# Patient Record
Sex: MALE | Race: WHITE | NOT HISPANIC OR LATINO | Employment: FULL TIME | URBAN - METROPOLITAN AREA
[De-identification: names, ages, dates, MRNs, and addresses within clinical notes are randomized per-mention and may not be internally consistent; named-entity substitution may affect disease eponyms.]

---

## 2017-08-24 ENCOUNTER — TELEPHONE (OUTPATIENT)
Dept: TRANSPLANT | Facility: CLINIC | Age: 47
End: 2017-08-24

## 2017-08-24 DIAGNOSIS — I50.9 CONGESTIVE HEART FAILURE, UNSPECIFIED CONGESTIVE HEART FAILURE CHRONICITY, UNSPECIFIED CONGESTIVE HEART FAILURE TYPE: Primary | ICD-10-CM

## 2017-08-24 NOTE — TELEPHONE ENCOUNTER
"REFERRAL NOTE:    Itz Daniel has been referred to the pre-heart transplant office for consideration for orthotopic heart transplantation by Aleksey Freeman. Patient's appointments have been scheduled for 09/11/17. Requested appointment that particular week as said have an appointment with another doctor on 09/08/17 for a second opinion and patient would like to keep and attend that appointment first.Informed a sooner appointment is available if change mind. Understanding verbalized. Information was provided and questions were answered.  Copies of "What to Expect", appointment letter and directions to [facility] were mailed to the patient. My contact information was given. Call PRN.  "

## 2017-08-24 NOTE — TELEPHONE ENCOUNTER
Called to schedule consult appointment. No answer. Left message along with contact information to call back.

## 2017-08-24 NOTE — TELEPHONE ENCOUNTER
Spoke with Tip. Informed of appointment scheduled for 09/11/17 and also requested recent labs and clinic visits. My contact information given.Plan to fax over.Call PRN.

## 2017-09-11 ENCOUNTER — INITIAL CONSULT (OUTPATIENT)
Dept: TRANSPLANT | Facility: CLINIC | Age: 47
End: 2017-09-11
Payer: COMMERCIAL

## 2017-09-11 ENCOUNTER — LAB VISIT (OUTPATIENT)
Dept: LAB | Facility: HOSPITAL | Age: 47
End: 2017-09-11
Attending: INTERNAL MEDICINE
Payer: COMMERCIAL

## 2017-09-11 VITALS
SYSTOLIC BLOOD PRESSURE: 119 MMHG | WEIGHT: 232.81 LBS | HEIGHT: 74 IN | HEART RATE: 77 BPM | DIASTOLIC BLOOD PRESSURE: 78 MMHG | BODY MASS INDEX: 29.88 KG/M2

## 2017-09-11 DIAGNOSIS — I50.9 CONGESTIVE HEART FAILURE, UNSPECIFIED CONGESTIVE HEART FAILURE CHRONICITY, UNSPECIFIED CONGESTIVE HEART FAILURE TYPE: ICD-10-CM

## 2017-09-11 DIAGNOSIS — E11.8 TYPE 2 DIABETES MELLITUS WITH COMPLICATION, WITHOUT LONG-TERM CURRENT USE OF INSULIN: ICD-10-CM

## 2017-09-11 DIAGNOSIS — I50.22 CHRONIC SYSTOLIC CHF (CONGESTIVE HEART FAILURE), NYHA CLASS 3: Primary | ICD-10-CM

## 2017-09-11 DIAGNOSIS — I25.5 ISCHEMIC CARDIOMYOPATHY: ICD-10-CM

## 2017-09-11 LAB
ALBUMIN SERPL BCP-MCNC: 3.5 G/DL
ALP SERPL-CCNC: 94 U/L
ALT SERPL W/O P-5'-P-CCNC: 30 U/L
ANION GAP SERPL CALC-SCNC: 5 MMOL/L
AST SERPL-CCNC: 12 U/L
BASOPHILS # BLD AUTO: 0.02 K/UL
BASOPHILS NFR BLD: 0.3 %
BILIRUB SERPL-MCNC: 0.5 MG/DL
BNP SERPL-MCNC: 128 PG/ML
BUN SERPL-MCNC: 21 MG/DL
CALCIUM SERPL-MCNC: 9.4 MG/DL
CHLORIDE SERPL-SCNC: 101 MMOL/L
CO2 SERPL-SCNC: 29 MMOL/L
CREAT SERPL-MCNC: 1.4 MG/DL
DIFFERENTIAL METHOD: ABNORMAL
EOSINOPHIL # BLD AUTO: 0.2 K/UL
EOSINOPHIL NFR BLD: 3.8 %
ERYTHROCYTE [DISTWIDTH] IN BLOOD BY AUTOMATED COUNT: 13.5 %
EST. GFR  (AFRICAN AMERICAN): >60 ML/MIN/1.73 M^2
EST. GFR  (NON AFRICAN AMERICAN): 59.4 ML/MIN/1.73 M^2
GLUCOSE SERPL-MCNC: 379 MG/DL
HCT VFR BLD AUTO: 40 %
HGB BLD-MCNC: 13.8 G/DL
LYMPHOCYTES # BLD AUTO: 2.3 K/UL
LYMPHOCYTES NFR BLD: 38.1 %
MCH RBC QN AUTO: 29.4 PG
MCHC RBC AUTO-ENTMCNC: 34.5 G/DL
MCV RBC AUTO: 85 FL
MONOCYTES # BLD AUTO: 0.5 K/UL
MONOCYTES NFR BLD: 8.5 %
NEUTROPHILS # BLD AUTO: 2.9 K/UL
NEUTROPHILS NFR BLD: 49.1 %
PLATELET # BLD AUTO: 224 K/UL
PMV BLD AUTO: 9.7 FL
POTASSIUM SERPL-SCNC: 4.9 MMOL/L
PROT SERPL-MCNC: 7.2 G/DL
RBC # BLD AUTO: 4.7 M/UL
SODIUM SERPL-SCNC: 135 MMOL/L
WBC # BLD AUTO: 5.98 K/UL

## 2017-09-11 PROCEDURE — 85025 COMPLETE CBC W/AUTO DIFF WBC: CPT | Mod: TXP

## 2017-09-11 PROCEDURE — 80053 COMPREHEN METABOLIC PANEL: CPT | Mod: TXP

## 2017-09-11 PROCEDURE — 83880 ASSAY OF NATRIURETIC PEPTIDE: CPT | Mod: TXP

## 2017-09-11 PROCEDURE — 3008F BODY MASS INDEX DOCD: CPT | Mod: S$GLB,TXP,, | Performed by: INTERNAL MEDICINE

## 2017-09-11 PROCEDURE — 99999 PR PBB SHADOW E&M-EST. PATIENT-LVL III: CPT | Mod: PBBFAC,TXP,, | Performed by: INTERNAL MEDICINE

## 2017-09-11 PROCEDURE — 36415 COLL VENOUS BLD VENIPUNCTURE: CPT | Mod: TXP

## 2017-09-11 PROCEDURE — 99205 OFFICE O/P NEW HI 60 MIN: CPT | Mod: S$GLB,TXP,, | Performed by: INTERNAL MEDICINE

## 2017-09-11 RX ORDER — ATORVASTATIN CALCIUM 40 MG/1
40 TABLET, FILM COATED ORAL DAILY
COMMUNITY
End: 2023-01-01 | Stop reason: ALTCHOICE

## 2017-09-11 RX ORDER — FUROSEMIDE 40 MG/1
40 TABLET ORAL DAILY PRN
COMMUNITY
End: 2023-01-01 | Stop reason: ALTCHOICE

## 2017-09-11 RX ORDER — GLIMEPIRIDE 4 MG/1
4 TABLET ORAL
COMMUNITY
End: 2023-01-01 | Stop reason: ALTCHOICE

## 2017-09-11 RX ORDER — METOPROLOL TARTRATE 25 MG/1
12.5 TABLET, FILM COATED ORAL DAILY
COMMUNITY
End: 2023-01-01 | Stop reason: ALTCHOICE

## 2017-09-11 NOTE — PROGRESS NOTES
Subjective:   Initial evaluation of heart transplant candidacy. He is referred Aleksey Freeman MD.     Chief Complaint: Dyspnea    HPI:  Mr. Daniel is a 47 y.o. year old Unknown male who has presents to be considered for advanced surgical options (LVAD/OHT).  He has HTN, CAD s/p 3V Bypass, ischemic CMP with resulting chronic systolic heart failure (HFrEF-EF 30%, LVEDD 6.3; s/p ), and DMII who quit smoking 5/1/17 after a previous 30pk/yr smoking history.     Per patient and records that were sent, he has had CMP and resulting HFrEF since May of this year when he came in for what he thought was a URI. Turned out he had acute heart failure and an ischemic w/u showed that he had multi-vessel disease and ischemic CMP. He underwent 3V bypass and since that time has had a low BP. As a result, he has been unable to tolerate GDMT. Hence, he was referred for advanced options. Pt, however, reports that over the last 2-4 weeks his BP has been trending up. Previously running the 80s-90s systolic, he is now 110s/70s (119/78 today). He is doing very well with cardiac rehab as he reports being able to go for 3 miles on the stationary bike and 1 mile on the treadmill during same visit. He denies any SOB/IRAHETA with these activities and reports BLE exhaustion/fatigue as the limiting factor to his exercise. He is scheduled for CPET at [facility] in [place]. He was recently see and the plan is to start low-dose lisinopril this week with close f/u of labs and BP. He has no started yet. He has no other complaints.     Activity level/exercise tolerance:  NYHA Class II  Fever/chills/sweats: No  Rash/skin lesions:  No  Orthopnea:  Sleeps on 1 pillows  Paroxysmal noctural dyspnea:  No  Chest pain/pressure:  No  Orthostatic lightheadedness: No  Palpitations: No  Lower extremity swelling:  No  Presyncope/syncope:  No      Past Medical History:   Diagnosis Date    CHF (congestive heart failure)     Coronary artery disease     Diabetes mellitus,  "type 2     Hypertension     Ischemic cardiomyopathy 05/01/2017     Past Surgical History:   Procedure Laterality Date    CORONARY ARTERY BYPASS GRAFT      3 vessel       Family History   Problem Relation Age of Onset    Hypertension Mother      Social History   Substance Use Topics    Smoking status: Former Smoker     Years: 30.00     Types: Cigarettes    Smokeless tobacco: Never Used    Alcohol use Yes       Review of Systems   Constitution: Negative for chills, fever, malaise/fatigue, night sweats and weight loss.   HENT: Negative for congestion and sore throat.    Eyes: Negative for blurred vision and visual disturbance.   Cardiovascular: Negative for chest pain, dyspnea on exertion, leg swelling, near-syncope, orthopnea, palpitations, paroxysmal nocturnal dyspnea and syncope.   Respiratory: Negative for cough, shortness of breath and wheezing.    Endocrine: Negative for cold intolerance and heat intolerance.   Hematologic/Lymphatic: Negative for adenopathy.   Skin: Negative for itching and rash.   Musculoskeletal: Negative for arthritis, back pain, joint pain and myalgias.   Gastrointestinal: Negative for bloating, abdominal pain, change in bowel habit, nausea and vomiting.   Genitourinary: Negative for dysuria and hesitancy.   Neurological: Negative for dizziness, headaches, numbness and sensory change.   Psychiatric/Behavioral: Negative for altered mental status and depression.       Objective:   Blood pressure 119/78, pulse 77, height 6' 2" (1.88 m), weight 105.6 kg (232 lb 12.9 oz).body mass index is 29.89 kg/m².    Physical Exam   Constitutional: He is oriented to person, place, and time. He appears well-developed and well-nourished. No distress.   HENT:   Head: Normocephalic.   Mouth/Throat: Oropharynx is clear and moist. No oropharyngeal exudate.   Eyes: Pupils are equal, round, and reactive to light. No scleral icterus.   Neck: Normal range of motion. No JVD present. No thyromegaly present. "   Cardiovascular: Normal rate, regular rhythm and normal heart sounds.  Exam reveals no gallop and no friction rub.    No murmur heard.  Pulmonary/Chest: Effort normal. No respiratory distress. He has no wheezes. He has no rales.   Abdominal: Soft. He exhibits no distension. There is no tenderness. There is no rebound and no guarding.   Musculoskeletal: Normal range of motion. He exhibits no edema, tenderness or deformity.   Neurological: He is alert and oriented to person, place, and time. No cranial nerve deficit. He exhibits normal muscle tone. Coordination normal.   Skin: Skin is warm and dry. No rash noted. No erythema.   Psychiatric: He has a normal mood and affect.       Labs:      Chemistry        Component Value Date/Time     (L) 09/11/2017 1031    K 4.9 09/11/2017 1031     09/11/2017 1031    CO2 29 09/11/2017 1031    BUN 21 (H) 09/11/2017 1031    CREATININE 1.4 09/11/2017 1031     (H) 09/11/2017 1031        Component Value Date/Time    CALCIUM 9.4 09/11/2017 1031    ALKPHOS 94 09/11/2017 1031    AST 12 09/11/2017 1031    ALT 30 09/11/2017 1031    BILITOT 0.5 09/11/2017 1031    ESTGFRAFRICA >60.0 09/11/2017 1031    EGFRNONAA 59.4 (A) 09/11/2017 1031          No results found for: MG  Lab Results   Component Value Date    WBC 5.98 09/11/2017    HGB 13.8 (L) 09/11/2017    HCT 40.0 09/11/2017    MCV 85 09/11/2017     09/11/2017     BNP   Date Value Ref Range Status   09/11/2017 128 (H) 0 - 99 pg/mL Final     Comment:     Values of less than 100 pg/ml are consistent with non-CHF populations.     No results found for this or any previous visit.    Labs were reviewed with the patient.    Assessment:      1. Chronic systolic CHF (congestive heart failure), NYHA class 3    2. Ischemic cardiomyopathy    3. Type 2 diabetes mellitus with complication, without long-term current use of insulin        Plan:   - pt is doing well today and euvolemic on exam  - I agree with trial of low-dose  lisinopril and up-titration if possible; will d/w primary cardiologist and f/u CPET outside  - Symptomatically he seems to be doing very well and with his recent increase in BP may tolerate GDMT. Will continue to follow along with primary cardiologist  - RTC in 4 months with 6MWT and likely repeat Echo/CPX    Patient is now NYHA II ACC stage D  Recommend 2 gram sodium restriction and 1500cc fluid restriction.  Encourage physical activity with graded exercise program.  Requested patient to weigh themselves daily, and to notify us if their weight increases by more than 3 lbs in 1 day or 5 lbs in 1 week.     Transplant Candidacy: Patient is a 47 y.o. year old male with heart failure is being seen for possible LVAD and OHT. In my opinion, he is  an excellent LVAD and OHT candidate. Patient did not meet with [facility] and/or pre-transplant coordinator at the end of this visit for workup. he is scheduled for risk stratification testing.    UNOS Patient Status  Functional Status: 100% - Normal, no complaints, no evidence of disease  Physical Capacity: No Limitations  Working for Income: Unknown    Venkata Gilliland MD

## 2017-09-11 NOTE — LETTER
September 11, 2017        Aleksey Freeman  [address]  [facility]  [place] [address]  Phone: [phone]  Fax: [phone]             [facility] [address] [address]  Phone: [phone]   Patient: Itz Daniel   MR Number: [id number]   YOB: 1970   Date of Visit: 9/11/2017       Dear Dr. Aleksey Freeman    Thank you for referring Itz Daniel to me for evaluation. Attached you will find relevant portions of my assessment and plan of care.    If you have questions, please do not hesitate to call me. I look forward to following Itz Daniel along with you.    Sincerely,    Venkata Gilliland MD    Enclosure    If you would like to receive this communication electronically, please contact [email or web address] or [phone] to request [employer] access.    [employer] is a tool which provides read-only access to select patient information with whom you have a relationship. Its easy to use and provides real time access to review your patients record including encounter summaries, notes, results, and demographic information.    If you feel you have received this communication in error or would no longer like to receive these types of communications, please e-mail [email or web address]

## 2017-11-03 ENCOUNTER — HISTORICAL (OUTPATIENT)
Dept: LAB | Facility: HOSPITAL | Age: 47
End: 2017-11-03

## 2017-11-03 LAB
ABS NEUT (OLG): 2.93 X10(3)/MCL (ref 2.1–9.2)
APTT PPP: 29.4 SECOND(S) (ref 24.8–36.9)
BASOPHILS # BLD AUTO: 0 X10(3)/MCL (ref 0–0.2)
BASOPHILS NFR BLD AUTO: 1 %
BUN SERPL-MCNC: 25 MG/DL (ref 7–18)
CALCIUM SERPL-MCNC: 9.3 MG/DL (ref 8.5–10.1)
CHLORIDE SERPL-SCNC: 101 MMOL/L (ref 98–107)
CO2 SERPL-SCNC: 28 MMOL/L (ref 21–32)
CREAT SERPL-MCNC: 1.31 MG/DL (ref 0.7–1.3)
EOSINOPHIL # BLD AUTO: 0.3 X10(3)/MCL (ref 0–0.9)
EOSINOPHIL NFR BLD AUTO: 6 %
ERYTHROCYTE [DISTWIDTH] IN BLOOD BY AUTOMATED COUNT: 12.3 % (ref 11.5–17)
GLUCOSE SERPL-MCNC: 313 MG/DL (ref 74–106)
HCT VFR BLD AUTO: 40.8 % (ref 42–52)
HGB BLD-MCNC: 13.6 GM/DL (ref 14–18)
INR PPP: 0.87 (ref 0–1.27)
LYMPHOCYTES # BLD AUTO: 2 X10(3)/MCL (ref 0.6–4.6)
LYMPHOCYTES NFR BLD AUTO: 35 %
MCH RBC QN AUTO: 29.6 PG (ref 27–31)
MCHC RBC AUTO-ENTMCNC: 33.3 GM/DL (ref 33–36)
MCV RBC AUTO: 88.9 FL (ref 80–94)
MONOCYTES # BLD AUTO: 0.4 X10(3)/MCL (ref 0.1–1.3)
MONOCYTES NFR BLD AUTO: 8 %
NEUTROPHILS # BLD AUTO: 2.93 X10(3)/MCL (ref 1.4–7.9)
NEUTROPHILS NFR BLD AUTO: 51 %
PLATELET # BLD AUTO: 248 X10(3)/MCL (ref 130–400)
PMV BLD AUTO: 10.2 FL (ref 9.4–12.4)
POTASSIUM SERPL-SCNC: 5.6 MMOL/L (ref 3.5–5.1)
PROTHROMBIN TIME: 11.7 SECOND(S) (ref 12.2–14.7)
RBC # BLD AUTO: 4.59 X10(6)/MCL (ref 4.7–6.1)
SODIUM SERPL-SCNC: 135 MMOL/L (ref 136–145)
WBC # SPEC AUTO: 5.8 X10(3)/MCL (ref 4.5–11.5)

## 2017-11-06 ENCOUNTER — HISTORICAL (OUTPATIENT)
Dept: MEDSURG UNIT | Facility: HOSPITAL | Age: 47
End: 2017-11-06

## 2017-11-06 LAB
BUN SERPL-MCNC: 24 MG/DL (ref 7–18)
CALCIUM SERPL-MCNC: 9.1 MG/DL (ref 8.5–10.1)
CHLORIDE SERPL-SCNC: 101 MMOL/L (ref 98–107)
CO2 SERPL-SCNC: 27 MMOL/L (ref 21–32)
CREAT SERPL-MCNC: 1.32 MG/DL (ref 0.7–1.3)
GLUCOSE SERPL-MCNC: 326 MG/DL (ref 74–106)
POTASSIUM SERPL-SCNC: 5.6 MMOL/L (ref 3.5–5.1)
SODIUM SERPL-SCNC: 135 MMOL/L (ref 136–145)

## 2017-11-30 RX ORDER — FUROSEMIDE 40 MG/1
40 TABLET ORAL
COMMUNITY
Start: 2017-06-03 | End: 2023-01-01 | Stop reason: ALTCHOICE

## 2017-11-30 RX ORDER — ASPIRIN 81 MG/1
81 TABLET ORAL
COMMUNITY

## 2017-11-30 RX ORDER — METOPROLOL TARTRATE 25 MG/1
12.5 TABLET, FILM COATED ORAL
COMMUNITY
Start: 2017-06-03 | End: 2023-01-01 | Stop reason: ALTCHOICE

## 2017-11-30 RX ORDER — ATORVASTATIN CALCIUM 40 MG/1
40 TABLET, FILM COATED ORAL
COMMUNITY
Start: 2017-06-03 | End: 2023-01-01 | Stop reason: ALTCHOICE

## 2017-11-30 RX ORDER — GLIMEPIRIDE 4 MG/1
TABLET ORAL
COMMUNITY
Start: 2017-06-11 | End: 2023-01-01 | Stop reason: ALTCHOICE

## 2018-07-31 ENCOUNTER — TELEPHONE (OUTPATIENT)
Dept: TRANSPLANT | Facility: CLINIC | Age: 48
End: 2018-07-31

## 2018-07-31 NOTE — TELEPHONE ENCOUNTER
Reached out to pt to inquire if he was still interested in pursuing care with [facility]. Pt last seen in clinic on 09/11/2017 by Dr. Gilliland. Subsequently pt was a no show for scheduled appointments/tests and labs on 01/25/2018. VM message left inquiring about possible return to care as well as my contact information.

## 2022-04-07 ENCOUNTER — HISTORICAL (OUTPATIENT)
Dept: ADMINISTRATIVE | Facility: HOSPITAL | Age: 52
End: 2022-04-07
Payer: COMMERCIAL

## 2022-04-23 VITALS — HEIGHT: 74 IN | BODY MASS INDEX: 27.67 KG/M2 | WEIGHT: 215.63 LBS

## 2023-01-01 ENCOUNTER — TELEPHONE (OUTPATIENT)
Dept: ADMINISTRATIVE | Facility: HOSPITAL | Age: 53
End: 2023-01-01

## 2023-01-01 ENCOUNTER — DOCUMENTATION ONLY (OUTPATIENT)
Dept: CARDIOLOGY | Facility: HOSPITAL | Age: 53
End: 2023-01-01

## 2023-01-01 ENCOUNTER — TELEPHONE (OUTPATIENT)
Dept: TRANSPLANT | Facility: CLINIC | Age: 53
End: 2023-01-01
Payer: COMMERCIAL

## 2023-01-01 ENCOUNTER — HOSPITAL ENCOUNTER (INPATIENT)
Facility: HOSPITAL | Age: 53
LOS: 13 days | DRG: 268 | End: 2023-08-24
Attending: INTERNAL MEDICINE | Admitting: INTERNAL MEDICINE
Payer: COMMERCIAL

## 2023-01-01 ENCOUNTER — HOSPITAL ENCOUNTER (INPATIENT)
Facility: HOSPITAL | Age: 53
LOS: 1 days | DRG: 215 | End: 2023-08-11
Attending: STUDENT IN AN ORGANIZED HEALTH CARE EDUCATION/TRAINING PROGRAM | Admitting: INTERNAL MEDICINE
Payer: COMMERCIAL

## 2023-01-01 VITALS
RESPIRATION RATE: 38 BRPM | WEIGHT: 260 LBS | HEIGHT: 74 IN | DIASTOLIC BLOOD PRESSURE: 85 MMHG | BODY MASS INDEX: 33.37 KG/M2 | SYSTOLIC BLOOD PRESSURE: 109 MMHG | HEART RATE: 111 BPM | TEMPERATURE: 98 F | OXYGEN SATURATION: 97 %

## 2023-01-01 DIAGNOSIS — N17.9 AKI (ACUTE KIDNEY INJURY): ICD-10-CM

## 2023-01-01 DIAGNOSIS — I49.9 CARDIAC RHYTHM DISORDER OR DISTURBANCE OR CHANGE: ICD-10-CM

## 2023-01-01 DIAGNOSIS — Z51.5 PALLIATIVE CARE ENCOUNTER: ICD-10-CM

## 2023-01-01 DIAGNOSIS — N18.4 CKD (CHRONIC KIDNEY DISEASE), STAGE IV: ICD-10-CM

## 2023-01-01 DIAGNOSIS — G40.409: ICD-10-CM

## 2023-01-01 DIAGNOSIS — E11.22 TYPE 2 DIABETES MELLITUS WITH CHRONIC KIDNEY DISEASE, WITH LONG-TERM CURRENT USE OF INSULIN, UNSPECIFIED CKD STAGE: ICD-10-CM

## 2023-01-01 DIAGNOSIS — R07.9 CHEST PAIN: ICD-10-CM

## 2023-01-01 DIAGNOSIS — E03.9 HYPOTHYROIDISM, UNSPECIFIED TYPE: ICD-10-CM

## 2023-01-01 DIAGNOSIS — E66.9 CLASS 1 OBESITY WITH BODY MASS INDEX (BMI) OF 33.0 TO 33.9 IN ADULT, UNSPECIFIED OBESITY TYPE, UNSPECIFIED WHETHER SERIOUS COMORBIDITY PRESENT: ICD-10-CM

## 2023-01-01 DIAGNOSIS — R73.9 HYPERGLYCEMIA: ICD-10-CM

## 2023-01-01 DIAGNOSIS — I25.10 CAD (CORONARY ARTERY DISEASE): ICD-10-CM

## 2023-01-01 DIAGNOSIS — Z79.4 TYPE 2 DIABETES MELLITUS WITH CHRONIC KIDNEY DISEASE, WITH LONG-TERM CURRENT USE OF INSULIN, UNSPECIFIED CKD STAGE: ICD-10-CM

## 2023-01-01 DIAGNOSIS — I21.4 NSTEMI (NON-ST ELEVATED MYOCARDIAL INFARCTION): Primary | ICD-10-CM

## 2023-01-01 DIAGNOSIS — I50.9 HEART FAILURE: ICD-10-CM

## 2023-01-01 DIAGNOSIS — I24.9 ACS (ACUTE CORONARY SYNDROME): ICD-10-CM

## 2023-01-01 DIAGNOSIS — I21.9 MI (MYOCARDIAL INFARCTION): ICD-10-CM

## 2023-01-01 DIAGNOSIS — I21.4 NSTEMI (NON-ST ELEVATED MYOCARDIAL INFARCTION): ICD-10-CM

## 2023-01-01 DIAGNOSIS — Z89.429 TOE AMPUTEE: ICD-10-CM

## 2023-01-01 DIAGNOSIS — I47.20 VT (VENTRICULAR TACHYCARDIA): ICD-10-CM

## 2023-01-01 DIAGNOSIS — R57.0 CARDIOGENIC SHOCK: ICD-10-CM

## 2023-01-01 DIAGNOSIS — Z71.89 ADVANCE CARE PLANNING: ICD-10-CM

## 2023-01-01 DIAGNOSIS — R06.02 SOB (SHORTNESS OF BREATH): ICD-10-CM

## 2023-01-01 DIAGNOSIS — I50.43 ACUTE ON CHRONIC COMBINED SYSTOLIC AND DIASTOLIC HEART FAILURE: ICD-10-CM

## 2023-01-01 DIAGNOSIS — I25.5 ISCHEMIC CARDIOMYOPATHY: Primary | ICD-10-CM

## 2023-01-01 LAB
ABO + RH BLD: ABNORMAL
ABO + RH BLD: ABNORMAL
ABO + RH BLD: NORMAL
ACINETOBACTER CALCOACETICUS-BAUMANNII COMPLEX (OHS): NOT DETECTED
ALBUMIN SERPL BCP-MCNC: 2.4 G/DL (ref 3.5–5.2)
ALBUMIN SERPL BCP-MCNC: 2.5 G/DL (ref 3.5–5.2)
ALBUMIN SERPL BCP-MCNC: 2.6 G/DL (ref 3.5–5.2)
ALBUMIN SERPL BCP-MCNC: 2.7 G/DL (ref 3.5–5.2)
ALBUMIN SERPL BCP-MCNC: 2.8 G/DL (ref 3.5–5.2)
ALBUMIN SERPL-MCNC: 2.8 G/DL (ref 3.5–5)
ALBUMIN SERPL-MCNC: 3.1 G/DL (ref 3.5–5)
ALBUMIN SERPL-MCNC: 3.8 G/DL (ref 3.5–5)
ALBUMIN/GLOB SERPL: 0.8 RATIO (ref 1.1–2)
ALBUMIN/GLOB SERPL: 0.9 RATIO (ref 1.1–2)
ALBUMIN/GLOB SERPL: 1.2 RATIO (ref 1.1–2)
ALLENS TEST BLOOD GAS (OHS): YES
ALLENS TEST: ABNORMAL
ALLENS TEST: NORMAL
ALP SERPL-CCNC: 117 U/L (ref 55–135)
ALP SERPL-CCNC: 133 U/L (ref 55–135)
ALP SERPL-CCNC: 141 U/L (ref 55–135)
ALP SERPL-CCNC: 158 U/L (ref 55–135)
ALP SERPL-CCNC: 65 UNIT/L (ref 40–150)
ALP SERPL-CCNC: 68 U/L (ref 55–135)
ALP SERPL-CCNC: 68 U/L (ref 55–135)
ALP SERPL-CCNC: 69 U/L (ref 55–135)
ALP SERPL-CCNC: 73 U/L (ref 55–135)
ALP SERPL-CCNC: 75 U/L (ref 55–135)
ALP SERPL-CCNC: 76 U/L (ref 55–135)
ALP SERPL-CCNC: 77 U/L (ref 55–135)
ALP SERPL-CCNC: 79 U/L (ref 55–135)
ALP SERPL-CCNC: 81 U/L (ref 55–135)
ALP SERPL-CCNC: 81 UNIT/L (ref 40–150)
ALP SERPL-CCNC: 83 U/L (ref 55–135)
ALP SERPL-CCNC: 84 U/L (ref 55–135)
ALP SERPL-CCNC: 84 U/L (ref 55–135)
ALP SERPL-CCNC: 86 U/L (ref 55–135)
ALP SERPL-CCNC: 90 UNIT/L (ref 40–150)
ALT SERPL W/O P-5'-P-CCNC: 126 U/L (ref 10–44)
ALT SERPL W/O P-5'-P-CCNC: 15 U/L (ref 10–44)
ALT SERPL W/O P-5'-P-CCNC: 16 U/L (ref 10–44)
ALT SERPL W/O P-5'-P-CCNC: 17 U/L (ref 10–44)
ALT SERPL W/O P-5'-P-CCNC: 18 U/L (ref 10–44)
ALT SERPL W/O P-5'-P-CCNC: 19 U/L (ref 10–44)
ALT SERPL W/O P-5'-P-CCNC: 2135 U/L (ref 10–44)
ALT SERPL W/O P-5'-P-CCNC: 23 U/L (ref 10–44)
ALT SERPL W/O P-5'-P-CCNC: 24 U/L (ref 10–44)
ALT SERPL W/O P-5'-P-CCNC: 25 U/L (ref 10–44)
ALT SERPL W/O P-5'-P-CCNC: 26 U/L (ref 10–44)
ALT SERPL W/O P-5'-P-CCNC: 2614 U/L (ref 10–44)
ALT SERPL W/O P-5'-P-CCNC: 28 U/L (ref 10–44)
ALT SERPL W/O P-5'-P-CCNC: 28 U/L (ref 10–44)
ALT SERPL W/O P-5'-P-CCNC: 29 U/L (ref 10–44)
ALT SERPL W/O P-5'-P-CCNC: 2966 U/L (ref 10–44)
ALT SERPL W/O P-5'-P-CCNC: 3655 U/L (ref 10–44)
ALT SERPL-CCNC: 27 UNIT/L (ref 0–55)
ALT SERPL-CCNC: 29 UNIT/L (ref 0–55)
ALT SERPL-CCNC: 35 UNIT/L (ref 0–55)
ANION GAP SERPL CALC-SCNC: 10 MMOL/L (ref 8–16)
ANION GAP SERPL CALC-SCNC: 11 MMOL/L (ref 8–16)
ANION GAP SERPL CALC-SCNC: 12 MMOL/L (ref 8–16)
ANION GAP SERPL CALC-SCNC: 13 MMOL/L (ref 8–16)
ANION GAP SERPL CALC-SCNC: 14 MMOL/L (ref 8–16)
ANION GAP SERPL CALC-SCNC: 15 MMOL/L (ref 8–16)
ANION GAP SERPL CALC-SCNC: 16 MMOL/L (ref 8–16)
ANION GAP SERPL CALC-SCNC: 17 MMOL/L (ref 8–16)
ANION GAP SERPL CALC-SCNC: 9 MMOL/L (ref 8–16)
APPEARANCE UR: CLEAR
APTT PPP: 22.8 SEC (ref 21–32)
APTT PPP: 26.8 SEC (ref 21–32)
APTT PPP: 28.5 SEC (ref 21–32)
APTT PPP: 28.5 SECONDS (ref 23.2–33.7)
APTT PPP: 28.6 SEC (ref 21–32)
APTT PPP: 29.3 SEC (ref 21–32)
APTT PPP: 30.6 SECONDS (ref 23.2–33.7)
APTT PPP: 34.8 SEC (ref 21–32)
APTT PPP: 34.9 SEC (ref 21–32)
APTT PPP: 35.2 SEC (ref 21–32)
APTT PPP: 35.9 SECONDS (ref 23.2–33.7)
APTT PPP: 45.4 SEC (ref 21–32)
APTT PPP: 45.9 SECONDS (ref 23.2–33.7)
APTT PPP: 47.5 SEC (ref 21–32)
APTT PPP: 52 SEC (ref 21–32)
APTT PPP: 52.2 SEC (ref 21–32)
APTT PPP: 58.8 SEC (ref 21–32)
ASCENDING AORTA: 3.86 CM
AST SERPL-CCNC: 114 UNIT/L (ref 5–34)
AST SERPL-CCNC: 120 U/L (ref 10–40)
AST SERPL-CCNC: 128 UNIT/L (ref 5–34)
AST SERPL-CCNC: 146 U/L (ref 10–40)
AST SERPL-CCNC: 154 U/L (ref 10–40)
AST SERPL-CCNC: 156 UNIT/L (ref 5–34)
AST SERPL-CCNC: 20 U/L (ref 10–40)
AST SERPL-CCNC: 24 U/L (ref 10–40)
AST SERPL-CCNC: 2906 U/L (ref 10–40)
AST SERPL-CCNC: 31 U/L (ref 10–40)
AST SERPL-CCNC: 33 U/L (ref 10–40)
AST SERPL-CCNC: 35 U/L (ref 10–40)
AST SERPL-CCNC: 38 U/L (ref 10–40)
AST SERPL-CCNC: 3853 U/L (ref 10–40)
AST SERPL-CCNC: 39 U/L (ref 10–40)
AST SERPL-CCNC: 4228 U/L (ref 10–40)
AST SERPL-CCNC: 4782 U/L (ref 10–40)
AST SERPL-CCNC: 50 U/L (ref 10–40)
AST SERPL-CCNC: 60 U/L (ref 10–40)
AST SERPL-CCNC: 82 U/L (ref 10–40)
AV INDEX (PROSTH): 0.57
AV INDEX (PROSTH): 0.59
AV INDEX (PROSTH): 0.92
AV MEAN GRADIENT: 2 MMHG
AV PEAK GRADIENT: 3 MMHG
AV PEAK GRADIENT: 4 MMHG
AV PEAK GRADIENT: 4 MMHG
AV VALVE AREA BY VELOCITY RATIO: 1.87 CM²
AV VALVE AREA BY VELOCITY RATIO: 3.19 CM²
AV VALVE AREA BY VELOCITY RATIO: 3.19 CM²
AV VALVE AREA: 1.79 CM²
AV VALVE AREA: 2.74 CM²
AV VALVE AREA: 3.49 CM²
AV VELOCITY RATIO: 0.59
AV VELOCITY RATIO: 0.69
AV VELOCITY RATIO: 0.84
BACTERIA #/AREA URNS AUTO: ABNORMAL /HPF
BACTERIA #/AREA URNS AUTO: ABNORMAL /HPF
BACTERIA #/AREA URNS AUTO: NORMAL /HPF
BACTERIA BLD CULT: ABNORMAL
BACTERIA BLD CULT: NORMAL
BACTEROIDES FRAGILIS (OHS): NOT DETECTED
BASE EXCESS BLD CALC-SCNC: -2.6 MMOL/L (ref -2–2)
BASOPHILS # BLD AUTO: 0.03 K/UL (ref 0–0.2)
BASOPHILS # BLD AUTO: 0.04 K/UL (ref 0–0.2)
BASOPHILS # BLD AUTO: 0.05 K/UL (ref 0–0.2)
BASOPHILS # BLD AUTO: 0.05 X10(3)/MCL
BASOPHILS # BLD AUTO: 0.05 X10(3)/MCL
BASOPHILS # BLD AUTO: 0.06 K/UL (ref 0–0.2)
BASOPHILS # BLD AUTO: 0.09 X10(3)/MCL
BASOPHILS NFR BLD AUTO: 0.4 %
BASOPHILS NFR BLD AUTO: 0.4 %
BASOPHILS NFR BLD AUTO: 0.6 %
BASOPHILS NFR BLD: 0.1 % (ref 0–1.9)
BASOPHILS NFR BLD: 0.3 % (ref 0–1.9)
BASOPHILS NFR BLD: 0.3 % (ref 0–1.9)
BASOPHILS NFR BLD: 0.4 % (ref 0–1.9)
BASOPHILS NFR BLD: 0.5 % (ref 0–1.9)
BASOPHILS NFR BLD: 0.5 % (ref 0–1.9)
BASOPHILS NFR BLD: 0.6 % (ref 0–1.9)
BILIRUB SERPL-MCNC: 0.6 MG/DL (ref 0.1–1)
BILIRUB SERPL-MCNC: 0.6 MG/DL (ref 0.1–1)
BILIRUB SERPL-MCNC: 0.7 MG/DL (ref 0.1–1)
BILIRUB SERPL-MCNC: 0.8 MG/DL
BILIRUB SERPL-MCNC: 0.8 MG/DL
BILIRUB SERPL-MCNC: 0.8 MG/DL (ref 0.1–1)
BILIRUB SERPL-MCNC: 0.9 MG/DL (ref 0.1–1)
BILIRUB SERPL-MCNC: 1 MG/DL
BILIRUB SERPL-MCNC: 1.1 MG/DL (ref 0.1–1)
BILIRUB SERPL-MCNC: 2 MG/DL (ref 0.1–1)
BILIRUB SERPL-MCNC: 2.1 MG/DL (ref 0.1–1)
BILIRUB SERPL-MCNC: 2.4 MG/DL (ref 0.1–1)
BILIRUB SERPL-MCNC: 2.7 MG/DL (ref 0.1–1)
BILIRUB UR QL STRIP.AUTO: NEGATIVE
BILIRUB UR QL STRIP: NEGATIVE
BILIRUB UR QL STRIP: NEGATIVE
BIPAP: 0
BLD GP AB SCN CELLS X3 SERPL QL: ABNORMAL
BLD GP AB SCN CELLS X3 SERPL QL: ABNORMAL
BLD GP AB SCN CELLS X3 SERPL QL: NORMAL
BLOOD GAS SAMPLE TYPE (OHS): ABNORMAL
BLOOD GROUP ANTIBODIES SERPL: NORMAL
BNP BLD-MCNC: 795.7 PG/ML
BNP SERPL-MCNC: 2674 PG/ML (ref 0–99)
BNP SERPL-MCNC: 956 PG/ML (ref 0–99)
BSA FOR ECHO PROCEDURE: 2.48 M2
BSA FOR ECHO PROCEDURE: 2.48 M2
BSA FOR ECHO PROCEDURE: 2.52 M2
BSA FOR ECHO PROCEDURE: 2.52 M2
BUN SERPL-MCNC: 26 MG/DL (ref 6–20)
BUN SERPL-MCNC: 28 MG/DL (ref 6–20)
BUN SERPL-MCNC: 29 MG/DL (ref 6–20)
BUN SERPL-MCNC: 30 MG/DL (ref 6–20)
BUN SERPL-MCNC: 30 MG/DL (ref 6–20)
BUN SERPL-MCNC: 31 MG/DL (ref 6–20)
BUN SERPL-MCNC: 32 MG/DL (ref 6–20)
BUN SERPL-MCNC: 33 MG/DL (ref 6–20)
BUN SERPL-MCNC: 34 MG/DL (ref 6–20)
BUN SERPL-MCNC: 36 MG/DL (ref 6–20)
BUN SERPL-MCNC: 37.3 MG/DL (ref 8.4–25.7)
BUN SERPL-MCNC: 38 MG/DL (ref 6–20)
BUN SERPL-MCNC: 38 MG/DL (ref 6–20)
BUN SERPL-MCNC: 40 MG/DL (ref 6–20)
BUN SERPL-MCNC: 41 MG/DL (ref 6–20)
BUN SERPL-MCNC: 41 MG/DL (ref 6–20)
BUN SERPL-MCNC: 42 MG/DL (ref 6–20)
BUN SERPL-MCNC: 43 MG/DL (ref 6–20)
BUN SERPL-MCNC: 43.2 MG/DL (ref 8.4–25.7)
BUN SERPL-MCNC: 43.8 MG/DL (ref 8.4–25.7)
BUN SERPL-MCNC: 44 MG/DL (ref 6–20)
BUN SERPL-MCNC: 45 MG/DL (ref 6–20)
BUN SERPL-MCNC: 49 MG/DL (ref 6–20)
BUN SERPL-MCNC: 50 MG/DL (ref 6–20)
BUN SERPL-MCNC: 50 MG/DL (ref 6–20)
BUN SERPL-MCNC: 51 MG/DL (ref 6–20)
BUN SERPL-MCNC: 53 MG/DL (ref 6–20)
BUN SERPL-MCNC: 55 MG/DL (ref 6–20)
BUN SERPL-MCNC: 55 MG/DL (ref 6–20)
BUN SERPL-MCNC: 59 MG/DL (ref 6–20)
BUN SERPL-MCNC: 59 MG/DL (ref 6–20)
BUN SERPL-MCNC: 60 MG/DL (ref 6–20)
BUN SERPL-MCNC: 61 MG/DL (ref 6–20)
BUN SERPL-MCNC: 63 MG/DL (ref 6–20)
BUN SERPL-MCNC: 65 MG/DL (ref 6–20)
BUN SERPL-MCNC: 65 MG/DL (ref 6–20)
BUN SERPL-MCNC: 67 MG/DL (ref 6–20)
BUN SERPL-MCNC: 68 MG/DL (ref 6–20)
BUN SERPL-MCNC: 69 MG/DL (ref 6–20)
BUN SERPL-MCNC: 73 MG/DL (ref 6–20)
BUN SERPL-MCNC: 73 MG/DL (ref 6–20)
BUN SERPL-MCNC: 78 MG/DL (ref 6–20)
BUN SERPL-MCNC: 82 MG/DL (ref 6–20)
BUN SERPL-MCNC: 82 MG/DL (ref 6–20)
BUN SERPL-MCNC: 85 MG/DL (ref 6–20)
C AURIS DNA BLD POS QL NAA+NON-PROBE: NOT DETECTED
C GATTII+NEOFOR DNA CSF QL NAA+NON-PROBE: NOT DETECTED
CA-I BLD-SCNC: 1.1 MMOL/L (ref 1.12–1.23)
CALCIUM SERPL-MCNC: 7.7 MG/DL (ref 8.7–10.5)
CALCIUM SERPL-MCNC: 7.8 MG/DL (ref 8.7–10.5)
CALCIUM SERPL-MCNC: 8 MG/DL (ref 8.7–10.5)
CALCIUM SERPL-MCNC: 8 MG/DL (ref 8.7–10.5)
CALCIUM SERPL-MCNC: 8.2 MG/DL (ref 8.7–10.5)
CALCIUM SERPL-MCNC: 8.3 MG/DL (ref 8.7–10.5)
CALCIUM SERPL-MCNC: 8.4 MG/DL (ref 8.7–10.5)
CALCIUM SERPL-MCNC: 8.5 MG/DL (ref 8.7–10.5)
CALCIUM SERPL-MCNC: 8.6 MG/DL (ref 8.4–10.2)
CALCIUM SERPL-MCNC: 8.6 MG/DL (ref 8.7–10.5)
CALCIUM SERPL-MCNC: 8.7 MG/DL (ref 8.4–10.2)
CALCIUM SERPL-MCNC: 8.7 MG/DL (ref 8.7–10.5)
CALCIUM SERPL-MCNC: 8.8 MG/DL (ref 8.7–10.5)
CALCIUM SERPL-MCNC: 9.5 MG/DL (ref 8.4–10.2)
CANDIDA ALBICANS (OHS): NOT DETECTED
CANDIDA GLABRATA (OHS): NOT DETECTED
CANDIDA KRUSEI (OHS): NOT DETECTED
CANDIDA PARAPSILOSIS (OHS): NOT DETECTED
CANDIDA TROPICALIS (OHS): NOT DETECTED
CHLORIDE SERPL-SCNC: 100 MMOL/L (ref 98–107)
CHLORIDE SERPL-SCNC: 102 MMOL/L (ref 95–110)
CHLORIDE SERPL-SCNC: 102 MMOL/L (ref 95–110)
CHLORIDE SERPL-SCNC: 89 MMOL/L (ref 95–110)
CHLORIDE SERPL-SCNC: 90 MMOL/L (ref 95–110)
CHLORIDE SERPL-SCNC: 91 MMOL/L (ref 95–110)
CHLORIDE SERPL-SCNC: 92 MMOL/L (ref 95–110)
CHLORIDE SERPL-SCNC: 93 MMOL/L (ref 95–110)
CHLORIDE SERPL-SCNC: 94 MMOL/L (ref 95–110)
CHLORIDE SERPL-SCNC: 95 MMOL/L (ref 95–110)
CHLORIDE SERPL-SCNC: 96 MMOL/L (ref 95–110)
CHLORIDE SERPL-SCNC: 96 MMOL/L (ref 98–107)
CHLORIDE SERPL-SCNC: 97 MMOL/L (ref 95–110)
CHLORIDE SERPL-SCNC: 98 MMOL/L (ref 95–110)
CHLORIDE SERPL-SCNC: 99 MMOL/L (ref 95–110)
CHLORIDE SERPL-SCNC: 99 MMOL/L (ref 95–110)
CHLORIDE SERPL-SCNC: 99 MMOL/L (ref 98–107)
CHLORIDE UR-SCNC: <20 MMOL/L (ref 25–200)
CHOLEST SERPL-MCNC: 140 MG/DL
CHOLEST/HDLC SERPL: 6 {RATIO} (ref 0–5)
CK SERPL-CCNC: 1398 U/L (ref 30–200)
CK SERPL-CCNC: 241 U/L (ref 20–200)
CLARITY UR REFRACT.AUTO: CLEAR
CLARITY UR REFRACT.AUTO: CLEAR
CO2 BLDA-SCNC: 24.5 MMOL/L
CO2 SERPL-SCNC: 16 MMOL/L (ref 23–29)
CO2 SERPL-SCNC: 18 MMOL/L (ref 23–29)
CO2 SERPL-SCNC: 18 MMOL/L (ref 23–29)
CO2 SERPL-SCNC: 19 MMOL/L (ref 22–29)
CO2 SERPL-SCNC: 19 MMOL/L (ref 23–29)
CO2 SERPL-SCNC: 20 MMOL/L (ref 23–29)
CO2 SERPL-SCNC: 21 MMOL/L (ref 22–29)
CO2 SERPL-SCNC: 21 MMOL/L (ref 23–29)
CO2 SERPL-SCNC: 22 MMOL/L (ref 23–29)
CO2 SERPL-SCNC: 23 MMOL/L (ref 22–29)
CO2 SERPL-SCNC: 23 MMOL/L (ref 23–29)
CO2 SERPL-SCNC: 24 MMOL/L (ref 23–29)
CO2 SERPL-SCNC: 25 MMOL/L (ref 23–29)
CO2 SERPL-SCNC: 26 MMOL/L (ref 23–29)
CO2 SERPL-SCNC: 27 MMOL/L (ref 23–29)
CO2 SERPL-SCNC: 28 MMOL/L (ref 23–29)
CO2 SERPL-SCNC: 28 MMOL/L (ref 23–29)
CO2 SERPL-SCNC: 29 MMOL/L (ref 23–29)
CO2 SERPL-SCNC: 31 MMOL/L (ref 23–29)
COHGB MFR BLDA: 1.9 % (ref 0.5–1.5)
COLOR UR AUTO: ABNORMAL
COLOR UR AUTO: YELLOW
COLOR UR: YELLOW
CREAT SERPL-MCNC: 2.1 MG/DL (ref 0.5–1.4)
CREAT SERPL-MCNC: 2.1 MG/DL (ref 0.5–1.4)
CREAT SERPL-MCNC: 2.2 MG/DL (ref 0.5–1.4)
CREAT SERPL-MCNC: 2.3 MG/DL (ref 0.5–1.4)
CREAT SERPL-MCNC: 2.32 MG/DL (ref 0.73–1.18)
CREAT SERPL-MCNC: 2.48 MG/DL (ref 0.73–1.18)
CREAT SERPL-MCNC: 2.5 MG/DL (ref 0.5–1.4)
CREAT SERPL-MCNC: 2.51 MG/DL (ref 0.73–1.18)
CREAT SERPL-MCNC: 2.6 MG/DL (ref 0.5–1.4)
CREAT SERPL-MCNC: 2.7 MG/DL (ref 0.5–1.4)
CREAT SERPL-MCNC: 2.8 MG/DL (ref 0.5–1.4)
CREAT SERPL-MCNC: 2.8 MG/DL (ref 0.5–1.4)
CREAT SERPL-MCNC: 2.9 MG/DL (ref 0.5–1.4)
CREAT SERPL-MCNC: 3 MG/DL (ref 0.5–1.4)
CREAT SERPL-MCNC: 3.1 MG/DL (ref 0.5–1.4)
CREAT SERPL-MCNC: 3.1 MG/DL (ref 0.5–1.4)
CREAT SERPL-MCNC: 3.2 MG/DL (ref 0.5–1.4)
CREAT SERPL-MCNC: 3.3 MG/DL (ref 0.5–1.4)
CREAT SERPL-MCNC: 3.4 MG/DL (ref 0.5–1.4)
CREAT SERPL-MCNC: 3.6 MG/DL (ref 0.5–1.4)
CREAT SERPL-MCNC: 3.6 MG/DL (ref 0.5–1.4)
CREAT SERPL-MCNC: 3.7 MG/DL (ref 0.5–1.4)
CREAT SERPL-MCNC: 3.7 MG/DL (ref 0.5–1.4)
CREAT SERPL-MCNC: 3.8 MG/DL (ref 0.5–1.4)
CREAT SERPL-MCNC: 3.8 MG/DL (ref 0.5–1.4)
CREAT SERPL-MCNC: 3.9 MG/DL (ref 0.5–1.4)
CREAT SERPL-MCNC: 4 MG/DL (ref 0.5–1.4)
CREAT SERPL-MCNC: 4 MG/DL (ref 0.5–1.4)
CREAT SERPL-MCNC: 4.1 MG/DL (ref 0.5–1.4)
CREAT SERPL-MCNC: 4.2 MG/DL (ref 0.5–1.4)
CREAT SERPL-MCNC: 4.4 MG/DL (ref 0.5–1.4)
CREAT SERPL-MCNC: 4.5 MG/DL (ref 0.5–1.4)
CREAT SERPL-MCNC: 4.8 MG/DL (ref 0.5–1.4)
CREAT SERPL-MCNC: 4.8 MG/DL (ref 0.5–1.4)
CREAT SERPL-MCNC: 4.9 MG/DL (ref 0.5–1.4)
CREAT SERPL-MCNC: 5.3 MG/DL (ref 0.5–1.4)
CREAT SERPL-MCNC: 5.4 MG/DL (ref 0.5–1.4)
CREAT SERPL-MCNC: 5.6 MG/DL (ref 0.5–1.4)
CREAT SERPL-MCNC: 5.9 MG/DL (ref 0.5–1.4)
CREAT UR-MCNC: 138 MG/DL (ref 23–375)
CREAT UR-MCNC: 95 MG/DL (ref 23–375)
CTX-M (OHS): ABNORMAL
CV ECHO LV RWT: 0.22 CM
CV ECHO LV RWT: 0.32 CM
CV ECHO LV RWT: 0.33 CM
DELSYS: ABNORMAL
DELSYS: NORMAL
DIFFERENTIAL METHOD: ABNORMAL
DOP CALC AO PEAK VEL: 0.93 M/S
DOP CALC AO PEAK VEL: 0.94 M/S
DOP CALC AO PEAK VEL: 1.01 M/S
DOP CALC AO VTI: 12.3 CM
DOP CALC AO VTI: 13.17 CM
DOP CALC AO VTI: 15.1 CM
DOP CALC LVOT AREA: 3.1 CM2
DOP CALC LVOT AREA: 3.8 CM2
DOP CALC LVOT AREA: 4.6 CM2
DOP CALC LVOT DIAMETER: 2 CM
DOP CALC LVOT DIAMETER: 2.2 CM
DOP CALC LVOT DIAMETER: 2.43 CM
DOP CALC LVOT PEAK VEL: 0.6 M/S
DOP CALC LVOT PEAK VEL: 0.64 M/S
DOP CALC LVOT PEAK VEL: 0.79 M/S
DOP CALC LVOT STROKE VOLUME: 27 CM3
DOP CALC LVOT STROKE VOLUME: 36.06 CM3
DOP CALC LVOT STROKE VOLUME: 42.93 CM3
DOP CALC MV VTI: 16.3 CM
DOP CALCLVOT PEAK VEL VTI: 11.3 CM
DOP CALCLVOT PEAK VEL VTI: 7.78 CM
DOP CALCLVOT PEAK VEL VTI: 8.6 CM
DRAWN BY BLOOD GAS (OHS): ABNORMAL
E WAVE DECELERATION TIME: 228.98 MSEC
E/A RATIO: 1.13
E/E' RATIO: 13.56 M/S
E/E' RATIO: 21.11 M/S
E/E' RATIO: 22.44 M/S
ECHO LV POSTERIOR WALL: 0.77 CM (ref 0.6–1.1)
ECHO LV POSTERIOR WALL: 1 CM (ref 0.6–1.1)
ECHO LV POSTERIOR WALL: 1.01 CM (ref 0.6–1.1)
ENTEROBACTER CLOACAE COMPLEX (OHS): NOT DETECTED
ENTEROBACTERALES (OHS): NOT DETECTED
ENTEROCOCCUS FAECALIS (OHS): NOT DETECTED
ENTEROCOCCUS FAECIUM (OHS): NOT DETECTED
EOSINOPHIL # BLD AUTO: 0 K/UL (ref 0–0.5)
EOSINOPHIL # BLD AUTO: 0.07 X10(3)/MCL (ref 0–0.9)
EOSINOPHIL # BLD AUTO: 0.1 K/UL (ref 0–0.5)
EOSINOPHIL # BLD AUTO: 0.11 X10(3)/MCL (ref 0–0.9)
EOSINOPHIL # BLD AUTO: 0.2 K/UL (ref 0–0.5)
EOSINOPHIL # BLD AUTO: 0.27 X10(3)/MCL (ref 0–0.9)
EOSINOPHIL # BLD AUTO: 0.3 K/UL (ref 0–0.5)
EOSINOPHIL # BLD AUTO: 0.4 K/UL (ref 0–0.5)
EOSINOPHIL # BLD AUTO: 0.5 K/UL (ref 0–0.5)
EOSINOPHIL # BLD AUTO: 0.6 K/UL (ref 0–0.5)
EOSINOPHIL # BLD AUTO: 0.7 K/UL (ref 0–0.5)
EOSINOPHIL # BLD AUTO: 0.8 K/UL (ref 0–0.5)
EOSINOPHIL # BLD AUTO: 0.8 K/UL (ref 0–0.5)
EOSINOPHIL NFR BLD AUTO: 0.6 %
EOSINOPHIL NFR BLD AUTO: 0.8 %
EOSINOPHIL NFR BLD AUTO: 2.3 %
EOSINOPHIL NFR BLD: 0.1 % (ref 0–8)
EOSINOPHIL NFR BLD: 0.7 % (ref 0–8)
EOSINOPHIL NFR BLD: 0.8 % (ref 0–8)
EOSINOPHIL NFR BLD: 1 % (ref 0–8)
EOSINOPHIL NFR BLD: 2 % (ref 0–8)
EOSINOPHIL NFR BLD: 2.3 % (ref 0–8)
EOSINOPHIL NFR BLD: 2.6 % (ref 0–8)
EOSINOPHIL NFR BLD: 3.8 % (ref 0–8)
EOSINOPHIL NFR BLD: 4.2 % (ref 0–8)
EOSINOPHIL NFR BLD: 4.4 % (ref 0–8)
EOSINOPHIL NFR BLD: 4.8 % (ref 0–8)
EOSINOPHIL NFR BLD: 5 % (ref 0–8)
EOSINOPHIL NFR BLD: 5.1 % (ref 0–8)
EOSINOPHIL NFR BLD: 6.4 % (ref 0–8)
EOSINOPHIL NFR BLD: 8.1 % (ref 0–8)
EOSINOPHIL NFR BLD: 8.7 % (ref 0–8)
ERYTHROCYTE [DISTWIDTH] IN BLOOD BY AUTOMATED COUNT: 12.1 % (ref 11.5–14.5)
ERYTHROCYTE [DISTWIDTH] IN BLOOD BY AUTOMATED COUNT: 12.3 % (ref 11.5–17)
ERYTHROCYTE [DISTWIDTH] IN BLOOD BY AUTOMATED COUNT: 12.3 % (ref 11.5–17)
ERYTHROCYTE [DISTWIDTH] IN BLOOD BY AUTOMATED COUNT: 12.5 % (ref 11.5–14.5)
ERYTHROCYTE [DISTWIDTH] IN BLOOD BY AUTOMATED COUNT: 12.5 % (ref 11.5–17)
ERYTHROCYTE [DISTWIDTH] IN BLOOD BY AUTOMATED COUNT: 12.7 % (ref 11.5–14.5)
ERYTHROCYTE [DISTWIDTH] IN BLOOD BY AUTOMATED COUNT: 12.8 % (ref 11.5–14.5)
ERYTHROCYTE [DISTWIDTH] IN BLOOD BY AUTOMATED COUNT: 12.9 % (ref 11.5–14.5)
ERYTHROCYTE [DISTWIDTH] IN BLOOD BY AUTOMATED COUNT: 12.9 % (ref 11.5–14.5)
ERYTHROCYTE [DISTWIDTH] IN BLOOD BY AUTOMATED COUNT: 13 % (ref 11.5–14.5)
ERYTHROCYTE [DISTWIDTH] IN BLOOD BY AUTOMATED COUNT: 13.2 % (ref 11.5–14.5)
ERYTHROCYTE [DISTWIDTH] IN BLOOD BY AUTOMATED COUNT: 13.3 % (ref 11.5–14.5)
ERYTHROCYTE [DISTWIDTH] IN BLOOD BY AUTOMATED COUNT: 13.5 % (ref 11.5–14.5)
ERYTHROCYTE [DISTWIDTH] IN BLOOD BY AUTOMATED COUNT: 13.9 % (ref 11.5–14.5)
ERYTHROCYTE [SEDIMENTATION RATE] IN BLOOD BY WESTERGREN METHOD: 16 MM/H
ERYTHROCYTE [SEDIMENTATION RATE] IN BLOOD BY WESTERGREN METHOD: 19 MM/H
ERYTHROCYTE [SEDIMENTATION RATE] IN BLOOD BY WESTERGREN METHOD: 22 MM/H
ERYTHROCYTE [SEDIMENTATION RATE] IN BLOOD BY WESTERGREN METHOD: 22 MM/H
ERYTHROCYTE [SEDIMENTATION RATE] IN BLOOD BY WESTERGREN METHOD: 24 MM/H
ESCHERICHIA COLI (OHS): NOT DETECTED
EST. AVERAGE GLUCOSE BLD GHB EST-MCNC: 246 MG/DL
EST. GFR  (NO RACE VARIABLE): 10.7 ML/MIN/1.73 M^2
EST. GFR  (NO RACE VARIABLE): 11.4 ML/MIN/1.73 M^2
EST. GFR  (NO RACE VARIABLE): 11.9 ML/MIN/1.73 M^2
EST. GFR  (NO RACE VARIABLE): 12.2 ML/MIN/1.73 M^2
EST. GFR  (NO RACE VARIABLE): 13.4 ML/MIN/1.73 M^2
EST. GFR  (NO RACE VARIABLE): 13.7 ML/MIN/1.73 M^2
EST. GFR  (NO RACE VARIABLE): 13.7 ML/MIN/1.73 M^2
EST. GFR  (NO RACE VARIABLE): 14.8 ML/MIN/1.73 M^2
EST. GFR  (NO RACE VARIABLE): 15.2 ML/MIN/1.73 M^2
EST. GFR  (NO RACE VARIABLE): 16.1 ML/MIN/1.73 M^2
EST. GFR  (NO RACE VARIABLE): 16.6 ML/MIN/1.73 M^2
EST. GFR  (NO RACE VARIABLE): 17.1 ML/MIN/1.73 M^2
EST. GFR  (NO RACE VARIABLE): 17.1 ML/MIN/1.73 M^2
EST. GFR  (NO RACE VARIABLE): 17.6 ML/MIN/1.73 M^2
EST. GFR  (NO RACE VARIABLE): 18.1 ML/MIN/1.73 M^2
EST. GFR  (NO RACE VARIABLE): 18.1 ML/MIN/1.73 M^2
EST. GFR  (NO RACE VARIABLE): 18.7 ML/MIN/1.73 M^2
EST. GFR  (NO RACE VARIABLE): 18.7 ML/MIN/1.73 M^2
EST. GFR  (NO RACE VARIABLE): 19.3 ML/MIN/1.73 M^2
EST. GFR  (NO RACE VARIABLE): 19.3 ML/MIN/1.73 M^2
EST. GFR  (NO RACE VARIABLE): 20.7 ML/MIN/1.73 M^2
EST. GFR  (NO RACE VARIABLE): 21.5 ML/MIN/1.73 M^2
EST. GFR  (NO RACE VARIABLE): 21.6 ML/MIN/1.73 M^2
EST. GFR  (NO RACE VARIABLE): 22.3 ML/MIN/1.73 M^2
EST. GFR  (NO RACE VARIABLE): 23.3 ML/MIN/1.73 M^2
EST. GFR  (NO RACE VARIABLE): 23.3 ML/MIN/1.73 M^2
EST. GFR  (NO RACE VARIABLE): 24.2 ML/MIN/1.73 M^2
EST. GFR  (NO RACE VARIABLE): 25.1 ML/MIN/1.73 M^2
EST. GFR  (NO RACE VARIABLE): 26.3 ML/MIN/1.73 M^2
EST. GFR  (NO RACE VARIABLE): 26.3 ML/MIN/1.73 M^2
EST. GFR  (NO RACE VARIABLE): 27.3 ML/MIN/1.73 M^2
EST. GFR  (NO RACE VARIABLE): 28.6 ML/MIN/1.73 M^2
EST. GFR  (NO RACE VARIABLE): 30.2 ML/MIN/1.73 M^2
EST. GFR  (NO RACE VARIABLE): 33.3 ML/MIN/1.73 M^2
EST. GFR  (NO RACE VARIABLE): 35.2 ML/MIN/1.73 M^2
EST. GFR  (NO RACE VARIABLE): 37.2 ML/MIN/1.73 M^2
EST. GFR  (NO RACE VARIABLE): 37.2 ML/MIN/1.73 M^2
FERRITIN SERPL-MCNC: 435 NG/ML (ref 20–300)
FIBRINOGEN PPP-MCNC: 652 MG/DL (ref 182–400)
FIO2: 21 %
FIO2: 32
FIO2: 36
FIO2: 36 %
FLOW: 10
FLOW: 2
FLOW: 3
FLOW: 4
FLUAV AG UPPER RESP QL IA.RAPID: NOT DETECTED
FLUBV AG UPPER RESP QL IA.RAPID: NOT DETECTED
FRACTIONAL SHORTENING: 6 % (ref 28–44)
FRACTIONAL SHORTENING: 7 % (ref 28–44)
FRACTIONAL SHORTENING: 7 % (ref 28–44)
GFR SERPLBLD CREATININE-BSD FMLA CKD-EPI: 30 MLS/MIN/1.73/M2
GFR SERPLBLD CREATININE-BSD FMLA CKD-EPI: 30 MLS/MIN/1.73/M2
GFR SERPLBLD CREATININE-BSD FMLA CKD-EPI: 33 MLS/MIN/1.73/M2
GLOBULIN SER-MCNC: 3.3 GM/DL (ref 2.4–3.5)
GLOBULIN SER-MCNC: 3.3 GM/DL (ref 2.4–3.5)
GLOBULIN SER-MCNC: 3.5 GM/DL (ref 2.4–3.5)
GLUCOSE SERPL-MCNC: 103 MG/DL (ref 70–110)
GLUCOSE SERPL-MCNC: 104 MG/DL (ref 70–110)
GLUCOSE SERPL-MCNC: 104 MG/DL (ref 70–110)
GLUCOSE SERPL-MCNC: 109 MG/DL (ref 70–110)
GLUCOSE SERPL-MCNC: 119 MG/DL (ref 70–110)
GLUCOSE SERPL-MCNC: 123 MG/DL (ref 70–110)
GLUCOSE SERPL-MCNC: 129 MG/DL (ref 70–110)
GLUCOSE SERPL-MCNC: 140 MG/DL (ref 70–110)
GLUCOSE SERPL-MCNC: 143 MG/DL (ref 70–110)
GLUCOSE SERPL-MCNC: 144 MG/DL (ref 70–110)
GLUCOSE SERPL-MCNC: 147 MG/DL (ref 70–110)
GLUCOSE SERPL-MCNC: 149 MG/DL (ref 70–110)
GLUCOSE SERPL-MCNC: 150 MG/DL (ref 70–110)
GLUCOSE SERPL-MCNC: 151 MG/DL (ref 70–110)
GLUCOSE SERPL-MCNC: 151 MG/DL (ref 70–110)
GLUCOSE SERPL-MCNC: 152 MG/DL (ref 70–110)
GLUCOSE SERPL-MCNC: 155 MG/DL (ref 70–110)
GLUCOSE SERPL-MCNC: 156 MG/DL (ref 70–110)
GLUCOSE SERPL-MCNC: 157 MG/DL (ref 70–110)
GLUCOSE SERPL-MCNC: 157 MG/DL (ref 70–110)
GLUCOSE SERPL-MCNC: 159 MG/DL (ref 70–110)
GLUCOSE SERPL-MCNC: 161 MG/DL (ref 70–110)
GLUCOSE SERPL-MCNC: 163 MG/DL (ref 70–110)
GLUCOSE SERPL-MCNC: 163 MG/DL (ref 70–110)
GLUCOSE SERPL-MCNC: 164 MG/DL (ref 70–110)
GLUCOSE SERPL-MCNC: 166 MG/DL (ref 70–110)
GLUCOSE SERPL-MCNC: 166 MG/DL (ref 70–110)
GLUCOSE SERPL-MCNC: 169 MG/DL (ref 70–110)
GLUCOSE SERPL-MCNC: 170 MG/DL (ref 70–110)
GLUCOSE SERPL-MCNC: 171 MG/DL (ref 70–110)
GLUCOSE SERPL-MCNC: 172 MG/DL (ref 70–110)
GLUCOSE SERPL-MCNC: 173 MG/DL (ref 70–110)
GLUCOSE SERPL-MCNC: 176 MG/DL (ref 70–110)
GLUCOSE SERPL-MCNC: 177 MG/DL (ref 70–110)
GLUCOSE SERPL-MCNC: 182 MG/DL (ref 70–110)
GLUCOSE SERPL-MCNC: 183 MG/DL (ref 70–110)
GLUCOSE SERPL-MCNC: 189 MG/DL (ref 70–110)
GLUCOSE SERPL-MCNC: 190 MG/DL (ref 70–110)
GLUCOSE SERPL-MCNC: 190 MG/DL (ref 70–110)
GLUCOSE SERPL-MCNC: 192 MG/DL (ref 70–110)
GLUCOSE SERPL-MCNC: 198 MG/DL (ref 70–110)
GLUCOSE SERPL-MCNC: 200 MG/DL (ref 70–110)
GLUCOSE SERPL-MCNC: 206 MG/DL (ref 70–110)
GLUCOSE SERPL-MCNC: 206 MG/DL (ref 70–110)
GLUCOSE SERPL-MCNC: 210 MG/DL (ref 70–110)
GLUCOSE SERPL-MCNC: 220 MG/DL (ref 70–110)
GLUCOSE SERPL-MCNC: 226 MG/DL (ref 70–110)
GLUCOSE SERPL-MCNC: 227 MG/DL (ref 70–110)
GLUCOSE SERPL-MCNC: 238 MG/DL (ref 74–100)
GLUCOSE SERPL-MCNC: 246 MG/DL (ref 70–110)
GLUCOSE SERPL-MCNC: 308 MG/DL (ref 70–110)
GLUCOSE SERPL-MCNC: 394 MG/DL (ref 70–110)
GLUCOSE SERPL-MCNC: 406 MG/DL (ref 70–110)
GLUCOSE SERPL-MCNC: 487 MG/DL (ref 74–100)
GLUCOSE SERPL-MCNC: 526 MG/DL (ref 74–100)
GLUCOSE UR QL STRIP.AUTO: ABNORMAL
GLUCOSE UR QL STRIP: ABNORMAL
GLUCOSE UR QL STRIP: ABNORMAL
GP B STREP DNA CSF QL NAA+NON-PROBE: NOT DETECTED
GRAM STN SPEC: ABNORMAL
HAEM INFLU DNA CSF QL NAA+NON-PROBE: NOT DETECTED
HAPTOGLOB SERPL-MCNC: 196 MG/DL (ref 30–250)
HBA1C MFR BLD: 10.2 %
HCO3 BLDA-SCNC: 23.2 MMOL/L (ref 22–26)
HCO3 UR-SCNC: 14.7 MMOL/L (ref 24–28)
HCO3 UR-SCNC: 21.5 MMOL/L (ref 24–28)
HCO3 UR-SCNC: 21.7 MMOL/L (ref 24–28)
HCO3 UR-SCNC: 22.5 MMOL/L (ref 24–28)
HCO3 UR-SCNC: 22.5 MMOL/L (ref 24–28)
HCO3 UR-SCNC: 22.9 MMOL/L (ref 24–28)
HCO3 UR-SCNC: 23.2 MMOL/L (ref 24–28)
HCO3 UR-SCNC: 23.8 MMOL/L (ref 24–28)
HCO3 UR-SCNC: 24.5 MMOL/L (ref 24–28)
HCO3 UR-SCNC: 24.7 MMOL/L (ref 24–28)
HCO3 UR-SCNC: 24.7 MMOL/L (ref 24–28)
HCO3 UR-SCNC: 25.4 MMOL/L (ref 24–28)
HCO3 UR-SCNC: 25.5 MMOL/L (ref 24–28)
HCO3 UR-SCNC: 25.6 MMOL/L (ref 24–28)
HCO3 UR-SCNC: 25.8 MMOL/L (ref 24–28)
HCO3 UR-SCNC: 26.1 MMOL/L (ref 24–28)
HCO3 UR-SCNC: 26.1 MMOL/L (ref 24–28)
HCO3 UR-SCNC: 26.2 MMOL/L (ref 24–28)
HCO3 UR-SCNC: 26.2 MMOL/L (ref 24–28)
HCO3 UR-SCNC: 26.3 MMOL/L (ref 24–28)
HCO3 UR-SCNC: 26.4 MMOL/L (ref 24–28)
HCO3 UR-SCNC: 26.7 MMOL/L (ref 24–28)
HCO3 UR-SCNC: 26.8 MMOL/L (ref 24–28)
HCO3 UR-SCNC: 27.2 MMOL/L (ref 24–28)
HCO3 UR-SCNC: 27.2 MMOL/L (ref 24–28)
HCO3 UR-SCNC: 27.3 MMOL/L (ref 24–28)
HCO3 UR-SCNC: 27.3 MMOL/L (ref 24–28)
HCO3 UR-SCNC: 27.4 MMOL/L (ref 24–28)
HCO3 UR-SCNC: 27.5 MMOL/L (ref 24–28)
HCO3 UR-SCNC: 27.6 MMOL/L (ref 24–28)
HCO3 UR-SCNC: 27.6 MMOL/L (ref 24–28)
HCO3 UR-SCNC: 27.7 MMOL/L (ref 24–28)
HCO3 UR-SCNC: 27.8 MMOL/L (ref 24–28)
HCO3 UR-SCNC: 28 MMOL/L (ref 24–28)
HCO3 UR-SCNC: 28.3 MMOL/L (ref 24–28)
HCO3 UR-SCNC: 28.5 MMOL/L (ref 24–28)
HCO3 UR-SCNC: 28.7 MMOL/L (ref 24–28)
HCO3 UR-SCNC: 29 MMOL/L (ref 24–28)
HCO3 UR-SCNC: 29 MMOL/L (ref 24–28)
HCO3 UR-SCNC: 29.3 MMOL/L (ref 24–28)
HCO3 UR-SCNC: 29.8 MMOL/L (ref 24–28)
HCO3 UR-SCNC: 29.9 MMOL/L (ref 24–28)
HCO3 UR-SCNC: 30.4 MMOL/L (ref 24–28)
HCO3 UR-SCNC: 31.2 MMOL/L (ref 24–28)
HCO3 UR-SCNC: 31.6 MMOL/L (ref 24–28)
HCO3 UR-SCNC: 32.2 MMOL/L (ref 24–28)
HCO3 UR-SCNC: 32.5 MMOL/L (ref 24–28)
HCO3 UR-SCNC: 32.7 MMOL/L (ref 24–28)
HCO3 UR-SCNC: 33.3 MMOL/L (ref 24–28)
HCO3 UR-SCNC: 33.6 MMOL/L (ref 24–28)
HCO3 UR-SCNC: 34.1 MMOL/L (ref 24–28)
HCO3 UR-SCNC: 34.5 MMOL/L (ref 24–28)
HCT VFR BLD AUTO: 21.9 % (ref 40–54)
HCT VFR BLD AUTO: 21.9 % (ref 40–54)
HCT VFR BLD AUTO: 22.8 % (ref 40–54)
HCT VFR BLD AUTO: 22.9 % (ref 40–54)
HCT VFR BLD AUTO: 23.4 % (ref 40–54)
HCT VFR BLD AUTO: 23.5 % (ref 40–54)
HCT VFR BLD AUTO: 23.8 % (ref 40–54)
HCT VFR BLD AUTO: 23.8 % (ref 40–54)
HCT VFR BLD AUTO: 25 % (ref 40–54)
HCT VFR BLD AUTO: 27.2 % (ref 40–54)
HCT VFR BLD AUTO: 29.8 % (ref 40–54)
HCT VFR BLD AUTO: 31.2 % (ref 40–54)
HCT VFR BLD AUTO: 31.3 % (ref 40–54)
HCT VFR BLD AUTO: 32.8 % (ref 40–54)
HCT VFR BLD AUTO: 32.8 % (ref 40–54)
HCT VFR BLD AUTO: 33.3 % (ref 42–52)
HCT VFR BLD AUTO: 34.2 % (ref 40–54)
HCT VFR BLD AUTO: 39.6 % (ref 42–52)
HCT VFR BLD AUTO: 42.5 % (ref 42–52)
HCT VFR BLD CALC: 22 %PCV (ref 36–54)
HCT VFR BLD CALC: 23 %PCV (ref 36–54)
HCT VFR BLD CALC: 24 %PCV (ref 36–54)
HCT VFR BLD CALC: 24 %PCV (ref 36–54)
HCT VFR BLD CALC: 25 %PCV (ref 36–54)
HCT VFR BLD CALC: 27 %PCV (ref 36–54)
HCT VFR BLD CALC: 30 %PCV (ref 36–54)
HCT VFR BLD CALC: 31 %PCV (ref 36–54)
HDLC SERPL-MCNC: 23 MG/DL (ref 35–60)
HGB BLD-MCNC: 10.2 G/DL (ref 14–18)
HGB BLD-MCNC: 10.3 G/DL (ref 14–18)
HGB BLD-MCNC: 10.7 G/DL
HGB BLD-MCNC: 10.9 G/DL (ref 14–18)
HGB BLD-MCNC: 11.1 G/DL (ref 14–18)
HGB BLD-MCNC: 11.5 G/DL (ref 14–18)
HGB BLD-MCNC: 11.8 G/DL (ref 14–18)
HGB BLD-MCNC: 13.7 G/DL (ref 14–18)
HGB BLD-MCNC: 14.7 G/DL (ref 14–18)
HGB BLD-MCNC: 7 G/DL (ref 14–18)
HGB BLD-MCNC: 7.2 G/DL (ref 14–18)
HGB BLD-MCNC: 7.4 G/DL (ref 14–18)
HGB BLD-MCNC: 7.5 G/DL (ref 14–18)
HGB BLD-MCNC: 7.5 G/DL (ref 14–18)
HGB BLD-MCNC: 7.6 G/DL (ref 14–18)
HGB BLD-MCNC: 7.8 G/DL (ref 14–18)
HGB BLD-MCNC: 7.8 G/DL (ref 14–18)
HGB BLD-MCNC: 8.2 G/DL (ref 14–18)
HGB BLD-MCNC: 8.9 G/DL (ref 14–18)
HGB BLD-MCNC: 9.7 G/DL (ref 14–18)
HGB UR QL STRIP: ABNORMAL
HGB UR QL STRIP: ABNORMAL
HR MV ECHO: 107 BPM
HYALINE CASTS UR QL AUTO: 0 /LPF
HYALINE CASTS UR QL AUTO: 0 /LPF
IMM GRANULOCYTES # BLD AUTO: 0.03 X10(3)/MCL (ref 0–0.04)
IMM GRANULOCYTES # BLD AUTO: 0.04 K/UL (ref 0–0.04)
IMM GRANULOCYTES # BLD AUTO: 0.05 K/UL (ref 0–0.04)
IMM GRANULOCYTES # BLD AUTO: 0.05 X10(3)/MCL (ref 0–0.04)
IMM GRANULOCYTES # BLD AUTO: 0.05 X10(3)/MCL (ref 0–0.04)
IMM GRANULOCYTES # BLD AUTO: 0.06 K/UL (ref 0–0.04)
IMM GRANULOCYTES # BLD AUTO: 0.07 K/UL (ref 0–0.04)
IMM GRANULOCYTES # BLD AUTO: 0.08 K/UL (ref 0–0.04)
IMM GRANULOCYTES # BLD AUTO: 0.08 K/UL (ref 0–0.04)
IMM GRANULOCYTES # BLD AUTO: 0.09 K/UL (ref 0–0.04)
IMM GRANULOCYTES # BLD AUTO: 0.13 K/UL (ref 0–0.04)
IMM GRANULOCYTES # BLD AUTO: 0.16 K/UL (ref 0–0.04)
IMM GRANULOCYTES # BLD AUTO: 0.43 K/UL (ref 0–0.04)
IMM GRANULOCYTES NFR BLD AUTO: 0.3 %
IMM GRANULOCYTES NFR BLD AUTO: 0.4 %
IMM GRANULOCYTES NFR BLD AUTO: 0.4 %
IMM GRANULOCYTES NFR BLD AUTO: 0.4 % (ref 0–0.5)
IMM GRANULOCYTES NFR BLD AUTO: 0.5 % (ref 0–0.5)
IMM GRANULOCYTES NFR BLD AUTO: 0.6 % (ref 0–0.5)
IMM GRANULOCYTES NFR BLD AUTO: 0.7 % (ref 0–0.5)
IMM GRANULOCYTES NFR BLD AUTO: 0.7 % (ref 0–0.5)
IMM GRANULOCYTES NFR BLD AUTO: 0.8 % (ref 0–0.5)
IMM GRANULOCYTES NFR BLD AUTO: 0.9 % (ref 0–0.5)
IMM GRANULOCYTES NFR BLD AUTO: 1 % (ref 0–0.5)
IMM GRANULOCYTES NFR BLD AUTO: 1.2 % (ref 0–0.5)
IMM GRANULOCYTES NFR BLD AUTO: 1.3 % (ref 0–0.5)
IMM GRANULOCYTES NFR BLD AUTO: 1.3 % (ref 0–0.5)
IMM GRANULOCYTES NFR BLD AUTO: 2 % (ref 0–0.5)
IMP (OHS): ABNORMAL
INR PPP: 1
INR PPP: 1 (ref 0.8–1.2)
INR PPP: 1.1 (ref 0.8–1.2)
INR PPP: 1.1 (ref 0.8–1.2)
INR PPP: 1.2 (ref 0.8–1.2)
INR PPP: 1.5 (ref 0.8–1.2)
INTERVENTRICULAR SEPTUM: 0.57 CM (ref 0.6–1.1)
INTERVENTRICULAR SEPTUM: 0.67 CM (ref 0.6–1.1)
INTERVENTRICULAR SEPTUM: 0.98 CM (ref 0.6–1.1)
IRON SERPL-MCNC: 41 UG/DL (ref 45–160)
KETONES UR QL STRIP.AUTO: ABNORMAL
KETONES UR QL STRIP: NEGATIVE
KETONES UR QL STRIP: NEGATIVE
KLEBSIELLA AEROGENES (OHS): NOT DETECTED
KLEBSIELLA OXYTOCA (OHS): NOT DETECTED
KLEBSIELLA PNEUMONIAE GROUP (OHS): NOT DETECTED
KPC (OHS): ABNORMAL
L MONOCYTOG DNA CSF QL NAA+NON-PROBE: NOT DETECTED
LA MAJOR: 5.84 CM
LA MAJOR: 6.25 CM
LA MINOR: 6.16 CM
LA MINOR: 6.3 CM
LA WIDTH: 3.84 CM
LA WIDTH: 4.2 CM
LACTATE SERPL-SCNC: 0.9 MMOL/L (ref 0.5–2.2)
LACTATE SERPL-SCNC: 1 MMOL/L (ref 0.5–2.2)
LACTATE SERPL-SCNC: 1 MMOL/L (ref 0.5–2.2)
LACTATE SERPL-SCNC: 1.2 MMOL/L (ref 0.5–2.2)
LACTATE SERPL-SCNC: 1.4 MMOL/L (ref 0.5–2.2)
LACTATE SERPL-SCNC: 1.5 MMOL/L (ref 0.5–2.2)
LACTATE SERPL-SCNC: 1.9 MMOL/L (ref 0.5–2.2)
LACTATE SERPL-SCNC: 2.1 MMOL/L (ref 0.5–2.2)
LACTATE SERPL-SCNC: 2.3 MMOL/L (ref 0.5–2.2)
LACTATE SERPL-SCNC: 2.7 MMOL/L (ref 0.5–2.2)
LACTATE SERPL-SCNC: 2.8 MMOL/L (ref 0.5–2.2)
LDH SERPL L TO P-CCNC: 0.51 MMOL/L (ref 0.5–2.2)
LDH SERPL L TO P-CCNC: 0.55 MMOL/L (ref 0.5–2.2)
LDH SERPL L TO P-CCNC: 0.56 MMOL/L (ref 0.5–2.2)
LDH SERPL L TO P-CCNC: 0.74 MMOL/L (ref 0.5–2.2)
LDH SERPL L TO P-CCNC: 0.78 MMOL/L (ref 0.5–2.2)
LDH SERPL L TO P-CCNC: 0.8 MMOL/L (ref 0.5–2.2)
LDH SERPL L TO P-CCNC: 0.88 MMOL/L (ref 0.5–2.2)
LDH SERPL L TO P-CCNC: 0.91 MMOL/L (ref 0.5–2.2)
LDH SERPL L TO P-CCNC: 1 MMOL/L (ref 0.36–1.25)
LDH SERPL L TO P-CCNC: 1.03 MMOL/L (ref 0.5–2.2)
LDH SERPL L TO P-CCNC: 1.19 MMOL/L (ref 0.5–2.2)
LDH SERPL L TO P-CCNC: 1.2 MMOL/L (ref 0.5–2.2)
LDH SERPL L TO P-CCNC: 1.22 MMOL/L (ref 0.5–2.2)
LDH SERPL L TO P-CCNC: 1.25 MMOL/L (ref 0.5–2.2)
LDH SERPL L TO P-CCNC: 1.37 MMOL/L (ref 0.5–2.2)
LDH SERPL L TO P-CCNC: 1.44 MMOL/L (ref 0.5–2.2)
LDH SERPL L TO P-CCNC: 1.66 MMOL/L (ref 0.36–1.25)
LDH SERPL L TO P-CCNC: 1.71 MMOL/L (ref 0.5–2.2)
LDH SERPL L TO P-CCNC: 1.84 MMOL/L (ref 0.36–1.25)
LDH SERPL L TO P-CCNC: 1067 U/L (ref 110–260)
LDH SERPL L TO P-CCNC: 2.02 MMOL/L (ref 0.5–2.2)
LDH SERPL L TO P-CCNC: 4.34 MMOL/L (ref 0.36–1.25)
LDH SERPL L TO P-CCNC: 4.98 MMOL/L (ref 0.36–1.25)
LDH SERPL L TO P-CCNC: 572 U/L (ref 110–260)
LDH SERPL L TO P-CCNC: 628 U/L (ref 110–260)
LDH SERPL L TO P-CCNC: 631 U/L (ref 110–260)
LDH SERPL L TO P-CCNC: 756 U/L (ref 110–260)
LDH SERPL L TO P-CCNC: 917 U/L (ref 110–260)
LDH SERPL L TO P-CCNC: 930 U/L (ref 110–260)
LDH SERPL L TO P-CCNC: 966 U/L (ref 110–260)
LDLC SERPL CALC-MCNC: 86 MG/DL (ref 50–140)
LEFT ATRIUM SIZE: 3.99 CM
LEFT ATRIUM SIZE: 4.76 CM
LEFT ATRIUM SIZE: 5 CM
LEFT ATRIUM VOLUME INDEX MOD: 23.4 ML/M2
LEFT ATRIUM VOLUME INDEX MOD: 38.7 ML/M2
LEFT ATRIUM VOLUME INDEX MOD: 40.2 ML/M2
LEFT ATRIUM VOLUME INDEX: 31.7 ML/M2
LEFT ATRIUM VOLUME INDEX: 43.3 ML/M2
LEFT ATRIUM VOLUME MOD: 57.58 CM3
LEFT ATRIUM VOLUME MOD: 94.1 CM3
LEFT ATRIUM VOLUME MOD: 98.8 CM3
LEFT ATRIUM VOLUME: 106.63 CM3
LEFT ATRIUM VOLUME: 78.08 CM3
LEFT INTERNAL DIMENSION IN SYSTOLE: 5.75 CM (ref 2.1–4)
LEFT INTERNAL DIMENSION IN SYSTOLE: 5.87 CM (ref 2.1–4)
LEFT INTERNAL DIMENSION IN SYSTOLE: 6.48 CM (ref 2.1–4)
LEFT VENTRICLE DIASTOLIC VOLUME INDEX: 78.19 ML/M2
LEFT VENTRICLE DIASTOLIC VOLUME INDEX: 84.96 ML/M2
LEFT VENTRICLE DIASTOLIC VOLUME INDEX: 99.48 ML/M2
LEFT VENTRICLE DIASTOLIC VOLUME: 190 ML
LEFT VENTRICLE DIASTOLIC VOLUME: 209.01 ML
LEFT VENTRICLE DIASTOLIC VOLUME: 244.73 ML
LEFT VENTRICLE MASS INDEX: 105 G/M2
LEFT VENTRICLE MASS INDEX: 81 G/M2
LEFT VENTRICLE MASS INDEX: 85 G/M2
LEFT VENTRICLE SYSTOLIC VOLUME INDEX: 67.1 ML/M2
LEFT VENTRICLE SYSTOLIC VOLUME INDEX: 69.7 ML/M2
LEFT VENTRICLE SYSTOLIC VOLUME INDEX: 87.2 ML/M2
LEFT VENTRICLE SYSTOLIC VOLUME: 163 ML
LEFT VENTRICLE SYSTOLIC VOLUME: 171.52 ML
LEFT VENTRICLE SYSTOLIC VOLUME: 214.62 ML
LEFT VENTRICULAR INTERNAL DIMENSION IN DIASTOLE: 6.15 CM (ref 3.5–6)
LEFT VENTRICULAR INTERNAL DIMENSION IN DIASTOLE: 6.3 CM (ref 3.5–6)
LEFT VENTRICULAR INTERNAL DIMENSION IN DIASTOLE: 6.87 CM (ref 3.5–6)
LEFT VENTRICULAR MASS: 199.66 G
LEFT VENTRICULAR MASS: 208.28 G
LEFT VENTRICULAR MASS: 254.16 G
LEUKOCYTE ESTERASE UR QL STRIP.AUTO: NEGATIVE
LEUKOCYTE ESTERASE UR QL STRIP: NEGATIVE
LEUKOCYTE ESTERASE UR QL STRIP: NEGATIVE
LPM (OHS): 4
LV LATERAL E/E' RATIO: 12.2 M/S
LV LATERAL E/E' RATIO: 19 M/S
LV LATERAL E/E' RATIO: 20.2 M/S
LV SEPTAL E/E' RATIO: 15.25 M/S
LV SEPTAL E/E' RATIO: 23.75 M/S
LV SEPTAL E/E' RATIO: 25.25 M/S
LVOT MG: 1 MMHG
LVOT MV: 0.4 CM/S
LYMPHOCYTES # BLD AUTO: 0.6 K/UL (ref 1–4.8)
LYMPHOCYTES # BLD AUTO: 1 K/UL (ref 1–4.8)
LYMPHOCYTES # BLD AUTO: 1.1 K/UL (ref 1–4.8)
LYMPHOCYTES # BLD AUTO: 1.1 K/UL (ref 1–4.8)
LYMPHOCYTES # BLD AUTO: 1.15 X10(3)/MCL (ref 0.6–4.6)
LYMPHOCYTES # BLD AUTO: 1.15 X10(3)/MCL (ref 0.6–4.6)
LYMPHOCYTES # BLD AUTO: 1.2 K/UL (ref 1–4.8)
LYMPHOCYTES # BLD AUTO: 1.3 K/UL (ref 1–4.8)
LYMPHOCYTES # BLD AUTO: 1.4 K/UL (ref 1–4.8)
LYMPHOCYTES # BLD AUTO: 1.53 X10(3)/MCL (ref 0.6–4.6)
LYMPHOCYTES # BLD AUTO: 1.7 K/UL (ref 1–4.8)
LYMPHOCYTES NFR BLD AUTO: 10.7 %
LYMPHOCYTES NFR BLD AUTO: 9.9 %
LYMPHOCYTES NFR BLD AUTO: 9.9 %
LYMPHOCYTES NFR BLD: 10.5 % (ref 18–48)
LYMPHOCYTES NFR BLD: 11.2 % (ref 18–48)
LYMPHOCYTES NFR BLD: 11.9 % (ref 18–48)
LYMPHOCYTES NFR BLD: 12.4 % (ref 18–48)
LYMPHOCYTES NFR BLD: 12.9 % (ref 18–48)
LYMPHOCYTES NFR BLD: 12.9 % (ref 18–48)
LYMPHOCYTES NFR BLD: 13.3 % (ref 18–48)
LYMPHOCYTES NFR BLD: 13.7 % (ref 18–48)
LYMPHOCYTES NFR BLD: 13.9 % (ref 18–48)
LYMPHOCYTES NFR BLD: 16 % (ref 18–48)
LYMPHOCYTES NFR BLD: 16.4 % (ref 18–48)
LYMPHOCYTES NFR BLD: 17 % (ref 18–48)
LYMPHOCYTES NFR BLD: 17.8 % (ref 18–48)
LYMPHOCYTES NFR BLD: 3 % (ref 18–48)
LYMPHOCYTES NFR BLD: 8.9 % (ref 18–48)
LYMPHOCYTES NFR BLD: 9.5 % (ref 18–48)
MAGNESIUM SERPL-MCNC: 1.9 MG/DL (ref 1.6–2.6)
MAGNESIUM SERPL-MCNC: 2 MG/DL (ref 1.6–2.6)
MAGNESIUM SERPL-MCNC: 2.1 MG/DL (ref 1.6–2.6)
MAGNESIUM SERPL-MCNC: 2.3 MG/DL (ref 1.6–2.6)
MAGNESIUM SERPL-MCNC: 2.4 MG/DL (ref 1.6–2.6)
MAGNESIUM SERPL-MCNC: 2.5 MG/DL (ref 1.6–2.6)
MAGNESIUM SERPL-MCNC: 2.5 MG/DL (ref 1.6–2.6)
MAGNESIUM SERPL-MCNC: 2.6 MG/DL (ref 1.6–2.6)
MAGNESIUM SERPL-MCNC: 2.7 MG/DL (ref 1.6–2.6)
MAGNESIUM SERPL-MCNC: 2.8 MG/DL (ref 1.6–2.6)
MAGNESIUM SERPL-MCNC: 2.9 MG/DL (ref 1.6–2.6)
MCH RBC QN AUTO: 28.8 PG (ref 27–31)
MCH RBC QN AUTO: 29 PG (ref 27–31)
MCH RBC QN AUTO: 29 PG (ref 27–31)
MCH RBC QN AUTO: 29.1 PG (ref 27–31)
MCH RBC QN AUTO: 29.2 PG (ref 27–31)
MCH RBC QN AUTO: 29.2 PG (ref 27–31)
MCH RBC QN AUTO: 29.3 PG (ref 27–31)
MCH RBC QN AUTO: 29.4 PG (ref 27–31)
MCH RBC QN AUTO: 29.4 PG (ref 27–31)
MCH RBC QN AUTO: 29.5 PG (ref 27–31)
MCH RBC QN AUTO: 29.7 PG (ref 27–31)
MCH RBC QN AUTO: 29.7 PG (ref 27–31)
MCH RBC QN AUTO: 29.8 PG (ref 27–31)
MCH RBC QN AUTO: 29.9 PG (ref 27–31)
MCH RBC QN AUTO: 30 PG (ref 27–31)
MCH RBC QN AUTO: 30.1 PG (ref 27–31)
MCH RBC QN AUTO: 30.2 PG (ref 27–31)
MCHC RBC AUTO-ENTMCNC: 31.9 G/DL (ref 32–36)
MCHC RBC AUTO-ENTMCNC: 32 G/DL (ref 32–36)
MCHC RBC AUTO-ENTMCNC: 32.5 G/DL (ref 32–36)
MCHC RBC AUTO-ENTMCNC: 32.5 G/DL (ref 32–36)
MCHC RBC AUTO-ENTMCNC: 32.6 G/DL (ref 32–36)
MCHC RBC AUTO-ENTMCNC: 32.7 G/DL (ref 32–36)
MCHC RBC AUTO-ENTMCNC: 32.7 G/DL (ref 32–36)
MCHC RBC AUTO-ENTMCNC: 32.8 G/DL (ref 32–36)
MCHC RBC AUTO-ENTMCNC: 32.9 G/DL (ref 32–36)
MCHC RBC AUTO-ENTMCNC: 32.9 G/DL (ref 32–36)
MCHC RBC AUTO-ENTMCNC: 33.2 G/DL (ref 32–36)
MCHC RBC AUTO-ENTMCNC: 33.6 G/DL (ref 32–36)
MCHC RBC AUTO-ENTMCNC: 33.8 G/DL (ref 32–36)
MCHC RBC AUTO-ENTMCNC: 34.6 G/DL (ref 33–36)
MCHC RBC AUTO-ENTMCNC: 34.6 G/DL (ref 33–36)
MCHC RBC AUTO-ENTMCNC: 35.4 G/DL (ref 33–36)
MCR-1 (OHS): ABNORMAL
MCV RBC AUTO: 85.2 FL (ref 80–94)
MCV RBC AUTO: 85.9 FL (ref 80–94)
MCV RBC AUTO: 86.7 FL (ref 80–94)
MCV RBC AUTO: 87 FL (ref 82–98)
MCV RBC AUTO: 88 FL (ref 82–98)
MCV RBC AUTO: 89 FL (ref 82–98)
MCV RBC AUTO: 90 FL (ref 82–98)
MCV RBC AUTO: 90 FL (ref 82–98)
MCV RBC AUTO: 91 FL (ref 82–98)
MCV RBC AUTO: 92 FL (ref 82–98)
MCV RBC AUTO: 92 FL (ref 82–98)
MECA/C (OHS): ABNORMAL
MECA/C AND MREJ (MRSA)(OHS): ABNORMAL
METHEMOGLOBIN: 0.9 % (ref 0–3)
METHEMOGLOBIN: 1.3 % (ref 0–3)
METHEMOGLOBIN: 1.3 % (ref 0–3)
METHEMOGLOBIN: 1.4 % (ref 0–3)
METHEMOGLOBIN: 1.4 % (ref 0–3)
METHEMOGLOBIN: 1.5 % (ref 0–3)
METHEMOGLOBIN: 1.6 % (ref 0–3)
METHGB MFR BLDA: 0.4 % (ref 0.4–1.5)
MICROSCOPIC COMMENT: ABNORMAL
MICROSCOPIC COMMENT: ABNORMAL
MODE: ABNORMAL
MODE: NORMAL
MONOCYTES # BLD AUTO: 0.5 K/UL (ref 0.3–1)
MONOCYTES # BLD AUTO: 0.5 K/UL (ref 0.3–1)
MONOCYTES # BLD AUTO: 0.6 K/UL (ref 0.3–1)
MONOCYTES # BLD AUTO: 0.6 K/UL (ref 0.3–1)
MONOCYTES # BLD AUTO: 0.66 X10(3)/MCL (ref 0.1–1.3)
MONOCYTES # BLD AUTO: 0.7 K/UL (ref 0.3–1)
MONOCYTES # BLD AUTO: 0.75 X10(3)/MCL (ref 0.1–1.3)
MONOCYTES # BLD AUTO: 0.8 K/UL (ref 0.3–1)
MONOCYTES # BLD AUTO: 0.9 K/UL (ref 0.3–1)
MONOCYTES # BLD AUTO: 0.9 K/UL (ref 0.3–1)
MONOCYTES # BLD AUTO: 0.92 X10(3)/MCL (ref 0.1–1.3)
MONOCYTES # BLD AUTO: 1 K/UL (ref 0.3–1)
MONOCYTES # BLD AUTO: 1.1 K/UL (ref 0.3–1)
MONOCYTES # BLD AUTO: 1.2 K/UL (ref 0.3–1)
MONOCYTES NFR BLD AUTO: 5.7 %
MONOCYTES NFR BLD AUTO: 6.5 %
MONOCYTES NFR BLD AUTO: 6.5 %
MONOCYTES NFR BLD: 10.6 % (ref 4–15)
MONOCYTES NFR BLD: 11 % (ref 4–15)
MONOCYTES NFR BLD: 2.8 % (ref 4–15)
MONOCYTES NFR BLD: 6.5 % (ref 4–15)
MONOCYTES NFR BLD: 7.2 % (ref 4–15)
MONOCYTES NFR BLD: 7.2 % (ref 4–15)
MONOCYTES NFR BLD: 7.5 % (ref 4–15)
MONOCYTES NFR BLD: 7.7 % (ref 4–15)
MONOCYTES NFR BLD: 7.9 % (ref 4–15)
MONOCYTES NFR BLD: 8.8 % (ref 4–15)
MONOCYTES NFR BLD: 8.9 % (ref 4–15)
MONOCYTES NFR BLD: 9.1 % (ref 4–15)
MONOCYTES NFR BLD: 9.4 % (ref 4–15)
MONOCYTES NFR BLD: 9.8 % (ref 4–15)
MR PISA EROA: 0.16 CM2
MRSA ID BY PCR: NEGATIVE
MV MEAN GRADIENT: 2 MMHG
MV PEAK A VEL: 0.54 M/S
MV PEAK E VEL: 0.61 M/S
MV PEAK E VEL: 0.95 M/S
MV PEAK E VEL: 1.01 M/S
MV PEAK GRADIENT: 4 MMHG
MV STENOSIS PRESSURE HALF TIME: 66.4 MS
MV VALVE AREA BY CONTINUITY EQUATION: 1.66 CM2
MV VALVE AREA P 1/2 METHOD: 3.31 CM2
N MEN DNA CSF QL NAA+NON-PROBE: NOT DETECTED
NDM (OHS): ABNORMAL
NEUTROPHILS # BLD AUTO: 11.56 X10(3)/MCL (ref 2.1–9.2)
NEUTROPHILS # BLD AUTO: 19.5 K/UL (ref 1.8–7.7)
NEUTROPHILS # BLD AUTO: 4.7 K/UL (ref 1.8–7.7)
NEUTROPHILS # BLD AUTO: 5.2 K/UL (ref 1.8–7.7)
NEUTROPHILS # BLD AUTO: 5.4 K/UL (ref 1.8–7.7)
NEUTROPHILS # BLD AUTO: 5.4 K/UL (ref 1.8–7.7)
NEUTROPHILS # BLD AUTO: 6 K/UL (ref 1.8–7.7)
NEUTROPHILS # BLD AUTO: 6 K/UL (ref 1.8–7.7)
NEUTROPHILS # BLD AUTO: 6.3 K/UL (ref 1.8–7.7)
NEUTROPHILS # BLD AUTO: 7.1 K/UL (ref 1.8–7.7)
NEUTROPHILS # BLD AUTO: 7.1 K/UL (ref 1.8–7.7)
NEUTROPHILS # BLD AUTO: 7.6 K/UL (ref 1.8–7.7)
NEUTROPHILS # BLD AUTO: 7.6 K/UL (ref 1.8–7.7)
NEUTROPHILS # BLD AUTO: 7.7 K/UL (ref 1.8–7.7)
NEUTROPHILS # BLD AUTO: 8.9 K/UL (ref 1.8–7.7)
NEUTROPHILS # BLD AUTO: 9.41 X10(3)/MCL (ref 2.1–9.2)
NEUTROPHILS # BLD AUTO: 9.49 X10(3)/MCL (ref 2.1–9.2)
NEUTROPHILS # BLD AUTO: 9.7 K/UL (ref 1.8–7.7)
NEUTROPHILS # BLD AUTO: 9.9 K/UL (ref 1.8–7.7)
NEUTROPHILS NFR BLD AUTO: 81 %
NEUTROPHILS NFR BLD AUTO: 81.4 %
NEUTROPHILS NFR BLD AUTO: 82.2 %
NEUTROPHILS NFR BLD: 62.6 % (ref 38–73)
NEUTROPHILS NFR BLD: 66.9 % (ref 38–73)
NEUTROPHILS NFR BLD: 69.4 % (ref 38–73)
NEUTROPHILS NFR BLD: 69.8 % (ref 38–73)
NEUTROPHILS NFR BLD: 70.3 % (ref 38–73)
NEUTROPHILS NFR BLD: 70.7 % (ref 38–73)
NEUTROPHILS NFR BLD: 70.7 % (ref 38–73)
NEUTROPHILS NFR BLD: 71.3 % (ref 38–73)
NEUTROPHILS NFR BLD: 73.2 % (ref 38–73)
NEUTROPHILS NFR BLD: 73.5 % (ref 38–73)
NEUTROPHILS NFR BLD: 78.3 % (ref 38–73)
NEUTROPHILS NFR BLD: 78.3 % (ref 38–73)
NEUTROPHILS NFR BLD: 78.9 % (ref 38–73)
NEUTROPHILS NFR BLD: 79.6 % (ref 38–73)
NEUTROPHILS NFR BLD: 80.1 % (ref 38–73)
NEUTROPHILS NFR BLD: 92 % (ref 38–73)
NITRITE UR QL STRIP.AUTO: NEGATIVE
NITRITE UR QL STRIP: NEGATIVE
NITRITE UR QL STRIP: NEGATIVE
NRBC BLD AUTO-RTO: 0 %
NRBC BLD-RTO: 0 /100 WBC
O2 HB BLOOD GAS (OHS): 92.6 % (ref 94–97)
OHS LV EJECTION FRACTION SIMPSONS BIPLANE MOD: 2 %
OSMOLALITY UR: 369 MOSM/KG (ref 50–1200)
OXA-48-LIKE (OHS): ABNORMAL
OXYGEN DEVICE BLOOD GAS (OHS): ABNORMAL
OXYHGB MFR BLDA: 13.8 G/DL (ref 12–16)
PCO2 BLDA: 30.9 MMHG (ref 35–45)
PCO2 BLDA: 36.7 MMHG (ref 35–45)
PCO2 BLDA: 36.7 MMHG (ref 35–45)
PCO2 BLDA: 37.7 MMHG (ref 35–45)
PCO2 BLDA: 40.5 MMHG (ref 35–45)
PCO2 BLDA: 41.8 MMHG (ref 35–45)
PCO2 BLDA: 42.7 MMHG (ref 35–45)
PCO2 BLDA: 43 MMHG (ref 35–45)
PCO2 BLDA: 43.2 MMHG (ref 35–45)
PCO2 BLDA: 43.4 MMHG (ref 35–45)
PCO2 BLDA: 43.5 MMHG (ref 35–45)
PCO2 BLDA: 43.5 MMHG (ref 35–45)
PCO2 BLDA: 43.6 MMHG (ref 35–45)
PCO2 BLDA: 44.2 MMHG (ref 35–45)
PCO2 BLDA: 44.5 MMHG (ref 35–45)
PCO2 BLDA: 44.6 MMHG (ref 35–45)
PCO2 BLDA: 44.9 MMHG (ref 35–45)
PCO2 BLDA: 45.2 MMHG (ref 35–45)
PCO2 BLDA: 45.7 MMHG (ref 35–45)
PCO2 BLDA: 45.8 MMHG (ref 35–45)
PCO2 BLDA: 46.1 MMHG (ref 35–45)
PCO2 BLDA: 46.3 MMHG (ref 35–45)
PCO2 BLDA: 46.4 MMHG (ref 35–45)
PCO2 BLDA: 46.7 MMHG (ref 35–45)
PCO2 BLDA: 46.8 MMHG (ref 35–45)
PCO2 BLDA: 46.9 MMHG (ref 35–45)
PCO2 BLDA: 46.9 MMHG (ref 35–45)
PCO2 BLDA: 47.1 MMHG (ref 35–45)
PCO2 BLDA: 47.1 MMHG (ref 35–45)
PCO2 BLDA: 47.2 MMHG (ref 35–45)
PCO2 BLDA: 47.2 MMHG (ref 35–45)
PCO2 BLDA: 47.3 MMHG (ref 35–45)
PCO2 BLDA: 47.4 MMHG (ref 35–45)
PCO2 BLDA: 47.7 MMHG (ref 35–45)
PCO2 BLDA: 48.1 MMHG (ref 35–45)
PCO2 BLDA: 48.2 MMHG (ref 35–45)
PCO2 BLDA: 48.5 MMHG (ref 35–45)
PCO2 BLDA: 48.6 MMHG (ref 35–45)
PCO2 BLDA: 48.9 MMHG (ref 35–45)
PCO2 BLDA: 49.2 MMHG (ref 35–45)
PCO2 BLDA: 49.2 MMHG (ref 35–45)
PCO2 BLDA: 49.4 MMHG (ref 35–45)
PCO2 BLDA: 49.4 MMHG (ref 35–45)
PCO2 BLDA: 49.5 MMHG (ref 35–45)
PCO2 BLDA: 49.6 MMHG (ref 35–45)
PCO2 BLDA: 49.8 MMHG (ref 35–45)
PCO2 BLDA: 50.3 MMHG (ref 35–45)
PCO2 BLDA: 51.3 MMHG (ref 35–45)
PCO2 BLDA: 52.3 MMHG (ref 35–45)
PCO2 BLDA: 52.5 MMHG (ref 35–45)
PCO2 BLDA: 53.2 MMHG (ref 35–45)
PCO2 BLDA: 53.3 MMHG (ref 35–45)
PCO2 BLDA: 53.3 MMHG (ref 35–45)
PCO2 BLDA: 53.5 MMHG (ref 35–45)
PH BLDA: 7.34 [PH] (ref 7.35–7.45)
PH SMN: 7.26 [PH] (ref 7.35–7.45)
PH SMN: 7.29 [PH] (ref 7.35–7.45)
PH SMN: 7.32 [PH] (ref 7.35–7.45)
PH SMN: 7.32 [PH] (ref 7.35–7.45)
PH SMN: 7.33 [PH] (ref 7.35–7.45)
PH SMN: 7.34 [PH] (ref 7.35–7.45)
PH SMN: 7.35 [PH] (ref 7.35–7.45)
PH SMN: 7.36 [PH] (ref 7.35–7.45)
PH SMN: 7.37 [PH] (ref 7.35–7.45)
PH SMN: 7.38 [PH] (ref 7.35–7.45)
PH SMN: 7.39 [PH] (ref 7.35–7.45)
PH SMN: 7.4 [PH] (ref 7.35–7.45)
PH SMN: 7.41 [PH] (ref 7.35–7.45)
PH SMN: 7.41 [PH] (ref 7.35–7.45)
PH SMN: 7.42 [PH] (ref 7.35–7.45)
PH SMN: 7.43 [PH] (ref 7.35–7.45)
PH SMN: 7.44 [PH] (ref 7.35–7.45)
PH SMN: 7.44 [PH] (ref 7.35–7.45)
PH UR STRIP.AUTO: 5.5 [PH]
PH UR STRIP: 6 [PH] (ref 5–8)
PH UR STRIP: 6 [PH] (ref 5–8)
PHOSPHATE SERPL-MCNC: 2.3 MG/DL (ref 2.7–4.5)
PHOSPHATE SERPL-MCNC: 3.1 MG/DL (ref 2.7–4.5)
PHOSPHATE SERPL-MCNC: 3.3 MG/DL (ref 2.3–4.7)
PHOSPHATE SERPL-MCNC: 3.3 MG/DL (ref 2.7–4.5)
PHOSPHATE SERPL-MCNC: 3.3 MG/DL (ref 2.7–4.5)
PHOSPHATE SERPL-MCNC: 3.5 MG/DL (ref 2.7–4.5)
PHOSPHATE SERPL-MCNC: 3.5 MG/DL (ref 2.7–4.5)
PHOSPHATE SERPL-MCNC: 3.7 MG/DL (ref 2.7–4.5)
PHOSPHATE SERPL-MCNC: 4 MG/DL (ref 2.7–4.5)
PHOSPHATE SERPL-MCNC: 4.6 MG/DL (ref 2.7–4.5)
PHOSPHATE SERPL-MCNC: 5 MG/DL (ref 2.7–4.5)
PHOSPHATE SERPL-MCNC: 5.2 MG/DL (ref 2.7–4.5)
PHOSPHATE SERPL-MCNC: 5.7 MG/DL (ref 2.7–4.5)
PHOSPHATE SERPL-MCNC: 6.3 MG/DL (ref 2.7–4.5)
PHOSPHATE SERPL-MCNC: 6.4 MG/DL (ref 2.7–4.5)
PHOSPHATE SERPL-MCNC: 7 MG/DL (ref 2.7–4.5)
PHOSPHATE SERPL-MCNC: 7.4 MG/DL (ref 2.7–4.5)
PHOSPHATE SERPL-MCNC: 7.5 MG/DL (ref 2.7–4.5)
PHOSPHATE SERPL-MCNC: 7.7 MG/DL (ref 2.7–4.5)
PHOSPHATE SERPL-MCNC: 7.8 MG/DL (ref 2.7–4.5)
PHOSPHATE SERPL-MCNC: 8.5 MG/DL (ref 2.7–4.5)
PHOSPHATE SERPL-MCNC: 9.3 MG/DL (ref 2.7–4.5)
PISA MRMAX VEL: 3.36 M/S
PISA RADIUS: 0.5 CM
PISA TR MAX VEL: 1.8 M/S
PISA TR MAX VEL: 3.1 M/S
PISA VN NYQUIST MS: 0.34 M/S
PISA VN NYQUIST: 0.34 M/S
PLATELET # BLD AUTO: 204 K/UL (ref 150–450)
PLATELET # BLD AUTO: 209 K/UL (ref 150–450)
PLATELET # BLD AUTO: 212 K/UL (ref 150–450)
PLATELET # BLD AUTO: 216 K/UL (ref 150–450)
PLATELET # BLD AUTO: 222 K/UL (ref 150–450)
PLATELET # BLD AUTO: 225 K/UL (ref 150–450)
PLATELET # BLD AUTO: 236 K/UL (ref 150–450)
PLATELET # BLD AUTO: 250 K/UL (ref 150–450)
PLATELET # BLD AUTO: 253 X10(3)/MCL (ref 130–400)
PLATELET # BLD AUTO: 273 K/UL (ref 150–450)
PLATELET # BLD AUTO: 277 X10(3)/MCL (ref 130–400)
PLATELET # BLD AUTO: 300 X10(3)/MCL (ref 130–400)
PLATELET # BLD AUTO: 308 K/UL (ref 150–450)
PLATELET # BLD AUTO: 369 K/UL (ref 150–450)
PLATELET # BLD AUTO: 408 K/UL (ref 150–450)
PLATELET # BLD AUTO: 413 K/UL (ref 150–450)
PLATELET # BLD AUTO: 420 K/UL (ref 150–450)
PLATELET # BLD AUTO: 435 K/UL (ref 150–450)
PLATELET # BLD AUTO: 481 K/UL (ref 150–450)
PMV BLD AUTO: 10 FL (ref 9.2–12.9)
PMV BLD AUTO: 10 FL (ref 9.2–12.9)
PMV BLD AUTO: 10.1 FL (ref 9.2–12.9)
PMV BLD AUTO: 10.2 FL (ref 7.4–10.4)
PMV BLD AUTO: 10.2 FL (ref 9.2–12.9)
PMV BLD AUTO: 10.3 FL (ref 9.2–12.9)
PMV BLD AUTO: 10.4 FL (ref 7.4–10.4)
PMV BLD AUTO: 10.5 FL (ref 9.2–12.9)
PMV BLD AUTO: 10.6 FL (ref 7.4–10.4)
PMV BLD AUTO: 10.6 FL (ref 9.2–12.9)
PMV BLD AUTO: 10.6 FL (ref 9.2–12.9)
PMV BLD AUTO: 10.7 FL (ref 9.2–12.9)
PMV BLD AUTO: 10.9 FL (ref 9.2–12.9)
PMV BLD AUTO: 11 FL (ref 9.2–12.9)
PMV BLD AUTO: 9.8 FL (ref 9.2–12.9)
PMV BLD AUTO: 9.9 FL (ref 9.2–12.9)
PMV BLD AUTO: 9.9 FL (ref 9.2–12.9)
PO2 BLDA: 101 MMHG (ref 80–100)
PO2 BLDA: 104 MMHG (ref 80–100)
PO2 BLDA: 117 MMHG (ref 80–100)
PO2 BLDA: 117 MMHG (ref 80–100)
PO2 BLDA: 22 MMHG (ref 40–60)
PO2 BLDA: 23 MMHG (ref 40–60)
PO2 BLDA: 24 MMHG (ref 40–60)
PO2 BLDA: 25 MMHG (ref 40–60)
PO2 BLDA: 26 MMHG (ref 40–60)
PO2 BLDA: 27 MMHG (ref 40–60)
PO2 BLDA: 28 MMHG (ref 40–60)
PO2 BLDA: 29 MMHG (ref 40–60)
PO2 BLDA: 30 MMHG (ref 40–60)
PO2 BLDA: 31 MMHG (ref 40–60)
PO2 BLDA: 32 MMHG (ref 40–60)
PO2 BLDA: 33 MMHG (ref 40–60)
PO2 BLDA: 34 MMHG (ref 40–60)
PO2 BLDA: 35 MMHG (ref 40–60)
PO2 BLDA: 35 MMHG (ref 40–60)
PO2 BLDA: 36 MMHG (ref 80–100)
PO2 BLDA: 38 MMHG (ref 40–60)
PO2 BLDA: 39 MMHG (ref 40–60)
PO2 BLDA: 69 MMHG (ref 80–100)
PO2 BLDA: 83 MMHG (ref 80–100)
POC ACTIVATED CLOTTING TIME K: 137 SEC (ref 74–137)
POC BE: -1 MMOL/L
POC BE: -12 MMOL/L
POC BE: -2 MMOL/L
POC BE: -2 MMOL/L
POC BE: -3 MMOL/L
POC BE: -4 MMOL/L
POC BE: -4 MMOL/L
POC BE: 0 MMOL/L
POC BE: 1 MMOL/L
POC BE: 10 MMOL/L
POC BE: 10 MMOL/L
POC BE: 2 MMOL/L
POC BE: 3 MMOL/L
POC BE: 4 MMOL/L
POC BE: 5 MMOL/L
POC BE: 5 MMOL/L
POC BE: 6 MMOL/L
POC BE: 7 MMOL/L
POC BE: 7 MMOL/L
POC BE: 8 MMOL/L
POC BE: 9 MMOL/L
POC BE: 9 MMOL/L
POC COHB: 1.5 % (ref 0–9)
POC IONIZED CALCIUM: 1.09 MMOL/L (ref 1.06–1.42)
POC IONIZED CALCIUM: 1.1 MMOL/L (ref 1.06–1.42)
POC IONIZED CALCIUM: 1.14 MMOL/L (ref 1.06–1.42)
POC IONIZED CALCIUM: 1.17 MMOL/L (ref 1.06–1.42)
POC IONIZED CALCIUM: 1.17 MMOL/L (ref 1.06–1.42)
POC IONIZED CALCIUM: 1.18 MMOL/L (ref 1.06–1.42)
POC IONIZED CALCIUM: 1.19 MMOL/L (ref 1.06–1.42)
POC IONIZED CALCIUM: 1.21 MMOL/L (ref 1.06–1.42)
POC IONIZED CALCIUM: 1.22 MMOL/L (ref 1.06–1.42)
POC METHB: 1.3 %
POC METHB: 1.3 % (ref 0–3)
POC METHB: 1.4 %
POC METHB: 1.4 %
POC METHB: 1.5 %
POC METHB: 1.6 % (ref 0–3)
POC O2HB: 48.2 %
POC PERFORMED BY: NORMAL
POC SATURATED O2: 35 % (ref 95–100)
POC SATURATED O2: 35 % (ref 95–100)
POC SATURATED O2: 37 % (ref 95–100)
POC SATURATED O2: 38 % (ref 95–100)
POC SATURATED O2: 38 % (ref 95–100)
POC SATURATED O2: 39 % (ref 95–100)
POC SATURATED O2: 39 % (ref 95–100)
POC SATURATED O2: 40 % (ref 95–100)
POC SATURATED O2: 41 % (ref 95–100)
POC SATURATED O2: 43 % (ref 95–100)
POC SATURATED O2: 44 % (ref 95–100)
POC SATURATED O2: 46 % (ref 95–100)
POC SATURATED O2: 47 % (ref 95–100)
POC SATURATED O2: 48 % (ref 95–100)
POC SATURATED O2: 48 % (ref 95–100)
POC SATURATED O2: 49 % (ref 95–100)
POC SATURATED O2: 49.6 %
POC SATURATED O2: 50 % (ref 95–100)
POC SATURATED O2: 51 % (ref 95–100)
POC SATURATED O2: 52 % (ref 95–100)
POC SATURATED O2: 53 % (ref 95–100)
POC SATURATED O2: 54 % (ref 95–100)
POC SATURATED O2: 56 % (ref 95–100)
POC SATURATED O2: 57 % (ref 95–100)
POC SATURATED O2: 57 % (ref 95–100)
POC SATURATED O2: 58 % (ref 95–100)
POC SATURATED O2: 58 % (ref 95–100)
POC SATURATED O2: 62 % (ref 95–100)
POC SATURATED O2: 64 % (ref 95–100)
POC SATURATED O2: 64 % (ref 95–100)
POC SATURATED O2: 65 % (ref 95–100)
POC SATURATED O2: 65 % (ref 95–100)
POC SATURATED O2: 66 % (ref 95–100)
POC SATURATED O2: 70 % (ref 95–100)
POC SATURATED O2: 74 % (ref 95–100)
POC SATURATED O2: 96 % (ref 95–100)
POC SATURATED O2: 97 % (ref 95–100)
POC SATURATED O2: 98 % (ref 95–100)
POC SATURATED O2: 99 % (ref 95–100)
POC SATURATED O2: 99 % (ref 95–100)
POC SVO2: 50 %
POC TCO2: 16 MMOL/L (ref 23–27)
POC TCO2: 23 MMOL/L (ref 23–27)
POC TCO2: 23 MMOL/L (ref 23–27)
POC TCO2: 24 MMOL/L (ref 23–27)
POC TCO2: 24 MMOL/L (ref 23–27)
POC TCO2: 24 MMOL/L (ref 24–29)
POC TCO2: 24 MMOL/L (ref 24–29)
POC TCO2: 25 MMOL/L (ref 24–29)
POC TCO2: 26 MMOL/L (ref 23–27)
POC TCO2: 26 MMOL/L (ref 24–29)
POC TCO2: 26 MMOL/L (ref 24–29)
POC TCO2: 27 MMOL/L (ref 24–29)
POC TCO2: 28 MMOL/L (ref 24–29)
POC TCO2: 29 MMOL/L (ref 24–29)
POC TCO2: 30 MMOL/L (ref 24–29)
POC TCO2: 31 MMOL/L (ref 24–29)
POC TCO2: 32 MMOL/L (ref 24–29)
POC TCO2: 33 MMOL/L (ref 24–29)
POC TCO2: 33 MMOL/L (ref 24–29)
POC TCO2: 34 MMOL/L (ref 24–29)
POC TCO2: 35 MMOL/L (ref 24–29)
POC TCO2: 35 MMOL/L (ref 24–29)
POC TCO2: 36 MMOL/L (ref 24–29)
POC TCO2: 36 MMOL/L (ref 24–29)
POC TEMPERATURE: 37 C
POCT GLUCOSE: 103 MG/DL (ref 70–110)
POCT GLUCOSE: 103 MG/DL (ref 70–110)
POCT GLUCOSE: 106 MG/DL (ref 70–110)
POCT GLUCOSE: 118 MG/DL (ref 70–110)
POCT GLUCOSE: 124 MG/DL (ref 70–110)
POCT GLUCOSE: 128 MG/DL (ref 70–110)
POCT GLUCOSE: 129 MG/DL (ref 70–110)
POCT GLUCOSE: 129 MG/DL (ref 70–110)
POCT GLUCOSE: 131 MG/DL (ref 70–110)
POCT GLUCOSE: 132 MG/DL (ref 70–110)
POCT GLUCOSE: 133 MG/DL (ref 70–110)
POCT GLUCOSE: 134 MG/DL (ref 70–110)
POCT GLUCOSE: 135 MG/DL (ref 70–110)
POCT GLUCOSE: 142 MG/DL (ref 70–110)
POCT GLUCOSE: 142 MG/DL (ref 70–110)
POCT GLUCOSE: 147 MG/DL (ref 70–110)
POCT GLUCOSE: 148 MG/DL (ref 70–110)
POCT GLUCOSE: 149 MG/DL (ref 70–110)
POCT GLUCOSE: 150 MG/DL (ref 70–110)
POCT GLUCOSE: 151 MG/DL (ref 70–110)
POCT GLUCOSE: 152 MG/DL (ref 70–110)
POCT GLUCOSE: 153 MG/DL (ref 70–110)
POCT GLUCOSE: 153 MG/DL (ref 70–110)
POCT GLUCOSE: 154 MG/DL (ref 70–110)
POCT GLUCOSE: 156 MG/DL (ref 70–110)
POCT GLUCOSE: 156 MG/DL (ref 70–110)
POCT GLUCOSE: 157 MG/DL (ref 70–110)
POCT GLUCOSE: 158 MG/DL (ref 70–110)
POCT GLUCOSE: 159 MG/DL (ref 70–110)
POCT GLUCOSE: 160 MG/DL (ref 70–110)
POCT GLUCOSE: 161 MG/DL (ref 70–110)
POCT GLUCOSE: 161 MG/DL (ref 70–110)
POCT GLUCOSE: 163 MG/DL (ref 70–110)
POCT GLUCOSE: 165 MG/DL (ref 70–110)
POCT GLUCOSE: 166 MG/DL (ref 70–110)
POCT GLUCOSE: 168 MG/DL (ref 70–110)
POCT GLUCOSE: 169 MG/DL (ref 70–110)
POCT GLUCOSE: 169 MG/DL (ref 70–110)
POCT GLUCOSE: 170 MG/DL (ref 70–110)
POCT GLUCOSE: 171 MG/DL (ref 70–110)
POCT GLUCOSE: 173 MG/DL (ref 70–110)
POCT GLUCOSE: 173 MG/DL (ref 70–110)
POCT GLUCOSE: 174 MG/DL (ref 70–110)
POCT GLUCOSE: 174 MG/DL (ref 70–110)
POCT GLUCOSE: 176 MG/DL (ref 70–110)
POCT GLUCOSE: 177 MG/DL (ref 70–110)
POCT GLUCOSE: 179 MG/DL (ref 70–110)
POCT GLUCOSE: 179 MG/DL (ref 70–110)
POCT GLUCOSE: 180 MG/DL (ref 70–110)
POCT GLUCOSE: 180 MG/DL (ref 70–110)
POCT GLUCOSE: 181 MG/DL (ref 70–110)
POCT GLUCOSE: 182 MG/DL (ref 70–110)
POCT GLUCOSE: 186 MG/DL (ref 70–110)
POCT GLUCOSE: 186 MG/DL (ref 70–110)
POCT GLUCOSE: 187 MG/DL (ref 70–110)
POCT GLUCOSE: 187 MG/DL (ref 70–110)
POCT GLUCOSE: 188 MG/DL (ref 70–110)
POCT GLUCOSE: 189 MG/DL (ref 70–110)
POCT GLUCOSE: 190 MG/DL (ref 70–110)
POCT GLUCOSE: 191 MG/DL (ref 70–110)
POCT GLUCOSE: 191 MG/DL (ref 70–110)
POCT GLUCOSE: 193 MG/DL (ref 70–110)
POCT GLUCOSE: 194 MG/DL (ref 70–110)
POCT GLUCOSE: 196 MG/DL (ref 70–110)
POCT GLUCOSE: 197 MG/DL (ref 70–110)
POCT GLUCOSE: 198 MG/DL (ref 70–110)
POCT GLUCOSE: 200 MG/DL (ref 70–110)
POCT GLUCOSE: 205 MG/DL (ref 70–110)
POCT GLUCOSE: 208 MG/DL (ref 70–110)
POCT GLUCOSE: 209 MG/DL (ref 70–110)
POCT GLUCOSE: 209 MG/DL (ref 70–110)
POCT GLUCOSE: 211 MG/DL (ref 70–110)
POCT GLUCOSE: 214 MG/DL (ref 70–110)
POCT GLUCOSE: 227 MG/DL (ref 70–110)
POCT GLUCOSE: 228 MG/DL (ref 70–110)
POCT GLUCOSE: 239 MG/DL (ref 70–110)
POCT GLUCOSE: 252 MG/DL (ref 70–110)
POCT GLUCOSE: 254 MG/DL (ref 70–110)
POCT GLUCOSE: 261 MG/DL (ref 70–110)
POCT GLUCOSE: 275 MG/DL (ref 70–110)
POCT GLUCOSE: 280 MG/DL (ref 70–110)
POCT GLUCOSE: 309 MG/DL (ref 70–110)
POCT GLUCOSE: 341 MG/DL (ref 70–110)
POCT GLUCOSE: 345 MG/DL (ref 70–110)
POCT GLUCOSE: 346 MG/DL (ref 70–110)
POCT GLUCOSE: 358 MG/DL (ref 70–110)
POCT GLUCOSE: 360 MG/DL (ref 70–110)
POCT GLUCOSE: 367 MG/DL (ref 70–110)
POCT GLUCOSE: 369 MG/DL (ref 70–110)
POCT GLUCOSE: 377 MG/DL (ref 70–110)
POCT GLUCOSE: 395 MG/DL (ref 70–110)
POCT GLUCOSE: 400 MG/DL (ref 70–110)
POCT GLUCOSE: 404 MG/DL (ref 70–110)
POCT GLUCOSE: 407 MG/DL (ref 70–110)
POCT GLUCOSE: 408 MG/DL (ref 70–110)
POCT GLUCOSE: 422 MG/DL (ref 70–110)
POCT GLUCOSE: 441 MG/DL (ref 70–110)
POCT GLUCOSE: 471 MG/DL (ref 70–110)
POCT GLUCOSE: 99 MG/DL (ref 70–110)
POTASSIUM BLD-SCNC: 3.8 MMOL/L (ref 3.5–5.1)
POTASSIUM BLD-SCNC: 4 MMOL/L (ref 3.5–5.1)
POTASSIUM BLD-SCNC: 4.1 MMOL/L (ref 3.5–5.1)
POTASSIUM BLD-SCNC: 4.3 MMOL/L (ref 3.5–5.1)
POTASSIUM BLOOD GAS (OHS): 5.1 MMOL/L (ref 3.5–5)
POTASSIUM SERPL-SCNC: 3.5 MMOL/L (ref 3.5–5.1)
POTASSIUM SERPL-SCNC: 3.6 MMOL/L (ref 3.5–5.1)
POTASSIUM SERPL-SCNC: 3.7 MMOL/L (ref 3.5–5.1)
POTASSIUM SERPL-SCNC: 3.8 MMOL/L (ref 3.5–5.1)
POTASSIUM SERPL-SCNC: 3.9 MMOL/L (ref 3.5–5.1)
POTASSIUM SERPL-SCNC: 4 MMOL/L (ref 3.5–5.1)
POTASSIUM SERPL-SCNC: 4.1 MMOL/L (ref 3.5–5.1)
POTASSIUM SERPL-SCNC: 4.2 MMOL/L (ref 3.5–5.1)
POTASSIUM SERPL-SCNC: 4.3 MMOL/L (ref 3.5–5.1)
POTASSIUM SERPL-SCNC: 4.4 MMOL/L (ref 3.5–5.1)
POTASSIUM SERPL-SCNC: 4.5 MMOL/L (ref 3.5–5.1)
POTASSIUM SERPL-SCNC: 4.6 MMOL/L (ref 3.5–5.1)
POTASSIUM SERPL-SCNC: 4.7 MMOL/L (ref 3.5–5.1)
POTASSIUM SERPL-SCNC: 5 MMOL/L (ref 3.5–5.1)
POTASSIUM SERPL-SCNC: 5 MMOL/L (ref 3.5–5.1)
POTASSIUM SERPL-SCNC: 5.1 MMOL/L (ref 3.5–5.1)
POTASSIUM SERPL-SCNC: 5.2 MMOL/L (ref 3.5–5.1)
POTASSIUM UR-SCNC: 43 MMOL/L (ref 15–95)
PROCALCITONIN SERPL IA-MCNC: 0.21 NG/ML
PROT SERPL-MCNC: 6 G/DL (ref 6–8.4)
PROT SERPL-MCNC: 6.1 G/DL (ref 6–8.4)
PROT SERPL-MCNC: 6.1 G/DL (ref 6–8.4)
PROT SERPL-MCNC: 6.1 GM/DL (ref 6.4–8.3)
PROT SERPL-MCNC: 6.2 G/DL (ref 6–8.4)
PROT SERPL-MCNC: 6.3 G/DL (ref 6–8.4)
PROT SERPL-MCNC: 6.3 G/DL (ref 6–8.4)
PROT SERPL-MCNC: 6.4 G/DL (ref 6–8.4)
PROT SERPL-MCNC: 6.5 G/DL (ref 6–8.4)
PROT SERPL-MCNC: 6.6 GM/DL (ref 6.4–8.3)
PROT SERPL-MCNC: 6.7 G/DL (ref 6–8.4)
PROT SERPL-MCNC: 6.9 G/DL (ref 6–8.4)
PROT SERPL-MCNC: 7.1 GM/DL (ref 6.4–8.3)
PROT UR QL STRIP.AUTO: ABNORMAL
PROT UR QL STRIP: ABNORMAL
PROT UR QL STRIP: ABNORMAL
PROT UR-MCNC: 128 MG/DL (ref 0–15)
PROT/CREAT UR: 1.35 MG/G{CREAT} (ref 0–0.2)
PROTEUS SPP. (OHS): NOT DETECTED
PROTHROMBIN TIME: 10.3 SEC (ref 9–12.5)
PROTHROMBIN TIME: 10.5 SEC (ref 9–12.5)
PROTHROMBIN TIME: 10.6 SEC (ref 9–12.5)
PROTHROMBIN TIME: 10.6 SEC (ref 9–12.5)
PROTHROMBIN TIME: 10.7 SEC (ref 9–12.5)
PROTHROMBIN TIME: 10.7 SEC (ref 9–12.5)
PROTHROMBIN TIME: 10.8 SEC (ref 9–12.5)
PROTHROMBIN TIME: 10.9 SEC (ref 9–12.5)
PROTHROMBIN TIME: 10.9 SEC (ref 9–12.5)
PROTHROMBIN TIME: 11.1 SEC (ref 9–12.5)
PROTHROMBIN TIME: 11.3 SEC (ref 9–12.5)
PROTHROMBIN TIME: 11.4 SEC (ref 9–12.5)
PROTHROMBIN TIME: 12.6 SEC (ref 9–12.5)
PROTHROMBIN TIME: 12.9 SECONDS (ref 12.5–14.5)
PROTHROMBIN TIME: 15.4 SEC (ref 9–12.5)
PSEUDOMONAS AERUGINOSA (OHS): NOT DETECTED
PULM VEIN S/D RATIO: 0.47
PV PEAK D VEL: 0.64 M/S
PV PEAK S VEL: 0.3 M/S
RA MAJOR: 5.2 CM
RA MAJOR: 5.29 CM
RA PRESSURE ESTIMATED: 3 MMHG
RA WIDTH: 3.52 CM
RA WIDTH: 4.12 CM
RBC # BLD AUTO: 2.43 M/UL (ref 4.6–6.2)
RBC # BLD AUTO: 2.47 M/UL (ref 4.6–6.2)
RBC # BLD AUTO: 2.52 M/UL (ref 4.6–6.2)
RBC # BLD AUTO: 2.54 M/UL (ref 4.6–6.2)
RBC # BLD AUTO: 2.54 M/UL (ref 4.6–6.2)
RBC # BLD AUTO: 2.56 M/UL (ref 4.6–6.2)
RBC # BLD AUTO: 2.67 M/UL (ref 4.6–6.2)
RBC # BLD AUTO: 2.69 M/UL (ref 4.6–6.2)
RBC # BLD AUTO: 2.81 M/UL (ref 4.6–6.2)
RBC # BLD AUTO: 3.07 M/UL (ref 4.6–6.2)
RBC # BLD AUTO: 3.26 M/UL (ref 4.6–6.2)
RBC # BLD AUTO: 3.44 M/UL (ref 4.6–6.2)
RBC # BLD AUTO: 3.51 M/UL (ref 4.6–6.2)
RBC # BLD AUTO: 3.69 M/UL (ref 4.6–6.2)
RBC # BLD AUTO: 3.71 M/UL (ref 4.6–6.2)
RBC # BLD AUTO: 3.91 X10(6)/MCL (ref 4.7–6.1)
RBC # BLD AUTO: 3.93 M/UL (ref 4.6–6.2)
RBC # BLD AUTO: 4.57 X10(6)/MCL (ref 4.7–6.1)
RBC # BLD AUTO: 4.95 X10(6)/MCL (ref 4.7–6.1)
RBC #/AREA URNS AUTO: 19 /HPF (ref 0–4)
RBC #/AREA URNS AUTO: 8 /HPF (ref 0–4)
RBC #/AREA URNS AUTO: <5 /HPF
RBC UR QL AUTO: ABNORMAL
RIGHT VENTRICULAR END-DIASTOLIC DIMENSION: 3.52 CM
RV TB RVSP: 5 MMHG
RV TB RVSP: 6 MMHG
S ENT+BONG DNA STL QL NAA+NON-PROBE: NOT DETECTED
S PNEUM DNA CSF QL NAA+NON-PROBE: NOT DETECTED
SAMPLE SITE BLOOD GAS (OHS): ABNORMAL
SAMPLE: ABNORMAL
SAMPLE: NORMAL
SAO2 % BLDA: 92.4 %
SARS-COV-2 RNA RESP QL NAA+PROBE: NOT DETECTED
SATURATED IRON: 16 % (ref 20–50)
SERRATIA MARCESCENS (OHS): NOT DETECTED
SINUS: 3.4 CM
SINUS: 3.6 CM
SINUS: 3.77 CM
SITE: ABNORMAL
SITE: NORMAL
SODIUM BLD-SCNC: 126 MMOL/L (ref 136–145)
SODIUM BLD-SCNC: 128 MMOL/L (ref 136–145)
SODIUM BLD-SCNC: 130 MMOL/L (ref 136–145)
SODIUM BLD-SCNC: 131 MMOL/L (ref 136–145)
SODIUM BLD-SCNC: 131 MMOL/L (ref 136–145)
SODIUM BLD-SCNC: 132 MMOL/L (ref 136–145)
SODIUM BLOOD GAS (OHS): 126 MMOL/L (ref 137–145)
SODIUM SERPL-SCNC: 125 MMOL/L (ref 136–145)
SODIUM SERPL-SCNC: 127 MMOL/L (ref 136–145)
SODIUM SERPL-SCNC: 127 MMOL/L (ref 136–145)
SODIUM SERPL-SCNC: 128 MMOL/L (ref 136–145)
SODIUM SERPL-SCNC: 129 MMOL/L (ref 136–145)
SODIUM SERPL-SCNC: 130 MMOL/L (ref 136–145)
SODIUM SERPL-SCNC: 131 MMOL/L (ref 136–145)
SODIUM SERPL-SCNC: 132 MMOL/L (ref 136–145)
SODIUM SERPL-SCNC: 133 MMOL/L (ref 136–145)
SODIUM SERPL-SCNC: 134 MMOL/L (ref 136–145)
SODIUM SERPL-SCNC: 135 MMOL/L (ref 136–145)
SODIUM SERPL-SCNC: 137 MMOL/L (ref 136–145)
SODIUM SERPL-SCNC: 137 MMOL/L (ref 136–145)
SODIUM UR-SCNC: 41 MMOL/L (ref 20–250)
SODIUM UR-SCNC: <10 MMOL/L (ref 20–250)
SP GR UR STRIP.AUTO: 1.03 (ref 1–1.03)
SP GR UR STRIP: 1.01 (ref 1–1.03)
SP GR UR STRIP: 1.01 (ref 1–1.03)
SP02: 100
SP02: 93
SP02: 96
SP02: 96
SP02: 97
SP02: 98
SPECIMEN OUTDATE: ABNORMAL
SPECIMEN OUTDATE: ABNORMAL
SPECIMEN OUTDATE: NORMAL
SPECIMEN SOURCE: NORMAL
SQUAMOUS #/AREA URNS AUTO: 0 /HPF
SQUAMOUS #/AREA URNS AUTO: <5 /HPF
STAPH AUREUS ID BY PCR: NEGATIVE
STAPHYLOCOCCUS AUREUS (OHS): NOT DETECTED
STAPHYLOCOCCUS EPIDERMIDIS (OHS): NOT DETECTED
STAPHYLOCOCCUS LUGDUNENSIS (OHS): NOT DETECTED
STAPHYLOCOCCUS SPP. (OHS): DETECTED
STENOTROPHOMONAS MALTOPHILIA (OHS): NOT DETECTED
STJ: 3.47 CM
STREPTOCOCCUS PYOGENES (GROUP A)(OHS): NOT DETECTED
STREPTOCOCCUS SPP. (OHS): NOT DETECTED
T3FREE SERPL-MCNC: 2.13 PG/ML (ref 1.58–3.91)
T4 FREE SERPL-MCNC: 1.08 NG/DL (ref 0.7–1.48)
TDI LATERAL: 0.05 M/S
TDI SEPTAL: 0.04 M/S
TDI: 0.05 M/S
TOTAL IRON BINDING CAPACITY: 258 UG/DL (ref 250–450)
TR MAX PG: 13 MMHG
TR MAX PG: 38 MMHG
TRANSFERRIN SERPL-MCNC: 174 MG/DL (ref 200–375)
TRICUSPID ANNULAR PLANE SYSTOLIC EXCURSION: 1.06 CM
TRICUSPID ANNULAR PLANE SYSTOLIC EXCURSION: 1.13 CM
TRICUSPID ANNULAR PLANE SYSTOLIC EXCURSION: 1.3 CM
TRIGL SERPL-MCNC: 156 MG/DL (ref 34–140)
TROPONIN I SERPL DL<=0.01 NG/ML-MCNC: 14.96 NG/ML (ref 0–0.03)
TROPONIN I SERPL-MCNC: 23.04 NG/ML (ref 0–0.04)
TROPONIN I SERPL-MCNC: 23.28 NG/ML (ref 0–0.04)
TROPONIN I SERPL-MCNC: 23.88 NG/ML (ref 0–0.04)
TROPONIN I SERPL-MCNC: 38.19 NG/ML (ref 0–0.04)
TSH SERPL-ACNC: 7.1 UIU/ML (ref 0.35–4.94)
TV REST PULMONARY ARTERY PRESSURE: 16 MMHG
TV REST PULMONARY ARTERY PRESSURE: 41 MMHG
URATE SERPL-MCNC: 11.5 MG/DL (ref 3.4–7)
URATE UR-MCNC: 40.4 MG/DL
URN SPEC COLLECT METH UR: ABNORMAL
URN SPEC COLLECT METH UR: ABNORMAL
UROBILINOGEN UR STRIP-ACNC: 1
VANA/B (OHS): ABNORMAL
VIM (OHS): ABNORMAL
VLDLC SERPL CALC-MCNC: 31 MG/DL
WBC # BLD AUTO: 10.09 K/UL (ref 3.9–12.7)
WBC # BLD AUTO: 10.73 K/UL (ref 3.9–12.7)
WBC # BLD AUTO: 10.83 K/UL (ref 3.9–12.7)
WBC # BLD AUTO: 11.37 K/UL (ref 3.9–12.7)
WBC # BLD AUTO: 12.2 K/UL (ref 3.9–12.7)
WBC # BLD AUTO: 12.37 K/UL (ref 3.9–12.7)
WBC # BLD AUTO: 21.2 K/UL (ref 3.9–12.7)
WBC # BLD AUTO: 6.63 K/UL (ref 3.9–12.7)
WBC # BLD AUTO: 7.38 K/UL (ref 3.9–12.7)
WBC # BLD AUTO: 7.5 K/UL (ref 3.9–12.7)
WBC # BLD AUTO: 7.81 K/UL (ref 3.9–12.7)
WBC # BLD AUTO: 8.11 K/UL (ref 3.9–12.7)
WBC # BLD AUTO: 8.97 K/UL (ref 3.9–12.7)
WBC # BLD AUTO: 9.03 K/UL (ref 3.9–12.7)
WBC # BLD AUTO: 9.71 K/UL (ref 3.9–12.7)
WBC # BLD AUTO: 9.81 K/UL (ref 3.9–12.7)
WBC # SPEC AUTO: 11.56 X10(3)/MCL (ref 4.5–11.5)
WBC # SPEC AUTO: 11.57 X10(3)/MCL (ref 4.5–11.5)
WBC # SPEC AUTO: 14.26 X10(3)/MCL (ref 4.5–11.5)
WBC #/AREA URNS AUTO: 1 /HPF (ref 0–5)
WBC #/AREA URNS AUTO: 3 /HPF (ref 0–5)
WBC #/AREA URNS AUTO: <5 /HPF
YEAST UR QL AUTO: ABNORMAL
YEAST UR QL AUTO: ABNORMAL
Z-SCORE OF LEFT VENTRICULAR DIMENSION IN END DIASTOLE: -6.05
Z-SCORE OF LEFT VENTRICULAR DIMENSION IN END DIASTOLE: -6.44
Z-SCORE OF LEFT VENTRICULAR DIMENSION IN END DIASTOLE: -6.92
Z-SCORE OF LEFT VENTRICULAR DIMENSION IN END SYSTOLE: -1.15
Z-SCORE OF LEFT VENTRICULAR DIMENSION IN END SYSTOLE: -1.55
Z-SCORE OF LEFT VENTRICULAR DIMENSION IN END SYSTOLE: -1.93

## 2023-01-01 PROCEDURE — 94761 N-INVAS EAR/PLS OXIMETRY MLT: CPT

## 2023-01-01 PROCEDURE — 99233 SBSQ HOSP IP/OBS HIGH 50: CPT | Mod: ,,, | Performed by: THORACIC SURGERY (CARDIOTHORACIC VASCULAR SURGERY)

## 2023-01-01 PROCEDURE — 27000221 HC OXYGEN, UP TO 24 HOURS

## 2023-01-01 PROCEDURE — 80048 BASIC METABOLIC PNL TOTAL CA: CPT | Mod: XB | Performed by: INTERNAL MEDICINE

## 2023-01-01 PROCEDURE — 20000000 HC ICU ROOM

## 2023-01-01 PROCEDURE — 37799 UNLISTED PX VASCULAR SURGERY: CPT

## 2023-01-01 PROCEDURE — 90945 DIALYSIS ONE EVALUATION: CPT

## 2023-01-01 PROCEDURE — 93005 ELECTROCARDIOGRAM TRACING: CPT

## 2023-01-01 PROCEDURE — 83735 ASSAY OF MAGNESIUM: CPT | Performed by: STUDENT IN AN ORGANIZED HEALTH CARE EDUCATION/TRAINING PROGRAM

## 2023-01-01 PROCEDURE — 84466 ASSAY OF TRANSFERRIN: CPT | Performed by: STUDENT IN AN ORGANIZED HEALTH CARE EDUCATION/TRAINING PROGRAM

## 2023-01-01 PROCEDURE — 63600175 PHARM REV CODE 636 W HCPCS: Performed by: INTERNAL MEDICINE

## 2023-01-01 PROCEDURE — 83540 ASSAY OF IRON: CPT | Performed by: STUDENT IN AN ORGANIZED HEALTH CARE EDUCATION/TRAINING PROGRAM

## 2023-01-01 PROCEDURE — 84560 ASSAY OF URINE/URIC ACID: CPT | Performed by: STUDENT IN AN ORGANIZED HEALTH CARE EDUCATION/TRAINING PROGRAM

## 2023-01-01 PROCEDURE — 99232 SBSQ HOSP IP/OBS MODERATE 35: CPT | Mod: ,,, | Performed by: NURSE PRACTITIONER

## 2023-01-01 PROCEDURE — 99232 PR SUBSEQUENT HOSPITAL CARE,LEVL II: ICD-10-PCS | Mod: ,,, | Performed by: NURSE PRACTITIONER

## 2023-01-01 PROCEDURE — 99291 CRITICAL CARE FIRST HOUR: CPT | Mod: ,,, | Performed by: INTERNAL MEDICINE

## 2023-01-01 PROCEDURE — 84100 ASSAY OF PHOSPHORUS: CPT | Performed by: STUDENT IN AN ORGANIZED HEALTH CARE EDUCATION/TRAINING PROGRAM

## 2023-01-01 PROCEDURE — 63600175 PHARM REV CODE 636 W HCPCS: Performed by: STUDENT IN AN ORGANIZED HEALTH CARE EDUCATION/TRAINING PROGRAM

## 2023-01-01 PROCEDURE — 99233 PR SUBSEQUENT HOSPITAL CARE,LEVL III: ICD-10-PCS | Mod: ,,, | Performed by: INTERNAL MEDICINE

## 2023-01-01 PROCEDURE — 25000003 PHARM REV CODE 250: Performed by: STUDENT IN AN ORGANIZED HEALTH CARE EDUCATION/TRAINING PROGRAM

## 2023-01-01 PROCEDURE — 99223 PR INITIAL HOSPITAL CARE,LEVL III: ICD-10-PCS | Mod: ,,, | Performed by: HOSPITALIST

## 2023-01-01 PROCEDURE — 85384 FIBRINOGEN ACTIVITY: CPT | Performed by: STUDENT IN AN ORGANIZED HEALTH CARE EDUCATION/TRAINING PROGRAM

## 2023-01-01 PROCEDURE — 99233 SBSQ HOSP IP/OBS HIGH 50: CPT | Mod: ,,, | Performed by: PHYSICIAN ASSISTANT

## 2023-01-01 PROCEDURE — 11000001 HC ACUTE MED/SURG PRIVATE ROOM

## 2023-01-01 PROCEDURE — 63600175 PHARM REV CODE 636 W HCPCS

## 2023-01-01 PROCEDURE — 99222 1ST HOSP IP/OBS MODERATE 55: CPT | Mod: ,,, | Performed by: PSYCHIATRY & NEUROLOGY

## 2023-01-01 PROCEDURE — 25500020 PHARM REV CODE 255: Performed by: INTERNAL MEDICINE

## 2023-01-01 PROCEDURE — 82803 BLOOD GASES ANY COMBINATION: CPT

## 2023-01-01 PROCEDURE — 93461 R&L HRT ART/VENTRICLE ANGIO: CPT | Mod: XU | Performed by: INTERNAL MEDICINE

## 2023-01-01 PROCEDURE — 83605 ASSAY OF LACTIC ACID: CPT

## 2023-01-01 PROCEDURE — 85025 COMPLETE CBC W/AUTO DIFF WBC: CPT | Performed by: STUDENT IN AN ORGANIZED HEALTH CARE EDUCATION/TRAINING PROGRAM

## 2023-01-01 PROCEDURE — 99900035 HC TECH TIME PER 15 MIN (STAT)

## 2023-01-01 PROCEDURE — 25000003 PHARM REV CODE 250: Performed by: INTERNAL MEDICINE

## 2023-01-01 PROCEDURE — C1874 STENT, COATED/COV W/DEL SYS: HCPCS | Performed by: INTERNAL MEDICINE

## 2023-01-01 PROCEDURE — 83605 ASSAY OF LACTIC ACID: CPT | Performed by: PHYSICIAN ASSISTANT

## 2023-01-01 PROCEDURE — 85730 THROMBOPLASTIN TIME PARTIAL: CPT | Mod: 91 | Performed by: INTERNAL MEDICINE

## 2023-01-01 PROCEDURE — 86900 BLOOD TYPING SEROLOGIC ABO: CPT | Performed by: NURSE PRACTITIONER

## 2023-01-01 PROCEDURE — 33992 RMVL PERQ LEFT HEART VAD: CPT | Performed by: INTERNAL MEDICINE

## 2023-01-01 PROCEDURE — 99291 PR CRITICAL CARE, E/M 30-74 MINUTES: ICD-10-PCS | Mod: ,,, | Performed by: INTERNAL MEDICINE

## 2023-01-01 PROCEDURE — 80100008 HC CRRT DAILY MAINTENANCE

## 2023-01-01 PROCEDURE — 80053 COMPREHEN METABOLIC PANEL: CPT | Performed by: INTERNAL MEDICINE

## 2023-01-01 PROCEDURE — 82330 ASSAY OF CALCIUM: CPT

## 2023-01-01 PROCEDURE — 63600367 HC NITRIC OXIDE PER HOUR

## 2023-01-01 PROCEDURE — 85610 PROTHROMBIN TIME: CPT | Performed by: STUDENT IN AN ORGANIZED HEALTH CARE EDUCATION/TRAINING PROGRAM

## 2023-01-01 PROCEDURE — 85730 THROMBOPLASTIN TIME PARTIAL: CPT | Performed by: HOSPITALIST

## 2023-01-01 PROCEDURE — 99233 SBSQ HOSP IP/OBS HIGH 50: CPT | Mod: ,,, | Performed by: INTERNAL MEDICINE

## 2023-01-01 PROCEDURE — 84484 ASSAY OF TROPONIN QUANT: CPT | Performed by: PHYSICIAN ASSISTANT

## 2023-01-01 PROCEDURE — 99497 PR ADVNCD CARE PLAN 30 MIN: ICD-10-PCS | Mod: 25,,, | Performed by: STUDENT IN AN ORGANIZED HEALTH CARE EDUCATION/TRAINING PROGRAM

## 2023-01-01 PROCEDURE — 99152 MOD SED SAME PHYS/QHP 5/>YRS: CPT | Performed by: INTERNAL MEDICINE

## 2023-01-01 PROCEDURE — 36556 INSERT NON-TUNNEL CV CATH: CPT

## 2023-01-01 PROCEDURE — 84300 ASSAY OF URINE SODIUM: CPT | Performed by: STUDENT IN AN ORGANIZED HEALTH CARE EDUCATION/TRAINING PROGRAM

## 2023-01-01 PROCEDURE — 25000003 PHARM REV CODE 250

## 2023-01-01 PROCEDURE — 86850 RBC ANTIBODY SCREEN: CPT | Performed by: STUDENT IN AN ORGANIZED HEALTH CARE EDUCATION/TRAINING PROGRAM

## 2023-01-01 PROCEDURE — 80048 BASIC METABOLIC PNL TOTAL CA: CPT | Mod: XB | Performed by: STUDENT IN AN ORGANIZED HEALTH CARE EDUCATION/TRAINING PROGRAM

## 2023-01-01 PROCEDURE — 82800 BLOOD PH: CPT

## 2023-01-01 PROCEDURE — 25000003 PHARM REV CODE 250: Performed by: NURSE PRACTITIONER

## 2023-01-01 PROCEDURE — 84100 ASSAY OF PHOSPHORUS: CPT | Performed by: INTERNAL MEDICINE

## 2023-01-01 PROCEDURE — 80053 COMPREHEN METABOLIC PANEL: CPT | Performed by: STUDENT IN AN ORGANIZED HEALTH CARE EDUCATION/TRAINING PROGRAM

## 2023-01-01 PROCEDURE — 85730 THROMBOPLASTIN TIME PARTIAL: CPT | Performed by: STUDENT IN AN ORGANIZED HEALTH CARE EDUCATION/TRAINING PROGRAM

## 2023-01-01 PROCEDURE — 84145 PROCALCITONIN (PCT): CPT | Performed by: STUDENT IN AN ORGANIZED HEALTH CARE EDUCATION/TRAINING PROGRAM

## 2023-01-01 PROCEDURE — 99222 PR INITIAL HOSPITAL CARE,LEVL II: ICD-10-PCS | Mod: ,,, | Performed by: PSYCHIATRY & NEUROLOGY

## 2023-01-01 PROCEDURE — 80048 BASIC METABOLIC PNL TOTAL CA: CPT | Mod: 91,XB | Performed by: STUDENT IN AN ORGANIZED HEALTH CARE EDUCATION/TRAINING PROGRAM

## 2023-01-01 PROCEDURE — 99232 PR SUBSEQUENT HOSPITAL CARE,LEVL II: ICD-10-PCS | Mod: ,,, | Performed by: PSYCHIATRY & NEUROLOGY

## 2023-01-01 PROCEDURE — 99291 CRITICAL CARE FIRST HOUR: CPT

## 2023-01-01 PROCEDURE — 95714 VEEG EA 12-26 HR UNMNTR: CPT

## 2023-01-01 PROCEDURE — 80069 RENAL FUNCTION PANEL: CPT | Performed by: INTERNAL MEDICINE

## 2023-01-01 PROCEDURE — C1753 CATH, INTRAVAS ULTRASOUND: HCPCS | Performed by: INTERNAL MEDICINE

## 2023-01-01 PROCEDURE — 84484 ASSAY OF TROPONIN QUANT: CPT | Performed by: STUDENT IN AN ORGANIZED HEALTH CARE EDUCATION/TRAINING PROGRAM

## 2023-01-01 PROCEDURE — 83615 LACTATE (LD) (LDH) ENZYME: CPT | Performed by: STUDENT IN AN ORGANIZED HEALTH CARE EDUCATION/TRAINING PROGRAM

## 2023-01-01 PROCEDURE — C1751 CATH, INF, PER/CENT/MIDLINE: HCPCS

## 2023-01-01 PROCEDURE — 80048 BASIC METABOLIC PNL TOTAL CA: CPT | Mod: 91,XB | Performed by: INTERNAL MEDICINE

## 2023-01-01 PROCEDURE — 84100 ASSAY OF PHOSPHORUS: CPT | Mod: 91 | Performed by: INTERNAL MEDICINE

## 2023-01-01 PROCEDURE — 80069 RENAL FUNCTION PANEL: CPT | Performed by: STUDENT IN AN ORGANIZED HEALTH CARE EDUCATION/TRAINING PROGRAM

## 2023-01-01 PROCEDURE — 84481 FREE ASSAY (FT-3): CPT | Performed by: INTERNAL MEDICINE

## 2023-01-01 PROCEDURE — 93010 ELECTROCARDIOGRAM REPORT: CPT | Mod: ,,, | Performed by: INTERNAL MEDICINE

## 2023-01-01 PROCEDURE — 36600 WITHDRAWAL OF ARTERIAL BLOOD: CPT

## 2023-01-01 PROCEDURE — 85025 COMPLETE CBC W/AUTO DIFF WBC: CPT | Performed by: INTERNAL MEDICINE

## 2023-01-01 PROCEDURE — 99223 PR INITIAL HOSPITAL CARE,LEVL III: ICD-10-PCS | Mod: ,,, | Performed by: STUDENT IN AN ORGANIZED HEALTH CARE EDUCATION/TRAINING PROGRAM

## 2023-01-01 PROCEDURE — 81001 URINALYSIS AUTO W/SCOPE: CPT | Performed by: PHYSICIAN ASSISTANT

## 2023-01-01 PROCEDURE — C9113 INJ PANTOPRAZOLE SODIUM, VIA: HCPCS | Performed by: STUDENT IN AN ORGANIZED HEALTH CARE EDUCATION/TRAINING PROGRAM

## 2023-01-01 PROCEDURE — 99223 1ST HOSP IP/OBS HIGH 75: CPT | Mod: ,,, | Performed by: INTERNAL MEDICINE

## 2023-01-01 PROCEDURE — 27000426 HC IMPELLA SET UP

## 2023-01-01 PROCEDURE — 63600175 PHARM REV CODE 636 W HCPCS: Performed by: EMERGENCY MEDICINE

## 2023-01-01 PROCEDURE — 82570 ASSAY OF URINE CREATININE: CPT | Performed by: STUDENT IN AN ORGANIZED HEALTH CARE EDUCATION/TRAINING PROGRAM

## 2023-01-01 PROCEDURE — 82550 ASSAY OF CK (CPK): CPT | Performed by: STUDENT IN AN ORGANIZED HEALTH CARE EDUCATION/TRAINING PROGRAM

## 2023-01-01 PROCEDURE — 83735 ASSAY OF MAGNESIUM: CPT | Mod: 91 | Performed by: INTERNAL MEDICINE

## 2023-01-01 PROCEDURE — 85347 COAGULATION TIME ACTIVATED: CPT | Performed by: INTERNAL MEDICINE

## 2023-01-01 PROCEDURE — 96374 THER/PROPH/DIAG INJ IV PUSH: CPT

## 2023-01-01 PROCEDURE — 27000203 HC IMPELLA ADD'L DAY (CL)

## 2023-01-01 PROCEDURE — C1894 INTRO/SHEATH, NON-LASER: HCPCS | Performed by: INTERNAL MEDICINE

## 2023-01-01 PROCEDURE — 99222 PR INITIAL HOSPITAL CARE,LEVL II: ICD-10-PCS | Mod: ,,, | Performed by: PHYSICIAN ASSISTANT

## 2023-01-01 PROCEDURE — 97530 THERAPEUTIC ACTIVITIES: CPT

## 2023-01-01 PROCEDURE — C1751 CATH, INF, PER/CENT/MIDLINE: HCPCS | Performed by: INTERNAL MEDICINE

## 2023-01-01 PROCEDURE — 25000003 PHARM REV CODE 250: Performed by: PHYSICIAN ASSISTANT

## 2023-01-01 PROCEDURE — 85730 THROMBOPLASTIN TIME PARTIAL: CPT | Performed by: INTERNAL MEDICINE

## 2023-01-01 PROCEDURE — 84133 ASSAY OF URINE POTASSIUM: CPT | Performed by: STUDENT IN AN ORGANIZED HEALTH CARE EDUCATION/TRAINING PROGRAM

## 2023-01-01 PROCEDURE — C1769 GUIDE WIRE: HCPCS | Performed by: INTERNAL MEDICINE

## 2023-01-01 PROCEDURE — 84295 ASSAY OF SERUM SODIUM: CPT

## 2023-01-01 PROCEDURE — 27000513 HC SENSOR FLOTRAC

## 2023-01-01 PROCEDURE — 87154 CUL TYP ID BLD PTHGN 6+ TRGT: CPT | Performed by: PHYSICIAN ASSISTANT

## 2023-01-01 PROCEDURE — 99233 SBSQ HOSP IP/OBS HIGH 50: CPT | Mod: ,,, | Performed by: CLINICAL NURSE SPECIALIST

## 2023-01-01 PROCEDURE — 83050 HGB METHEMOGLOBIN QUAN: CPT

## 2023-01-01 PROCEDURE — 93010 EKG 12-LEAD: ICD-10-PCS | Mod: ,,, | Performed by: INTERNAL MEDICINE

## 2023-01-01 PROCEDURE — 27000923 HC TRIALYSIS CATHETER, ANY SIZE

## 2023-01-01 PROCEDURE — 99233 PR SUBSEQUENT HOSPITAL CARE,LEVL III: ICD-10-PCS | Mod: ,,, | Performed by: CLINICAL NURSE SPECIALIST

## 2023-01-01 PROCEDURE — C1760 CLOSURE DEV, VASC: HCPCS | Performed by: INTERNAL MEDICINE

## 2023-01-01 PROCEDURE — 88741 TRANSCUTANEOUS METHB: CPT

## 2023-01-01 PROCEDURE — 80053 COMPREHEN METABOLIC PANEL: CPT | Mod: 91 | Performed by: STUDENT IN AN ORGANIZED HEALTH CARE EDUCATION/TRAINING PROGRAM

## 2023-01-01 PROCEDURE — 99223 1ST HOSP IP/OBS HIGH 75: CPT | Mod: ,,, | Performed by: STUDENT IN AN ORGANIZED HEALTH CARE EDUCATION/TRAINING PROGRAM

## 2023-01-01 PROCEDURE — 93503 PR INSERT/PLACE FLOW DIRECT CATH: ICD-10-PCS | Mod: 51,,, | Performed by: INTERNAL MEDICINE

## 2023-01-01 PROCEDURE — 80048 BASIC METABOLIC PNL TOTAL CA: CPT | Performed by: STUDENT IN AN ORGANIZED HEALTH CARE EDUCATION/TRAINING PROGRAM

## 2023-01-01 PROCEDURE — 27000202 HC IABP, ADD'L DAY

## 2023-01-01 PROCEDURE — 97535 SELF CARE MNGMENT TRAINING: CPT

## 2023-01-01 PROCEDURE — 25000003 PHARM REV CODE 250: Performed by: EMERGENCY MEDICINE

## 2023-01-01 PROCEDURE — 80048 BASIC METABOLIC PNL TOTAL CA: CPT | Performed by: INTERNAL MEDICINE

## 2023-01-01 PROCEDURE — 80061 LIPID PANEL: CPT | Performed by: INTERNAL MEDICINE

## 2023-01-01 PROCEDURE — 99497 ADVNCD CARE PLAN 30 MIN: CPT | Mod: 25,,, | Performed by: STUDENT IN AN ORGANIZED HEALTH CARE EDUCATION/TRAINING PROGRAM

## 2023-01-01 PROCEDURE — 27100171 HC OXYGEN HIGH FLOW UP TO 24 HOURS

## 2023-01-01 PROCEDURE — 36589 REMOVAL TUNNELED CV CATH: CPT | Mod: 51,,, | Performed by: INTERNAL MEDICINE

## 2023-01-01 PROCEDURE — 85730 THROMBOPLASTIN TIME PARTIAL: CPT | Mod: 91 | Performed by: STUDENT IN AN ORGANIZED HEALTH CARE EDUCATION/TRAINING PROGRAM

## 2023-01-01 PROCEDURE — 99232 PR SUBSEQUENT HOSPITAL CARE,LEVL II: ICD-10-PCS | Mod: ,,, | Performed by: INTERNAL MEDICINE

## 2023-01-01 PROCEDURE — 27000248 HC VAD-ADDITIONAL DAY

## 2023-01-01 PROCEDURE — 83010 ASSAY OF HAPTOGLOBIN QUANT: CPT | Performed by: STUDENT IN AN ORGANIZED HEALTH CARE EDUCATION/TRAINING PROGRAM

## 2023-01-01 PROCEDURE — 83935 ASSAY OF URINE OSMOLALITY: CPT | Performed by: STUDENT IN AN ORGANIZED HEALTH CARE EDUCATION/TRAINING PROGRAM

## 2023-01-01 PROCEDURE — 87077 CULTURE AEROBIC IDENTIFY: CPT | Performed by: PHYSICIAN ASSISTANT

## 2023-01-01 PROCEDURE — 33992 PR REMOVAL, VAD, PERCUT, LT HEART, ART/VENOUS CANNUL: ICD-10-PCS | Mod: ,,, | Performed by: INTERNAL MEDICINE

## 2023-01-01 PROCEDURE — 99233 PR SUBSEQUENT HOSPITAL CARE,LEVL III: ICD-10-PCS | Mod: ,,, | Performed by: THORACIC SURGERY (CARDIOTHORACIC VASCULAR SURGERY)

## 2023-01-01 PROCEDURE — 84132 ASSAY OF SERUM POTASSIUM: CPT

## 2023-01-01 PROCEDURE — 86870 RBC ANTIBODY IDENTIFICATION: CPT | Performed by: STUDENT IN AN ORGANIZED HEALTH CARE EDUCATION/TRAINING PROGRAM

## 2023-01-01 PROCEDURE — 84443 ASSAY THYROID STIM HORMONE: CPT | Performed by: INTERNAL MEDICINE

## 2023-01-01 PROCEDURE — 99232 PR SUBSEQUENT HOSPITAL CARE,LEVL II: ICD-10-PCS | Mod: ,,, | Performed by: HOSPITALIST

## 2023-01-01 PROCEDURE — 84484 ASSAY OF TROPONIN QUANT: CPT | Performed by: INTERNAL MEDICINE

## 2023-01-01 PROCEDURE — C1887 CATHETER, GUIDING: HCPCS | Performed by: INTERNAL MEDICINE

## 2023-01-01 PROCEDURE — 99153 MOD SED SAME PHYS/QHP EA: CPT | Performed by: INTERNAL MEDICINE

## 2023-01-01 PROCEDURE — 63600175 PHARM REV CODE 636 W HCPCS: Performed by: NURSE PRACTITIONER

## 2023-01-01 PROCEDURE — 84439 ASSAY OF FREE THYROXINE: CPT | Performed by: INTERNAL MEDICINE

## 2023-01-01 PROCEDURE — 0240U COVID/FLU A&B PCR: CPT | Performed by: PHYSICIAN ASSISTANT

## 2023-01-01 PROCEDURE — 84550 ASSAY OF BLOOD/URIC ACID: CPT | Performed by: INTERNAL MEDICINE

## 2023-01-01 PROCEDURE — 82436 ASSAY OF URINE CHLORIDE: CPT | Performed by: STUDENT IN AN ORGANIZED HEALTH CARE EDUCATION/TRAINING PROGRAM

## 2023-01-01 PROCEDURE — 85014 HEMATOCRIT: CPT

## 2023-01-01 PROCEDURE — 86900 BLOOD TYPING SEROLOGIC ABO: CPT | Performed by: STUDENT IN AN ORGANIZED HEALTH CARE EDUCATION/TRAINING PROGRAM

## 2023-01-01 PROCEDURE — 85025 COMPLETE CBC W/AUTO DIFF WBC: CPT | Mod: 91 | Performed by: STUDENT IN AN ORGANIZED HEALTH CARE EDUCATION/TRAINING PROGRAM

## 2023-01-01 PROCEDURE — 80053 COMPREHEN METABOLIC PANEL: CPT | Performed by: PHYSICIAN ASSISTANT

## 2023-01-01 PROCEDURE — 99232 SBSQ HOSP IP/OBS MODERATE 35: CPT | Mod: ,,, | Performed by: HOSPITALIST

## 2023-01-01 PROCEDURE — 99232 SBSQ HOSP IP/OBS MODERATE 35: CPT | Mod: ,,, | Performed by: PSYCHIATRY & NEUROLOGY

## 2023-01-01 PROCEDURE — 84100 ASSAY OF PHOSPHORUS: CPT | Mod: 91 | Performed by: STUDENT IN AN ORGANIZED HEALTH CARE EDUCATION/TRAINING PROGRAM

## 2023-01-01 PROCEDURE — 83605 ASSAY OF LACTIC ACID: CPT | Performed by: INTERNAL MEDICINE

## 2023-01-01 PROCEDURE — 99232 SBSQ HOSP IP/OBS MODERATE 35: CPT | Mod: ,,, | Performed by: INTERNAL MEDICINE

## 2023-01-01 PROCEDURE — 82550 ASSAY OF CK (CPK): CPT | Performed by: INTERNAL MEDICINE

## 2023-01-01 PROCEDURE — 85018 HEMOGLOBIN: CPT

## 2023-01-01 PROCEDURE — 82728 ASSAY OF FERRITIN: CPT | Performed by: STUDENT IN AN ORGANIZED HEALTH CARE EDUCATION/TRAINING PROGRAM

## 2023-01-01 PROCEDURE — 93503 INSERT/PLACE HEART CATHETER: CPT | Mod: 51,,, | Performed by: INTERNAL MEDICINE

## 2023-01-01 PROCEDURE — C1725 CATH, TRANSLUMIN NON-LASER: HCPCS | Performed by: INTERNAL MEDICINE

## 2023-01-01 PROCEDURE — 87040 BLOOD CULTURE FOR BACTERIA: CPT | Performed by: STUDENT IN AN ORGANIZED HEALTH CARE EDUCATION/TRAINING PROGRAM

## 2023-01-01 PROCEDURE — 87150 DNA/RNA AMPLIFIED PROBE: CPT | Mod: 59 | Performed by: STUDENT IN AN ORGANIZED HEALTH CARE EDUCATION/TRAINING PROGRAM

## 2023-01-01 PROCEDURE — 97110 THERAPEUTIC EXERCISES: CPT

## 2023-01-01 PROCEDURE — 99223 PR INITIAL HOSPITAL CARE,LEVL III: ICD-10-PCS | Mod: ,,, | Performed by: INTERNAL MEDICINE

## 2023-01-01 PROCEDURE — 83735 ASSAY OF MAGNESIUM: CPT | Mod: 91 | Performed by: STUDENT IN AN ORGANIZED HEALTH CARE EDUCATION/TRAINING PROGRAM

## 2023-01-01 PROCEDURE — 97165 OT EVAL LOW COMPLEX 30 MIN: CPT

## 2023-01-01 PROCEDURE — 92978 ENDOLUMINL IVUS OCT C 1ST: CPT | Mod: LC | Performed by: INTERNAL MEDICINE

## 2023-01-01 PROCEDURE — 83036 HEMOGLOBIN GLYCOSYLATED A1C: CPT | Performed by: INTERNAL MEDICINE

## 2023-01-01 PROCEDURE — 96375 TX/PRO/DX INJ NEW DRUG ADDON: CPT

## 2023-01-01 PROCEDURE — 36589 PR REMOVAL TUNNELED CV CATH W/O SUBQ PORT OR PUMP: ICD-10-PCS | Mod: 51,,, | Performed by: INTERNAL MEDICINE

## 2023-01-01 PROCEDURE — 85025 COMPLETE CBC W/AUTO DIFF WBC: CPT | Mod: 91 | Performed by: INTERNAL MEDICINE

## 2023-01-01 PROCEDURE — 63600175 PHARM REV CODE 636 W HCPCS: Performed by: PHYSICIAN ASSISTANT

## 2023-01-01 PROCEDURE — 36589 REMOVAL TUNNELED CV CATH: CPT | Performed by: INTERNAL MEDICINE

## 2023-01-01 PROCEDURE — 85025 COMPLETE CBC W/AUTO DIFF WBC: CPT | Performed by: PHYSICIAN ASSISTANT

## 2023-01-01 PROCEDURE — 33992 RMVL PERQ LEFT HEART VAD: CPT | Mod: ,,, | Performed by: INTERNAL MEDICINE

## 2023-01-01 PROCEDURE — 83735 ASSAY OF MAGNESIUM: CPT | Performed by: INTERNAL MEDICINE

## 2023-01-01 PROCEDURE — 81001 URINALYSIS AUTO W/SCOPE: CPT | Performed by: STUDENT IN AN ORGANIZED HEALTH CARE EDUCATION/TRAINING PROGRAM

## 2023-01-01 PROCEDURE — 27801859 OPTIME CATHETER, IMPELLA: Performed by: INTERNAL MEDICINE

## 2023-01-01 PROCEDURE — 99222 1ST HOSP IP/OBS MODERATE 55: CPT | Mod: ,,, | Performed by: PHYSICIAN ASSISTANT

## 2023-01-01 PROCEDURE — 93503 INSERT/PLACE HEART CATHETER: CPT | Performed by: INTERNAL MEDICINE

## 2023-01-01 PROCEDURE — 83880 ASSAY OF NATRIURETIC PEPTIDE: CPT | Performed by: STUDENT IN AN ORGANIZED HEALTH CARE EDUCATION/TRAINING PROGRAM

## 2023-01-01 PROCEDURE — 36620 INSERTION CATHETER ARTERY: CPT

## 2023-01-01 PROCEDURE — 83605 ASSAY OF LACTIC ACID: CPT | Performed by: STUDENT IN AN ORGANIZED HEALTH CARE EDUCATION/TRAINING PROGRAM

## 2023-01-01 PROCEDURE — 33990 INSJ PERQ VAD L HRT ARTERIAL: CPT | Performed by: INTERNAL MEDICINE

## 2023-01-01 PROCEDURE — 97161 PT EVAL LOW COMPLEX 20 MIN: CPT

## 2023-01-01 PROCEDURE — 99233 PR SUBSEQUENT HOSPITAL CARE,LEVL III: ICD-10-PCS | Mod: ,,, | Performed by: PHYSICIAN ASSISTANT

## 2023-01-01 PROCEDURE — 87040 BLOOD CULTURE FOR BACTERIA: CPT | Mod: 59 | Performed by: STUDENT IN AN ORGANIZED HEALTH CARE EDUCATION/TRAINING PROGRAM

## 2023-01-01 PROCEDURE — 27100094 HC IABP, SET-UP

## 2023-01-01 PROCEDURE — 83880 ASSAY OF NATRIURETIC PEPTIDE: CPT | Performed by: PHYSICIAN ASSISTANT

## 2023-01-01 PROCEDURE — 83605 ASSAY OF LACTIC ACID: CPT | Mod: 91 | Performed by: STUDENT IN AN ORGANIZED HEALTH CARE EDUCATION/TRAINING PROGRAM

## 2023-01-01 PROCEDURE — C9600 PERC DRUG-EL COR STENT SING: HCPCS | Mod: LC | Performed by: INTERNAL MEDICINE

## 2023-01-01 PROCEDURE — 85610 PROTHROMBIN TIME: CPT | Performed by: INTERNAL MEDICINE

## 2023-01-01 PROCEDURE — 95700 EEG CONT REC W/VID EEG TECH: CPT

## 2023-01-01 PROCEDURE — 86850 RBC ANTIBODY SCREEN: CPT | Performed by: NURSE PRACTITIONER

## 2023-01-01 PROCEDURE — 99223 1ST HOSP IP/OBS HIGH 75: CPT | Mod: ,,, | Performed by: HOSPITALIST

## 2023-01-01 DEVICE — ANGIO-SEAL VIP VASCULAR CLOSURE DEVICE
Type: IMPLANTABLE DEVICE | Site: GROIN | Status: FUNCTIONAL
Brand: ANGIO-SEAL

## 2023-01-01 DEVICE — EVEROLIMUS-ELUTING PLATINUM CHROMIUM CORONARY STENT SYSTEM
Type: IMPLANTABLE DEVICE | Site: CORONARY | Status: FUNCTIONAL
Brand: SYNERGY™ XD

## 2023-01-01 RX ORDER — TOPIRAMATE 25 MG/1
25 TABLET ORAL DAILY
Status: DISCONTINUED | OUTPATIENT
Start: 2023-01-01 | End: 2023-01-01

## 2023-01-01 RX ORDER — NOREPINEPHRINE BITARTRATE/D5W 4MG/250ML
0-3 PLASTIC BAG, INJECTION (ML) INTRAVENOUS CONTINUOUS
Status: DISCONTINUED | OUTPATIENT
Start: 2023-01-01 | End: 2023-01-01

## 2023-01-01 RX ORDER — INSULIN ASPART 100 [IU]/ML
15 INJECTION, SOLUTION INTRAVENOUS; SUBCUTANEOUS
Status: DISCONTINUED | OUTPATIENT
Start: 2023-01-01 | End: 2023-01-01

## 2023-01-01 RX ORDER — POTASSIUM CHLORIDE 14.9 MG/ML
20 INJECTION INTRAVENOUS ONCE
Status: COMPLETED | OUTPATIENT
Start: 2023-01-01 | End: 2023-01-01

## 2023-01-01 RX ORDER — SYRING-NEEDL,DISP,INSUL,0.3 ML 29 G X1/2"
296 SYRINGE, EMPTY DISPOSABLE MISCELLANEOUS
Status: COMPLETED | OUTPATIENT
Start: 2023-01-01 | End: 2023-01-01

## 2023-01-01 RX ORDER — SODIUM CHLORIDE 0.9 % (FLUSH) 0.9 %
10 SYRINGE (ML) INJECTION
Status: DISCONTINUED | OUTPATIENT
Start: 2023-01-01 | End: 2023-01-01 | Stop reason: HOSPADM

## 2023-01-01 RX ORDER — INSULIN ASPART 100 [IU]/ML
5 INJECTION, SOLUTION INTRAVENOUS; SUBCUTANEOUS ONCE
Status: COMPLETED | OUTPATIENT
Start: 2023-01-01 | End: 2023-01-01

## 2023-01-01 RX ORDER — GLUCAGON 1 MG
1 KIT INJECTION
Status: DISCONTINUED | OUTPATIENT
Start: 2023-01-01 | End: 2023-01-01

## 2023-01-01 RX ORDER — SODIUM CHLORIDE 9 MG/ML
INJECTION, SOLUTION INTRAVENOUS ONCE
Status: CANCELLED | OUTPATIENT
Start: 2023-01-01 | End: 2023-01-01

## 2023-01-01 RX ORDER — ONDANSETRON 2 MG/ML
INJECTION INTRAMUSCULAR; INTRAVENOUS
Status: COMPLETED
Start: 2023-01-01 | End: 2023-01-01

## 2023-01-01 RX ORDER — LORAZEPAM 2 MG/ML
1 INJECTION INTRAMUSCULAR EVERY 30 MIN PRN
Status: DISCONTINUED | OUTPATIENT
Start: 2023-01-01 | End: 2023-01-01

## 2023-01-01 RX ORDER — ISOSORBIDE DINITRATE 10 MG/1
10 TABLET ORAL 3 TIMES DAILY
Status: DISCONTINUED | OUTPATIENT
Start: 2023-01-01 | End: 2023-01-01

## 2023-01-01 RX ORDER — FUROSEMIDE 10 MG/ML
80 INJECTION INTRAMUSCULAR; INTRAVENOUS ONCE
Status: CANCELLED | OUTPATIENT
Start: 2023-01-01

## 2023-01-01 RX ORDER — ISOSORBIDE DINITRATE 20 MG/1
20 TABLET ORAL 3 TIMES DAILY
Status: DISCONTINUED | OUTPATIENT
Start: 2023-01-01 | End: 2023-01-01

## 2023-01-01 RX ORDER — MORPHINE SULFATE 2 MG/ML
2 INJECTION, SOLUTION INTRAMUSCULAR; INTRAVENOUS ONCE
Status: DISCONTINUED | OUTPATIENT
Start: 2023-01-01 | End: 2023-01-01

## 2023-01-01 RX ORDER — POTASSIUM CHLORIDE 20 MEQ/1
40 TABLET, EXTENDED RELEASE ORAL ONCE
Status: COMPLETED | OUTPATIENT
Start: 2023-01-01 | End: 2023-01-01

## 2023-01-01 RX ORDER — INSULIN ASPART 100 [IU]/ML
1-10 INJECTION, SOLUTION INTRAVENOUS; SUBCUTANEOUS
Status: DISCONTINUED | OUTPATIENT
Start: 2023-01-01 | End: 2023-01-01

## 2023-01-01 RX ORDER — DEXTROSE MONOHYDRATE 100 MG/ML
12.5 INJECTION, SOLUTION INTRAVENOUS
Status: DISCONTINUED | OUTPATIENT
Start: 2023-01-01 | End: 2023-01-01

## 2023-01-01 RX ORDER — ATORVASTATIN CALCIUM 40 MG/1
80 TABLET, FILM COATED ORAL NIGHTLY
Status: DISCONTINUED | OUTPATIENT
Start: 2023-01-01 | End: 2023-01-01 | Stop reason: HOSPADM

## 2023-01-01 RX ORDER — POLYETHYLENE GLYCOL 3350 17 G/17G
17 POWDER, FOR SOLUTION ORAL 2 TIMES DAILY PRN
Status: DISCONTINUED | OUTPATIENT
Start: 2023-01-01 | End: 2023-01-01 | Stop reason: HOSPADM

## 2023-01-01 RX ORDER — LEVETIRACETAM 500 MG/5ML
500 INJECTION, SOLUTION, CONCENTRATE INTRAVENOUS EVERY 12 HOURS
Status: DISCONTINUED | OUTPATIENT
Start: 2023-01-01 | End: 2023-01-01

## 2023-01-01 RX ORDER — ACETAMINOPHEN 325 MG/1
650 TABLET ORAL EVERY 6 HOURS PRN
Status: DISCONTINUED | OUTPATIENT
Start: 2023-01-01 | End: 2023-01-01 | Stop reason: HOSPADM

## 2023-01-01 RX ORDER — ASPIRIN 325 MG
325 TABLET, DELAYED RELEASE (ENTERIC COATED) ORAL
Status: COMPLETED | OUTPATIENT
Start: 2023-01-01 | End: 2023-01-01

## 2023-01-01 RX ORDER — IBUPROFEN 200 MG
24 TABLET ORAL
Status: DISCONTINUED | OUTPATIENT
Start: 2023-01-01 | End: 2023-01-01 | Stop reason: HOSPADM

## 2023-01-01 RX ORDER — MORPHINE SULFATE 2 MG/ML
INJECTION, SOLUTION INTRAMUSCULAR; INTRAVENOUS
Status: COMPLETED
Start: 2023-01-01 | End: 2023-01-01

## 2023-01-01 RX ORDER — LEVETIRACETAM 500 MG/5ML
INJECTION, SOLUTION, CONCENTRATE INTRAVENOUS
Status: DISCONTINUED
Start: 2023-01-01 | End: 2023-01-01 | Stop reason: WASHOUT

## 2023-01-01 RX ORDER — LIDOCAINE HYDROCHLORIDE ANHYDROUS AND DEXTROSE MONOHYDRATE .8; 5 G/100ML; G/100ML
1 INJECTION, SOLUTION INTRAVENOUS CONTINUOUS
Status: DISCONTINUED | OUTPATIENT
Start: 2023-01-01 | End: 2023-01-01 | Stop reason: HOSPADM

## 2023-01-01 RX ORDER — HYDROMORPHONE HYDROCHLORIDE 1 MG/ML
0.2 INJECTION, SOLUTION INTRAMUSCULAR; INTRAVENOUS; SUBCUTANEOUS ONCE
Status: COMPLETED | OUTPATIENT
Start: 2023-01-01 | End: 2023-01-01

## 2023-01-01 RX ORDER — IBUPROFEN 200 MG
16 TABLET ORAL
Status: DISCONTINUED | OUTPATIENT
Start: 2023-01-01 | End: 2023-01-01 | Stop reason: HOSPADM

## 2023-01-01 RX ORDER — ENOXAPARIN SODIUM 100 MG/ML
30 INJECTION SUBCUTANEOUS EVERY 24 HOURS
Status: DISCONTINUED | OUTPATIENT
Start: 2023-01-01 | End: 2023-01-01

## 2023-01-01 RX ORDER — LIDOCAINE HYDROCHLORIDE 10 MG/ML
INJECTION INFILTRATION; PERINEURAL
Status: COMPLETED
Start: 2023-01-01 | End: 2023-01-01

## 2023-01-01 RX ORDER — INSULIN ASPART 100 [IU]/ML
0-10 INJECTION, SOLUTION INTRAVENOUS; SUBCUTANEOUS
Status: DISCONTINUED | OUTPATIENT
Start: 2023-01-01 | End: 2023-01-01 | Stop reason: HOSPADM

## 2023-01-01 RX ORDER — ETOMIDATE 2 MG/ML
INJECTION INTRAVENOUS
Status: DISPENSED
Start: 2023-01-01 | End: 2023-01-01

## 2023-01-01 RX ORDER — IBUPROFEN 200 MG
16 TABLET ORAL
Status: DISCONTINUED | OUTPATIENT
Start: 2023-01-01 | End: 2023-01-01

## 2023-01-01 RX ORDER — LACTULOSE 10 G/15ML
20 SOLUTION ORAL 3 TIMES DAILY
Status: DISCONTINUED | OUTPATIENT
Start: 2023-01-01 | End: 2023-01-01

## 2023-01-01 RX ORDER — ENOXAPARIN SODIUM 100 MG/ML
40 INJECTION SUBCUTANEOUS EVERY 24 HOURS
Status: DISCONTINUED | OUTPATIENT
Start: 2023-01-01 | End: 2023-01-01

## 2023-01-01 RX ORDER — GLUCAGON 1 MG
1 KIT INJECTION
Status: DISCONTINUED | OUTPATIENT
Start: 2023-01-01 | End: 2023-01-01 | Stop reason: HOSPADM

## 2023-01-01 RX ORDER — HEPARIN SODIUM 10000 [USP'U]/100ML
3 INJECTION, SOLUTION INTRAVENOUS CONTINUOUS
Status: DISCONTINUED | OUTPATIENT
Start: 2023-01-01 | End: 2023-01-01

## 2023-01-01 RX ORDER — INSULIN ASPART 100 [IU]/ML
20 INJECTION, SOLUTION INTRAVENOUS; SUBCUTANEOUS
Status: DISCONTINUED | OUTPATIENT
Start: 2023-01-01 | End: 2023-01-01 | Stop reason: HOSPADM

## 2023-01-01 RX ORDER — SODIUM CHLORIDE 9 MG/ML
INJECTION, SOLUTION INTRAVENOUS CONTINUOUS
Status: ACTIVE | OUTPATIENT
Start: 2023-01-01 | End: 2023-01-01

## 2023-01-01 RX ORDER — SODIUM CHLORIDE 0.9 % (FLUSH) 0.9 %
3 SYRINGE (ML) INJECTION EVERY 6 HOURS PRN
Status: DISCONTINUED | OUTPATIENT
Start: 2023-01-01 | End: 2023-01-01 | Stop reason: HOSPADM

## 2023-01-01 RX ORDER — PROPOFOL 10 MG/ML
INJECTION, EMULSION INTRAVENOUS
Status: DISPENSED
Start: 2023-01-01 | End: 2023-01-01

## 2023-01-01 RX ORDER — HYDRALAZINE HYDROCHLORIDE 25 MG/1
25 TABLET, FILM COATED ORAL 3 TIMES DAILY
Status: DISCONTINUED | OUTPATIENT
Start: 2023-01-01 | End: 2023-01-01

## 2023-01-01 RX ORDER — INSULIN ASPART 100 [IU]/ML
17 INJECTION, SOLUTION INTRAVENOUS; SUBCUTANEOUS
Status: DISCONTINUED | OUTPATIENT
Start: 2023-01-01 | End: 2023-01-01

## 2023-01-01 RX ORDER — LIDOCAINE HYDROCHLORIDE 20 MG/ML
100 INJECTION INTRAVENOUS ONCE
Status: COMPLETED | OUTPATIENT
Start: 2023-01-01 | End: 2023-01-01

## 2023-01-01 RX ORDER — HEPARIN SODIUM,PORCINE/D5W 25000/250
0-24 INTRAVENOUS SOLUTION INTRAVENOUS CONTINUOUS
Status: DISCONTINUED | OUTPATIENT
Start: 2023-01-01 | End: 2023-01-01

## 2023-01-01 RX ORDER — HYDROCODONE BITARTRATE AND ACETAMINOPHEN 500; 5 MG/1; MG/1
TABLET ORAL CONTINUOUS
Status: DISCONTINUED | OUTPATIENT
Start: 2023-01-01 | End: 2023-01-01

## 2023-01-01 RX ORDER — MORPHINE SULFATE 2 MG/ML
INJECTION, SOLUTION INTRAMUSCULAR; INTRAVENOUS
Status: DISCONTINUED
Start: 2023-01-01 | End: 2023-01-01 | Stop reason: HOSPADM

## 2023-01-01 RX ORDER — INSULIN ASPART 100 [IU]/ML
10 INJECTION, SOLUTION INTRAVENOUS; SUBCUTANEOUS
Status: DISCONTINUED | OUTPATIENT
Start: 2023-01-01 | End: 2023-01-01

## 2023-01-01 RX ORDER — MUPIROCIN 20 MG/G
OINTMENT TOPICAL 2 TIMES DAILY
Status: DISCONTINUED | OUTPATIENT
Start: 2023-01-01 | End: 2023-01-01 | Stop reason: HOSPADM

## 2023-01-01 RX ORDER — GLYCERIN 1 G/1
1 SUPPOSITORY RECTAL ONCE
Status: DISCONTINUED | OUTPATIENT
Start: 2023-01-01 | End: 2023-01-01

## 2023-01-01 RX ORDER — DIPHENHYDRAMINE HCL 50 MG
50 CAPSULE ORAL ONCE
Status: COMPLETED | OUTPATIENT
Start: 2023-01-01 | End: 2023-01-01

## 2023-01-01 RX ORDER — ONDANSETRON 2 MG/ML
4 INJECTION INTRAMUSCULAR; INTRAVENOUS
Status: COMPLETED | OUTPATIENT
Start: 2023-01-01 | End: 2023-01-01

## 2023-01-01 RX ORDER — GLYCERIN 1 G/1
1 SUPPOSITORY RECTAL ONCE
Status: COMPLETED | OUTPATIENT
Start: 2023-01-01 | End: 2023-01-01

## 2023-01-01 RX ORDER — INSULIN ASPART 100 [IU]/ML
19 INJECTION, SOLUTION INTRAVENOUS; SUBCUTANEOUS
Status: DISCONTINUED | OUTPATIENT
Start: 2023-01-01 | End: 2023-01-01

## 2023-01-01 RX ORDER — ONDANSETRON 2 MG/ML
4 INJECTION INTRAMUSCULAR; INTRAVENOUS ONCE
Status: COMPLETED | OUTPATIENT
Start: 2023-01-01 | End: 2023-01-01

## 2023-01-01 RX ORDER — HEPARIN SODIUM 1000 [USP'U]/ML
INJECTION, SOLUTION INTRAVENOUS; SUBCUTANEOUS
Status: DISCONTINUED | OUTPATIENT
Start: 2023-01-01 | End: 2023-01-01 | Stop reason: HOSPADM

## 2023-01-01 RX ORDER — LORAZEPAM 2 MG/ML
2 INJECTION INTRAMUSCULAR
Status: COMPLETED | OUTPATIENT
Start: 2023-01-01 | End: 2023-01-01

## 2023-01-01 RX ORDER — LIDOCAINE 50 MG/G
1 PATCH TOPICAL
Status: DISCONTINUED | OUTPATIENT
Start: 2023-01-01 | End: 2023-01-01

## 2023-01-01 RX ORDER — FUROSEMIDE 10 MG/ML
40 INJECTION INTRAMUSCULAR; INTRAVENOUS ONCE
Status: COMPLETED | OUTPATIENT
Start: 2023-01-01 | End: 2023-01-01

## 2023-01-01 RX ORDER — POTASSIUM CHLORIDE 20 MEQ/1
20 TABLET, EXTENDED RELEASE ORAL ONCE
Status: COMPLETED | OUTPATIENT
Start: 2023-01-01 | End: 2023-01-01

## 2023-01-01 RX ORDER — ROSUVASTATIN CALCIUM 40 MG/1
40 TABLET, COATED ORAL DAILY
COMMUNITY

## 2023-01-01 RX ORDER — SODIUM CHLORIDE 9 MG/ML
500 INJECTION, SOLUTION INTRAVENOUS
Status: COMPLETED | OUTPATIENT
Start: 2023-01-01 | End: 2023-01-01

## 2023-01-01 RX ORDER — FUROSEMIDE 10 MG/ML
80 INJECTION INTRAMUSCULAR; INTRAVENOUS ONCE
Status: COMPLETED | OUTPATIENT
Start: 2023-01-01 | End: 2023-01-01

## 2023-01-01 RX ORDER — ONDANSETRON 2 MG/ML
4 INJECTION INTRAMUSCULAR; INTRAVENOUS EVERY 4 HOURS PRN
Status: DISCONTINUED | OUTPATIENT
Start: 2023-01-01 | End: 2023-01-01 | Stop reason: HOSPADM

## 2023-01-01 RX ORDER — METHOCARBAMOL 500 MG/1
500 TABLET, FILM COATED ORAL EVERY 6 HOURS PRN
Status: DISCONTINUED | OUTPATIENT
Start: 2023-01-01 | End: 2023-01-01 | Stop reason: HOSPADM

## 2023-01-01 RX ORDER — PANTOPRAZOLE SODIUM 40 MG/1
40 TABLET, DELAYED RELEASE ORAL DAILY
Status: DISCONTINUED | OUTPATIENT
Start: 2023-01-01 | End: 2023-01-01

## 2023-01-01 RX ORDER — ROCURONIUM BROMIDE 10 MG/ML
INJECTION, SOLUTION INTRAVENOUS
Status: DISPENSED
Start: 2023-01-01 | End: 2023-01-01

## 2023-01-01 RX ORDER — LORAZEPAM 2 MG/ML
INJECTION INTRAMUSCULAR
Status: DISPENSED
Start: 2023-01-01 | End: 2023-01-01

## 2023-01-01 RX ORDER — DOBUTAMINE HYDROCHLORIDE 400 MG/100ML
5 INJECTION INTRAVENOUS CONTINUOUS
Status: DISCONTINUED | OUTPATIENT
Start: 2023-01-01 | End: 2023-01-01 | Stop reason: HOSPADM

## 2023-01-01 RX ORDER — INSULIN GLARGINE 100 [IU]/ML
50 INJECTION, SOLUTION SUBCUTANEOUS DAILY
COMMUNITY
Start: 2023-01-01

## 2023-01-01 RX ORDER — PANTOPRAZOLE SODIUM 40 MG/10ML
40 INJECTION, POWDER, LYOPHILIZED, FOR SOLUTION INTRAVENOUS DAILY
Status: DISCONTINUED | OUTPATIENT
Start: 2023-01-01 | End: 2023-01-01

## 2023-01-01 RX ORDER — LIDOCAINE HYDROCHLORIDE 10 MG/ML
INJECTION INFILTRATION; PERINEURAL
Status: DISCONTINUED | OUTPATIENT
Start: 2023-01-01 | End: 2023-01-01 | Stop reason: HOSPADM

## 2023-01-01 RX ORDER — HEPARIN SODIUM,PORCINE/D5W 25000/250
0-40 INTRAVENOUS SOLUTION INTRAVENOUS CONTINUOUS
Status: DISCONTINUED | OUTPATIENT
Start: 2023-01-01 | End: 2023-01-01

## 2023-01-01 RX ORDER — FENTANYL CITRATE 50 UG/ML
INJECTION, SOLUTION INTRAMUSCULAR; INTRAVENOUS
Status: DISCONTINUED | OUTPATIENT
Start: 2023-01-01 | End: 2023-01-01 | Stop reason: HOSPADM

## 2023-01-01 RX ORDER — LIDOCAINE HYDROCHLORIDE 10 MG/ML
INJECTION, SOLUTION EPIDURAL; INFILTRATION; INTRACAUDAL; PERINEURAL
Status: DISPENSED
Start: 2023-01-01 | End: 2023-01-01

## 2023-01-01 RX ORDER — CEFAZOLIN SODIUM 1 G/3ML
INJECTION, POWDER, FOR SOLUTION INTRAMUSCULAR; INTRAVENOUS
Status: DISCONTINUED | OUTPATIENT
Start: 2023-01-01 | End: 2023-01-01 | Stop reason: HOSPADM

## 2023-01-01 RX ORDER — SODIUM CHLORIDE, SODIUM LACTATE, POTASSIUM CHLORIDE, CALCIUM CHLORIDE 600; 310; 30; 20 MG/100ML; MG/100ML; MG/100ML; MG/100ML
INJECTION, SOLUTION INTRAVENOUS CONTINUOUS
Status: DISCONTINUED | OUTPATIENT
Start: 2023-01-01 | End: 2023-01-01 | Stop reason: HOSPADM

## 2023-01-01 RX ORDER — LEVOTHYROXINE SODIUM 125 UG/1
125 TABLET ORAL
COMMUNITY
Start: 2023-01-01

## 2023-01-01 RX ORDER — IBUPROFEN 200 MG
24 TABLET ORAL
Status: DISCONTINUED | OUTPATIENT
Start: 2023-01-01 | End: 2023-01-01

## 2023-01-01 RX ORDER — TALC
6 POWDER (GRAM) TOPICAL NIGHTLY PRN
Status: DISCONTINUED | OUTPATIENT
Start: 2023-01-01 | End: 2023-01-01 | Stop reason: HOSPADM

## 2023-01-01 RX ORDER — MORPHINE SULFATE 4 MG/ML
4 INJECTION, SOLUTION INTRAMUSCULAR; INTRAVENOUS
Status: COMPLETED | OUTPATIENT
Start: 2023-01-01 | End: 2023-01-01

## 2023-01-01 RX ORDER — ATORVASTATIN CALCIUM 40 MG/1
80 TABLET, FILM COATED ORAL DAILY
Status: DISCONTINUED | OUTPATIENT
Start: 2023-01-01 | End: 2023-01-01

## 2023-01-01 RX ORDER — OXYMETAZOLINE HCL 0.05 %
2 SPRAY, NON-AEROSOL (ML) NASAL 2 TIMES DAILY PRN
Status: DISCONTINUED | OUTPATIENT
Start: 2023-01-01 | End: 2023-01-01

## 2023-01-01 RX ORDER — ONDANSETRON 8 MG/1
8 TABLET, ORALLY DISINTEGRATING ORAL EVERY 8 HOURS PRN
Status: DISCONTINUED | OUTPATIENT
Start: 2023-01-01 | End: 2023-01-01

## 2023-01-01 RX ORDER — SENNOSIDES 8.6 MG/1
8.6 TABLET ORAL 2 TIMES DAILY
Status: DISCONTINUED | OUTPATIENT
Start: 2023-01-01 | End: 2023-01-01

## 2023-01-01 RX ORDER — OXYCODONE HYDROCHLORIDE 5 MG/1
5 TABLET ORAL EVERY 8 HOURS PRN
Status: DISCONTINUED | OUTPATIENT
Start: 2023-01-01 | End: 2023-01-01 | Stop reason: HOSPADM

## 2023-01-01 RX ORDER — HEPARIN SOD,PORCINE/0.9 % NACL 1000/500ML
INTRAVENOUS SOLUTION INTRAVENOUS
Status: DISCONTINUED | OUTPATIENT
Start: 2023-01-01 | End: 2023-01-01 | Stop reason: HOSPADM

## 2023-01-01 RX ORDER — HYDRALAZINE HYDROCHLORIDE 10 MG/1
10 TABLET, FILM COATED ORAL 3 TIMES DAILY
Status: DISCONTINUED | OUTPATIENT
Start: 2023-01-01 | End: 2023-01-01

## 2023-01-01 RX ORDER — POLYETHYLENE GLYCOL 3350 17 G/17G
17 POWDER, FOR SOLUTION ORAL 3 TIMES DAILY
Status: DISCONTINUED | OUTPATIENT
Start: 2023-01-01 | End: 2023-01-01

## 2023-01-01 RX ORDER — LORAZEPAM 2 MG/ML
2 INJECTION INTRAMUSCULAR
Status: DISCONTINUED | OUTPATIENT
Start: 2023-01-01 | End: 2023-01-01 | Stop reason: HOSPADM

## 2023-01-01 RX ORDER — FUROSEMIDE 10 MG/ML
100 INJECTION INTRAMUSCULAR; INTRAVENOUS ONCE
Status: COMPLETED | OUTPATIENT
Start: 2023-01-01 | End: 2023-01-01

## 2023-01-01 RX ORDER — PROCHLORPERAZINE EDISYLATE 5 MG/ML
2.5 INJECTION INTRAMUSCULAR; INTRAVENOUS EVERY 6 HOURS PRN
Status: DISCONTINUED | OUTPATIENT
Start: 2023-01-01 | End: 2023-01-01

## 2023-01-01 RX ORDER — ONDANSETRON 2 MG/ML
4 INJECTION INTRAMUSCULAR; INTRAVENOUS EVERY 8 HOURS PRN
Status: DISCONTINUED | OUTPATIENT
Start: 2023-01-01 | End: 2023-01-01

## 2023-01-01 RX ORDER — MORPHINE SULFATE 2 MG/ML
2 INJECTION, SOLUTION INTRAMUSCULAR; INTRAVENOUS
Status: DISCONTINUED | OUTPATIENT
Start: 2023-01-01 | End: 2023-01-01 | Stop reason: HOSPADM

## 2023-01-01 RX ORDER — LIDOCAINE HYDROCHLORIDE 20 MG/ML
INJECTION INTRAVENOUS
Status: COMPLETED
Start: 2023-01-01 | End: 2023-01-01

## 2023-01-01 RX ORDER — PHENYLEPHRINE HCL IN 0.9% NACL 1 MG/10 ML
SYRINGE (ML) INTRAVENOUS
Status: DISPENSED
Start: 2023-01-01 | End: 2023-01-01

## 2023-01-01 RX ORDER — MAGNESIUM SULFATE HEPTAHYDRATE 40 MG/ML
2 INJECTION, SOLUTION INTRAVENOUS ONCE
Status: COMPLETED | OUTPATIENT
Start: 2023-01-01 | End: 2023-01-01

## 2023-01-01 RX ORDER — METOPROLOL SUCCINATE 25 MG/1
12.5 TABLET, EXTENDED RELEASE ORAL DAILY
COMMUNITY
Start: 2023-01-01

## 2023-01-01 RX ORDER — ACETAMINOPHEN 325 MG/1
650 TABLET ORAL EVERY 4 HOURS PRN
Status: DISCONTINUED | OUTPATIENT
Start: 2023-01-01 | End: 2023-01-01 | Stop reason: HOSPADM

## 2023-01-01 RX ORDER — MIDAZOLAM HYDROCHLORIDE 1 MG/ML
INJECTION INTRAMUSCULAR; INTRAVENOUS
Status: DISCONTINUED | OUTPATIENT
Start: 2023-01-01 | End: 2023-01-01 | Stop reason: HOSPADM

## 2023-01-01 RX ORDER — DEXTROSE MONOHYDRATE 100 MG/ML
25 INJECTION, SOLUTION INTRAVENOUS
Status: DISCONTINUED | OUTPATIENT
Start: 2023-01-01 | End: 2023-01-01

## 2023-01-01 RX ORDER — MAG HYDROX/ALUMINUM HYD/SIMETH 200-200-20
30 SUSPENSION, ORAL (FINAL DOSE FORM) ORAL 4 TIMES DAILY PRN
Status: DISCONTINUED | OUTPATIENT
Start: 2023-01-01 | End: 2023-01-01 | Stop reason: HOSPADM

## 2023-01-01 RX ORDER — MUPIROCIN 20 MG/G
OINTMENT TOPICAL 2 TIMES DAILY
Status: COMPLETED | OUTPATIENT
Start: 2023-01-01 | End: 2023-01-01

## 2023-01-01 RX ORDER — SODIUM CHLORIDE 0.9 % (FLUSH) 0.9 %
10 SYRINGE (ML) INJECTION CONTINUOUS
Status: DISCONTINUED | OUTPATIENT
Start: 2023-01-01 | End: 2023-01-01 | Stop reason: HOSPADM

## 2023-01-01 RX ORDER — PHENYLEPHRINE HCL IN 0.9% NACL 1 MG/10 ML
SYRINGE (ML) INTRAVENOUS
Status: COMPLETED
Start: 2023-01-01 | End: 2023-01-01

## 2023-01-01 RX ORDER — SUCCINYLCHOLINE CHLORIDE 20 MG/ML
INJECTION INTRAMUSCULAR; INTRAVENOUS
Status: DISPENSED
Start: 2023-01-01 | End: 2023-01-01

## 2023-01-01 RX ORDER — INSULIN ASPART 100 [IU]/ML
1-10 INJECTION, SOLUTION INTRAVENOUS; SUBCUTANEOUS EVERY 6 HOURS PRN
Status: DISCONTINUED | OUTPATIENT
Start: 2023-01-01 | End: 2023-01-01 | Stop reason: HOSPADM

## 2023-01-01 RX ORDER — INSULIN ASPART 100 [IU]/ML
20 INJECTION, SOLUTION INTRAVENOUS; SUBCUTANEOUS
COMMUNITY
Start: 2023-01-01

## 2023-01-01 RX ORDER — HYDRALAZINE HYDROCHLORIDE 10 MG/1
10 TABLET, FILM COATED ORAL EVERY 8 HOURS
Status: DISCONTINUED | OUTPATIENT
Start: 2023-01-01 | End: 2023-01-01

## 2023-01-01 RX ORDER — POLYETHYLENE GLYCOL 3350 17 G/17G
17 POWDER, FOR SOLUTION ORAL 2 TIMES DAILY
Status: DISCONTINUED | OUTPATIENT
Start: 2023-01-01 | End: 2023-01-01

## 2023-01-01 RX ORDER — HYDROCODONE BITARTRATE AND ACETAMINOPHEN 5; 325 MG/1; MG/1
1 TABLET ORAL EVERY 4 HOURS PRN
Status: DISCONTINUED | OUTPATIENT
Start: 2023-01-01 | End: 2023-01-01 | Stop reason: HOSPADM

## 2023-01-01 RX ORDER — SODIUM CHLORIDE 9 MG/ML
INJECTION, SOLUTION INTRAVENOUS CONTINUOUS
Status: DISCONTINUED | OUTPATIENT
Start: 2023-01-01 | End: 2023-01-01 | Stop reason: HOSPADM

## 2023-01-01 RX ORDER — SEVELAMER CARBONATE 800 MG/1
800 TABLET, FILM COATED ORAL
Status: DISCONTINUED | OUTPATIENT
Start: 2023-01-01 | End: 2023-01-01

## 2023-01-01 RX ORDER — LIDOCAINE HYDROCHLORIDE 10 MG/ML
10 INJECTION, SOLUTION EPIDURAL; INFILTRATION; INTRACAUDAL; PERINEURAL ONCE
Status: DISCONTINUED | OUTPATIENT
Start: 2023-01-01 | End: 2023-01-01

## 2023-01-01 RX ORDER — PHENYLEPHRINE HCL IN 0.9% NACL 1 MG/10 ML
100 SYRINGE (ML) INTRAVENOUS ONCE
Status: COMPLETED | OUTPATIENT
Start: 2023-01-01 | End: 2023-01-01

## 2023-01-01 RX ORDER — PROCHLORPERAZINE EDISYLATE 5 MG/ML
5 INJECTION INTRAMUSCULAR; INTRAVENOUS EVERY 6 HOURS PRN
Status: DISCONTINUED | OUTPATIENT
Start: 2023-01-01 | End: 2023-01-01 | Stop reason: HOSPADM

## 2023-01-01 RX ORDER — ONDANSETRON 2 MG/ML
4 INJECTION INTRAMUSCULAR; INTRAVENOUS EVERY 6 HOURS PRN
Status: DISCONTINUED | OUTPATIENT
Start: 2023-01-01 | End: 2023-01-01 | Stop reason: HOSPADM

## 2023-01-01 RX ORDER — FUROSEMIDE 10 MG/ML
40 INJECTION INTRAMUSCULAR; INTRAVENOUS ONCE
Status: DISCONTINUED | OUTPATIENT
Start: 2023-01-01 | End: 2023-01-01

## 2023-01-01 RX ORDER — ONDANSETRON 2 MG/ML
4 INJECTION INTRAMUSCULAR; INTRAVENOUS EVERY 6 HOURS PRN
Status: DISCONTINUED | OUTPATIENT
Start: 2023-01-01 | End: 2023-01-01

## 2023-01-01 RX ORDER — LORAZEPAM 2 MG/ML
2 INJECTION INTRAMUSCULAR ONCE
Status: DISCONTINUED | OUTPATIENT
Start: 2023-01-01 | End: 2023-01-01

## 2023-01-01 RX ORDER — SODIUM BICARBONATE 650 MG/1
1300 TABLET ORAL 2 TIMES DAILY
Status: DISCONTINUED | OUTPATIENT
Start: 2023-01-01 | End: 2023-01-01 | Stop reason: HOSPADM

## 2023-01-01 RX ORDER — TORSEMIDE 100 MG/1
50 TABLET ORAL DAILY
COMMUNITY
Start: 2023-01-01

## 2023-01-01 RX ORDER — MAGNESIUM SULFATE HEPTAHYDRATE 40 MG/ML
2 INJECTION, SOLUTION INTRAVENOUS
Status: DISCONTINUED | OUTPATIENT
Start: 2023-01-01 | End: 2023-01-01

## 2023-01-01 RX ORDER — MORPHINE SULFATE 4 MG/ML
2 INJECTION, SOLUTION INTRAMUSCULAR; INTRAVENOUS EVERY 4 HOURS PRN
Status: DISCONTINUED | OUTPATIENT
Start: 2023-01-01 | End: 2023-01-01 | Stop reason: HOSPADM

## 2023-01-01 RX ORDER — LIDOCAINE HYDROCHLORIDE 10 MG/ML
5 INJECTION INFILTRATION; PERINEURAL ONCE
Status: COMPLETED | OUTPATIENT
Start: 2023-01-01 | End: 2023-01-01

## 2023-01-01 RX ORDER — ASPIRIN 81 MG/1
81 TABLET ORAL DAILY
Status: DISCONTINUED | OUTPATIENT
Start: 2023-01-01 | End: 2023-01-01

## 2023-01-01 RX ORDER — AMIODARONE HYDROCHLORIDE 150 MG/3ML
150 INJECTION, SOLUTION INTRAVENOUS ONCE
Status: COMPLETED | OUTPATIENT
Start: 2023-01-01 | End: 2023-01-01

## 2023-01-01 RX ORDER — VASOPRESSIN 20 [USP'U]/ML
INJECTION, SOLUTION INTRAMUSCULAR; SUBCUTANEOUS
Status: DISPENSED
Start: 2023-01-01 | End: 2023-01-01

## 2023-01-01 RX ORDER — AMOXICILLIN 250 MG
2 CAPSULE ORAL 2 TIMES DAILY PRN
Status: DISCONTINUED | OUTPATIENT
Start: 2023-01-01 | End: 2023-01-01 | Stop reason: HOSPADM

## 2023-01-01 RX ORDER — LORAZEPAM 2 MG/ML
INJECTION INTRAMUSCULAR
Status: COMPLETED
Start: 2023-01-01 | End: 2023-01-01

## 2023-01-01 RX ORDER — LORAZEPAM 2 MG/ML
INJECTION INTRAMUSCULAR
Status: DISCONTINUED
Start: 2023-01-01 | End: 2023-01-01 | Stop reason: WASHOUT

## 2023-01-01 RX ORDER — LIDOCAINE HYDROCHLORIDE 20 MG/ML
INJECTION, SOLUTION INFILTRATION; PERINEURAL
Status: DISCONTINUED | OUTPATIENT
Start: 2023-01-01 | End: 2023-01-01 | Stop reason: HOSPADM

## 2023-01-01 RX ORDER — DAPAGLIFLOZIN 10 MG/1
10 TABLET, FILM COATED ORAL DAILY
COMMUNITY
Start: 2023-01-01

## 2023-01-01 RX ORDER — ACETAMINOPHEN 500 MG
1000 TABLET ORAL EVERY 6 HOURS PRN
Status: DISCONTINUED | OUTPATIENT
Start: 2023-01-01 | End: 2023-01-01 | Stop reason: HOSPADM

## 2023-01-01 RX ORDER — ASPIRIN 81 MG/1
81 TABLET ORAL DAILY
Status: DISCONTINUED | OUTPATIENT
Start: 2023-01-01 | End: 2023-01-01 | Stop reason: HOSPADM

## 2023-01-01 RX ORDER — INSULIN ASPART 100 [IU]/ML
6-12 INJECTION, SOLUTION INTRAVENOUS; SUBCUTANEOUS
Status: DISCONTINUED | OUTPATIENT
Start: 2023-01-01 | End: 2023-01-01

## 2023-01-01 RX ORDER — ATROPINE SULFATE 10 MG/ML
2 SOLUTION/ DROPS OPHTHALMIC EVERY 4 HOURS PRN
Status: DISCONTINUED | OUTPATIENT
Start: 2023-01-01 | End: 2023-01-01 | Stop reason: HOSPADM

## 2023-01-01 RX ORDER — POTASSIUM CHLORIDE 750 MG/1
30 CAPSULE, EXTENDED RELEASE ORAL ONCE
Status: COMPLETED | OUTPATIENT
Start: 2023-01-01 | End: 2023-01-01

## 2023-01-01 RX ORDER — OXYMETAZOLINE HCL 0.05 %
2 SPRAY, NON-AEROSOL (ML) NASAL 2 TIMES DAILY
Status: DISPENSED | OUTPATIENT
Start: 2023-01-01 | End: 2023-01-01

## 2023-01-01 RX ORDER — PSEUDOEPHEDRINE/ACETAMINOPHEN 30MG-500MG
100 TABLET ORAL
Status: COMPLETED | OUTPATIENT
Start: 2023-01-01 | End: 2023-01-01

## 2023-01-01 RX ADMIN — POLYETHYLENE GLYCOL 3350 17 G: 17 POWDER, FOR SOLUTION ORAL at 02:08

## 2023-01-01 RX ADMIN — HYDRALAZINE HYDROCHLORIDE 10 MG: 10 TABLET, FILM COATED ORAL at 09:08

## 2023-01-01 RX ADMIN — ISOSORBIDE DINITRATE 10 MG: 10 TABLET ORAL at 10:08

## 2023-01-01 RX ADMIN — AMIODARONE HYDROCHLORIDE 150 MG: 1.5 INJECTION, SOLUTION INTRAVENOUS at 07:08

## 2023-01-01 RX ADMIN — ISOSORBIDE DINITRATE 10 MG: 10 TABLET ORAL at 03:08

## 2023-01-01 RX ADMIN — HYDRALAZINE HYDROCHLORIDE 10 MG: 10 TABLET, FILM COATED ORAL at 10:08

## 2023-01-01 RX ADMIN — INSULIN DETEMIR 20 UNITS: 100 INJECTION, SOLUTION SUBCUTANEOUS at 10:08

## 2023-01-01 RX ADMIN — SODIUM CHLORIDE: 9 INJECTION, SOLUTION INTRAVENOUS at 08:08

## 2023-01-01 RX ADMIN — INSULIN ASPART 4 UNITS: 100 INJECTION, SOLUTION INTRAVENOUS; SUBCUTANEOUS at 07:08

## 2023-01-01 RX ADMIN — MUPIROCIN: 20 OINTMENT TOPICAL at 09:08

## 2023-01-01 RX ADMIN — PANTOPRAZOLE SODIUM 40 MG: 40 TABLET, DELAYED RELEASE ORAL at 09:08

## 2023-01-01 RX ADMIN — SENNOSIDES 8.6 MG: 8.6 TABLET, FILM COATED ORAL at 09:08

## 2023-01-01 RX ADMIN — SODIUM CHLORIDE: 9 INJECTION, SOLUTION INTRAVENOUS at 10:08

## 2023-01-01 RX ADMIN — TICAGRELOR 90 MG: 90 TABLET ORAL at 08:08

## 2023-01-01 RX ADMIN — FUROSEMIDE 80 MG: 10 INJECTION, SOLUTION INTRAMUSCULAR; INTRAVENOUS at 09:08

## 2023-01-01 RX ADMIN — INSULIN ASPART 2 UNITS: 100 INJECTION, SOLUTION INTRAVENOUS; SUBCUTANEOUS at 08:08

## 2023-01-01 RX ADMIN — TICAGRELOR 90 MG: 90 TABLET ORAL at 09:08

## 2023-01-01 RX ADMIN — FUROSEMIDE 80 MG: 10 INJECTION, SOLUTION INTRAMUSCULAR; INTRAVENOUS at 11:08

## 2023-01-01 RX ADMIN — CHLOROTHIAZIDE SODIUM 500 MG: 500 INJECTION, POWDER, LYOPHILIZED, FOR SOLUTION INTRAVENOUS at 01:08

## 2023-01-01 RX ADMIN — LIDOCAINE HYDROCHLORIDE 5 ML: 10 INJECTION, SOLUTION INFILTRATION; PERINEURAL at 06:08

## 2023-01-01 RX ADMIN — AMIODARONE HYDROCHLORIDE 150 MG: 50 INJECTION, SOLUTION INTRAVENOUS at 10:08

## 2023-01-01 RX ADMIN — ENOXAPARIN SODIUM 40 MG: 40 INJECTION SUBCUTANEOUS at 05:08

## 2023-01-01 RX ADMIN — POLYETHYLENE GLYCOL 3350 17 G: 17 POWDER, FOR SOLUTION ORAL at 08:08

## 2023-01-01 RX ADMIN — ENOXAPARIN SODIUM 40 MG: 40 INJECTION SUBCUTANEOUS at 08:08

## 2023-01-01 RX ADMIN — INSULIN ASPART 12 UNITS: 100 INJECTION, SOLUTION INTRAVENOUS; SUBCUTANEOUS at 11:08

## 2023-01-01 RX ADMIN — TICAGRELOR 180 MG: 90 TABLET ORAL at 09:08

## 2023-01-01 RX ADMIN — INSULIN ASPART 2 UNITS: 100 INJECTION, SOLUTION INTRAVENOUS; SUBCUTANEOUS at 10:08

## 2023-01-01 RX ADMIN — INSULIN ASPART 6 UNITS: 100 INJECTION, SOLUTION INTRAVENOUS; SUBCUTANEOUS at 06:08

## 2023-01-01 RX ADMIN — HYDRALAZINE HYDROCHLORIDE 25 MG: 25 TABLET, FILM COATED ORAL at 09:08

## 2023-01-01 RX ADMIN — INSULIN ASPART 2 UNITS: 100 INJECTION, SOLUTION INTRAVENOUS; SUBCUTANEOUS at 12:08

## 2023-01-01 RX ADMIN — VASOPRESSIN 0.06 UNITS/MIN: 20 INJECTION INTRAVENOUS at 05:08

## 2023-01-01 RX ADMIN — ATORVASTATIN CALCIUM 80 MG: 40 TABLET, FILM COATED ORAL at 11:08

## 2023-01-01 RX ADMIN — ONDANSETRON 4 MG: 2 INJECTION INTRAMUSCULAR; INTRAVENOUS at 09:08

## 2023-01-01 RX ADMIN — SENNOSIDES 8.6 MG: 8.6 TABLET, FILM COATED ORAL at 08:08

## 2023-01-01 RX ADMIN — ISOSORBIDE DINITRATE 10 MG: 10 TABLET ORAL at 09:08

## 2023-01-01 RX ADMIN — Medication 100 ML: at 02:08

## 2023-01-01 RX ADMIN — ASPIRIN 81 MG: 81 TABLET, COATED ORAL at 08:08

## 2023-01-01 RX ADMIN — ASPIRIN 81 MG: 81 TABLET, COATED ORAL at 10:08

## 2023-01-01 RX ADMIN — POLYETHYLENE GLYCOL 3350 17 G: 17 POWDER, FOR SOLUTION ORAL at 03:08

## 2023-01-01 RX ADMIN — SODIUM BICARBONATE: 84 INJECTION, SOLUTION INTRAVENOUS at 05:08

## 2023-01-01 RX ADMIN — LACTULOSE 20 G: 20 SOLUTION ORAL at 10:08

## 2023-01-01 RX ADMIN — ATORVASTATIN CALCIUM 80 MG: 40 TABLET, FILM COATED ORAL at 08:08

## 2023-01-01 RX ADMIN — TORSEMIDE 50 MG: 20 TABLET ORAL at 09:08

## 2023-01-01 RX ADMIN — ASPIRIN 81 MG: 81 TABLET, COATED ORAL at 09:08

## 2023-01-01 RX ADMIN — MUPIROCIN: 20 OINTMENT TOPICAL at 08:08

## 2023-01-01 RX ADMIN — LEVETIRACETAM 500 MG: 100 INJECTION INTRAVENOUS at 09:08

## 2023-01-01 RX ADMIN — PANTOPRAZOLE SODIUM 40 MG: 40 TABLET, DELAYED RELEASE ORAL at 08:08

## 2023-01-01 RX ADMIN — INSULIN HUMAN 4 UNITS/HR: 1 INJECTION, SOLUTION INTRAVENOUS at 01:08

## 2023-01-01 RX ADMIN — INSULIN HUMAN 4 UNITS/HR: 1 INJECTION, SOLUTION INTRAVENOUS at 08:08

## 2023-01-01 RX ADMIN — MORPHINE SULFATE 2 MG: 2 INJECTION, SOLUTION INTRAMUSCULAR; INTRAVENOUS at 05:08

## 2023-01-01 RX ADMIN — INSULIN ASPART 12 UNITS: 100 INJECTION, SOLUTION INTRAVENOUS; SUBCUTANEOUS at 12:08

## 2023-01-01 RX ADMIN — TOPIRAMATE 25 MG: 25 TABLET, FILM COATED ORAL at 09:08

## 2023-01-01 RX ADMIN — INSULIN ASPART 2 UNITS: 100 INJECTION, SOLUTION INTRAVENOUS; SUBCUTANEOUS at 02:08

## 2023-01-01 RX ADMIN — OXYCODONE HYDROCHLORIDE 5 MG: 5 TABLET ORAL at 08:08

## 2023-01-01 RX ADMIN — HYDRALAZINE HYDROCHLORIDE 10 MG: 10 TABLET, FILM COATED ORAL at 03:08

## 2023-01-01 RX ADMIN — HEPARIN SODIUM AND DEXTROSE 12 UNITS/KG/HR: 10000; 5 INJECTION INTRAVENOUS at 10:08

## 2023-01-01 RX ADMIN — INSULIN HUMAN 2.8 UNITS/HR: 1 INJECTION, SOLUTION INTRAVENOUS at 05:08

## 2023-01-01 RX ADMIN — CEFTRIAXONE 1 G: 1 INJECTION, POWDER, FOR SOLUTION INTRAMUSCULAR; INTRAVENOUS at 09:08

## 2023-01-01 RX ADMIN — INSULIN ASPART 17 UNITS: 100 INJECTION, SOLUTION INTRAVENOUS; SUBCUTANEOUS at 05:08

## 2023-01-01 RX ADMIN — LIDOCAINE HYDROCHLORIDE 100 MG: 20 INJECTION INTRAVENOUS at 11:08

## 2023-01-01 RX ADMIN — SODIUM BICARBONATE: 84 INJECTION, SOLUTION INTRAVENOUS at 02:08

## 2023-01-01 RX ADMIN — ISOSORBIDE DINITRATE 20 MG: 20 TABLET ORAL at 09:08

## 2023-01-01 RX ADMIN — HYDRALAZINE HYDROCHLORIDE 25 MG: 25 TABLET, FILM COATED ORAL at 03:08

## 2023-01-01 RX ADMIN — CHLOROTHIAZIDE SODIUM 250 MG: 500 INJECTION, POWDER, LYOPHILIZED, FOR SOLUTION INTRAVENOUS at 12:08

## 2023-01-01 RX ADMIN — AMIODARONE HYDROCHLORIDE 1 MG/MIN: 1.8 INJECTION, SOLUTION INTRAVENOUS at 12:08

## 2023-01-01 RX ADMIN — INSULIN ASPART 8 UNITS: 100 INJECTION, SOLUTION INTRAVENOUS; SUBCUTANEOUS at 08:08

## 2023-01-01 RX ADMIN — DOBUTAMINE HYDROCHLORIDE 5 MCG/KG/MIN: 400 INJECTION INTRAVENOUS at 05:08

## 2023-01-01 RX ADMIN — LEVOTHYROXINE SODIUM 125 MCG: 25 TABLET ORAL at 06:08

## 2023-01-01 RX ADMIN — ASPIRIN 81 MG: 81 TABLET, COATED ORAL at 01:08

## 2023-01-01 RX ADMIN — ONDANSETRON 4 MG: 2 INJECTION INTRAMUSCULAR; INTRAVENOUS at 12:08

## 2023-01-01 RX ADMIN — SODIUM BICARBONATE: 84 INJECTION, SOLUTION INTRAVENOUS at 04:08

## 2023-01-01 RX ADMIN — HEPARIN SODIUM 15 UNITS/KG/HR: 10000 INJECTION, SOLUTION INTRAVENOUS at 04:08

## 2023-01-01 RX ADMIN — FUROSEMIDE 20 MG/HR: 10 INJECTION, SOLUTION INTRAMUSCULAR; INTRAVENOUS at 08:08

## 2023-01-01 RX ADMIN — LIDOCAINE HYDROCHLORIDE 150 MG: 10 INJECTION, SOLUTION INFILTRATION; PERINEURAL at 12:08

## 2023-01-01 RX ADMIN — HYDRALAZINE HYDROCHLORIDE 10 MG: 10 TABLET, FILM COATED ORAL at 02:08

## 2023-01-01 RX ADMIN — AMIODARONE HYDROCHLORIDE 360 MG: 1.8 INJECTION, SOLUTION INTRAVENOUS at 10:08

## 2023-01-01 RX ADMIN — ISOSORBIDE DINITRATE 20 MG: 20 TABLET ORAL at 03:08

## 2023-01-01 RX ADMIN — ISOSORBIDE DINITRATE 10 MG: 10 TABLET ORAL at 08:08

## 2023-01-01 RX ADMIN — ONDANSETRON 4 MG: 2 INJECTION INTRAMUSCULAR; INTRAVENOUS at 08:08

## 2023-01-01 RX ADMIN — Medication 100 MCG: at 08:08

## 2023-01-01 RX ADMIN — OXYCODONE HYDROCHLORIDE 5 MG: 5 TABLET ORAL at 12:08

## 2023-01-01 RX ADMIN — SODIUM CHLORIDE: 9 INJECTION, SOLUTION INTRAVENOUS at 01:08

## 2023-01-01 RX ADMIN — FUROSEMIDE 40 MG: 10 INJECTION, SOLUTION INTRAMUSCULAR; INTRAVENOUS at 11:08

## 2023-01-01 RX ADMIN — LEVOTHYROXINE SODIUM 125 MCG: 125 TABLET ORAL at 05:08

## 2023-01-01 RX ADMIN — VASOPRESSIN 0.06 UNITS/MIN: 20 INJECTION INTRAVENOUS at 04:08

## 2023-01-01 RX ADMIN — ONDANSETRON 4 MG: 2 INJECTION INTRAMUSCULAR; INTRAVENOUS at 06:08

## 2023-01-01 RX ADMIN — INSULIN ASPART 4 UNITS: 100 INJECTION, SOLUTION INTRAVENOUS; SUBCUTANEOUS at 04:08

## 2023-01-01 RX ADMIN — DOBUTAMINE HYDROCHLORIDE 5 MCG/KG/MIN: 400 INJECTION INTRAVENOUS at 12:08

## 2023-01-01 RX ADMIN — POTASSIUM CHLORIDE 30 MEQ: 10 CAPSULE, COATED, EXTENDED RELEASE ORAL at 10:08

## 2023-01-01 RX ADMIN — LEVETIRACETAM 500 MG: 100 INJECTION INTRAVENOUS at 08:08

## 2023-01-01 RX ADMIN — CEFTRIAXONE 1 G: 1 INJECTION, POWDER, FOR SOLUTION INTRAMUSCULAR; INTRAVENOUS at 08:08

## 2023-01-01 RX ADMIN — ATORVASTATIN CALCIUM 80 MG: 40 TABLET, FILM COATED ORAL at 09:08

## 2023-01-01 RX ADMIN — PANTOPRAZOLE SODIUM 40 MG: 40 INJECTION, POWDER, FOR SOLUTION INTRAVENOUS at 09:08

## 2023-01-01 RX ADMIN — FUROSEMIDE 40 MG: 10 INJECTION, SOLUTION INTRAMUSCULAR; INTRAVENOUS at 10:08

## 2023-01-01 RX ADMIN — DOBUTAMINE HYDROCHLORIDE 5 MCG/KG/MIN: 400 INJECTION INTRAVENOUS at 03:08

## 2023-01-01 RX ADMIN — HEPARIN SODIUM AND DEXTROSE 21 UNITS/KG/HR: 10000; 5 INJECTION INTRAVENOUS at 05:08

## 2023-01-01 RX ADMIN — INSULIN ASPART 6 UNITS: 100 INJECTION, SOLUTION INTRAVENOUS; SUBCUTANEOUS at 08:08

## 2023-01-01 RX ADMIN — OXYCODONE HYDROCHLORIDE 5 MG: 5 TABLET ORAL at 02:08

## 2023-01-01 RX ADMIN — HEPARIN SODIUM AND DEXTROSE 21 UNITS/KG/HR: 10000; 5 INJECTION INTRAVENOUS at 06:08

## 2023-01-01 RX ADMIN — LIDOCAINE HYDROCHLORIDE 200 MG: 10 INJECTION, SOLUTION INFILTRATION; PERINEURAL at 12:08

## 2023-01-01 RX ADMIN — OXYCODONE HYDROCHLORIDE 5 MG: 5 TABLET ORAL at 09:08

## 2023-01-01 RX ADMIN — FUROSEMIDE 80 MG: 10 INJECTION, SOLUTION INTRAMUSCULAR; INTRAVENOUS at 10:08

## 2023-01-01 RX ADMIN — BE HEALTH MAGNESIUM CITRATE ORAL SOLUTION - LEMON 296 ML: 1.75 LIQUID ORAL at 02:08

## 2023-01-01 RX ADMIN — NOREPINEPHRINE BITARTRATE 0.02 MCG/KG/MIN: 1 INJECTION, SOLUTION, CONCENTRATE INTRAVENOUS at 08:08

## 2023-01-01 RX ADMIN — OXYMETAZOLINE HYDROCHLORIDE 2 SPRAY: 0.05 SPRAY NASAL at 02:08

## 2023-01-01 RX ADMIN — OXYCODONE HYDROCHLORIDE 5 MG: 5 TABLET ORAL at 10:08

## 2023-01-01 RX ADMIN — INSULIN ASPART 12 UNITS: 100 INJECTION, SOLUTION INTRAVENOUS; SUBCUTANEOUS at 06:08

## 2023-01-01 RX ADMIN — FUROSEMIDE 80 MG: 10 INJECTION, SOLUTION INTRAMUSCULAR; INTRAVENOUS at 04:08

## 2023-01-01 RX ADMIN — INSULIN HUMAN 3 UNITS/HR: 1 INJECTION, SOLUTION INTRAVENOUS at 12:08

## 2023-01-01 RX ADMIN — INSULIN ASPART 5 UNITS: 100 INJECTION, SOLUTION INTRAVENOUS; SUBCUTANEOUS at 03:08

## 2023-01-01 RX ADMIN — AMIODARONE HYDROCHLORIDE 1 MG/MIN: 1.8 INJECTION, SOLUTION INTRAVENOUS at 04:08

## 2023-01-01 RX ADMIN — ONDANSETRON 8 MG: 8 TABLET, ORALLY DISINTEGRATING ORAL at 08:08

## 2023-01-01 RX ADMIN — OXYCODONE HYDROCHLORIDE 5 MG: 5 TABLET ORAL at 01:08

## 2023-01-01 RX ADMIN — FUROSEMIDE 10 MG/HR: 10 INJECTION, SOLUTION INTRAMUSCULAR; INTRAVENOUS at 11:08

## 2023-01-01 RX ADMIN — INSULIN ASPART 2 UNITS: 100 INJECTION, SOLUTION INTRAVENOUS; SUBCUTANEOUS at 05:08

## 2023-01-01 RX ADMIN — NOREPINEPHRINE BITARTRATE 0.26 MCG/KG/MIN: 1 INJECTION, SOLUTION, CONCENTRATE INTRAVENOUS at 08:08

## 2023-01-01 RX ADMIN — ONDANSETRON 4 MG: 2 INJECTION INTRAMUSCULAR; INTRAVENOUS at 11:08

## 2023-01-01 RX ADMIN — ASPIRIN 325 MG: 325 TABLET, COATED ORAL at 08:08

## 2023-01-01 RX ADMIN — MORPHINE SULFATE 4 MG: 4 INJECTION, SOLUTION INTRAMUSCULAR; INTRAVENOUS at 08:08

## 2023-01-01 RX ADMIN — AMIODARONE HYDROCHLORIDE 1 MG/MIN: 1.8 INJECTION, SOLUTION INTRAVENOUS at 07:08

## 2023-01-01 RX ADMIN — FUROSEMIDE 10 MG/HR: 10 INJECTION, SOLUTION INTRAMUSCULAR; INTRAVENOUS at 06:08

## 2023-01-01 RX ADMIN — FUROSEMIDE 40 MG: 10 INJECTION, SOLUTION INTRAMUSCULAR; INTRAVENOUS at 01:08

## 2023-01-01 RX ADMIN — ACETAMINOPHEN 650 MG: 325 TABLET ORAL at 11:08

## 2023-01-01 RX ADMIN — INSULIN ASPART 20 UNITS: 100 INJECTION, SOLUTION INTRAVENOUS; SUBCUTANEOUS at 07:08

## 2023-01-01 RX ADMIN — TICAGRELOR 90 MG: 90 TABLET ORAL at 10:08

## 2023-01-01 RX ADMIN — INSULIN HUMAN 21 UNITS/HR: 1 INJECTION, SOLUTION INTRAVENOUS at 04:08

## 2023-01-01 RX ADMIN — INSULIN ASPART 12 UNITS: 100 INJECTION, SOLUTION INTRAVENOUS; SUBCUTANEOUS at 04:08

## 2023-01-01 RX ADMIN — HEPARIN SODIUM 15 UNITS/KG/HR: 10000 INJECTION, SOLUTION INTRAVENOUS at 09:08

## 2023-01-01 RX ADMIN — LEVOTHYROXINE SODIUM 125 MCG: 25 TABLET ORAL at 05:08

## 2023-01-01 RX ADMIN — SODIUM CHLORIDE: 9 INJECTION, SOLUTION INTRAVENOUS at 03:08

## 2023-01-01 RX ADMIN — FUROSEMIDE 80 MG: 10 INJECTION, SOLUTION INTRAMUSCULAR; INTRAVENOUS at 08:08

## 2023-01-01 RX ADMIN — INSULIN ASPART 12 UNITS: 100 INJECTION, SOLUTION INTRAVENOUS; SUBCUTANEOUS at 05:08

## 2023-01-01 RX ADMIN — ISOSORBIDE DINITRATE 20 MG: 20 TABLET ORAL at 08:08

## 2023-01-01 RX ADMIN — HEPARIN SODIUM AND DEXTROSE 19 UNITS/KG/HR: 10000; 5 INJECTION INTRAVENOUS at 06:08

## 2023-01-01 RX ADMIN — ENOXAPARIN SODIUM 40 MG: 40 INJECTION SUBCUTANEOUS at 04:08

## 2023-01-01 RX ADMIN — DIPHENHYDRAMINE HYDROCHLORIDE 50 MG: 50 CAPSULE ORAL at 01:08

## 2023-01-01 RX ADMIN — INSULIN ASPART 10 UNITS: 100 INJECTION, SOLUTION INTRAVENOUS; SUBCUTANEOUS at 11:08

## 2023-01-01 RX ADMIN — HYDRALAZINE HYDROCHLORIDE 25 MG: 25 TABLET, FILM COATED ORAL at 08:08

## 2023-01-01 RX ADMIN — SODIUM BICARBONATE: 84 INJECTION, SOLUTION INTRAVENOUS at 10:08

## 2023-01-01 RX ADMIN — LEVETIRACETAM 500 MG: 100 INJECTION INTRAVENOUS at 01:08

## 2023-01-01 RX ADMIN — POLYETHYLENE GLYCOL 3350 17 G: 17 POWDER, FOR SOLUTION ORAL at 10:08

## 2023-01-01 RX ADMIN — HYDROMORPHONE HYDROCHLORIDE 0.2 MG: 1 INJECTION, SOLUTION INTRAMUSCULAR; INTRAVENOUS; SUBCUTANEOUS at 12:08

## 2023-01-01 RX ADMIN — LIDOCAINE HYDROCHLORIDE 1 MG/MIN: 8 INJECTION, SOLUTION INTRAVENOUS at 11:08

## 2023-01-01 RX ADMIN — DOBUTAMINE HYDROCHLORIDE 5 MCG/KG/MIN: 400 INJECTION INTRAVENOUS at 01:08

## 2023-01-01 RX ADMIN — AMIODARONE HYDROCHLORIDE 1 MG/MIN: 1.8 INJECTION, SOLUTION INTRAVENOUS at 06:08

## 2023-01-01 RX ADMIN — NOREPINEPHRINE BITARTRATE 0.02 MCG/KG/MIN: 4 INJECTION, SOLUTION INTRAVENOUS at 08:08

## 2023-01-01 RX ADMIN — AMIODARONE HYDROCHLORIDE 1 MG/MIN: 1.8 INJECTION, SOLUTION INTRAVENOUS at 11:08

## 2023-01-01 RX ADMIN — OXYMETAZOLINE HYDROCHLORIDE 2 SPRAY: 0.05 SPRAY NASAL at 10:08

## 2023-01-01 RX ADMIN — INSULIN ASPART 12 UNITS: 100 INJECTION, SOLUTION INTRAVENOUS; SUBCUTANEOUS at 08:08

## 2023-01-01 RX ADMIN — OXYCODONE HYDROCHLORIDE 5 MG: 5 TABLET ORAL at 05:08

## 2023-01-01 RX ADMIN — LIDOCAINE 1 PATCH: 50 PATCH TOPICAL at 10:08

## 2023-01-01 RX ADMIN — HEPARIN SODIUM AND DEXTROSE 20 UNITS/KG/HR: 10000; 5 INJECTION INTRAVENOUS at 08:08

## 2023-01-01 RX ADMIN — SODIUM ZIRCONIUM CYCLOSILICATE 10 G: 10 POWDER, FOR SUSPENSION ORAL at 06:08

## 2023-01-01 RX ADMIN — FUROSEMIDE 40 MG/HR: 10 INJECTION, SOLUTION INTRAMUSCULAR; INTRAVENOUS at 04:08

## 2023-01-01 RX ADMIN — HEPARIN SODIUM AND DEXTROSE 21 UNITS/KG/HR: 10000; 5 INJECTION INTRAVENOUS at 07:08

## 2023-01-01 RX ADMIN — HEPARIN SODIUM 12 UNITS/KG/HR: 10000 INJECTION, SOLUTION INTRAVENOUS at 08:08

## 2023-01-01 RX ADMIN — ONDANSETRON 4 MG: 2 INJECTION INTRAMUSCULAR; INTRAVENOUS at 04:08

## 2023-01-01 RX ADMIN — CHLOROTHIAZIDE SODIUM 250 MG: 500 INJECTION, POWDER, LYOPHILIZED, FOR SOLUTION INTRAVENOUS at 11:08

## 2023-01-01 RX ADMIN — SENNOSIDES 8.6 MG: 8.6 TABLET, FILM COATED ORAL at 10:08

## 2023-01-01 RX ADMIN — LORAZEPAM 2 MG: 2 INJECTION INTRAMUSCULAR; INTRAVENOUS at 05:08

## 2023-01-01 RX ADMIN — POLYETHYLENE GLYCOL 3350 17 G: 17 POWDER, FOR SOLUTION ORAL at 09:08

## 2023-01-01 RX ADMIN — FUROSEMIDE 100 MG: 10 INJECTION, SOLUTION INTRAMUSCULAR; INTRAVENOUS at 06:08

## 2023-01-01 RX ADMIN — LORAZEPAM 2 MG: 2 INJECTION INTRAMUSCULAR at 10:08

## 2023-01-01 RX ADMIN — ENOXAPARIN SODIUM 30 MG: 30 INJECTION SUBCUTANEOUS at 05:08

## 2023-01-01 RX ADMIN — OXYCODONE HYDROCHLORIDE 5 MG: 5 TABLET ORAL at 07:08

## 2023-01-01 RX ADMIN — SODIUM CHLORIDE 500 ML: 9 INJECTION, SOLUTION INTRAVENOUS at 04:08

## 2023-01-01 RX ADMIN — POTASSIUM CHLORIDE 20 MEQ: 200 INJECTION, SOLUTION INTRAVENOUS at 12:08

## 2023-01-01 RX ADMIN — OXYCODONE HYDROCHLORIDE 5 MG: 5 TABLET ORAL at 11:08

## 2023-01-01 RX ADMIN — VASOPRESSIN 0.06 UNITS/MIN: 20 INJECTION INTRAVENOUS at 10:08

## 2023-01-01 RX ADMIN — SODIUM CHLORIDE: 9 INJECTION, SOLUTION INTRAVENOUS at 11:08

## 2023-01-01 RX ADMIN — INSULIN ASPART 6 UNITS: 100 INJECTION, SOLUTION INTRAVENOUS; SUBCUTANEOUS at 12:08

## 2023-01-01 RX ADMIN — FUROSEMIDE 20 MG/HR: 10 INJECTION, SOLUTION INTRAMUSCULAR; INTRAVENOUS at 03:08

## 2023-01-01 RX ADMIN — POTASSIUM CHLORIDE 20 MEQ: 1500 TABLET, EXTENDED RELEASE ORAL at 05:08

## 2023-01-01 RX ADMIN — INSULIN ASPART 2 UNITS: 100 INJECTION, SOLUTION INTRAVENOUS; SUBCUTANEOUS at 01:08

## 2023-01-01 RX ADMIN — FUROSEMIDE 40 MG/HR: 10 INJECTION, SOLUTION INTRAMUSCULAR; INTRAVENOUS at 03:08

## 2023-01-01 RX ADMIN — INSULIN ASPART 10 UNITS: 100 INJECTION, SOLUTION INTRAVENOUS; SUBCUTANEOUS at 05:08

## 2023-01-01 RX ADMIN — SODIUM CHLORIDE, POTASSIUM CHLORIDE, SODIUM LACTATE AND CALCIUM CHLORIDE: 600; 310; 30; 20 INJECTION, SOLUTION INTRAVENOUS at 04:08

## 2023-01-01 RX ADMIN — DOBUTAMINE HYDROCHLORIDE 5 MCG/KG/MIN: 400 INJECTION INTRAVENOUS at 02:08

## 2023-01-01 RX ADMIN — INSULIN ASPART 6 UNITS: 100 INJECTION, SOLUTION INTRAVENOUS; SUBCUTANEOUS at 09:08

## 2023-01-01 RX ADMIN — PANTOPRAZOLE SODIUM 40 MG: 40 TABLET, DELAYED RELEASE ORAL at 01:08

## 2023-01-01 RX ADMIN — DOBUTAMINE HYDROCHLORIDE 2 MCG/KG/MIN: 400 INJECTION INTRAVENOUS at 03:08

## 2023-01-01 RX ADMIN — OXYMETAZOLINE HYDROCHLORIDE 2 SPRAY: 0.05 SPRAY NASAL at 09:08

## 2023-01-01 RX ADMIN — METHOCARBAMOL 500 MG: 500 TABLET ORAL at 04:08

## 2023-01-01 RX ADMIN — ONDANSETRON 4 MG: 2 INJECTION INTRAMUSCULAR; INTRAVENOUS at 03:08

## 2023-01-01 RX ADMIN — INSULIN ASPART 10 UNITS: 100 INJECTION, SOLUTION INTRAVENOUS; SUBCUTANEOUS at 08:08

## 2023-01-01 RX ADMIN — PANTOPRAZOLE SODIUM 40 MG: 40 TABLET, DELAYED RELEASE ORAL at 11:08

## 2023-01-01 RX ADMIN — SODIUM CHLORIDE: 9 INJECTION, SOLUTION INTRAVENOUS at 06:08

## 2023-01-01 RX ADMIN — TICAGRELOR 90 MG: 90 TABLET ORAL at 01:08

## 2023-01-01 RX ADMIN — ACETAMINOPHEN 650 MG: 325 TABLET ORAL at 07:08

## 2023-01-01 RX ADMIN — INSULIN ASPART 6 UNITS: 100 INJECTION, SOLUTION INTRAVENOUS; SUBCUTANEOUS at 01:08

## 2023-01-01 RX ADMIN — METOPROLOL SUCCINATE 12.5 MG: 25 TABLET, EXTENDED RELEASE ORAL at 09:08

## 2023-01-01 RX ADMIN — INSULIN HUMAN 5 UNITS/HR: 1 INJECTION, SOLUTION INTRAVENOUS at 09:08

## 2023-01-01 RX ADMIN — ENOXAPARIN SODIUM 40 MG: 40 INJECTION SUBCUTANEOUS at 06:08

## 2023-01-01 RX ADMIN — INSULIN ASPART 6 UNITS: 100 INJECTION, SOLUTION INTRAVENOUS; SUBCUTANEOUS at 04:08

## 2023-01-01 RX ADMIN — MUPIROCIN: 20 OINTMENT TOPICAL at 10:08

## 2023-01-01 RX ADMIN — INSULIN DETEMIR 30 UNITS: 100 INJECTION, SOLUTION SUBCUTANEOUS at 11:08

## 2023-01-01 RX ADMIN — FUROSEMIDE 15 MG/HR: 10 INJECTION, SOLUTION INTRAMUSCULAR; INTRAVENOUS at 07:08

## 2023-01-01 RX ADMIN — VASOPRESSIN 0.04 UNITS/MIN: 20 INJECTION INTRAVENOUS at 09:08

## 2023-01-01 RX ADMIN — INSULIN ASPART 8 UNITS: 100 INJECTION, SOLUTION INTRAVENOUS; SUBCUTANEOUS at 11:08

## 2023-01-01 RX ADMIN — PANTOPRAZOLE SODIUM 40 MG: 40 TABLET, DELAYED RELEASE ORAL at 10:08

## 2023-01-01 RX ADMIN — ATORVASTATIN CALCIUM 80 MG: 40 TABLET, FILM COATED ORAL at 10:08

## 2023-01-01 RX ADMIN — POTASSIUM CHLORIDE 40 MEQ: 1500 TABLET, EXTENDED RELEASE ORAL at 04:08

## 2023-01-01 RX ADMIN — DOBUTAMINE HYDROCHLORIDE 5 MCG/KG/MIN: 400 INJECTION INTRAVENOUS at 11:08

## 2023-01-01 RX ADMIN — POTASSIUM CHLORIDE 40 MEQ: 1500 TABLET, EXTENDED RELEASE ORAL at 05:08

## 2023-01-01 RX ADMIN — INSULIN ASPART 12 UNITS: 100 INJECTION, SOLUTION INTRAVENOUS; SUBCUTANEOUS at 01:08

## 2023-01-01 RX ADMIN — GLYCERIN 1 SUPPOSITORY: 2 SUPPOSITORY RECTAL at 05:08

## 2023-01-01 RX ADMIN — NOREPINEPHRINE BITARTRATE 0.46 MCG/KG/MIN: 1 INJECTION, SOLUTION, CONCENTRATE INTRAVENOUS at 05:08

## 2023-01-01 RX ADMIN — SODIUM BICARBONATE 650 MG TABLET 1300 MG: at 05:08

## 2023-01-01 RX ADMIN — FUROSEMIDE 20 MG/HR: 10 INJECTION, SOLUTION INTRAMUSCULAR; INTRAVENOUS at 05:08

## 2023-01-01 RX ADMIN — INSULIN HUMAN 4 UNITS/HR: 1 INJECTION, SOLUTION INTRAVENOUS at 05:08

## 2023-01-01 RX ADMIN — AMIODARONE HYDROCHLORIDE 1 MG/MIN: 1.8 INJECTION, SOLUTION INTRAVENOUS at 03:08

## 2023-01-01 RX ADMIN — FUROSEMIDE 15 MG/HR: 10 INJECTION, SOLUTION INTRAMUSCULAR; INTRAVENOUS at 12:08

## 2023-01-01 RX ADMIN — ATORVASTATIN CALCIUM 80 MG: 40 TABLET, FILM COATED ORAL at 01:08

## 2023-01-01 RX ADMIN — HYDRALAZINE HYDROCHLORIDE 35 MG: 25 TABLET, FILM COATED ORAL at 03:08

## 2023-01-01 RX ADMIN — PERFLUTREN 1 ML: 6.52 INJECTION, SUSPENSION INTRAVENOUS at 10:08

## 2023-01-01 RX ADMIN — CEFTRIAXONE 1 G: 1 INJECTION, POWDER, FOR SOLUTION INTRAMUSCULAR; INTRAVENOUS at 07:08

## 2023-01-01 RX ADMIN — POTASSIUM CHLORIDE 20 MEQ: 1500 TABLET, EXTENDED RELEASE ORAL at 01:08

## 2023-01-01 RX ADMIN — INSULIN HUMAN 20 UNITS: 100 INJECTION, SOLUTION PARENTERAL at 03:08

## 2023-01-01 RX ADMIN — SODIUM CHLORIDE 500 ML: 9 INJECTION, SOLUTION INTRAVENOUS at 02:08

## 2023-01-01 RX ADMIN — INSULIN HUMAN 6 UNITS/HR: 1 INJECTION, SOLUTION INTRAVENOUS at 11:08

## 2023-01-01 RX ADMIN — HYDRALAZINE HYDROCHLORIDE 10 MG: 10 TABLET, FILM COATED ORAL at 08:08

## 2023-01-01 RX ADMIN — INSULIN DETEMIR 33 UNITS: 100 INJECTION, SOLUTION SUBCUTANEOUS at 08:08

## 2023-01-01 RX ADMIN — INSULIN ASPART 20 UNITS: 100 INJECTION, SOLUTION INTRAVENOUS; SUBCUTANEOUS at 04:08

## 2023-01-01 RX ADMIN — INSULIN HUMAN 3 UNITS/HR: 1 INJECTION, SOLUTION INTRAVENOUS at 11:08

## 2023-01-01 RX ADMIN — INSULIN HUMAN 2.5 UNITS/HR: 1 INJECTION, SOLUTION INTRAVENOUS at 09:08

## 2023-01-01 RX ADMIN — FUROSEMIDE 40 MG/HR: 10 INJECTION, SOLUTION INTRAMUSCULAR; INTRAVENOUS at 02:08

## 2023-01-01 RX ADMIN — INSULIN HUMAN 4 UNITS/HR: 1 INJECTION, SOLUTION INTRAVENOUS at 09:08

## 2023-01-01 RX ADMIN — ISOSORBIDE DINITRATE 10 MG: 10 TABLET ORAL at 02:08

## 2023-01-01 RX ADMIN — LORAZEPAM 2 MG: 2 INJECTION INTRAMUSCULAR; INTRAVENOUS at 10:08

## 2023-01-01 RX ADMIN — NOREPINEPHRINE BITARTRATE 0.02 MCG/KG/MIN: 4 INJECTION, SOLUTION INTRAVENOUS at 12:08

## 2023-01-01 RX ADMIN — LEVOTHYROXINE SODIUM 125 MCG: 25 TABLET ORAL at 07:08

## 2023-01-01 RX ADMIN — INSULIN HUMAN 11 UNITS/HR: 1 INJECTION, SOLUTION INTRAVENOUS at 12:08

## 2023-01-01 RX ADMIN — HEPARIN SODIUM AND DEXTROSE 18 UNITS/KG/HR: 10000; 5 INJECTION INTRAVENOUS at 05:08

## 2023-01-01 RX ADMIN — POTASSIUM CHLORIDE 40 MEQ: 1500 TABLET, EXTENDED RELEASE ORAL at 01:08

## 2023-01-01 RX ADMIN — MAGNESIUM SULFATE HEPTAHYDRATE 2 G: 40 INJECTION, SOLUTION INTRAVENOUS at 06:08

## 2023-01-01 RX ADMIN — INSULIN HUMAN 2.5 UNITS/HR: 1 INJECTION, SOLUTION INTRAVENOUS at 04:08

## 2023-01-01 RX ADMIN — HUMAN ALBUMIN MICROSPHERES AND PERFLUTREN 0.66 MG: 10; .22 INJECTION, SOLUTION INTRAVENOUS at 03:08

## 2023-01-01 RX ADMIN — INSULIN HUMAN 5 UNITS/HR: 1 INJECTION, SOLUTION INTRAVENOUS at 06:08

## 2023-01-01 RX ADMIN — SODIUM CHLORIDE 500 ML: 9 INJECTION, SOLUTION INTRAVENOUS at 08:08

## 2023-01-01 RX ADMIN — CEFTRIAXONE SODIUM 1 G: 1 INJECTION, POWDER, FOR SOLUTION INTRAMUSCULAR; INTRAVENOUS at 05:08

## 2023-01-01 RX ADMIN — SEVELAMER CARBONATE 800 MG: 800 TABLET, FILM COATED ORAL at 03:08

## 2023-08-10 PROBLEM — I21.4 NSTEMI (NON-ST ELEVATED MYOCARDIAL INFARCTION): Status: ACTIVE | Noted: 2023-01-01

## 2023-08-10 NOTE — Clinical Note
The catheter was inserted into the, was removed from the and was inserted over the wire into the distal   circumflex. IVUS performed. Distal to Prox Circumflex.

## 2023-08-10 NOTE — Clinical Note
An angiography was performed of the left coronary arteries. The angiography was performed via power injection.  Post Intervention angio performed.

## 2023-08-10 NOTE — Clinical Note
The catheter was repositioned into the ostial SVG graft. An angiography was performed of the right coronary arteries. The angiography was performed via power injection.  SVG-RCA

## 2023-08-10 NOTE — Clinical Note
The catheter was inserted into the and was inserted over the wire into the pulmonary capillary wedge. Hemodynamics were performed.

## 2023-08-10 NOTE — Clinical Note
The catheter was repositioned into the ostial SVG graft. An angiography was performed of the left coronary arteries and graft. The angiography was performed via power injection.  SVG-OM

## 2023-08-10 NOTE — Clinical Note
The catheter was inserted into the and was inserted over the wire into the ostium   right coronary artery. An angiography was performed of the right coronary arteries. The angiography was performed via power injection.

## 2023-08-10 NOTE — Clinical Note
The catheter was inserted into the, was removed from the and was inserted over the wire into the distal   circumflex.  IVUS performed. Distal -Prox Circumflex.

## 2023-08-10 NOTE — Clinical Note
The catheter was inserted into the, was removed from the and was inserted over the wire into the proximal   circumflex. IVUS performed.

## 2023-08-10 NOTE — Clinical Note
The catheter was repositioned into the ostial LIMA graft. An angiography was performed of the left coronary arteries and graft. Multiple views were taken. The angiography was performed via power injection.  LIMA-LAD

## 2023-08-10 NOTE — Clinical Note
The catheter was repositioned into the pulmonary artery. Hemodynamics were performed.  The patient's O2 saturation measured 49%. Statement Selected

## 2023-08-10 NOTE — FIRST PROVIDER EVALUATION
Medical screening examination initiated.  I have conducted a focused provider triage encounter, findings are as follows:    Brief history of present illness:  53 yo male presents to ED for evaluation cough, congestion, nausea and and vomiting since last night. Complains of SOB. Hx of CABG.     Vitals:    08/10/23 1842 08/10/23 1844   BP: (!) 100/57    Pulse: 110    Resp: 18    Temp: 98.3 °F (36.8 °C)    SpO2: (!) 91% 95%   Weight: 117.9 kg (260 lb)        Pertinent physical exam:  Patient is awake and alert and oriented.  Ambulatory to triage.  In no acute distress.      Brief workup plan:  labs, EKG, CXR, COVID/FLU    Preliminary workup initiated; this workup will be continued and followed by the physician or advanced practice provider that is assigned to the patient when roomed.

## 2023-08-11 NOTE — Clinical Note
The PA catheter was inserted into the main pulmonary artery. Hemodynamics were performed. SECURED IN PLACE AT 54CM

## 2023-08-11 NOTE — PROGRESS NOTES
Dr. Urban spoke with Dr. Cuellar at [facility] for consideration for LVAD. Dr. Cuellar states they will accept the patient transfer process initiated at this time

## 2023-08-11 NOTE — ED PROVIDER NOTES
Encounter Date: 8/10/2023    SCRIBE #1 NOTE: I, Olga Pascual, am scribing for, and in the presence of,  Boom Jacobson MD. I have scribed the following portions of the note - Other sections scribed: HPI, ROS, PE.       History     Chief Complaint   Patient presents with    Emesis     Pt c/o vomiting, nausea, weakness, and shortness of breath that started last night. Hx CABG. Denies fever     A 52 year old male with a history of CHF, CAD, DM, HTN, and ischemic cardiomyopathy is presenting to the ED with c/o nausea and vomiting. The pt is reporting nausea, vomiting, and shortness of breath onset last night. He denies any chest pain or pressure but states that he has indigestion onset last night. The pt has a history of a CABG. He is not on O2 at home.     The history is provided by the patient. No  was used.     Review of patient's allergies indicates:  No Known Allergies  Past Medical History:   Diagnosis Date    CHF (congestive heart failure)     Coronary artery disease     Diabetes mellitus, type 2     Hypertension     Ischemic cardiomyopathy 05/01/2017     Past Surgical History:   Procedure Laterality Date    CORONARY ARTERY BYPASS GRAFT      3 vessel     Family History   Problem Relation Age of Onset    Hypertension Mother      Social History     Tobacco Use    Smoking status: Former     Current packs/day: 0.00     Types: Cigarettes    Smokeless tobacco: Never   Substance Use Topics    Alcohol use: Yes    Drug use: No     Review of Systems   Constitutional:  Negative for chills and fever.   HENT:  Negative for drooling and sore throat.    Eyes:  Negative for pain and redness.   Respiratory:  Positive for shortness of breath. Negative for wheezing and stridor.    Cardiovascular:  Negative for chest pain, palpitations and leg swelling.   Gastrointestinal:  Positive for nausea and vomiting. Negative for abdominal pain.   Genitourinary:  Negative for dysuria and hematuria.   Musculoskeletal:   Negative for back pain, neck pain and neck stiffness.   Skin:  Negative for rash and wound.   Neurological:  Negative for weakness and numbness.   Hematological:  Does not bruise/bleed easily.       Physical Exam     Initial Vitals [08/10/23 1842]   BP Pulse Resp Temp SpO2   (!) 100/57 110 18 98.3 °F (36.8 °C) (!) 91 %      MAP       --         Physical Exam    Nursing note and vitals reviewed.  Constitutional: He appears well-developed. He is not diaphoretic. No distress.   Uncomfortable appearing    HENT:   Head: Normocephalic and atraumatic.   Nose: Nose normal.   Mouth/Throat: Oropharynx is clear and moist.   Eyes: Conjunctivae and EOM are normal. Pupils are equal, round, and reactive to light.   Cardiovascular:  Normal rate and regular rhythm.           No murmur heard.  Pulmonary/Chest: Breath sounds normal. No respiratory distress. He has no wheezes. He has no rales.   Nasal cannula in place    Abdominal: Abdomen is soft. He exhibits no distension. There is no abdominal tenderness.     Neurological: He is alert and oriented to person, place, and time. He has normal strength. No cranial nerve deficit.   Skin: Skin is warm. Capillary refill takes less than 2 seconds. No rash noted. There is pallor.         ED Course   Critical Care    Date/Time: 8/10/2023 10:41 PM    Performed by: Boom Jacobson MD  Authorized by: Boom Jacobson MD  Direct patient critical care time: 75 minutes  Total critical care time (exclusive of procedural time) : 75 minutes  Critical care time was exclusive of separately billable procedures and treating other patients.  Critical care was necessary to treat or prevent imminent or life-threatening deterioration of the following conditions: cardiac failure, endocrine crisis and renal failure.  Critical care was time spent personally by me on the following activities: blood draw for specimens, discussions with consultants, development of treatment plan with patient or surrogate,  evaluation of patient's response to treatment, examination of patient, ordering and performing treatments and interventions, obtaining history from patient or surrogate, ordering and review of radiographic studies, ordering and review of laboratory studies, pulse oximetry, re-evaluation of patient's condition and review of old charts.        Labs Reviewed   COMPREHENSIVE METABOLIC PANEL - Abnormal; Notable for the following components:       Result Value    Sodium Level 132 (*)     Chloride 96 (*)     Carbon Dioxide 21 (*)     Glucose Level 487 (*)     Blood Urea Nitrogen 37.3 (*)     Creatinine 2.51 (*)     Aspartate Aminotransferase 156 (*)     All other components within normal limits   TROPONIN I - Abnormal; Notable for the following components:    Troponin-I 23.882 (*)     All other components within normal limits   B-TYPE NATRIURETIC PEPTIDE - Abnormal; Notable for the following components:    Natriuretic Peptide 795.7 (*)     All other components within normal limits   CBC WITH DIFFERENTIAL - Abnormal; Notable for the following components:    WBC 11.57 (*)     Neut # 9.41 (*)     All other components within normal limits   LACTIC ACID, PLASMA - Abnormal; Notable for the following components:    Lactic Acid Level 2.7 (*)     All other components within normal limits   LACTIC ACID, PLASMA - Abnormal; Notable for the following components:    Lactic Acid Level 2.8 (*)     All other components within normal limits   COVID/FLU A&B PCR - Normal    Narrative:     The Xpert Xpress SARS-CoV-2/FLU/RSV plus is a rapid, multiplexed real-time PCR test intended for the simultaneous qualitative detection and differentiation of SARS-CoV-2, Influenza A, Influenza B, and respiratory syncytial virus (RSV) viral RNA in either nasopharyngeal swab or nasal swab specimens.         APTT - Normal   PROTIME-INR - Normal   BLOOD CULTURE [facility]   BLOOD CULTURE [facility]   CBC W/ AUTO DIFFERENTIAL    Narrative:     The following orders were  created for panel order CBC auto differential.  Procedure                               Abnormality         Status                     ---------                               -----------         ------                     CBC with Differential[[id number]]        Abnormal            Final result                 Please view results for these tests on the individual orders.   URINALYSIS, REFLEX TO URINE CULTURE          Imaging Results              X-Ray Chest PA And Lateral (Final result)  Result time 08/10/23 19:14:44      Final result by Aldo Messina MD (08/10/23 19:14:44)                   Impression:      No acute pulmonary process appreciated.      Electronically signed by: Aldo Messina  Date:    08/10/2023  Time:    19:14               Narrative:    EXAMINATION:  XR CHEST PA AND LATERAL    CLINICAL HISTORY:  Shortness of breath    TECHNIQUE:  PA and lateral radiographs of the chest    COMPARISON:  Radiography 08/06/2015    FINDINGS:  Prior sternotomy with normal cardiac silhouette and device lead overlying the left anterior chest.  The lungs are well-inflated. No consolidation identified. No pleural effusion or discernible pneumothorax.                                       Medications   heparin 25,000 units in dextrose 5% 250 mL (100 units/mL) infusion LOW INTENSITY nomogram - L[facility] (12 Units/kg/hr × 96.5 kg (Adjusted) Intravenous New Bag 8/10/23 2014)   heparin 25,000 units in dextrose 5% (100 units/ml) IV bolus from bag - ADDITIONAL PRN BOLUS - 60 units/kg (max bolus 4000 units) (has no administration in time range)   heparin 25,000 units in dextrose 5% (100 units/ml) IV bolus from bag - ADDITIONAL PRN BOLUS - 30 units/kg (max bolus 4000 units) (has no administration in time range)   ondansetron injection 4 mg (4 mg Intravenous Given 8/10/23 2009)   0.9%  NaCl infusion (500 mLs Intravenous New Bag 8/10/23 2009)   aspirin EC tablet 325 mg (325 mg Oral Given 8/10/23 2012)   heparin 25,000 units in dextrose  5% (100 units/ml) IV bolus from bag INITIAL BOLUS (max bolus 4000 units) (4,000 Units Intravenous Bolus from Bag 8/10/23 2012)   morphine injection 4 mg (4 mg Intravenous Given 8/10/23 2012)   ticagrelor tablet 180 mg (180 mg Oral Given 8/10/23 2123)              Scribe Attestation:   Scribe #1: I performed the above scribed service and the documentation accurately describes the services I performed. I attest to the accuracy of the note.    Attending Attestation:           Physician Attestation for Scribe:  Physician Attestation Statement for Scribe #1: I, UGO Lofton, reviewed documentation, as scribed by Olga Garner in my presence, and it is both accurate and complete.         Medical Decision Making  Amount and/or Complexity of Data Reviewed  Labs: ordered.    Risk  OTC drugs.  Prescription drug management.  Decision regarding hospitalization.        Differential diagnosis (includes but is not limited to):   ACS, arrhythmia, kidney injury, dehydration, electrolyte abnormalities, gastritis, gastroenteritis, biliary pathology, pancreatitis    MDM Narrative  52-year-old male presents for evaluation of generalized weakness, nausea with vomiting, and shortness of breath.  EKG reviewed.  Labs reviewed.  Troponin significantly elevated, heparin drip initiated, aspirin ordered, cardiology immediately consulted and case discussed.  Recommends keeping patient NPO at midnight, IV fluid rehydration, Brilinta 180 mg x 1 dose, and recommends hospital Medicine admission and they will follow in consult.  X-ray reviewed.  Remainder of labs reviewed, hyperglycemia noted, kidney injury noted.  Hospital Medicine consulted and will admit the patient.  Patient agrees with plan for admission and has no questions at this time.    Dispo:  Admit    My independent radiology interpretation:  As above  Point of care US (independently performed and interpreted):   Decision rules/clinical scoring:     Sepsis Perfusion Assessment:      Amount and/or Complexity of Data Reviewed  Independent historian: daughter    Summary of history:  History corroborated by the patient's family members who are present at the bedside  External data reviewed: notes from previous admissions, notes from previous ED visits, and notes from clinic visits  Summary of data reviewed:  Prior notes reviewed in the electronic medical record as above  Risk and benefits of testing: discussed   Labs: ordered and reviewed  Radiology: ordered and independent interpretation performed (see above or ED course)  ECG/medicine tests: ordered and independent interpretation performed (see above or ED course)  Discussion of management or test interpretation with external provider(s): discussed with hospitalist physician and discussed with Cardiology consultant   Summary of discussion:  As above    Risk  Parenteral controlled substances   Drug therapy requiring intense monitoring for toxicity   Decision regarding hospitalization  Shared decision making     Critical Care   minutes     Data Reviewed/Counseling: I have personally reviewed the patient's vital signs, nursing notes, and other relevant tests, information, and imaging. I had a detailed discussion regarding the historical points, exam findings, and any diagnostic results supporting the discharge diagnosis. I personally performed the history, PE, MDM and procedures as documented above and agree with the scribe's documentation.    Portions of this note were dictated using voice recognition software. Although it was reviewed for accuracy, some inherent voice recognition errors may have occurred and may be present in this document.       ED Course as of 08/10/23 2244   Thu Aug 10, 2023   2003 I assumed care of this patient at 2003. Labs reviewed, markedly elevated troponin, asa ordered, heparin drip initiated. Cardiology consulted, appreciate recs [MC]   2009 Paged cardiology [RB]   2021 Cardiology (KOSTAS Abdi) recommends HM  admission, NPO @ midnight, agrees with aspirin and heparin, recommends Brilinta 180 x1, IVF,  and continue to trend enzymes. []   [id number] Paged hospitalist  [RB]      ED Course User Index  [MC] Boom Jacobson MD  [RB] Olga Garner                 Clinical Impression:   Final diagnoses:  [R06.02] SOB (shortness of breath)  [I21.4] NSTEMI (non-ST elevated myocardial infarction) (Primary)  [R73.9] Hyperglycemia  [N17.9] YVES (acute kidney injury)        ED Disposition Condition    Admit Stable                Boom Jacobson MD  08/10/23 0185

## 2023-08-11 NOTE — CONSULTS
[facility] - [facility] Tele[facility]    Cardiology  Consult Note    Patient Name: Itz Daniel  MRN: [id number]  Admission Date: 8/10/2023  Hospital Length of Stay: 1 days  Code Status: Full Code   Attending Provider: Yordy Victoria MD   Consulting Provider: Rin Luke NP  Primary Care Physician: Sunday Boo MD  Principal Problem:NSTEMI (non-ST elevated myocardial infarction)    Patient information was obtained from parent, past medical records, and ER records.     Subjective:     Chief Complaint:  Dyspnea with SOB    HPI:   53-year-old male that is known to [facility]/Dr. Boss with a PMHx of CAD s/p CABGx3 in 2017, chronic SHF (EF 30% 7.1.22), DM 2, CKD IV. He presented to the ED with dyspnea X2 days with SOB, nausea, and vomiting. He denied CP. ED course: on arrival his O2 Sat was 91% on RA, tachycardiac, and a mildly low BP. EKG showed Sinus tachycardia with no acute ST changes noted . Troponin were found to be elevated at 23.277 --> 23.035, Lactate 2.8, WBC 14.26, , K 5.2, BUN 43.2, Cr 2.48, , AST 29.  CXR shows no acute findings. Patient still reports SOB. He denies CP, Nausea, or vomiting currently. [facility] was consulted by Dr. Scales for NSTEMI.       PMH:   CAD s/p CABG x3 in 2017, CKD stage IV, Systolic heart failure, COVID 19, peripheral neuropathy, HTN    PSH:   CABG x3 2017 (LIMA to LAD, SVG to OM1, SVG to PDA), [facility], laser cataract surgery of L eye, tonsillectomy    Family History: Father DM2, Mother MI, CAD, DM2     Social History: Former smoker, occasional ETOH use, denies drug use       Previous Cardiac Diagnostics:     TTE 7.1.22  The study quality is average.   The left ventricle is mildly enlarged. Global left ventricular systolic function is severely decreased. The left ventricular ejection fraction is 30%.   Mild (1+) mitral regurgitation.  The pulmonary artery systolic pressure is 10 mmHg. The pulmonary artery appears to be normal.    [facility] 5.19.2017  Severe MVCAD as  a cause to severe newly diagnosed ischemic congestive heart failure with EF of 20%  Mild MR  Patent L subclavian, & LIMA suitable for bypass graft conduit     BLE Arterial US 10.18.21  The study quality is average.   The arteries of the left lower extremity appear patent with no significant stenosis noted.   Tri-phasic waveforms are obtained throughout the left lower extremity arteries.     Carotid US 10.18.21  The study quality is average.   1-39% stenosis in the proximal right internal carotid artery based on Bluth Criteria.   1-39% stenosis in the proximal left internal carotid artery based on Bluth Criteria.   Less than 50% stenosis throughout the bilateral common carotid arteries.   Antegrade bilateral vertebral artery flow.       Review of patient's allergies indicates:  No Known Allergies    No current facility-administered medications on file prior to encounter.     Current Outpatient Medications on File Prior to Encounter   Medication Sig    aspirin (ECOTRIN) 81 MG EC tablet Take 81 mg by mouth.    FARXIGA 10 mg tablet Take 10 mg by mouth once daily.    LANTUS SOLOSTAR U-100 INSULIN glargine 100 units/mL SubQ pen Inject 50 Units into the skin once daily.    levothyroxine (SYNTHROID) 125 MCG tablet Take 125 mcg by mouth before breakfast.    metoprolol succinate (TOPROL-XL) 25 MG 24 hr tablet Take 12.5 mg by mouth once daily.    NOVOLOG FLEXPEN U-100 INSULIN 100 unit/mL (3 mL) InPn pen Inject 20 Units into the skin 3 (three) times daily with meals.    rosuvastatin (CRESTOR) 40 MG Tab Take 40 mg by mouth once daily.    torsemide (DEMADEX) 100 MG Tab Take 50 mg by mouth once daily.             Review of Systems   Constitutional:  Negative for chills, fatigue and fever.   Respiratory:  Positive for shortness of breath.    Cardiovascular:  Negative for chest pain and palpitations.   Gastrointestinal:  Negative for abdominal pain.       Objective:     Vital Signs (Most Recent):  Temp: 98.5 °F (36.9 °C) (08/11/23  0759)  Pulse: 100 (08/11/23 0759)  Resp: 18 (08/11/23 0441)  BP: 122/85 (08/11/23 0759)  SpO2: 95 % (08/11/23 0759) Vital Signs (24h Range):  Temp:  [97 °F (36.1 °C)-98.5 °F (36.9 °C)] 98.5 °F (36.9 °C)  Pulse:  [100-113] 100  Resp:  [17-38] 18  SpO2:  [91 %-97 %] 95 %  BP: ()/(57-93) 122/85     Weight: 117.9 kg (260 lb)  Body mass index is 33.38 kg/m².    SpO2: 95 %         Intake/Output Summary (Last 24 hours) at 8/11/2023 0834  Last data filed at 8/11/2023 0510  Gross per 24 hour   Intake --   Output 700 ml   Net -700 ml       Lines/Drains/Airways       Drain  Duration                  Urethral Catheter 08/11/23 0425 Double-lumen 16 Fr. <1 day              Peripheral Intravenous Line  Duration                  Peripheral IV - Single Lumen 08/10/23 1945 20 G Anterior;Left Upper Arm <1 day         Peripheral IV - Single Lumen 08/11/23 0330 20 G;Other (Comments) Anterior;Right Upper Arm <1 day                    Significant Labs:  Recent Results (from the past 72 hour(s))   COVID/FLU A&B PCR    Collection Time: 08/10/23  6:45 PM   Result Value Ref Range    Influenza A PCR Not Detected Not Detected    Influenza B PCR Not Detected Not Detected    SARS-CoV-2 PCR Not Detected Not Detected, Negative, Invalid   Comprehensive metabolic panel    Collection Time: 08/10/23  6:56 PM   Result Value Ref Range    Sodium Level 132 (L) 136 - 145 mmol/L    Potassium Level 5.1 3.5 - 5.1 mmol/L    Chloride 96 (L) 98 - 107 mmol/L    Carbon Dioxide 21 (L) 22 - 29 mmol/L    Glucose Level 487 (HH) 74 - 100 mg/dL    Blood Urea Nitrogen 37.3 (H) 8.4 - 25.7 mg/dL    Creatinine 2.51 (H) 0.73 - 1.18 mg/dL    Calcium Level Total 9.5 8.4 - 10.2 mg/dL    Protein Total 7.1 6.4 - 8.3 gm/dL    Albumin Level 3.8 3.5 - 5.0 g/dL    Globulin 3.3 2.4 - 3.5 gm/dL    Albumin/Globulin Ratio 1.2 1.1 - 2.0 ratio    Bilirubin Total 1.0 <=1.5 mg/dL    Alkaline Phosphatase 90 40 - 150 unit/L    Alanine Aminotransferase 35 0 - 55 unit/L    Aspartate  Aminotransferase 156 (H) 5 - 34 unit/L    eGFR 30 mls/min/1.73/m2   Troponin I    Collection Time: 08/10/23  6:56 PM   Result Value Ref Range    Troponin-I 23.882 (H) 0.000 - 0.045 ng/mL   Brain natriuretic peptide    Collection Time: 08/10/23  6:56 PM   Result Value Ref Range    Natriuretic Peptide 795.7 (H) <=100.0 pg/mL   CBC with Differential    Collection Time: 08/10/23  6:56 PM   Result Value Ref Range    WBC 11.57 (H) 4.50 - 11.50 x10(3)/mcL    RBC 4.95 4.70 - 6.10 x10(6)/mcL    Hgb 14.7 14.0 - 18.0 g/dL    Hct 42.5 42.0 - 52.0 %    MCV 85.9 80.0 - 94.0 fL    MCH 29.7 27.0 - 31.0 pg    MCHC 34.6 33.0 - 36.0 g/dL    RDW 12.3 11.5 - 17.0 %    Platelet 300 130 - 400 x10(3)/mcL    MPV 10.4 7.4 - 10.4 fL    Neut % 81.4 %    Lymph % 9.9 %    Mono % 5.7 %    Eos % 2.3 %    Basophil % 0.4 %    Lymph # 1.15 0.6 - 4.6 x10(3)/mcL    Neut # 9.41 (H) 2.1 - 9.2 x10(3)/mcL    Mono # 0.66 0.1 - 1.3 x10(3)/mcL    Eos # 0.27 0 - 0.9 x10(3)/mcL    Baso # 0.05 <=0.2 x10(3)/mcL    IG# 0.03 0 - 0.04 x10(3)/mcL    IG% 0.3 %    NRBC% 0.0 %   Lactic acid, plasma    Collection Time: 08/10/23  6:56 PM   Result Value Ref Range    Lactic Acid Level 2.7 (H) 0.5 - 2.2 mmol/L   Lactic Acid, Plasma    Collection Time: 08/10/23  8:25 PM   Result Value Ref Range    Lactic Acid Level 2.8 (H) 0.5 - 2.2 mmol/L   APTT    Collection Time: 08/10/23  8:25 PM   Result Value Ref Range    PTT 30.6 23.2 - 33.7 seconds   Protime-INR    Collection Time: 08/10/23  8:25 PM   Result Value Ref Range    PT 12.9 12.5 - 14.5 seconds    INR 1.0 <=1.3   Troponin I    Collection Time: 08/10/23 11:04 PM   Result Value Ref Range    Troponin-I 23.277 (H) 0.000 - 0.045 ng/mL   T4, Free    Collection Time: 08/10/23 11:04 PM   Result Value Ref Range    Thyroxine Free 1.08 0.70 - 1.48 ng/dL   T3, Free ([facility])    Collection Time: 08/10/23 11:04 PM   Result Value Ref Range    T3 Free 2.13 1.58 - 3.91 pg/mL   Hemoglobin A1c if not done in past 3 months    Collection Time:  08/10/23 11:04 PM   Result Value Ref Range    Hemoglobin A1c 10.2 (H) <=7.0 %    Estimated Average Glucose 246.0 mg/dL   Lipid Panel    Collection Time: 08/10/23 11:04 PM   Result Value Ref Range    Cholesterol Total 140 <=200 mg/dL    HDL Cholesterol 23 (L) 35 - 60 mg/dL    Triglyceride 156 (H) 34 - 140 mg/dL    Cholesterol/HDL Ratio 6 (H) 0 - 5    Very Low Density Lipoprotein 31     LDL Cholesterol 86.00 50.00 - 140.00 mg/dL   TSH    Collection Time: 08/10/23 11:04 PM   Result Value Ref Range    Thyroid Stimulating Hormone 7.103 (H) 0.350 - 4.940 uIU/mL   POCT glucose    Collection Time: 08/10/23 11:14 PM   Result Value Ref Range    POCT Glucose 471 (HH) 70 - 110 mg/dL   Troponin I    Collection Time: 08/11/23  2:28 AM   Result Value Ref Range    Troponin-I 23.035 (H) 0.000 - 0.045 ng/mL   Comprehensive Metabolic Panel    Collection Time: 08/11/23  2:28 AM   Result Value Ref Range    Sodium Level 129 (L) 136 - 145 mmol/L    Potassium Level 5.2 (H) 3.5 - 5.1 mmol/L    Chloride 99 98 - 107 mmol/L    Carbon Dioxide 19 (L) 22 - 29 mmol/L    Glucose Level 526 (HH) 74 - 100 mg/dL    Blood Urea Nitrogen 43.2 (H) 8.4 - 25.7 mg/dL    Creatinine 2.48 (H) 0.73 - 1.18 mg/dL    Calcium Level Total 8.6 8.4 - 10.2 mg/dL    Protein Total 6.6 6.4 - 8.3 gm/dL    Albumin Level 3.1 (L) 3.5 - 5.0 g/dL    Globulin 3.5 2.4 - 3.5 gm/dL    Albumin/Globulin Ratio 0.9 (L) 1.1 - 2.0 ratio    Bilirubin Total 0.8 <=1.5 mg/dL    Alkaline Phosphatase 81 40 - 150 unit/L    Alanine Aminotransferase 29 0 - 55 unit/L    Aspartate Aminotransferase 114 (H) 5 - 34 unit/L    eGFR 30 mls/min/1.73/m2   Magnesium    Collection Time: 08/11/23  2:28 AM   Result Value Ref Range    Magnesium Level 2.00 1.60 - 2.60 mg/dL   Phosphorus    Collection Time: 08/11/23  2:28 AM   Result Value Ref Range    Phosphorus Level 3.3 2.3 - 4.7 mg/dL   CBC with Differential    Collection Time: 08/11/23  2:28 AM   Result Value Ref Range    WBC 14.26 (H) 4.50 - 11.50 x10(3)/mcL     RBC 4.57 (L) 4.70 - 6.10 x10(6)/mcL    Hgb 13.7 (L) 14.0 - 18.0 g/dL    Hct 39.6 (L) 42.0 - 52.0 %    MCV 86.7 80.0 - 94.0 fL    MCH 30.0 27.0 - 31.0 pg    MCHC 34.6 33.0 - 36.0 g/dL    RDW 12.3 11.5 - 17.0 %    Platelet 277 130 - 400 x10(3)/mcL    MPV 10.2 7.4 - 10.4 fL    Neut % 81.0 %    Lymph % 10.7 %    Mono % 6.5 %    Eos % 0.8 %    Basophil % 0.6 %    Lymph # 1.53 0.6 - 4.6 x10(3)/mcL    Neut # 11.56 (H) 2.1 - 9.2 x10(3)/mcL    Mono # 0.92 0.1 - 1.3 x10(3)/mcL    Eos # 0.11 0 - 0.9 x10(3)/mcL    Baso # 0.09 <=0.2 x10(3)/mcL    IG# 0.05 (H) 0 - 0.04 x10(3)/mcL    IG% 0.4 %    NRBC% 0.0 %   Lactic Acid, Plasma    Collection Time: 08/11/23  2:28 AM   Result Value Ref Range    Lactic Acid Level 2.1 0.5 - 2.2 mmol/L   CK    Collection Time: 08/11/23  2:28 AM   Result Value Ref Range    Creatine Kinase 1,398 (H) 30 - 200 U/L   RT Blood Gas    Collection Time: 08/11/23  2:30 AM   Result Value Ref Range    Sample Type Arterial Blood     Sample site Right Radial Artery     Drawn by richar, santiago     pH, Blood gas 7.340 (L) 7.350 - 7.450    pCO2, Blood gas 43.0 35.0 - 45.0 mmHg    pO2, Blood gas 69.0 (L) 80.0 - 100.0 mmHg    Sodium, Blood Gas 126 (L) 137 - 145 mmol/L    Potassium, Blood Gas 5.1 (H) 3.5 - 5.0 mmol/L    Calcium Level Ionized 1.10 (L) 1.12 - 1.23 mmol/L    TOC2, Blood gas 24.5 mmol/L    Base Excess, Blood gas -2.60 (L) -2.00 - 2.00 mmol/L    sO2, Blood gas 92.4 %    HCO3, Blood gas 23.2 22.0 - 26.0 mmol/L    THb, Blood gas 13.8 12 - 16 g/dL    O2 Hb, Blood Gas 92.6 (L) 94.0 - 97.0 %    CO Hgb 1.9 (H) 0.5 - 1.5 %    Met Hgb 0.4 0.4 - 1.5 %    Allens Test Yes     Oxygen Device, Blood gas Cannula     LPM 4    APTT    Collection Time: 08/11/23  2:48 AM   Result Value Ref Range    PTT 35.9 (H) 23.2 - 33.7 seconds   Urinalysis, Reflex to Urine Culture    Collection Time: 08/11/23  3:48 AM    Specimen: Urine   Result Value Ref Range    Color, UA Yellow Yellow, Light-Yellow, Dark Yellow, Angelique, Straw     Appearance, UA Clear Clear    Specific Gravity, UA 1.028 1.005 - 1.030    pH, UA 5.5 5.0 - 8.5    Protein, UA 4+ (A) Negative    Glucose, UA 3+ (A) Negative, Normal    Ketones, UA Trace (A) Negative    Blood, UA 2+ (A) Negative    Bilirubin, UA Negative Negative    Urobilinogen, UA 1.0 0.2, 1.0, Normal    Nitrites, UA Negative Negative    Leukocyte Esterase, UA Negative Negative   Urinalysis, Microscopic    Collection Time: 08/11/23  3:48 AM   Result Value Ref Range    RBC, UA <5 <=5 /HPF    WBC, UA <5 <=5 /HPF    Squamous Epithelial Cells, UA <5 <=5 /HPF    Bacteria, UA None Seen None Seen, Rare, Occasional /HPF   POCT glucose    Collection Time: 08/11/23  5:02 AM   Result Value Ref Range    POCT Glucose 404 (H) 70 - 110 mg/dL   POCT glucose    Collection Time: 08/11/23  6:25 AM   Result Value Ref Range    POCT Glucose 400 (H) 70 - 110 mg/dL   POCT glucose    Collection Time: 08/11/23  8:23 AM   Result Value Ref Range    POCT Glucose 367 (H) 70 - 110 mg/dL       Significant Imaging:  Imaging Results              X-Ray Chest PA And Lateral (Final result)  Result time 08/10/23 19:14:44      Final result by Aldo Messina MD (08/10/23 19:14:44)                   Impression:      No acute pulmonary process appreciated.      Electronically signed by: Aldo Messina  Date:    08/10/2023  Time:    19:14               Narrative:    EXAMINATION:  XR CHEST PA AND LATERAL    CLINICAL HISTORY:  Shortness of breath    TECHNIQUE:  PA and lateral radiographs of the chest    COMPARISON:  Radiography 08/06/2015    FINDINGS:  Prior sternotomy with normal cardiac silhouette and device lead overlying the left anterior chest.  The lungs are well-inflated. No consolidation identified. No pleural effusion or discernible pneumothorax.                                      EKG:    Results for orders placed or performed during the hospital encounter of 08/10/23   EKG 12-lead    Narrative    Test Reason : I24.9,    Vent. Rate : 112 BPM      Atrial Rate : 112 BPM     P-R Int : 160 ms          QRS Dur : 112 ms      QT Int : 352 ms       P-R-T Axes : 071 -62 090 degrees     QTc Int : 480 ms    Sinus tachycardia  Possible Left atrial enlargement  Left axis deviation  Minimal voltage criteria for LVH, may be normal variant ( Av product )  Abnormal ECG  When compared with ECG of 10-AUG-2023 18:44,  No significant change was found  Confirmed by Kendall Ansari MD ([id number]) on 8/11/2023 7:30:21 AM    Referred By: MARK   SELF           Confirmed By:Kendall Ansari MD       Telemetry:  ST    Physical Exam  Constitutional:       Appearance: Normal appearance.   HENT:      Nose: Nose normal.      Mouth/Throat:      Mouth: Mucous membranes are dry.   Cardiovascular:      Rate and Rhythm: Normal rate and regular rhythm.      Pulses: Normal pulses.      Heart sounds: Normal heart sounds. No murmur heard.  Pulmonary:      Effort: Respiratory distress present.   Abdominal:      General: Abdomen is flat.   Neurological:      Mental Status: He is alert.   Psychiatric:         Mood and Affect: Mood normal.         Behavior: Behavior normal.         Home Medications:   No current facility-administered medications on file prior to encounter.     Current Outpatient Medications on File Prior to Encounter   Medication Sig Dispense Refill    aspirin (ECOTRIN) 81 MG EC tablet Take 81 mg by mouth.      FARXIGA 10 mg tablet Take 10 mg by mouth once daily.      LANTUS SOLOSTAR U-100 INSULIN glargine 100 units/mL SubQ pen Inject 50 Units into the skin once daily.      levothyroxine (SYNTHROID) 125 MCG tablet Take 125 mcg by mouth before breakfast.      metoprolol succinate (TOPROL-XL) 25 MG 24 hr tablet Take 12.5 mg by mouth once daily.      NOVOLOG FLEXPEN U-100 INSULIN 100 unit/mL (3 mL) InPn pen Inject 20 Units into the skin 3 (three) times daily with meals.      rosuvastatin (CRESTOR) 40 MG Tab Take 40 mg by mouth once daily.      torsemide (DEMADEX) 100 MG Tab Take 50 mg  by mouth once daily.         Current Inpatient Medications:    Current Facility-Administered Medications:     acetaminophen tablet 1,000 mg, 1,000 mg, Oral, Q6H PRN, Yordy Victoria MD    acetaminophen tablet 650 mg, 650 mg, Oral, Q4H PRN, Yordy Victoria MD    aluminum-magnesium hydroxide-simethicone 200-200-20 mg/5 mL suspension 30 mL, 30 mL, Oral, QID PRN, Yordy Victoria MD    aspirin EC tablet 81 mg, 81 mg, Oral, Daily, Yordy Victoria MD    atorvastatin tablet 80 mg, 80 mg, Oral, QHS, Yordy Victoria MD, 80 mg at 08/10/23 2324    dextrose 10% bolus 125 mL 125 mL, 12.5 g, Intravenous, PRN, Yordy Victoria MD    dextrose 10% bolus 250 mL 250 mL, 25 g, Intravenous, PRN, Yordy Victoria MD    glucagon (human recombinant) injection 1 mg, 1 mg, Intramuscular, PRN, Yordy Victoria MD    heparin 25,000 units in dextrose 5% (100 units/ml) IV bolus from bag - ADDITIONAL PRN BOLUS - 30 units/kg (max bolus 4000 units), 30 Units/kg (Adjusted), Intravenous, PRN, Boom Jacobson MD    heparin 25,000 units in dextrose 5% (100 units/ml) IV bolus from bag - ADDITIONAL PRN BOLUS - 60 units/kg (max bolus 4000 units), 41.5 Units/kg (Adjusted), Intravenous, PRN, Boom Jacobson MD, 4,000 Units at 08/11/23 0411    heparin 25,000 units in dextrose 5% 250 mL (100 units/mL) infusion LOW INTENSITY nomogram - [facility], 0-24 Units/kg/hr (Adjusted), Intravenous, Continuous, Boom Jacobson MD, Last Rate: 14.5 mL/hr at 08/11/23 0412, 15 Units/kg/hr at 08/11/23 0412    insulin aspart U-100 injection 1-10 Units, 1-10 Units, Subcutaneous, Q6H PRN, Yordy Victoria MD, 10 Units at 08/11/23 0511    insulin aspart U-100 injection 20 Units, 20 Units, Subcutaneous, TIDWM, Yordy Victoria MD, 20 Units at 08/11/23 0748    insulin detemir U-100 injection 50 Units, 50 Units, Subcutaneous, QHS, Yordy Victoria MD    levothyroxine tablet 125 mcg, 125 mcg, Oral, Before breakfast, Yordy Victoria MD, 125 mcg at 08/11/23 0510    melatonin tablet 6 mg, 6 mg, Oral,  Nightly PRN, Yordy Victoria MD    metoprolol succinate (TOPROL-XL) 24 hr split tablet 12.5 mg, 12.5 mg, Oral, Daily, Yordy Victoria MD    mupirocin 2 % ointment, , Nasal, BID, Yordy Victoria MD    ondansetron injection 4 mg, 4 mg, Intravenous, Q4H PRN, Yordy Victoria MD, 4 mg at 08/11/23 0435    polyethylene glycol packet 17 g, 17 g, Oral, BID PRN, Yordy Victoria MD    prochlorperazine injection Soln 5 mg, 5 mg, Intravenous, Q6H PRN, Yordy Victoria MD    senna-docusate 8.6-50 mg per tablet 2 tablet, 2 tablet, Oral, BID PRN, Yordy Victoria MD    sodium bicarbonate tablet 1,300 mg, 1,300 mg, Oral, BID, Yordy Victoria MD, 1,300 mg at 08/11/23 0510    sodium chloride 0.9% flush 10 mL, 10 mL, Intravenous, PRN, Yordy Victoria MD    ticagrelor tablet 90 mg, 90 mg, Oral, BID, Yordy Victoria MD    torsemide tablet 50 mg, 50 mg, Oral, Daily, Yordy Victoria MD         VTE Risk Mitigation (From admission, onward)           Ordered     IP VTE HIGH RISK PATIENT  Once         08/10/23 2304     Place sequential compression device  Until discontinued         08/10/23 2304     heparin 25,000 units in dextrose 5% (100 units/ml) IV bolus from bag - ADDITIONAL PRN BOLUS - 60 units/kg (max bolus 4000 units)  As needed (PRN)        Question:  Heparin Infusion Adjustment (DO NOT MODIFY ANSWER)  Answer:  [email or web address]\Images\Pharmacy\HeparinInfusions\heparin LOW INTENSITY nomogram for [facility] [id number].pdf    08/10/23 2005     heparin 25,000 units in dextrose 5% (100 units/ml) IV bolus from bag - ADDITIONAL PRN BOLUS - 30 units/kg (max bolus 4000 units)  As needed (PRN)        Question:  Heparin Infusion Adjustment (DO NOT MODIFY ANSWER)  Answer:  [email or web address]\Images\Pharmacy\HeparinInfusions\heparin LOW INTENSITY nomogram for [facility] [id number]    08/10/23 2005     heparin 25,000 units in dextrose 5% 250 mL (100 units/mL) infusion LOW INTENSITY nomogram - [facility]  Continuous        Question Answer Comment   Begin at (in units/kg/hr) 12    Heparin  Infusion Adjustment (DO NOT MODIFY ANSWER) [email or web address]\Images\Pharmacy\HeparinInfusions\heparin LOW INTENSITY nomogram for [facility] [id number].pdf        08/10/23 2005                    Assessment:   NSTEMI - Type 1   - Trop 23.277 --> 23.035   - no acute ST changes on EKG   - denies CP  Chronic systolic heart failure    - EF 30% (10.14.21)  CAD s/p CABG X3 - 2017   - LIMA to LAD, SVG to OM1, SVG to PDA  DM 2  Leukocytosis    - Positive blood culture ~ probable staph        Plan:   ECHO pending   EKG reviewed  Will plan for C today   Will consult nephrology   Cont. ASA/Statin/Brilenta  Cont. Heparin drip       Thank you for your consult.     Rin Luke NP  Cardiology  [facility] - [facility] Tele[facility]  08/11/2023 8:34 AM

## 2023-08-11 NOTE — AI DETERIORATION ALERT
Artificial Intelligence Notification  [facility]  [address]  [place] [address]  Phone: [phone]    This documentation was triggered by an Artificial Intelligence Notification:    Admit Date: 8/10/2023   LOS: 1  Code Status: Full Code  : 1970  Age: 52 y.o.  Weight:   Wt Readings from Last 1 Encounters:   08/10/23 117.9 kg (260 lb)        Sex: male  Bed: [address] [place] [address]  MRN: [id number]  Attending Physician: Yordy Victoria MD     Date of Alert: 2023  Time AI Alert Received: 0148            Vitals:    23 0145   BP: 99/69   Pulse: 108   Resp: (!) 28   Temp: 97 °F (36.1 °C)     SpO2: 97 %      Artificial Intelligence alert discussed with Provider:     Name: RASHID Rene; Dr. Victoria   Date/Time of Provider Notification: 23 @0205      Patient Condition: Pt admitted from the ER with SOB, weakness, elevated troponin. Cardiology consulted & recommended Heparin drip. Pt blood pressure trending downward over last few hours. Cardiology & attending MD aware of pt status, repeating all lab work, ABGs. Requested that RN reach out with any concerns or if patient deteriorates any further.

## 2023-08-11 NOTE — NURSING
Nurses Note -- 4 Eyes      8/11/2023   5:33 AM      Skin assessed during: Admit      [x] No Altered Skin Integrity Present    []Prevention Measures Documented      [] Yes- Altered Skin Integrity Present or Discovered   [] LDA Added if Not in Epic (Describe Wound)   [] New Altered Skin Integrity was Present on Admit and Documented in LDA   [] Wound Image Taken    Wound Care Consulted? No    Attending Nurse:  Edgard Vallejo RN/Staff Member:   Erica Diamond

## 2023-08-11 NOTE — H&P
[facility] Hospital Medicine - H&P Note    Patient Name: Itz Daniel  : 1970  MRN: [id number]  PCP: Sunday Boo MD  Admitting Physician: Yordy Victoria MD  Admission Class: IP- Inpatient   Code status: Full    Chief Complaint   Emesis (Pt c/o vomiting, nausea, weakness, and shortness of breath that started last night. Hx CABG. Denies fever)      History of Present Illness   Mr. Daniel is a 53 yo male with pmhx CAD s/p CABGx3 in 2017, chronic systolic HF (LVE 30% in 2017), DM2, CKD IV (reports GFR 20) presents to the ED with c/o IRAHETA x2 days with SOB, nausea and vomiting last night.  Denied chest pain.    Initial ED VS:  Temperature 98.3°, heart rate 110, respirations 18, blood pressure 100/57, oxygenation 91% room air.  CXR cardiomegaly with no acute cardiopulmonary process.  Patient was placed on 2 L nasal cannula with improvement in oxygenation.  Laboratory work was remarkable for a troponin of 23.  EKG sinus tach, LVH.  Leukocytosis of 11,570, glucose 487, BUN 37.2, creatinine 2.51, CO2 21, .  Cardiology was consulted and patient was started on a heparin drip, and loaded with Brilinta and aspirin.  NPO after midnight.  But due to his worsening renal function and hyperglycemia admission to the hospitalist service was warranted      ROS   Except as documented, all other systems reviewed and negative     Past Medical History   IDDM type 2   CAD status post CABG x3 in 2017   CKD stage IV  Systolic heart failure (LVEF 30% in 2017   COVID-19 infection   History of left midfoot diabetic ulcer status post left 4th and 5th toe amputation  Peripheral neuropathy  Essential hypertension  Past Surgical History     Past Surgical History:   Procedure Laterality Date    CORONARY ARTERY BYPASS GRAFT      3 vessel       Social History   Former tobacco use  Denies alcohol or illicit drug use    Family History   Reviewed and negative    Allergies   Patient has no known  allergies.    Home Medications     Prior to Admission medications    Medication Sig Start Date End Date   aspirin (ECOTRIN) 81 MG EC tablet Take 81 mg by mouth.     FARXIGA 10 mg tablet Take 10 mg by mouth once daily. 6/11/23    LANTUS SOLOSTAR U-100 INSULIN glargine 100 units/mL SubQ pen Inject 50 Units into the skin once daily. 6/1/23    levothyroxine (SYNTHROID) 125 MCG tablet Take 125 mcg by mouth before breakfast. 5/31/23    metoprolol succinate (TOPROL-XL) 25 MG 24 hr tablet Take 12.5 mg by mouth once daily. 5/25/23    NOVOLOG FLEXPEN U-100 INSULIN 100 unit/mL (3 mL) InPn pen Inject 20 Units into the skin 3 (three) times daily with meals. 7/30/23    rosuvastatin (CRESTOR) 40 MG Tab Take 40 mg by mouth once daily.     torsemide (DEMADEX) 100 MG Tab Take 50 mg by mouth once daily. 3/19/23         Physical Exam   Vital Signs  Temp:  [98.3 °F (36.8 °C)]   Pulse:  [108-113]   Resp:  [17-38]   BP: (100-132)/(57-93)   SpO2:  [91 %-96 %]    General: Appears comfortable, eating sandwich in no acute distress  HEENT: NC/AT  Neck:  no JVD  Chest: CTABL  CVS: Regular rhythm. Normal S1/S2.  Abdomen: nondistended, normoactive BS, soft and non-tender.  MSK: No obvious deformity or joint swelling  Skin: Warm and dry  Neuro: AAOx3, no focal neurological deficit  Psych: Cooperative    Labs     Recent Labs     08/10/23  1856   WBC 11.57*   RBC 4.95   HGB 14.7   HCT 42.5   MCV 85.9   MCH 29.7   MCHC 34.6   RDW 12.3        Recent Labs     08/10/23  2025   PROTIME 12.9   INR 1.0   PTT 30.6      Recent Labs     08/10/23  1856   *   K 5.1   CHLORIDE 96*   CO2 21*   BUN 37.3*   CREATININE 2.51*   EGFRNORACEVR 30   GLUCOSE 487*   CALCIUM 9.5   ALBUMIN 3.8   GLOBULIN 3.3   ALKPHOS 90   ALT 35   *   BILITOT 1.0   .7*     Recent Labs     08/10/23  1856 08/10/23  2025   LACTIC 2.7* 2.8*     Recent Labs     08/10/23  1856   TROPONINI 23.882*        Microbiology Results (last 7 days)       Procedure Component Value  Units Date/Time    Blood culture #2 **CANNOT BE ORDERED STAT** [[id number]] Collected: 08/10/23 1856    Order Status: Resulted Specimen: Blood Updated: 08/10/23 1904    Blood culture #1 **CANNOT BE ORDERED STAT** [[id number]] Collected: 08/10/23 1856    Order Status: Resulted Specimen: Blood Updated: 08/10/23 1904           Imaging     X-Ray Chest PA And Lateral   Final Result      No acute pulmonary process appreciated.         Electronically signed by: Aldo Messina   Date:    08/10/2023   Time:    19:14        Assessment & Plan   NSTEMI/ACS  Acute on chronic HFrEF (LVEF 30%)  Hyperglycemia/IDDM2  YVES on CKD (baseline crt unknown, pt reports baseline GFR 20)  Essential HTN  HLD  Hypothyroidism      PLAN:   - Cardiology consulted from ED  - Heparin gtt, Brilinta, ASA. Trend trop. NPO after MN for LHC  - ISS with accuchecks q4H. Give 20 units IV Regular insulin NOW, then recheck in on hour and notify attending.  - Resume home insulin at decreased dose. A1c 10.2  - Torsemide 50 mg daily. Monitor I&Os.  - Avoid nephrotoxic agents and hold renal sensitive meds  - home meds reviewed and resumed  - AM labs  - VTE Prophylaxis: Heparin gtt     I, KELSEY Kearney have discussed this patients case with Dr. Victoria who agrees with the diagnosis and treatment plan.

## 2023-08-11 NOTE — Clinical Note
The groin and right neck was prepped. The site was prepped with ChloraPrep. The site was clipped. The patient was draped.

## 2023-08-11 NOTE — H&P (VIEW-ONLY)
[facility] - [facility] Tele[facility]    Cardiology  Consult Note    Patient Name: Itz Daniel  MRN: [id number]  Admission Date: 8/10/2023  Hospital Length of Stay: 1 days  Code Status: Full Code   Attending Provider: Yordy Victoria MD   Consulting Provider: Rin Luke NP  Primary Care Physician: Sunday Boo MD  Principal Problem:NSTEMI (non-ST elevated myocardial infarction)    Patient information was obtained from parent, past medical records, and ER records.     Subjective:     Chief Complaint:  Dyspnea with SOB    HPI:   53-year-old male that is known to [facility]/Dr. Boss with a PMHx of CAD s/p CABGx3 in 2017, chronic SHF (EF 30% 7.1.22), DM 2, CKD IV. He presented to the ED with dyspnea X2 days with SOB, nausea, and vomiting. He denied CP. ED course: on arrival his O2 Sat was 91% on RA, tachycardiac, and a mildly low BP. EKG showed Sinus tachycardia with no acute ST changes noted . Troponin were found to be elevated at 23.277 --> 23.035, Lactate 2.8, WBC 14.26, , K 5.2, BUN 43.2, Cr 2.48, , AST 29.  CXR shows no acute findings. Patient still reports SOB. He denies CP, Nausea, or vomiting currently. [facility] was consulted by Dr. Scales for NSTEMI.       PMH:   CAD s/p CABG x3 in 2017, CKD stage IV, Systolic heart failure, COVID 19, peripheral neuropathy, HTN    PSH:   CABG x3 2017 (LIMA to LAD, SVG to OM1, SVG to PDA), [facility], laser cataract surgery of L eye, tonsillectomy    Family History: Father DM2, Mother MI, CAD, DM2     Social History: Former smoker, occasional ETOH use, denies drug use       Previous Cardiac Diagnostics:     TTE 7.1.22  The study quality is average.   The left ventricle is mildly enlarged. Global left ventricular systolic function is severely decreased. The left ventricular ejection fraction is 30%.   Mild (1+) mitral regurgitation.  The pulmonary artery systolic pressure is 10 mmHg. The pulmonary artery appears to be normal.    [facility] 5.19.2017  Severe MVCAD as  a cause to severe newly diagnosed ischemic congestive heart failure with EF of 20%  Mild MR  Patent L subclavian, & LIMA suitable for bypass graft conduit     BLE Arterial US 10.18.21  The study quality is average.   The arteries of the left lower extremity appear patent with no significant stenosis noted.   Tri-phasic waveforms are obtained throughout the left lower extremity arteries.     Carotid US 10.18.21  The study quality is average.   1-39% stenosis in the proximal right internal carotid artery based on Bluth Criteria.   1-39% stenosis in the proximal left internal carotid artery based on Bluth Criteria.   Less than 50% stenosis throughout the bilateral common carotid arteries.   Antegrade bilateral vertebral artery flow.       Review of patient's allergies indicates:  No Known Allergies    No current facility-administered medications on file prior to encounter.     Current Outpatient Medications on File Prior to Encounter   Medication Sig    aspirin (ECOTRIN) 81 MG EC tablet Take 81 mg by mouth.    FARXIGA 10 mg tablet Take 10 mg by mouth once daily.    LANTUS SOLOSTAR U-100 INSULIN glargine 100 units/mL SubQ pen Inject 50 Units into the skin once daily.    levothyroxine (SYNTHROID) 125 MCG tablet Take 125 mcg by mouth before breakfast.    metoprolol succinate (TOPROL-XL) 25 MG 24 hr tablet Take 12.5 mg by mouth once daily.    NOVOLOG FLEXPEN U-100 INSULIN 100 unit/mL (3 mL) InPn pen Inject 20 Units into the skin 3 (three) times daily with meals.    rosuvastatin (CRESTOR) 40 MG Tab Take 40 mg by mouth once daily.    torsemide (DEMADEX) 100 MG Tab Take 50 mg by mouth once daily.             Review of Systems   Constitutional:  Negative for chills, fatigue and fever.   Respiratory:  Positive for shortness of breath.    Cardiovascular:  Negative for chest pain and palpitations.   Gastrointestinal:  Negative for abdominal pain.       Objective:     Vital Signs (Most Recent):  Temp: 98.5 °F (36.9 °C) (08/11/23  0759)  Pulse: 100 (08/11/23 0759)  Resp: 18 (08/11/23 0441)  BP: 122/85 (08/11/23 0759)  SpO2: 95 % (08/11/23 0759) Vital Signs (24h Range):  Temp:  [97 °F (36.1 °C)-98.5 °F (36.9 °C)] 98.5 °F (36.9 °C)  Pulse:  [100-113] 100  Resp:  [17-38] 18  SpO2:  [91 %-97 %] 95 %  BP: ()/(57-93) 122/85     Weight: 117.9 kg (260 lb)  Body mass index is 33.38 kg/m².    SpO2: 95 %         Intake/Output Summary (Last 24 hours) at 8/11/2023 0834  Last data filed at 8/11/2023 0510  Gross per 24 hour   Intake --   Output 700 ml   Net -700 ml       Lines/Drains/Airways       Drain  Duration                  Urethral Catheter 08/11/23 0425 Double-lumen 16 Fr. <1 day              Peripheral Intravenous Line  Duration                  Peripheral IV - Single Lumen 08/10/23 1945 20 G Anterior;Left Upper Arm <1 day         Peripheral IV - Single Lumen 08/11/23 0330 20 G;Other (Comments) Anterior;Right Upper Arm <1 day                    Significant Labs:  Recent Results (from the past 72 hour(s))   COVID/FLU A&B PCR    Collection Time: 08/10/23  6:45 PM   Result Value Ref Range    Influenza A PCR Not Detected Not Detected    Influenza B PCR Not Detected Not Detected    SARS-CoV-2 PCR Not Detected Not Detected, Negative, Invalid   Comprehensive metabolic panel    Collection Time: 08/10/23  6:56 PM   Result Value Ref Range    Sodium Level 132 (L) 136 - 145 mmol/L    Potassium Level 5.1 3.5 - 5.1 mmol/L    Chloride 96 (L) 98 - 107 mmol/L    Carbon Dioxide 21 (L) 22 - 29 mmol/L    Glucose Level 487 (HH) 74 - 100 mg/dL    Blood Urea Nitrogen 37.3 (H) 8.4 - 25.7 mg/dL    Creatinine 2.51 (H) 0.73 - 1.18 mg/dL    Calcium Level Total 9.5 8.4 - 10.2 mg/dL    Protein Total 7.1 6.4 - 8.3 gm/dL    Albumin Level 3.8 3.5 - 5.0 g/dL    Globulin 3.3 2.4 - 3.5 gm/dL    Albumin/Globulin Ratio 1.2 1.1 - 2.0 ratio    Bilirubin Total 1.0 <=1.5 mg/dL    Alkaline Phosphatase 90 40 - 150 unit/L    Alanine Aminotransferase 35 0 - 55 unit/L    Aspartate  Aminotransferase 156 (H) 5 - 34 unit/L    eGFR 30 mls/min/1.73/m2   Troponin I    Collection Time: 08/10/23  6:56 PM   Result Value Ref Range    Troponin-I 23.882 (H) 0.000 - 0.045 ng/mL   Brain natriuretic peptide    Collection Time: 08/10/23  6:56 PM   Result Value Ref Range    Natriuretic Peptide 795.7 (H) <=100.0 pg/mL   CBC with Differential    Collection Time: 08/10/23  6:56 PM   Result Value Ref Range    WBC 11.57 (H) 4.50 - 11.50 x10(3)/mcL    RBC 4.95 4.70 - 6.10 x10(6)/mcL    Hgb 14.7 14.0 - 18.0 g/dL    Hct 42.5 42.0 - 52.0 %    MCV 85.9 80.0 - 94.0 fL    MCH 29.7 27.0 - 31.0 pg    MCHC 34.6 33.0 - 36.0 g/dL    RDW 12.3 11.5 - 17.0 %    Platelet 300 130 - 400 x10(3)/mcL    MPV 10.4 7.4 - 10.4 fL    Neut % 81.4 %    Lymph % 9.9 %    Mono % 5.7 %    Eos % 2.3 %    Basophil % 0.4 %    Lymph # 1.15 0.6 - 4.6 x10(3)/mcL    Neut # 9.41 (H) 2.1 - 9.2 x10(3)/mcL    Mono # 0.66 0.1 - 1.3 x10(3)/mcL    Eos # 0.27 0 - 0.9 x10(3)/mcL    Baso # 0.05 <=0.2 x10(3)/mcL    IG# 0.03 0 - 0.04 x10(3)/mcL    IG% 0.3 %    NRBC% 0.0 %   Lactic acid, plasma    Collection Time: 08/10/23  6:56 PM   Result Value Ref Range    Lactic Acid Level 2.7 (H) 0.5 - 2.2 mmol/L   Lactic Acid, Plasma    Collection Time: 08/10/23  8:25 PM   Result Value Ref Range    Lactic Acid Level 2.8 (H) 0.5 - 2.2 mmol/L   APTT    Collection Time: 08/10/23  8:25 PM   Result Value Ref Range    PTT 30.6 23.2 - 33.7 seconds   Protime-INR    Collection Time: 08/10/23  8:25 PM   Result Value Ref Range    PT 12.9 12.5 - 14.5 seconds    INR 1.0 <=1.3   Troponin I    Collection Time: 08/10/23 11:04 PM   Result Value Ref Range    Troponin-I 23.277 (H) 0.000 - 0.045 ng/mL   T4, Free    Collection Time: 08/10/23 11:04 PM   Result Value Ref Range    Thyroxine Free 1.08 0.70 - 1.48 ng/dL   T3, Free ([facility])    Collection Time: 08/10/23 11:04 PM   Result Value Ref Range    T3 Free 2.13 1.58 - 3.91 pg/mL   Hemoglobin A1c if not done in past 3 months    Collection Time:  08/10/23 11:04 PM   Result Value Ref Range    Hemoglobin A1c 10.2 (H) <=7.0 %    Estimated Average Glucose 246.0 mg/dL   Lipid Panel    Collection Time: 08/10/23 11:04 PM   Result Value Ref Range    Cholesterol Total 140 <=200 mg/dL    HDL Cholesterol 23 (L) 35 - 60 mg/dL    Triglyceride 156 (H) 34 - 140 mg/dL    Cholesterol/HDL Ratio 6 (H) 0 - 5    Very Low Density Lipoprotein 31     LDL Cholesterol 86.00 50.00 - 140.00 mg/dL   TSH    Collection Time: 08/10/23 11:04 PM   Result Value Ref Range    Thyroid Stimulating Hormone 7.103 (H) 0.350 - 4.940 uIU/mL   POCT glucose    Collection Time: 08/10/23 11:14 PM   Result Value Ref Range    POCT Glucose 471 (HH) 70 - 110 mg/dL   Troponin I    Collection Time: 08/11/23  2:28 AM   Result Value Ref Range    Troponin-I 23.035 (H) 0.000 - 0.045 ng/mL   Comprehensive Metabolic Panel    Collection Time: 08/11/23  2:28 AM   Result Value Ref Range    Sodium Level 129 (L) 136 - 145 mmol/L    Potassium Level 5.2 (H) 3.5 - 5.1 mmol/L    Chloride 99 98 - 107 mmol/L    Carbon Dioxide 19 (L) 22 - 29 mmol/L    Glucose Level 526 (HH) 74 - 100 mg/dL    Blood Urea Nitrogen 43.2 (H) 8.4 - 25.7 mg/dL    Creatinine 2.48 (H) 0.73 - 1.18 mg/dL    Calcium Level Total 8.6 8.4 - 10.2 mg/dL    Protein Total 6.6 6.4 - 8.3 gm/dL    Albumin Level 3.1 (L) 3.5 - 5.0 g/dL    Globulin 3.5 2.4 - 3.5 gm/dL    Albumin/Globulin Ratio 0.9 (L) 1.1 - 2.0 ratio    Bilirubin Total 0.8 <=1.5 mg/dL    Alkaline Phosphatase 81 40 - 150 unit/L    Alanine Aminotransferase 29 0 - 55 unit/L    Aspartate Aminotransferase 114 (H) 5 - 34 unit/L    eGFR 30 mls/min/1.73/m2   Magnesium    Collection Time: 08/11/23  2:28 AM   Result Value Ref Range    Magnesium Level 2.00 1.60 - 2.60 mg/dL   Phosphorus    Collection Time: 08/11/23  2:28 AM   Result Value Ref Range    Phosphorus Level 3.3 2.3 - 4.7 mg/dL   CBC with Differential    Collection Time: 08/11/23  2:28 AM   Result Value Ref Range    WBC 14.26 (H) 4.50 - 11.50 x10(3)/mcL     RBC 4.57 (L) 4.70 - 6.10 x10(6)/mcL    Hgb 13.7 (L) 14.0 - 18.0 g/dL    Hct 39.6 (L) 42.0 - 52.0 %    MCV 86.7 80.0 - 94.0 fL    MCH 30.0 27.0 - 31.0 pg    MCHC 34.6 33.0 - 36.0 g/dL    RDW 12.3 11.5 - 17.0 %    Platelet 277 130 - 400 x10(3)/mcL    MPV 10.2 7.4 - 10.4 fL    Neut % 81.0 %    Lymph % 10.7 %    Mono % 6.5 %    Eos % 0.8 %    Basophil % 0.6 %    Lymph # 1.53 0.6 - 4.6 x10(3)/mcL    Neut # 11.56 (H) 2.1 - 9.2 x10(3)/mcL    Mono # 0.92 0.1 - 1.3 x10(3)/mcL    Eos # 0.11 0 - 0.9 x10(3)/mcL    Baso # 0.09 <=0.2 x10(3)/mcL    IG# 0.05 (H) 0 - 0.04 x10(3)/mcL    IG% 0.4 %    NRBC% 0.0 %   Lactic Acid, Plasma    Collection Time: 08/11/23  2:28 AM   Result Value Ref Range    Lactic Acid Level 2.1 0.5 - 2.2 mmol/L   CK    Collection Time: 08/11/23  2:28 AM   Result Value Ref Range    Creatine Kinase 1,398 (H) 30 - 200 U/L   RT Blood Gas    Collection Time: 08/11/23  2:30 AM   Result Value Ref Range    Sample Type Arterial Blood     Sample site Right Radial Artery     Drawn by richar, santiago     pH, Blood gas 7.340 (L) 7.350 - 7.450    pCO2, Blood gas 43.0 35.0 - 45.0 mmHg    pO2, Blood gas 69.0 (L) 80.0 - 100.0 mmHg    Sodium, Blood Gas 126 (L) 137 - 145 mmol/L    Potassium, Blood Gas 5.1 (H) 3.5 - 5.0 mmol/L    Calcium Level Ionized 1.10 (L) 1.12 - 1.23 mmol/L    TOC2, Blood gas 24.5 mmol/L    Base Excess, Blood gas -2.60 (L) -2.00 - 2.00 mmol/L    sO2, Blood gas 92.4 %    HCO3, Blood gas 23.2 22.0 - 26.0 mmol/L    THb, Blood gas 13.8 12 - 16 g/dL    O2 Hb, Blood Gas 92.6 (L) 94.0 - 97.0 %    CO Hgb 1.9 (H) 0.5 - 1.5 %    Met Hgb 0.4 0.4 - 1.5 %    Allens Test Yes     Oxygen Device, Blood gas Cannula     LPM 4    APTT    Collection Time: 08/11/23  2:48 AM   Result Value Ref Range    PTT 35.9 (H) 23.2 - 33.7 seconds   Urinalysis, Reflex to Urine Culture    Collection Time: 08/11/23  3:48 AM    Specimen: Urine   Result Value Ref Range    Color, UA Yellow Yellow, Light-Yellow, Dark Yellow, Angelique, Straw     Appearance, UA Clear Clear    Specific Gravity, UA 1.028 1.005 - 1.030    pH, UA 5.5 5.0 - 8.5    Protein, UA 4+ (A) Negative    Glucose, UA 3+ (A) Negative, Normal    Ketones, UA Trace (A) Negative    Blood, UA 2+ (A) Negative    Bilirubin, UA Negative Negative    Urobilinogen, UA 1.0 0.2, 1.0, Normal    Nitrites, UA Negative Negative    Leukocyte Esterase, UA Negative Negative   Urinalysis, Microscopic    Collection Time: 08/11/23  3:48 AM   Result Value Ref Range    RBC, UA <5 <=5 /HPF    WBC, UA <5 <=5 /HPF    Squamous Epithelial Cells, UA <5 <=5 /HPF    Bacteria, UA None Seen None Seen, Rare, Occasional /HPF   POCT glucose    Collection Time: 08/11/23  5:02 AM   Result Value Ref Range    POCT Glucose 404 (H) 70 - 110 mg/dL   POCT glucose    Collection Time: 08/11/23  6:25 AM   Result Value Ref Range    POCT Glucose 400 (H) 70 - 110 mg/dL   POCT glucose    Collection Time: 08/11/23  8:23 AM   Result Value Ref Range    POCT Glucose 367 (H) 70 - 110 mg/dL       Significant Imaging:  Imaging Results              X-Ray Chest PA And Lateral (Final result)  Result time 08/10/23 19:14:44      Final result by Aldo Messina MD (08/10/23 19:14:44)                   Impression:      No acute pulmonary process appreciated.      Electronically signed by: Aldo Messina  Date:    08/10/2023  Time:    19:14               Narrative:    EXAMINATION:  XR CHEST PA AND LATERAL    CLINICAL HISTORY:  Shortness of breath    TECHNIQUE:  PA and lateral radiographs of the chest    COMPARISON:  Radiography 08/06/2015    FINDINGS:  Prior sternotomy with normal cardiac silhouette and device lead overlying the left anterior chest.  The lungs are well-inflated. No consolidation identified. No pleural effusion or discernible pneumothorax.                                      EKG:    Results for orders placed or performed during the hospital encounter of 08/10/23   EKG 12-lead    Narrative    Test Reason : I24.9,    Vent. Rate : 112 BPM      Atrial Rate : 112 BPM     P-R Int : 160 ms          QRS Dur : 112 ms      QT Int : 352 ms       P-R-T Axes : 071 -62 090 degrees     QTc Int : 480 ms    Sinus tachycardia  Possible Left atrial enlargement  Left axis deviation  Minimal voltage criteria for LVH, may be normal variant ( Av product )  Abnormal ECG  When compared with ECG of 10-AUG-2023 18:44,  No significant change was found  Confirmed by Kendall Ansari MD ([id number]) on 8/11/2023 7:30:21 AM    Referred By: MARK   SELF           Confirmed By:Kendall Ansari MD       Telemetry:  ST    Physical Exam  Constitutional:       Appearance: Normal appearance.   HENT:      Nose: Nose normal.      Mouth/Throat:      Mouth: Mucous membranes are dry.   Cardiovascular:      Rate and Rhythm: Normal rate and regular rhythm.      Pulses: Normal pulses.      Heart sounds: Normal heart sounds. No murmur heard.  Pulmonary:      Effort: Respiratory distress present.   Abdominal:      General: Abdomen is flat.   Neurological:      Mental Status: He is alert.   Psychiatric:         Mood and Affect: Mood normal.         Behavior: Behavior normal.         Home Medications:   No current facility-administered medications on file prior to encounter.     Current Outpatient Medications on File Prior to Encounter   Medication Sig Dispense Refill    aspirin (ECOTRIN) 81 MG EC tablet Take 81 mg by mouth.      FARXIGA 10 mg tablet Take 10 mg by mouth once daily.      LANTUS SOLOSTAR U-100 INSULIN glargine 100 units/mL SubQ pen Inject 50 Units into the skin once daily.      levothyroxine (SYNTHROID) 125 MCG tablet Take 125 mcg by mouth before breakfast.      metoprolol succinate (TOPROL-XL) 25 MG 24 hr tablet Take 12.5 mg by mouth once daily.      NOVOLOG FLEXPEN U-100 INSULIN 100 unit/mL (3 mL) InPn pen Inject 20 Units into the skin 3 (three) times daily with meals.      rosuvastatin (CRESTOR) 40 MG Tab Take 40 mg by mouth once daily.      torsemide (DEMADEX) 100 MG Tab Take 50 mg  by mouth once daily.         Current Inpatient Medications:    Current Facility-Administered Medications:     acetaminophen tablet 1,000 mg, 1,000 mg, Oral, Q6H PRN, Yordy Victoria MD    acetaminophen tablet 650 mg, 650 mg, Oral, Q4H PRN, Yordy Victoria MD    aluminum-magnesium hydroxide-simethicone 200-200-20 mg/5 mL suspension 30 mL, 30 mL, Oral, QID PRN, Yordy Victoria MD    aspirin EC tablet 81 mg, 81 mg, Oral, Daily, Yordy Victoria MD    atorvastatin tablet 80 mg, 80 mg, Oral, QHS, Yordy Victoria MD, 80 mg at 08/10/23 2324    dextrose 10% bolus 125 mL 125 mL, 12.5 g, Intravenous, PRN, Yordy Victoria MD    dextrose 10% bolus 250 mL 250 mL, 25 g, Intravenous, PRN, Yordy Victoria MD    glucagon (human recombinant) injection 1 mg, 1 mg, Intramuscular, PRN, Yordy Victoria MD    heparin 25,000 units in dextrose 5% (100 units/ml) IV bolus from bag - ADDITIONAL PRN BOLUS - 30 units/kg (max bolus 4000 units), 30 Units/kg (Adjusted), Intravenous, PRN, Boom Jacobson MD    heparin 25,000 units in dextrose 5% (100 units/ml) IV bolus from bag - ADDITIONAL PRN BOLUS - 60 units/kg (max bolus 4000 units), 41.5 Units/kg (Adjusted), Intravenous, PRN, Boom Jacobson MD, 4,000 Units at 08/11/23 0411    heparin 25,000 units in dextrose 5% 250 mL (100 units/mL) infusion LOW INTENSITY nomogram - [facility], 0-24 Units/kg/hr (Adjusted), Intravenous, Continuous, Boom Jacobson MD, Last Rate: 14.5 mL/hr at 08/11/23 0412, 15 Units/kg/hr at 08/11/23 0412    insulin aspart U-100 injection 1-10 Units, 1-10 Units, Subcutaneous, Q6H PRN, Yordy Victoria MD, 10 Units at 08/11/23 0511    insulin aspart U-100 injection 20 Units, 20 Units, Subcutaneous, TIDWM, Yordy Victoria MD, 20 Units at 08/11/23 0748    insulin detemir U-100 injection 50 Units, 50 Units, Subcutaneous, QHS, Yordy Victoria MD    levothyroxine tablet 125 mcg, 125 mcg, Oral, Before breakfast, Yordy Victoria MD, 125 mcg at 08/11/23 0510    melatonin tablet 6 mg, 6 mg, Oral,  Nightly PRN, Yordy Victoria MD    metoprolol succinate (TOPROL-XL) 24 hr split tablet 12.5 mg, 12.5 mg, Oral, Daily, Yordy Victoria MD    mupirocin 2 % ointment, , Nasal, BID, Yordy Victoria MD    ondansetron injection 4 mg, 4 mg, Intravenous, Q4H PRN, Yordy Victoria MD, 4 mg at 08/11/23 0435    polyethylene glycol packet 17 g, 17 g, Oral, BID PRN, Yordy Victoria MD    prochlorperazine injection Soln 5 mg, 5 mg, Intravenous, Q6H PRN, Yordy Victoria MD    senna-docusate 8.6-50 mg per tablet 2 tablet, 2 tablet, Oral, BID PRN, Yordy Victoria MD    sodium bicarbonate tablet 1,300 mg, 1,300 mg, Oral, BID, Yordy Victoria MD, 1,300 mg at 08/11/23 0510    sodium chloride 0.9% flush 10 mL, 10 mL, Intravenous, PRN, Yordy Victoria MD    ticagrelor tablet 90 mg, 90 mg, Oral, BID, Yordy Victoria MD    torsemide tablet 50 mg, 50 mg, Oral, Daily, Yordy Victoria MD         VTE Risk Mitigation (From admission, onward)           Ordered     IP VTE HIGH RISK PATIENT  Once         08/10/23 2304     Place sequential compression device  Until discontinued         08/10/23 2304     heparin 25,000 units in dextrose 5% (100 units/ml) IV bolus from bag - ADDITIONAL PRN BOLUS - 60 units/kg (max bolus 4000 units)  As needed (PRN)        Question:  Heparin Infusion Adjustment (DO NOT MODIFY ANSWER)  Answer:  [email or web address]\Images\Pharmacy\HeparinInfusions\heparin LOW INTENSITY nomogram for [facility] [id number]    08/10/23 2005     heparin 25,000 units in dextrose 5% (100 units/ml) IV bolus from bag - ADDITIONAL PRN BOLUS - 30 units/kg (max bolus 4000 units)  As needed (PRN)        Question:  Heparin Infusion Adjustment (DO NOT MODIFY ANSWER)  Answer:  [email or web address]\Images\Pharmacy\HeparinInfusions\heparin LOW INTENSITY nomogram for [facility] [id number]    08/10/23 2005     heparin 25,000 units in dextrose 5% 250 mL (100 units/mL) infusion LOW INTENSITY nomogram - [facility]  Continuous        Question Answer Comment   Begin at (in units/kg/hr) 12    Heparin  Infusion Adjustment (DO NOT MODIFY ANSWER) [email or web address]\Images\Pharmacy\HeparinInfusions\heparin LOW INTENSITY nomogram for [facility] [id number]        08/10/23 2005                    Assessment:   NSTEMI - Type 1   - Trop 23.277 --> 23.035   - no acute ST changes on EKG   - denies CP  Chronic systolic heart failure    - EF 30% (10.14.21)  CAD s/p CABG X3 - 2017   - LIMA to LAD, SVG to OM1, SVG to PDA  DM 2  Leukocytosis    - Positive blood culture ~ probable staph        Plan:   ECHO pending   EKG reviewed  Will plan for C today   Will consult nephrology   Cont. ASA/Statin/Brilenta  Cont. Heparin drip       Thank you for your consult.     Rin Luke NP  Cardiology  [facility] - [facility]  08/11/2023 8:34 AM

## 2023-08-11 NOTE — PROGRESS NOTES
[facility] Hospital Medicine Progress Note        Chief Complaint: Inpatient Follow-up    HPI:   52-year-old male with significant history of poorly controlled type 2 diabetes mellitus, CAD status post CABG in 2017 with ischemic cardiomyopathy, last ejection fraction 30% in 2017, chronic kidney disease stage 4 presented to the ED with complaints of dyspnea, generalized weakness.  Patient was mildly hypoxemic in the ED, tachycardic and borderline low BP.  Troponin significantly elevated.  Initially was more than 23 and remained flat trend.  Lactic acid was elevated.  BNP slightly high.  Noted renal compromise.  Patient also had significant hyperglycemia with no signs of DKA.  Chest x-ray with prominent vascular interstitial markings.  No overt signs of volume overload.  Concern for NSTEMI type 1 with acute on chronic systolic heart failure and possible early cardiogenic shock.  Patient was initiated on heparin GTT.  Loaded with Brilinta.  Continued Brilinta, aspirin along with high-intensity statin and other goal-directed medical treatment except for Ace/ARB given renal insufficiency.  Cardiology consulted for urgent cardiac catheterization.  Echocardiogram was ordered.    Interval Hx:   Patient seen at bedside prior to left heart catheterization.  Still dyspneic, saturations were stable in low to mid 90s.  BP low normal.  No active chest pain.  NPO awaiting left heart catheterization    Objective/physical exam:  General:  Mild acute distress secondary to dyspnea   Chest: Clear to auscultation bilaterally  Heart: S1, S2, no appreciable murmur  Abdomen: Soft, nontender, BS +  MSK: Warm, no lower extremity edema, no clubbing or cyanosis  Neurologic: Alert and oriented x4, moving all extremities with good strength     VITAL SIGNS: 24 HRS MIN & MAX LAST   Temp  Min: 97 °F (36.1 °C)  Max: 98.5 °F (36.9 °C) 98.5 °F (36.9 °C)   BP  Min: 99/69  Max: 132/93 122/85   Pulse  Min: 100  Max: 113   100   Resp  Min: 17  Max: 38 18   SpO2  Min: 91 %  Max: 97 % 95 %       Recent Labs   Lab 08/11/23 0228   WBC 14.26*   RBC 4.57*   HGB 13.7*   HCT 39.6*   MCV 86.7   MCH 30.0   MCHC 34.6   RDW 12.3      MPV 10.2         Recent Labs   Lab 08/11/23 0228 08/11/23  0230   *  --    K 5.2*  --    CO2 19*  --    BUN 43.2*  --    CREATININE 2.48*  --    CALCIUM 8.6  --    PH  --  7.340*   MG 2.00  --    ALBUMIN 3.1*  --    ALKPHOS 81  --    ALT 29  --    *  --    BILITOT 0.8  --           Microbiology Results (last 7 days)       Procedure Component Value Units Date/Time    Blood culture #2 **CANNOT BE ORDERED STAT** [[id number]] Collected: 08/10/23 1856    Order Status: Resulted Specimen: Blood Updated: 08/10/23 1904    Blood culture #1 **CANNOT BE ORDERED STAT** [[id number]] Collected: 08/10/23 1856    Order Status: Resulted Specimen: Blood Updated: 08/10/23 1904             Scheduled Med:   aspirin  81 mg Oral Daily    atorvastatin  80 mg Oral QHS    insulin aspart U-100  20 Units Subcutaneous TIDWM    insulin detemir U-100  50 Units Subcutaneous QHS    levothyroxine  125 mcg Oral Before breakfast    metoprolol succinate  12.5 mg Oral Daily    mupirocin   Nasal BID    sodium bicarbonate  1,300 mg Oral BID    sodium zirconium cyclosilicate  10 g Oral Once    ticagrelor  90 mg Oral BID    torsemide  50 mg Oral Daily          Assessment/Plan:    NSTEMI-suspect type 1  Acute on chronic systolic heart failure with ejection fraction-15-20%  Acute hypoxemic respiratory failure secondary to above  Lactic acidosis-suspect early cardiogenic shock no line history of CAD status post CABG in 2017  Poorly-controlled type 2 diabetes mellitus with severe hyperglycemia  Nehemias on CKD, stage IV  Acute hypervolemic hyponatremia   Mild hyperkalemia  Sirs with leukocytosis-likely stress induced  Hypothyroidism  Prophylaxis    Cardiology planning for left heart catheterization urgently today   For now continue heparin GTT,  aspirin, Brilinta, high-intensity statin  On torsemide for decompensated heart failure   Ejection fraction is 15-20%   Supplemental oxygen to keep saturations more than 90%   Had lactic acidosis on admit, BP low normal   Possible early cardiogenic shock, low threshold for upgrade to ICU  Hyperkalemia treated with Lokelma today morning   Repeat BMP in the evening  Hold off on Ace/ARB given renal insufficiency   Will be cautious with diuresis   In case of worsening will consult Nephrology  Severe hyperglycemia with no signs of DKA  Hyperglycemia also should be contributing to hyponatremia  Continue Levemir 50 units q.h.s. along with scheduled short-acting insulin 20 units with meals  Continue to make adjustments in doses based on subsequent CBG  Leukocytosis could be stress related  Follow-up blood cultures from admit   Chest x-ray with no pneumonia, no urinary symptoms   Continue home meds-levothyroxine   DVT prophylaxis-on heparin GTT      Critical care time-35 minutes  Critical care diagnosis-NSTEMI type 1 requiring heparin GTT  Critical care interventions: Hands-on evaluation, review of labs/radiographs/records and discussions with patient   Linda Bateman MD   08/11/2023        New changes noted   Patient was taken to cath lab, ballooned proximal circumflex, placed MIRELLA  Echo was 10%    Impella was placed and the patient was admitted to ICU  Cardiology planning to transfer him to [facility] [place] for possible LVAD   Blood cultures from admit with possible GPC, BC ID panel-Staph species   Pharmacy consulted for vancomycin

## 2023-08-11 NOTE — PROCEDURES
"Itz Daniel is a 52 y.o. male patient.    Temp: 98.2 °F (36.8 °C) (08/11/23 1600)  Pulse: (!) 111 (08/11/23 1800)  Resp: (!) 38 (08/11/23 1800)  BP: 109/85 (08/11/23 1800)  SpO2: 97 % (08/11/23 1800)  Weight: 117.9 kg (260 lb) (08/11/23 0435)  Height: 6' 2" (188 cm) (08/11/23 0435)  Mallampati Scale: Class II  ASA Classification: Class 3    Arterial Line    Date/Time: 8/11/2023 6:15 PM  Location procedure was performed: [facility]    Performed by: Nadir Clark DO  Authorized by: Nadir Clark DO  Consent Done: Not Needed  Indications: multiple ABGs and hemodynamic monitoring  Location: right radial    Anesthesia:  Local Anesthetic: lidocaine 1% without epinephrine  Needle gauge: 22  Seldinger technique: Seldinger technique used  Number of attempts: 1  Complications: No  Estimated blood loss (mL): 2  Specimens: No  Implants: No  Post-procedure: dressing applied  Post-procedure CMS: normal  Patient tolerance: Patient tolerated the procedure well with no immediate complications          Nadir Clark DO  [facility] Internal Medicine PGY-II  8/11/2023    "

## 2023-08-11 NOTE — H&P
[facility]  Pulmonary Critical Care Note    Patient Name: Itz Daniel  MRN: [id number]  Admission Date: 8/10/2023  Hospital Length of Stay: 1 days  Code Status: Full Code  Attending Provider: Yordy Victoria MD  Primary Care Provider: Sunday Boo MD     Subjective:     HPI:   This is a 52-year-old male with a past medical history of DM type II, CAD with prior CABG in 2017, CKD stage IV, chronic systolic heart failure with last EF of 30% in 2017, and ICD who presented to the ED on 8/102/23 with c/o nausea, vomiting, and shortness of breath. Cardiology was consulted for NSTEMI and started heparin drip and loaded with Brilinta and aspirin. BUN 2.51 and creatinine of 2.51. . Echo revealed an EF of 15%, severely dilated left atrium, mild MR. Blood culture with gram positive cocci, probable staph. BCID panel positive for staphylococcus spp. He was taken to cath lab, ballooned proximal circ and placed MIRELLA, previous bypass to OM was occluded. Echo at that time revealed an EF of 10%, Impella was placed. He was then transferred post-procedure to the ICU. Currently not requiring vasopressor support. He is on room air and BP is currently stable.     Hospital Course/Significant events:  8/11/2023- [facility] with impella;     24 Hour Interval History:  N/A    Past Medical History:   Diagnosis Date    CKD stage 4, GFR 15-29 ml/min     HLD (hyperlipidemia)     Hypertension     Ischemic cardiomyopathy/Chronic HFrEF s/p CABG and AICD 2017    T2DM (type 2 diabetes mellitus)        Past Surgical History:   Procedure Laterality Date    CORONARY ARTERY BYPASS GRAFT  2017    3 vessel       Social History     Socioeconomic History    Marital status:    Tobacco Use    Smoking status: Former     Current packs/day: 0.00     Types: Cigarettes    Smokeless tobacco: Never   Substance and Sexual Activity    Alcohol use: Yes    Drug use: No           Current Outpatient Medications   Medication Instructions     aspirin (ECOTRIN) 81 mg, Oral    FARXIGA 10 mg, Oral, Daily    LANTUS SOLOSTAR U-100 INSULIN 50 Units, Subcutaneous, Daily    levothyroxine (SYNTHROID) 125 mcg, Oral, Before breakfast    metoprolol succinate (TOPROL-XL) 12.5 mg, Oral, Daily    NovoLOG FlexPen U-100 Insulin 20 Units, Subcutaneous, 3 times daily with meals    rosuvastatin (CRESTOR) 40 mg, Oral, Daily    torsemide (DEMADEX) 50 mg, Oral, Daily       Current Inpatient Medications   aspirin  81 mg Oral Daily    atorvastatin  80 mg Oral QHS    insulin aspart U-100  20 Units Subcutaneous TIDWM    insulin detemir U-100  50 Units Subcutaneous QHS    levothyroxine  125 mcg Oral Before breakfast    metoprolol succinate  12.5 mg Oral Daily    mupirocin   Nasal BID    sodium bicarbonate  1,300 mg Oral BID    sodium zirconium cyclosilicate  10 g Oral Once    ticagrelor  90 mg Oral BID    torsemide  50 mg Oral Daily       Current Intravenous Infusions   heparin (porcine) in 5 % dex      sodium bicarbonate 25 mEq in dextrose 5 % (D5W) 1,000 mL infusion           ROS       Objective:       Intake/Output Summary (Last 24 hours) at 8/11/2023 1538  Last data filed at 8/11/2023 1519  Gross per 24 hour   Intake --   Output 1500 ml   Net -1500 ml         Vital Signs (Most Recent):  Temp: 98 °F (36.7 °C) (08/11/23 1143)  Pulse: 104 (08/11/23 1143)  Resp: 18 (08/11/23 0441)  BP: (!) 89/64 (08/11/23 1143)  SpO2: 96 % (08/11/23 1143)  Body mass index is 33.38 kg/m².  Weight: 117.9 kg (260 lb) Vital Signs (24h Range):  Temp:  [97 °F (36.1 °C)-98.5 °F (36.9 °C)] 98 °F (36.7 °C)  Pulse:  [100-113] 104  Resp:  [17-38] 18  SpO2:  [91 %-97 %] 96 %  BP: ()/(57-93) 89/64     Physical Exam  Vitals reviewed.   Constitutional:       Appearance: Normal appearance.   HENT:      Head: Normocephalic and atraumatic.   Cardiovascular:      Rate and Rhythm: Normal rate and regular rhythm.      Comments: Impella inserted in right groin  Pulmonary:      Effort: Pulmonary effort is  normal.      Breath sounds: Normal breath sounds.   Abdominal:      General: Bowel sounds are normal.      Palpations: Abdomen is soft.   Neurological:      General: No focal deficit present.      Mental Status: He is alert.   Psychiatric:         Mood and Affect: Mood normal.           Lines/Drains/Airways       Drain  Duration                  Sheath 08/11/23 1327 Right <1 day         Sheath 08/11/23 1327 Right <1 day         Urethral Catheter 08/11/23 0425 Double-lumen 16 Fr. <1 day              Peripheral Intravenous Line  Duration                  Peripheral IV - Single Lumen 08/10/23 1945 20 G Anterior;Left Upper Arm <1 day         Peripheral IV - Single Lumen 08/11/23 0330 20 G;Other (Comments) Anterior;Right Upper Arm <1 day                    Significant Labs:    Lab Results   Component Value Date    WBC 14.26 (H) 08/11/2023    HGB 13.7 (L) 08/11/2023    HCT 39.6 (L) 08/11/2023    MCV 86.7 08/11/2023     08/11/2023           BMP  Lab Results   Component Value Date     (L) 08/11/2023    K 5.2 (H) 08/11/2023    CHLORIDE 99 08/11/2023    CO2 19 (L) 08/11/2023    BUN 43.2 (H) 08/11/2023    CREATININE 2.48 (H) 08/11/2023    CALCIUM 8.6 08/11/2023    ESTGFRAFRICA 14 08/15/2022    EGFRNONAA >60 11/06/2017         ABG  Recent Labs   Lab 08/11/23  0230   PH 7.340*   PO2 69.0*   PCO2 43.0   HCO3 23.2   POCBASEDEF -2.60*       Mechanical Ventilation Support:         Significant Imaging:  I have reviewed the pertinent imaging within the past 24 hours.        Assessment/Plan:     Assessment  Coronary artery disease s/p previous CABG in 2017 now s/p LHC with PCI and placement of Impella  Ischemic cardiomyopathy with severely reduced EF of 15%; ICD  CKD stage IV  DM type II      Plan  Impella management per cardiology  Heparin drip ordered; also DAPT  Nephrology has been consulted  Will start LR @ 50 mL/hr  Trending lactic acid. CMP to be repeated later this evening  Will start Vanc due to positive BC,  awaiting sensitivities  Cardiology working on transferring the patient to [facility] for possible LVAD  Continue close ICU monitoring    DVT Prophylaxis: heparin drip  GI Prophylaxis: none     32 minutes of critical care was time spent personally by me on the following activities: development of treatment plan with patient or surrogate and bedside caregivers, discussions with consultants, evaluation of patient's response to treatment, examination of patient, ordering and performing treatments and interventions, ordering and review of laboratory studies, ordering and review of radiographic studies, pulse oximetry, re-evaluation of patient's condition.  This critical care time did not overlap with that of any other provider or involve time for any procedures.     TARSHA Ro  Pulmonary Critical Care Medicine  [facility]

## 2023-08-11 NOTE — Clinical Note
PT ARRIVED WITH PRE-EXISTING LEAVE-IN SWAN IN RIGHT CFV, PRE-EXISTING IMPELLA ACCESSED THROUGH RIGHT CFA, CENTRAL LINE IN RIGHT IJ VEIN, AND PERCLOSE IN RIGHT CFA THAT HAS NOT BEEN DEPLOYED.

## 2023-08-11 NOTE — CONSULTS
Nephrology  Consults  Chief Complaint   Patient presents with    Emesis     Pt c/o vomiting, nausea, weakness, and shortness of breath that started last night. Hx CABG. Denies fever     HPI: 51 y/o consulted for CKD (known to Dr. Leyva with baseline Scr 1.8). The consult for nephrology was originally placed to the wrong group and we were not notified of this consult until this evening.  Pt presented to ED on 8/10/23 with N/V/SOB/CP. ED eval revealed NSTEMI, troponin > 23, and acute on chronic CHF. He was treated with Lasix IV and Heparin gtt. ECHO showed severe global hypokenesis EF 15%, dilated LA, mod decr RV EF. He went for urgent LHC this morning, proximal circ was ballooned + MIRELLA, previous bypass to OM was occluded, impella was placed and transferred to the ICU. Currently not on pressors.       Past Medical History:   Diagnosis Date    CKD stage 4, GFR 15-29 ml/min     HLD (hyperlipidemia)     Hypertension     Ischemic cardiomyopathy/Chronic HFrEF s/p CABG and AICD 2017    T2DM (type 2 diabetes mellitus)         Past Surgical History:   Procedure Laterality Date    CORONARY ARTERY BYPASS GRAFT  2017    3 vessel        Family History   Problem Relation Age of Onset    Hypertension Mother         Social History     Socioeconomic History    Marital status:    Tobacco Use    Smoking status: Former     Current packs/day: 0.00     Types: Cigarettes    Smokeless tobacco: Never   Substance and Sexual Activity    Alcohol use: Yes    Drug use: No      Review of patient's allergies indicates:  No Known Allergies  Current Outpatient Medications   Medication Instructions    aspirin (ECOTRIN) 81 mg, Oral    FARXIGA 10 mg, Oral, Daily    LANTUS SOLOSTAR U-100 INSULIN 50 Units, Subcutaneous, Daily    levothyroxine (SYNTHROID) 125 mcg, Oral, Before breakfast    metoprolol succinate (TOPROL-XL) 12.5 mg, Oral, Daily    NovoLOG FlexPen U-100 Insulin 20 Units, Subcutaneous, 3 times daily with meals    rosuvastatin  (CRESTOR) 40 mg, Oral, Daily    torsemide (DEMADEX) 50 mg, Oral, Daily     Objective   VITAL SIGNS: 24 HR MIN & MAX LAST    Temp  Min: 97 °F (36.1 °C)  Max: 98.5 °F (36.9 °C)  98.2 °F (36.8 °C)        BP  Min: 89/64  Max: 132/93  114/79     Pulse  Min: 100  Max: 113  106     Resp  Min: 17  Max: 38  17    SpO2  Min: 85 %  Max: 97 %  97 %      Wt Readings from Last 3 Encounters:   08/11/23 117.9 kg (260 lb)   06/20/17 97.8 kg (215 lb 9.8 oz)   09/11/17 105.6 kg (232 lb 12.9 oz)       Intake/Output Summary (Last 24 hours) at 8/11/2023 1707  Last data filed at 8/11/2023 1519  Gross per 24 hour   Intake --   Output 1500 ml   Net -1500 ml     Recent Labs     08/10/23  1856 08/11/23  0228   * 129*   K 5.1 5.2*   CHLORIDE 96* 99   CO2 21* 19*   BUN 37.3* 43.2*   CREATININE 2.51* 2.48*   GLUCOSE 487* 526*   CALCIUM 9.5 8.6   MG  --  2.00   PHOS  --  3.3   ALBUMIN 3.8 3.1*      Recent Labs     08/10/23  1856 08/11/23  0228   WBC 11.57* 14.26*   HGB 14.7 13.7*    277          ASSESSMENT PLAN    NSTEMI, cardiogenic shock, SIRS, staph sepsis    CKD 3b - baseline Scr 1.8  YVES - cardiorenal +/- AMI. Scr was improving this morning, but then had contrast today for [facility], so Scr might worsen over the next few days. No need for HD at this time. Cont supportive care. Avoid further nephrotoxins if possible.  HyperK - s/p lokelma today, monitor labs  Met Acid - cont PO Bicarb, monitor labs  Cardiogenic shock, EF 15% - impella placed 8/11/23, on torsemide per cards  CAD, CABG x 3 in 2017 - s/p proximal circ MIRELLA 8/11/23  Staph sepsis - on Rocephin and Vanc per attending  Uncontrolled DM2   Diabetic Nephropathy  Hypothyroid  H/o ATN 8/2022 ([facility])  PAD, (L) 4th and 5th toe amputation 8/2022 ([facility])  COVID-19 (7/27/2022 s/p molnupiravir)      Support BP and oxygenation  Cont atbx  Labs in AM  Cardiology is trying to get pt transferred to [facility] [place] for LVAD          Boom Genao M.D.  Nephrology

## 2023-08-11 NOTE — INTERVAL H&P NOTE
Patient name: Itz Daniel  MRN: [id number]  : 1970  Cath Lab Procedure H&P Update    Pre-Procedure Assessment:    I saw and examined the patient face to face. The patient has been re-evaluated and his condition is unchanged. The reason for admission, procedure and care is still present.  Based on the patients H&P, pre-procedure physical exam, relevant diagnostic studies, NPO status and information obtained from the patient, I determine the patient is an appropriate candidate for the proposed procedure and anesthesia planned. I further certify the anesthesia risks, benefits and options have been explained to the patient to which he agrees as documented on the procedural consent.

## 2023-08-11 NOTE — Clinical Note
100 ml of contrast were injected throughout the case. -95 mL of contrast was the total wasted during the case. 5 mL was the total amount used during the case.

## 2023-08-11 NOTE — NURSING
Artificial Intelligence Notification  [facility]  [address]  [place] [address]  Phone: [phone]     This documentation was triggered by an Artificial Intelligence Notification.     Admit Date: 8/10/2023   LOS: 1  Code Status: Full Code  : 1970  Age: 52 y.o.  Weight:   Wt Readings from Last 1 Encounters:   23 117.9 kg (260 lb)        Sex: male  Bed: [address] [place] [address] A  MRN: [id number]  Attending Physician: Yordy Victoria MD     Date of Alert: 2023  Time AI Alert Received: 1100             Vitals:    23 1143   BP: (!) 89/64   Pulse: 104   Resp:    Temp: 98 °F (36.7 °C)           Patient Condition: Patient admitted for NSTEMI. Vitals stable. Last BP taken was 122/85. Echo obtained this AM with EF 15%, cardiology plans to do LHC this afternoon. RN reports BC came back as probable staph. RN denies any new changes since intial AI alert earlier this morning, reports vitals stable. Communicated with RN to reach out with any new questions or concerns.

## 2023-08-12 PROBLEM — E11.9 DM (DIABETES MELLITUS): Status: ACTIVE | Noted: 2017-09-11

## 2023-08-12 PROBLEM — N18.4 CKD (CHRONIC KIDNEY DISEASE), STAGE IV: Status: ACTIVE | Noted: 2023-01-01

## 2023-08-12 PROBLEM — E03.9 HYPOTHYROIDISM: Status: ACTIVE | Noted: 2023-01-01

## 2023-08-12 PROBLEM — R78.81 BACTEREMIA: Status: ACTIVE | Noted: 2023-01-01

## 2023-08-12 NOTE — PROGRESS NOTES
Pharmacokinetic Initial Assessment: IV Vancomycin    Assessment/Plan:    Initiate intravenous vancomycin with loading dose of 2000 mg once followed by a maintenance dose of vancomycin 1750mg IV every 24 hours  Desired empiric serum trough concentration is 10 to 20 mcg/mL  Draw vancomycin trough on 8/13 at 1900.  Pharmacy will continue to follow and monitor vancomycin.      Please contact pharmacy at extension [phone] with any questions regarding this assessment.     Thank you for the consult,   Faith Joseph       Patient brief summary:  Itz Daniel is a 52 y.o. male initiated on antimicrobial therapy with IV Vancomycin for treatment of suspected bacteremia    Drug Allergies:   Review of patient's allergies indicates:  No Known Allergies    Actual Body Weight:   117.9 kg    Renal Function:   Estimated Creatinine Clearance: 47.6 mL/min (A) (based on SCr of 2.48 mg/dL (H)).,     Dialysis Method (if applicable):  N/A    CBC (last 72 hours):  Recent Labs   Lab Result Units 08/10/23  1856 08/10/23  2304 08/11/23 0228   WBC x10(3)/mcL 11.57*  --  14.26*   Hgb g/dL 14.7  --  13.7*   Hemoglobin A1c %  --  10.2*  --    Hct % 42.5  --  39.6*   Platelet x10(3)/mcL 300  --  277   Mono % % 5.7  --  6.5   Eos % % 2.3  --  0.8   Basophil % % 0.4  --  0.6       Metabolic Panel (last 72 hours):  Recent Labs   Lab Result Units 08/10/23  1856 08/11/23  0228 08/11/23  0348   Sodium Level mmol/L 132* 129*  --    Potassium Level mmol/L 5.1 5.2*  --    Chloride mmol/L 96* 99  --    Carbon Dioxide mmol/L 21* 19*  --    Glucose Level mg/dL 487* 526*  --    Glucose, UA   --   --  3+*   Blood Urea Nitrogen mg/dL 37.3* 43.2*  --    Creatinine mg/dL 2.51* 2.48*  --    Albumin Level g/dL 3.8 3.1*  --    Bilirubin Total mg/dL 1.0 0.8  --    Alkaline Phosphatase unit/L 90 81  --    Aspartate Aminotransferase unit/L 156* 114*  --    Alanine Aminotransferase unit/L 35 29  --    Magnesium Level mg/dL  --  2.00  --    Phosphorus Level mg/dL  --   "3.3  --        Drug levels (last 3 results):  No results for input(s): "VANCOMYCINRA", "VANCORANDOM", "VANCOMYCINPE", "VANCOPEAK", "VANCOMYCINTR", "VANCOTROUGH" in the last 72 hours.    Microbiologic Results:  Microbiology Results (last 7 days)       Procedure Component Value Units Date/Time    BCID2 Panel [[id number]]  (Abnormal) Collected: 08/10/23 8676    Order Status: Completed Specimen: Blood Updated: 08/11/23 1204     CTX-M (ESBL ) N/A     IMP (Cabapenemase ) N/A     KPC resistance gene (Carbapenemase ) N/A     mcr-1 N/A     mecA ID N/A     Comment: Note: Antimicrobial resistance can occur via multiple mechanisms. A Not Detected result for antimicrobial resistance gene(s) does not indicate antimicrobial susceptibility. Subculturing is required for species identification and susceptibility testing of   isolates.        mecA/C and MREJ (MRSA) gene N/A     NDM (Carbapenemase ) N/A     OXA-48-like (Carbapenemase ) N/A     Fausto/B (VRE gene) N/A     VIM (Carbapenemase ) N/A     Enterococcus faecalis Not Detected     Enterococcus faecium Not Detected     Listeria monocytogenes Not Detected     Staphylococcus spp. Detected     Staphylococcus aureus Not Detected     Staphylococcus epidermidis Not Detected     Staphylococcus lugdunensis Not Detected     Streptococcus spp. Not Detected     Streptococcus agalactiae (Group B) Not Detected     Streptococcus pneumoniae Not Detected     Streptococcus pyogenes (Group A) Not Detected     Acinetobacter calcoaceticus/baumannii complex Not Detected     Bacteroides fragilis Not Detected     Enterobacterales Not Detected     Enterobacter cloacae complex Not Detected     Escherichia coli Not Detected     Klebsiella aerogenes Not Detected     Klebsiella oxytoca Not Detected     Klebsiella pneumoniae group Not Detected     Proteus spp. Not Detected     Salmonella spp. Not Detected     Serratia marcescens Not Detected     Haemophilus " influenzae Not Detected     Neisseria meningitidis Not Detected     Pseudomonas aeruginosa Not Detected     Stenotrophomonas maltophilia Not Detected     Candida albicans Not Detected     Candida auris Not Detected     Candida glabrata Not Detected     Candida krusei Not Detected     Candida parapsilosis Not Detected     Candida tropicalis Not Detected     Cryptococcus neoformans/gattii Not Detected    Narrative:      The [employer] BCID2 Panel is a multiplexed nucleic acid test intended for the use with compareit4me® 2.0 or compareit4me® Connectivity Data Systems Systems for the simultaneous qualitative detection and identification of multiple bacterial and yeast nucleic acids and select genetic determinants associated with antimicrobial resistance.  The [employer] BCID2 Panel test is performed directly on blood culture samples identified as positive by a continuous monitoring blood culture system.  Results are intended to be interpreted in conjunction with Gram stain results.    Blood culture #1 **CANNOT BE ORDERED STAT** [[id number]]  (Abnormal) Collected: 08/10/23 1856    Order Status: Completed Specimen: Blood Updated: 08/11/23 1053     GRAM STAIN Gram Positive Cocci, probable Staphylococcus      Seen in gram stain of broth only      1 of 1 Aerobic bottle positive    Blood culture #2 **CANNOT BE ORDERED STAT** [[id number]] Collected: 08/10/23 1856    Order Status: Resulted Specimen: Blood Updated: 08/10/23 1904

## 2023-08-12 NOTE — CARE UPDATE
2130  CVP: 9  SVO2:  44  Cardiac Output: 4.9  Cardiac Index: 2.0  SVR: 1250  LA 0.9  Started on  5    0200  CVP: 9  SVO2:  58  Cardiac Output: 6.7  Cardiac Index: 2.7  SVR: 860  LA 0.8  Impella speed dropped P7>>P6    0400  CVP: 7  SVO2:  62  Cardiac Output: 7.5  Cardiac Index: 3.0  SVR: 793  LA 1.2  PA 65/30   WP 30  Impella speed dropped to P4    0800  CVP: 8  SVO2:  57  Cardiac Output: 6.5  Cardiac Index: 2.6  SVR: 894  LA 1.7  PA 65/35  WP 30  CP 1.15  LYLE 3.75  Impella increased to P5    1000  CVP remains at 8-9  PA 65/35  WP 22    Plan   Lasix 80 mg IV x1. repeat HDs in 2 hours  Will consider low-dose epi to maintain map greater than 65

## 2023-08-12 NOTE — ASSESSMENT & PLAN NOTE
Cardiogenic shock    52-year-old male with past medical history of ischemic cardiomyopathy status post CABG in 2017 who presents as a transfer from outside hospital after he presented with an NSTEMI status post revascularization to proximal circumflex.  Associated cardiogenic shock noted from right heart catheterization, CP Impella placed on a 12. 3.4cm from AV to inlet on bedside echo     Starting dobutamine 5  If repeat hemodynamics appear improved, decrease and impella P 6  Given low filling pressures will hold off on diuretics for now  Systemic heparin drip with Impella, bicarb for purge  LDH of 900 on arrival, hemolysis labs negative.  Continue daily LDH draws  TTE in AM

## 2023-08-12 NOTE — ASSESSMENT & PLAN NOTE
Titrate insulin slowly to avoid hypoglycemia as the risk of hypoglycemia increases with decreased creatinine clearance.    Estimated Creatinine Clearance: 53.6 mL/min (A) (based on SCr of 2.2 mg/dL (H)).

## 2023-08-12 NOTE — ASSESSMENT & PLAN NOTE
1 out of 2 blood cultures from outside hospital with Gram-positive cocci resembling Staph  Unclear of contaminant, UA does not appear infectious, no fevers, WBC of 11    Continue ceftriaxone for now  Repeat blood cultures pending  Procalcitonin pending

## 2023-08-12 NOTE — ASSESSMENT & PLAN NOTE
BG goal: 140-180   T2DM. On high doses of insulin at home. CKD.  Will increase regimen given remains elevated.  Will place on MDC given high insulin needs.      - -Increase Levemir 30 units daily,   -Increase Novolog 17 units AC   -Continue Novolog Moderate dose correction with ISF 25 starting at 150    - POCT Glucose before meals, at bedtime and at 2 am  - Hypoglycemia protocol in place      ** Please notify Endocrine for any change and/or advance in diet**  ** Please call Endocrine for any BG related issues **     Discharge Planning:   TBD. Please notify endocrinology prior to discharge.

## 2023-08-12 NOTE — HPI
52-year-old male with a past medical history of ICM, CABG in 2017 (LIMA to LAD, SVG to OM1, SVG to PDA), DM type II, CKD stage IV (baseline 1.8), and ICD who presented [facility] on 8/10//23 due to n/v and SOB. Work up concerning for NSTEMI with peak trop of 38. Started on ACS protocolwith asa, ticagrelor, and heparin gtt. Also noted to have YVES with Cr 2.5, volume overloaded with BNP of 796, LA 2.8>>1.0. TTE with EF drop from 30 to 15%, LVEDD 6.15, moderately reduced RV function. LHC/RHC on 8/12: s/p MIRELLA to prox Cx. RHC showed RA 20, RV 81/21, PA 81/47 (mean 61), PWCP 45, CO/CI: 3.69/1.52 (VIKKI)  3.91/1.6 (TD), therefore, impella CP was placed in in right femoral artery and leave in swan in right femoral vein. He was then transferred to [facility] [place] for higher level of care. Of note, never started on inotropes or pressors. Was receiving metoprolol.      [facility] on 8/12  Grafts:  SVG-RCA:  Diffuse disease with narrowing of up to 40-50% in the proximal portion  The native PLB and PDA both diffusely diseased.  SVG-OM: Occluded  LIMA-LAD:  Widely patent.  Diffusely diseased native LAD with narrowing of up to 90% in the very apical portion.  Severe native CAD status post PCI of the proximal circumflex with a 33 x 24 mm Synergy XD stent (post-dilated up to 4.25 mm)      Previous Cardiac Diagnostics:   TTE 7.1.22  The study quality is average.   The left ventricle is mildly enlarged. Global left ventricular systolic function is severely decreased. The left ventricular ejection fraction is 30%.   Mild (1+) mitral regurgitation.  The pulmonary artery systolic pressure is 10 mmHg. The pulmonary artery appears to be normal.     [facility] 5.19.2017  Severe MVCAD as a cause to severe newly diagnosed ischemic congestive heart failure with EF of 20%  Mild MR  Patent L subclavian, & LIMA suitable for bypass graft conduit     BLE Arterial US 10.18.21  The study quality is average.   The arteries of the left lower extremity appear patent with no  significant stenosis noted.   Tri-phasic waveforms are obtained throughout the left lower extremity arteries.      Carotid US 10.18.21  The study quality is average.   1-39% stenosis in the proximal right internal carotid artery based on Bluth Criteria.   1-39% stenosis in the proximal left internal carotid artery based on Bluth Criteria.   Less than 50% stenosis throughout the bilateral common carotid arteries.   Antegrade bilateral vertebral artery flow.

## 2023-08-12 NOTE — HPI
Reason for Consult: Management of T2DM, Hyperglycemia     Diabetes diagnosis year: >20 years ago    Home Diabetes Medications:  Farxiga 10 mg QD; Lantus 50 units; Novolog 20 units    How often checking glucose at home?  Dexcom    BG readings on regimen: mid to upper 100s  Hypoglycemia on the regimen?  No  Missed doses on regimen?  No    Diabetes Complications include:     Hyperglycemia    Complicating diabetes co morbidities:   Cardiogenic shock; HTN; HLD      HPI: 52-year-old male with a past medical history of ICM, CABG in 2017 (LIMA to LAD, SVG to OM1, SVG to PDA), DM type II, CKD stage IV (baseline 1.8), and ICD who presented [facility] ED on 8/10//23 due to n/v and SOB. Work up concerning for NSTEMI with peak trop of 38. Started on ACS protocol with asa, ticagrelor, and heparin gtt. Also noted to have YVES with Cr 2.5, volume overloaded with BNP of 796, LA 2.8>>1.0. TTE with EF drop from 30 to 15%, LVEDD 6.15, moderately reduced RV function. LHC/RHC on 8/12: s/p MIRELLA to prox Cx. RHC showed RA 20, RV 81/21, PA 81/47 (mean 61), PWCP 45, CO/CI: 3.69/1.52 (VIKKI)  3.91/1.6 (TD), therefore, impella CP was placed in in right femoral artery and leave in swan in right femoral vein. He was then [place] [facility] [place] of care. Endocrine consulted to manage hyperglycemia and type 2 diabetes.     Lab Results   Component Value Date    HGBA1C 10.2 (H) 08/10/2023

## 2023-08-12 NOTE — PLAN OF CARE
Cardiac ICU Care Plan    POC reviewed with Itz Daniel and family. Questions and concerns addressed. No acute events today. Pt progressing toward goals. Will continue to monitor. See below and flowsheets for full assessment and VS info. Impella CP at P6 locked at 87 cm.  CVP 7, last SVO2 62.  UOP 1100mL.        Neuro:  Ben Coma Scale  Best Eye Response: 4-->(E4) spontaneous  Best Motor Response: 6-->(M6) obeys commands  Best Verbal Response: 5-->(V5) oriented  [place] Coma Scale Score: 15  Pupil PERRLA: yes    24 hr Temp:  [98 °F (36.7 °C)-99.1 °F (37.3 °C)]      CV:  Rhythm: sinus tachycardia   DVT prophylaxis: VTE Required Core Measure: Pharmacological prophylaxis initiated/maintained    CVP (mean): 6 mmHg (08/12/23 0400)       PAP: 101/56 (08/12/23 0400)       Type: Impella       Pulses  Right Radial Pulse: 2+ (normal)  Left Radial Pulse: 2+ (normal)  Right Dorsalis Pedis Pulse: Doppler  Left Dorsalis Pedis Pulse: Doppler  Right Posterior Tibial Pulse: Doppler  Left Posterior Tibial Pulse: Doppler    Resp:  Flow (L/min): 5       GI/:  GI prophylaxis: no     Last Bowel Movement: 08/09/23  Voiding Characteristics: urethral catheter (bladder)  [REMOVED]      Urethral Catheter 08/11/23 0425 Double-lumen 16 Fr.-Reason for Continuing Urinary Catheterization: Critically ill in ICU and requiring hourly monitoring of intake/output   Intake/Output Summary (Last 24 hours) at 8/12/2023 0501  Last data filed at 8/12/2023 0400  Gross per 24 hour   Intake 436.58 ml   Output 800 ml   Net -363.42 ml            Labs/Accuchecks:  Recent Labs   Lab 08/11/23 1837 08/11/23 2126 08/12/23 0426   WBC 11.56* 11.37 12.37   RBC 3.91* 3.93* 3.69*   HGB 11.8* 11.5* 11.1*   HCT 33.3* 34.2* 32.8*    250 216      Recent Labs   Lab 08/10/23  2025 08/11/23  2126 08/12/23  0426   INR 1.0 1.0 1.0   APTT  --  22.8 28.6      Recent Labs     08/11/23 2126      K 3.9   CO2 26      BUN 41*   CREATININE 2.2*   ALKPHOS 68    ALT 29   *   BILITOT 0.8       Recent Labs   Lab 08/10/23  2304 08/11/23  0228 08/11/23  1232   CPK  --  1,398*  --    TROPONINI 23.277* 23.035* 38.193*      Recent Labs     08/12/23  0200 08/12/23  0230 08/12/23  0400   PH 7.374 7.354 7.353   PCO2 47.7* 49.2* 47.1*   PO2 32* 39* 34*   HCO3 27.8 27.4 26.2   POCSATURATED 58* 70* 62*   BE 3 2 1       Electrolytes: Electrolytes replaced  Accuchecks: ACHS    Gtts/LDAs:   sodium chloride 0.9% 10 mL/hr at 08/12/23 0400    DOBUTamine IV infusion (non-titrating) 5 mcg/kg/min (08/12/23 0400)    sodium bicarbonate 25 mEq in dextrose 5 % (D5W) 1,000 mL Purge Solution for Impella 15.1 mL/hr at 08/12/23 0400    sodium chloride 0.9%      heparin (porcine) in D5W 12 Units/kg/hr (08/12/23 0400)       Lines/Drains/Airways       Central Venous Catheter Line  Duration                  Percutaneous Central Line Insertion/Assessment - Cordis 08/12/23 0045 Internal Jugular Right <1 day    Percutaneous Central Line Insertion/Assessment - Triple Lumen  08/12/23 0046 Internal Jugular Right <1 day    Pulmonary Artery Catheter Assessment  08/11/23 1500 Femoral Vein Right <1 day              Drain  Duration                  Sheath 08/11/23 1327 Right <1 day         Sheath 08/11/23 1327 Right <1 day         Urethral Catheter 08/12/23 0130 16 Fr. <1 day              Arterial Line  Duration             Arterial Line 08/11/23 1800 Right Radial <1 day              Line  Duration                  VAD 08/11/23 1600 <1 day              Peripheral Intravenous Line  Duration                  Peripheral IV - Single Lumen 08/11/23 0330 20 G;Other (Comments) Anterior;Right Upper Arm 1 day                    Skin/Wounds  Bathing/Skin Care: bath, complete (08/11/23 2100)  Wounds: No    Wound care consulted: No

## 2023-08-12 NOTE — EICU
Intervention Initiated From:  Bedside    Callie intervened regarding:  Time-Out    Nurse Notified:  Yes    Doctor Notified:  No    Comments: Time out done for Cordis and MAC TLC to right IJ per . See flowsheet

## 2023-08-12 NOTE — HPI
52-year-old male with a past medical history of ICM, CABG in 2017 (LIMA to LAD, SVG to OM1, SVG to PDA), DM type II, CKD stage IV (baseline 1.8), and ICD who presented [facility] ED on 8/10//23 due to n/v and SOB. Work up concerning for NSTEMI with peak trop of 38. Started on ACS protocol with asa, ticagrelor, and heparin gtt. Also noted to have YVES with Cr 2.5, volume overloaded with BNP of 796, LA 2.8>>1.0. TTE with EF drop from 30 to 15%, LVEDD 6.15, moderately reduced RV function. LHC/RHC on 8/12: s/p MIRELLA to prox Cx. RHC showed RA 20, RV 81/21, PA 81/47 (mean 61), PWCP 45, CO/CI: 3.69/1.52 (VIKKI)  3.91/1.6 (TD), therefore, impella CP was placed in in right femoral artery and leave in swan in right femoral vein. [place] [facility] [place] Of note, never started on inotropes or pressors. Was receiving metoprolol.     On arrival patient was hemodynamically stable.  Not on any inotropes or pressors.  Impella at P7  Initial hemodynamics  CVP: 9  SVO2:  44  Cardiac Output: 4.9  Cardiac Index: 2.0  SVR: 1250     [facility] on 8/12  Grafts:  SVG-RCA:  Diffuse disease with narrowing of up to 40-50% in the proximal portion  The native PLB and PDA both diffusely diseased.  SVG-OM: Occluded  LIMA-LAD:  Widely patent.  Diffusely diseased native LAD with narrowing of up to 90% in the very apical portion.  Severe native CAD status post PCI of the proximal circumflex with a 33 x 24 mm Synergy XD stent (post-dilated up to 4.25 mm)      Previous Cardiac Diagnostics:   TTE 7.1.22  The study quality is average.   The left ventricle is mildly enlarged. Global left ventricular systolic function is severely decreased. The left ventricular ejection fraction is 30%.   Mild (1+) mitral regurgitation.  The pulmonary artery systolic pressure is 10 mmHg. The pulmonary artery appears to be normal.     [facility] 5.19.2017  Severe MVCAD as a cause to severe newly diagnosed ischemic congestive heart failure with EF of 20%  Mild  MR  Patent L subclavian, & LIMA suitable for bypass graft conduit     BLE Arterial US 10.18.21  The study quality is average.   The arteries of the left lower extremity appear patent with no significant stenosis noted.   Tri-phasic waveforms are obtained throughout the left lower extremity arteries.      Carotid US 10.18.21  The study quality is average.   1-39% stenosis in the proximal right internal carotid artery based on Bluth Criteria.   1-39% stenosis in the proximal left internal carotid artery based on Bluth Criteria.   Less than 50% stenosis throughout the bilateral common carotid arteries.   Antegrade bilateral vertebral artery flow.

## 2023-08-12 NOTE — ASSESSMENT & PLAN NOTE
Lab Results   Component Value Date    HGBA1C 10.2 (H) 08/10/2023     Detemir 20 qhs  Aspart 10 TIDWM  [facility]  POCT glucose ACHS  Diabetic/cardiac diet

## 2023-08-12 NOTE — CARE UPDATE
1400 hemos  CVP 8  Svo2 66  Map 71  CO 8.4  CI 3.4  Svr 600  PA 67/34  WP 23  PAPI4.13  CP 1.3  LA 1.4  UOP 1.8 since lasix    Decrease impella to P4, repeat HD in 1 hour

## 2023-08-12 NOTE — SUBJECTIVE & OBJECTIVE
Past Medical History:   Diagnosis Date    CKD stage 4, GFR 15-29 ml/min     HLD (hyperlipidemia)     Hypertension     Ischemic cardiomyopathy/Chronic HFrEF s/p CABG and AICD 2017    T2DM (type 2 diabetes mellitus)        Past Surgical History:   Procedure Laterality Date    CORONARY ARTERY BYPASS GRAFT  2017    3 vessel       Review of patient's allergies indicates:  No Known Allergies    Current Facility-Administered Medications   Medication    0.9%  NaCl infusion    aspirin EC tablet 81 mg    atorvastatin tablet 80 mg    dextrose 10 % infusion    dextrose 10 % infusion    DOBUtamine 1000 mg in D5W 250 mL infusion    glucagon (human recombinant) injection 1 mg    glucose chewable tablet 16 g    glucose chewable tablet 24 g    insulin aspart U-100 pen 1-10 Units    insulin aspart U-100 pen 10 Units    insulin detemir U-100 (Levemir) pen 20 Units    levothyroxine tablet 125 mcg    LIDOcaine (PF) 10 mg/ml (1%) injection 100 mg    LIDOcaine 5 % patch 1 patch    mupirocin 2 % ointment    potassium chloride SA CR tablet 20 mEq    sodium bicarbonate 25 mEq in dextrose 5 % (D5W) 1,000 mL Purge Solution for Impella    sodium chloride 0.9% flush 10 mL    sodium chloride 0.9% flush 10 mL    Systemic heparin WITHOUT HEPARIN PURGE - heparin 25,000 units in dextrose 5% 250 ml (100 units/mL) infusion Impella nomogram - [facility]    ticagrelor tablet 90 mg     Family History       Problem Relation (Age of Onset)    Hypertension Mother          Tobacco Use    Smoking status: Former     Current packs/day: 0.00     Types: Cigarettes    Smokeless tobacco: Never   Substance and Sexual Activity    Alcohol use: Yes    Drug use: No    Sexual activity: Not on file     Review of Systems   Constitutional:  Positive for fatigue. Negative for activity change, appetite change, chills and fever.   Respiratory:  Positive for shortness of breath.    Cardiovascular:  Negative for chest pain, palpitations and leg swelling.   Gastrointestinal:  Negative  for abdominal pain, constipation, diarrhea, nausea and vomiting.   Genitourinary:  Negative for difficulty urinating, dysuria and hematuria.   Musculoskeletal:  Negative for back pain, myalgias and neck pain.   Skin:  Negative for rash.   Neurological:  Negative for weakness and headaches.   Psychiatric/Behavioral:  Negative for agitation and confusion.      Objective:     Vital Signs (Most Recent):  Temp: 99.1 °F (37.3 °C) (08/12/23 0100)  Pulse: 109 (08/12/23 0100)  Resp: (!) 22 (08/12/23 0100)  SpO2: 95 % (08/12/23 0100) Vital Signs (24h Range):  Temp:  [97 °F (36.1 °C)-99.1 °F (37.3 °C)] 99.1 °F (37.3 °C)  Pulse:  [100-114] 109  Resp:  [17-38] 22  SpO2:  [85 %-100 %] 95 %  BP: ()/(64-87) 109/85  Arterial Line BP: ()/(67-77) 97/70     Patient Vitals for the past 72 hrs (Last 3 readings):   Weight   08/11/23 2137 117.9 kg (259 lb 14.8 oz)     Body mass index is 33.37 kg/m².      Intake/Output Summary (Last 24 hours) at 8/12/2023 0118  Last data filed at 8/12/2023 0100  Gross per 24 hour   Intake 61.32 ml   Output 650 ml   Net -588.68 ml          Physical Exam  Constitutional:       Appearance: Normal appearance.   HENT:      Head: Normocephalic and atraumatic.      Mouth/Throat:      Mouth: Mucous membranes are moist.   Eyes:      Conjunctiva/sclera: Conjunctivae normal.   Cardiovascular:      Rate and Rhythm: Normal rate and regular rhythm.      Comments: CP impella in right femoral artery, swan in right femoral vein   Pulmonary:      Effort: Pulmonary effort is normal. No respiratory distress.      Comments: Bibasilar crackles noted  Abdominal:      General: There is no distension.      Palpations: Abdomen is soft.      Tenderness: There is no abdominal tenderness. There is no guarding.   Musculoskeletal:      Right lower leg: No edema.      Left lower leg: No edema.      Comments: BLE are warm   Skin:     Capillary Refill: Capillary refill takes less than 2 seconds.      Findings: No rash.    Neurological:      General: No focal deficit present.      Mental Status: He is alert and oriented to person, place, and time.   Psychiatric:         Mood and Affect: Mood normal.            Significant Labs:  CBC:  Recent Labs   Lab 08/11/23 0228 08/11/23 1837 08/11/23 2126   WBC 14.26* 11.56* 11.37   RBC 4.57* 3.91* 3.93*   HGB 13.7* 11.8* 11.5*   HCT 39.6* 33.3* 34.2*    253 250   MCV 86.7 85.2 87   MCH 30.0 30.2 29.3   MCHC 34.6 35.4 33.6     BNP:  Recent Labs   Lab 08/10/23  1856 08/11/23  2126   .7* 956*     CMP:  Recent Labs   Lab 08/11/23 0228 08/11/23 1837 08/11/23 2126   GLU  --   --  183*   CALCIUM 8.6 8.7 8.6*   ALBUMIN 3.1* 2.8* 2.8*   PROT  --   --  6.3   * 134* 137   K 5.2* 4.0 3.9   CO2 19* 23 26   CL  --   --  102   BUN 43.2* 43.8* 41*   CREATININE 2.48* 2.32* 2.2*   ALKPHOS 81 65 68   ALT 29 27 29   * 128* 146*   BILITOT 0.8 0.8 0.8      Coagulation:   Recent Labs   Lab 08/10/23  2025 08/11/23  2126   INR 1.0 1.0   APTT  --  22.8     LDH:  Recent Labs   Lab 08/11/23 2126   *     Microbiology:  Microbiology Results (last 7 days)       Procedure Component Value Units Date/Time    Blood culture [[id number]] Collected: 08/12/23 0042    Order Status: Sent Specimen: Blood from Line, Jugular, Internal Right Updated: 08/12/23 0111    Blood culture [[id number]] Collected: 08/12/23 0103    Order Status: Sent Specimen: Blood from Peripheral, Hand, Left Updated: 08/12/23 0111            I have reviewed all pertinent labs within the past 24 hours.

## 2023-08-12 NOTE — CONSULTS
[facility] - Cardiac Intensive Care  Endocrinology  Diabetes Consult Note    Consult Requested by: Aracelis Cuellar DO   Reason for admit: Ischemic cardiomyopathy    HISTORY OF PRESENT ILLNESS:  Reason for Consult: Management of T2DM, Hyperglycemia     Diabetes diagnosis year: >20 years ago    Home Diabetes Medications:  Farxiga 10 mg QD; Lantus 50 units; Novolog 20 units    How often checking glucose at home?  Dexcom    BG readings on regimen: mid to upper 100s  Hypoglycemia on the regimen?  No  Missed doses on regimen?  No    Diabetes Complications include:     Hyperglycemia    Complicating diabetes co morbidities:   Cardiogenic shock; HTN; HLD      HPI: 52-year-old male with a past medical history of ICM, CABG in 2017 (LIMA to LAD, SVG to OM1, SVG to PDA), DM type II, CKD stage IV (baseline 1.8), and ICD who presented [facility] on 8/10//23 due to n/v and SOB. Work up concerning for NSTEMI with peak trop of 38. Started on ACS protocol with asa, ticagrelor, and heparin gtt. Also noted to have YVES with Cr 2.5, volume overloaded with BNP of 796, LA 2.8>>1.0. TTE with EF drop from 30 to 15%, LVEDD 6.15, moderately reduced RV function. LHC/RHC on 8/12: s/p MIRELLA to prox Cx. RHC showed RA 20, RV 81/21, PA 81/47 (mean 61), PWCP 45, CO/CI: 3.69/1.52 (VIKKI)  3.91/1.6 (TD), therefore, impella CP was placed in in right femoral artery and leave in swan in right femoral vein. He was then transferred to [facility] for higher level of care. Endocrine consulted to manage hyperglycemia and type 2 diabetes.     Lab Results   Component Value Date    HGBA1C 10.2 (H) 08/10/2023                 Interval HPI:   Overnight events: No acute events overnight. Patient in room [id number] A. Blood glucose improving. BG above goal on current insulin regimen (SSI, prandial, and basal insulin ). Steroid use- None.    Renal function- Abnormal - YVES   Vasopressors-  None         Diet diabetic 1500 Calorie; Fluid - 1500mL     Eating:    50%  Nausea: No  Hypoglycemia and intervention: No  Fever: No  TPN and/or TF: No    PMH, PSH, FH, SH updated and reviewed     ROS:  Review of Systems   Constitutional:  Positive for fatigue. Negative for unexpected weight change.   Eyes:  Negative for visual disturbance.   Respiratory:  Positive for shortness of breath. Negative for cough.    Cardiovascular:  Negative for chest pain.   Gastrointestinal:  Negative for nausea and vomiting.   Endocrine: Negative for polydipsia and polyuria.   Musculoskeletal:  Negative for back pain.   Skin:  Negative for rash.   Neurological:  Negative for syncope.   Psychiatric/Behavioral:  Negative for agitation and dysphoric mood.        Current Medications and/or Treatments Impacting Glycemic Control  Immunotherapy:    Immunosuppressants       None          Steroids:   Hormones (From admission, onward)      None          Pressors:    Autonomic Drugs (From admission, onward)      None          Hyperglycemia/Diabetes Medications:   Antihyperglycemics (From admission, onward)      Start     Stop Route Frequency Ordered    08/12/23 0715  insulin aspart U-100 pen 10 Units         -- SubQ 3 times daily with meals 08/11/23 2212 08/11/23 2300  insulin detemir U-100 (Levemir) pen 20 Units         -- SubQ Nightly 08/11/23 2212    08/11/23 2229  insulin aspart U-100 pen 1-10 Units         -- SubQ Before meals & nightly PRN 08/11/23 2130             PHYSICAL EXAMINATION:  Vitals:    08/12/23 0600   BP: 125/72   Pulse: (!) 130   Resp: (!) 30   Temp:      Body mass index is 33.37 kg/m².     Physical Exam  Constitutional:       General: He is not in acute distress.     Appearance: Normal appearance. He is not ill-appearing.   HENT:      Head: Normocephalic and atraumatic.      Right Ear: External ear normal.      Left Ear: External ear normal.      Nose: Nose normal.   Pulmonary:      Effort: No respiratory distress.   Musculoskeletal:         General: No swelling.      Right lower leg: No  "edema.      Left lower leg: No edema.   Skin:     Findings: No erythema.   Neurological:      Mental Status: He is alert.              Labs Reviewed and Include   Recent Labs   Lab 08/12/23  0426   *   CALCIUM 8.5*   ALBUMIN 2.8*   PROT 6.1      K 3.9   CO2 21*      BUN 41*   CREATININE 2.2*   ALKPHOS 68   ALT 26   *   BILITOT 1.1*     Lab Results   Component Value Date    WBC 12.37 08/12/2023    HGB 11.1 (L) 08/12/2023    HCT 32.8 (L) 08/12/2023    MCV 89 08/12/2023     08/12/2023     Recent Labs   Lab 08/10/23  2304   TSH 7.103*     Lab Results   Component Value Date    HGBA1C 10.2 (H) 08/10/2023       Nutritional status:   Body mass index is 33.37 kg/m².  Lab Results   Component Value Date    ALBUMIN 2.8 (L) 08/12/2023    ALBUMIN 2.8 (L) 08/11/2023    ALBUMIN 2.8 (L) 08/11/2023     No results found for: "PREALBUMIN"    Estimated Creatinine Clearance: 53.6 mL/min (A) (based on SCr of 2.2 mg/dL (H)).    Accu-Checks  Recent Labs     08/11/23  0502 08/11/23  0625 08/11/23  0743 08/11/23  0823 08/11/23  0920 08/11/23  1110 08/11/23  1542 08/11/23  2122 08/12/23  0807 08/12/23  1147   POCTGLUCOSE 404* 400* 408* 367* 360* 309* 261* 181* 341* 345*        ASSESSMENT and PLAN    Cardiac/Vascular  * Ischemic cardiomyopathy  Managed per primary team  Avoid hypoglycemia        Renal/  CKD (chronic kidney disease), stage IV  Titrate insulin slowly to avoid hypoglycemia as the risk of hypoglycemia increases with decreased creatinine clearance.    Estimated Creatinine Clearance: 53.6 mL/min (A) (based on SCr of 2.2 mg/dL (H)).        Endocrine  Hypothyroidism  Lab Results   Component Value Date    TSH 7.103 (H) 08/10/2023     Om Synthroid 125 mcg      DM (diabetes mellitus)  BG goal: 140-180   T2DM. On high doses of insulin at home. CKD.  Will increase regimen given remains elevated.  Will place on MDC given high insulin needs.      - -Increase Levemir 30 units daily,   -Increase Novolog 17 " units AC   -Continue Novolog Moderate dose correction with ISF 25 starting at 150    - POCT Glucose before meals, at bedtime and at 2 am  - Hypoglycemia protocol in place      ** Please notify Endocrine for any change and/or advance in diet**  ** Please call Endocrine for any BG related issues **     Discharge Planning:   TBD. Please notify endocrinology prior to discharge.              Plan discussed with patient, family, and RN at bedside.     Niles Zeng PA-C  Endocrinology  [facility] - Cardiac Intensive Care

## 2023-08-12 NOTE — CARE UPDATE
FLIGHT CARE TRANSPORT NOTE     Date of Transport: 2023  : 1970  Age: 52 y.o.  Medication Dosing Weight: 117.9kg  Sex: male  Race:    Unknown  White    MRN: [id number]  Time Of Patient Handoff:     ASSESSMENT/INTERVENTIONS     This patient was transported by [facility] from the ICU of [facility] by [facility]. The patient's overall condition remained unchanged throughout transport, with all vital signs remaining stable per the patient's current baseline. All lines, tubes, and devices remained patent and intact.     LDAs:  Lajas - R. Groin: ~80cm,  Impella - R. Groin: ~85cm,  PIV - 20G. R. AC,  PIV - 20G. L. AC,  Arterial line - R. Radial,  Indwelling bhardwaj catheter.     GTTS:  N/A    Was given 4mg Zofran IVP and 5mg Compazine IVP prior to transport per sending facility for nausea without vomiting.     [facility] care was transitioned to Bedside [facility]  without incident. The patient's Personal Belongings (phone, wallet, phone ) and OSH Chart and Diagnostic Disk(s) were left with receiving clinician.     Vital signs prior to  departure:  HR: 110, ST w/o ectopy,  BP: Automatic: 106/70 (82)  BP: Arterial: 99/68 (78)  SpO2: 96% 4L NC O2  RR: 22  Afebrile    Impella Settings:   R. Groin,  P7,  CDI,  Placement Signal: 117/62 (76),  LV: 123/-7,  Motor Current: 772/639 (704),  Flow: 3.2L/min,  Purge Flow: 16.1ml/hr,  Purge Pressure: 424mmHg    Personally called and updated family, Ozzie (father), [phone], on safe arrival. No questions noted.     Lucia (daughter): [phone].    FOLLOW-UP     Call [facility], Hazel Gallegos RN at [phone] (Adult Team) or [phone] (Pediatric Team) for additional questions or concerns.

## 2023-08-12 NOTE — ASSESSMENT & PLAN NOTE
Acute kidney injury    Likely secondary to cardiogenic shock  Baseline creatinine of 1.8.   Lab Results   Component Value Date    CREATININE 2.2 (H) 08/11/2023    CREATININE 2.32 (H) 08/11/2023    CREATININE 2.48 (H) 08/11/2023        Creatinine improved from 2.5>>2.2   Daily weights, strict I/O and chart, renally dose all medications   Avoid nephrotoxic medications, NSAIDs, ACE/ARB, and IV contrast.

## 2023-08-12 NOTE — PLAN OF CARE
HTS Hemodynamics:   Parameter   at 2000   MAP 71   CVP 11   SvO2 53   CO  6.2   CI 2.5      Mean PA: 55  LYLE: 3.73   Wedge Pressure: 37  Lactic Acid: 1.03     sodium chloride 0.9% 10 mL/hr at 08/12/23 1733    DOBUTamine IV infusion (non-titrating) 5 mcg/kg/min (08/12/23 1733)    EPINEPHrine      sodium bicarbonate 25 mEq in dextrose 5 % (D5W) 1,000 mL Purge Solution for Impella 15.1 mL/hr at 08/12/23 1733    sodium chloride 0.9%      heparin (porcine) in D5W 18 Units/kg/hr (08/12/23 1756)       Current Heart Failure Medications:  - Lasix: s/p IV 80mg  - Dobutamine 5 mcg/kg/min    Current Mechanical Circulatory Support:  Impella: P4  UOP average/hr: 200/hr after shift change    Assessment/Plan:  -@2100 Lasix 40mg IV, 10mg/hr drip started  -Patient making 350/hr urine after drip started. Follow up @ 0300 CVP 8 and SVO2 64  -Continue lasix gtt and if CVP <5, will reduce rate.  -Insulin 10u sliding scale given for Glucose 400s  -Plan was discussed with on-call attending    Phillip Olmos MD  Cardiovascular Disease PGY-4  [facility]

## 2023-08-12 NOTE — PROGRESS NOTES
08/12/2023  Nickie Arellano    Current provider:  Aracelis Cuellar DO    Device interrogation:  TXP LVAD INTERROGATIONS 8/12/2023 8/12/2023 8/12/2023 8/12/2023 8/12/2023 8/12/2023 8/12/2023   Type Impella Impella Impella Impella Impella Impella Impella   Pulsatility Pulse Pulse Pulse Pulse Pulse Pulse Pulse          Rounded on Itz Daniel to ensure all mechanical assist device settings (IABP or VAD) were appropriate and all parameters were within limits.  I was able to ensure all back up equipment was present, the staff had no issues, and the Perfusion Department daily rounding was complete.      For implantable VADs: Interrogation of Ventricular assist device was performed with analysis of device parameters and review of device function. I have personally reviewed the interrogation findings and agree with findings as stated.     In emergency, the nursing units have been notified to contact the perfusion department either by:  Calling [phone] from 630am to 4pm Mon thru Fri, utilizing the On-Call Finder functionality of Epic and searching for Perfusion, or by contacting the hospital  from 4pm to 630am and on weekends and asking to speak with the perfusionist on call.    10:51 AM

## 2023-08-12 NOTE — SUBJECTIVE & OBJECTIVE
Interval HPI:   Overnight events: No acute events overnight. Patient in room [id number] A. Blood glucose improving. BG above goal on current insulin regimen (SSI, prandial, and basal insulin ). Steroid use- None.    Renal function- Abnormal - YVES   Vasopressors-  None         Diet diabetic 1500 Calorie; Fluid - 1500mL     Eatin%  Nausea: No  Hypoglycemia and intervention: No  Fever: No  TPN and/or TF: No    PMH, PSH, FH, SH updated and reviewed     ROS:  Review of Systems   Constitutional:  Positive for fatigue. Negative for unexpected weight change.   Eyes:  Negative for visual disturbance.   Respiratory:  Positive for shortness of breath. Negative for cough.    Cardiovascular:  Negative for chest pain.   Gastrointestinal:  Negative for nausea and vomiting.   Endocrine: Negative for polydipsia and polyuria.   Musculoskeletal:  Negative for back pain.   Skin:  Negative for rash.   Neurological:  Negative for syncope.   Psychiatric/Behavioral:  Negative for agitation and dysphoric mood.        Current Medications and/or Treatments Impacting Glycemic Control  Immunotherapy:    Immunosuppressants       None          Steroids:   Hormones (From admission, onward)      None          Pressors:    Autonomic Drugs (From admission, onward)      None          Hyperglycemia/Diabetes Medications:   Antihyperglycemics (From admission, onward)      Start     Stop Route Frequency Ordered    23 0715  insulin aspart U-100 pen 10 Units         -- SubQ 3 times daily with meals 23 2300  insulin detemir U-100 (Levemir) pen 20 Units         -- SubQ Nightly 23 2212    23 2229  insulin aspart U-100 pen 1-10 Units         -- SubQ Before meals & nightly PRN 23 2130             PHYSICAL EXAMINATION:  Vitals:    23 0600   BP: 125/72   Pulse: (!) 130   Resp: (!) 30   Temp:      Body mass index is 33.37 kg/m².     Physical Exam  Constitutional:       General: He is not in acute  distress.     Appearance: Normal appearance. He is not ill-appearing.   HENT:      Head: Normocephalic and atraumatic.      Right Ear: External ear normal.      Left Ear: External ear normal.      Nose: Nose normal.   Pulmonary:      Effort: No respiratory distress.   Musculoskeletal:         General: No swelling.      Right lower leg: No edema.      Left lower leg: No edema.   Skin:     Findings: No erythema.   Neurological:      Mental Status: He is alert.

## 2023-08-12 NOTE — PROCEDURES
Itz Daniel is a 52 y.o. male patient.    Temp: 99.1 °F (37.3 °C) (08/12/23 0100)  Pulse: 109 (08/12/23 0100)  Resp: (!) 22 (08/12/23 0100)  SpO2: 95 % (08/12/23 0100)  Weight: 117.9 kg (259 lb 14.8 oz) (08/11/23 2137)    Central Line    Date/Time: 8/12/2023 1:36 AM    Performed by: Fransisco Ivan MD  Authorized by: Fransisco Ivan MD    Location procedure was performed:  [facility]  Consent Done ?:  Yes  Time out complete?: Verified correct patient, procedure, equipment, staff, and site/side    Indications:  Hemodialysis, vascular access, hemodynamic monitoring and med administration  Anesthesia:  Local infiltration  Local anesthetic:  Lidocaine 1% without epinephrine  Preparation:  Skin prepped with ChloraPrep  Skin prep agent dried: Skin prep agent completely dried prior to procedure    Sterile barriers: All five maximal sterile barriers used - gloves, gown, cap, mask and large sterile sheet    Hand hygiene: Hand hygiene performed immediately prior to central venous catheter insertion    Location:  Right internal jugular  Catheter type:  Cordis  Catheter size:  8.5 Fr  Ultrasound guidance: Yes    Vessel Caliber:  Medium  Comprressibility:  Normal  Needle advanced into vessel with real time ultrasound guidance.    Guidewire confirmed in vessel.    Steril sheath on probe.    Manometry: Yes    Number of attempts:  1  Securement:  Line sutured, chlorhexidine patch, sterile dressing applied and blood return through all ports  Complications: No    XRay:  Placement verified by x-ray, no pneumothorax on x-ray, tip termination and successful placement  Adverse Events:  None  Other Complications:  8.5 F cordis placed in RIJ, 7 F MAC inserted through cordis   8.5 F cordis placed in RIJ, 7 F MAC inserted through cordisTermination Site: superior vena cava      TXP LVAD INTERROGATIONS 8/12/2023 8/12/2023 8/11/2023 8/11/2023 8/11/2023 8/11/2023 8/11/2023   Type Impella Impella Impella Impella Impella Impella  [place]dalys   Pulsatility Pulse Pulse Pulse Pulse Pulse - -         8/12/2023

## 2023-08-13 PROBLEM — E66.9 CLASS 1 OBESITY WITH BODY MASS INDEX (BMI) OF 33.0 TO 33.9 IN ADULT: Status: ACTIVE | Noted: 2023-01-01

## 2023-08-13 PROBLEM — R57.0 CARDIOGENIC SHOCK: Status: ACTIVE | Noted: 2023-01-01

## 2023-08-13 PROBLEM — Z89.429 TOE AMPUTEE: Status: ACTIVE | Noted: 2023-01-01

## 2023-08-13 PROBLEM — I50.43 ACUTE ON CHRONIC COMBINED SYSTOLIC AND DIASTOLIC HEART FAILURE: Status: ACTIVE | Noted: 2023-01-01

## 2023-08-13 NOTE — PROGRESS NOTES
08/13/2023  Nickie Arellano    Current provider:  Araceils Cuellar DO    Device interrogation:  TXP LVAD INTERROGATIONS 8/13/2023 8/13/2023 8/13/2023 8/13/2023 8/13/2023 8/13/2023 8/13/2023   Type Impella Impella Impella Impella Impella Impella Impella   Pulsatility Pulse Pulse Pulse Pulse Pulse Pulse Pulse          Rounded on Itz Daniel to ensure all mechanical assist device settings (IABP or VAD) were appropriate and all parameters were within limits.  I was able to ensure all back up equipment was present, the staff had no issues, and the Perfusion Department daily rounding was complete.      For implantable VADs: Interrogation of Ventricular assist device was performed with analysis of device parameters and review of device function. I have personally reviewed the interrogation findings and agree with findings as stated.     In emergency, the nursing units have been notified to contact the perfusion department either by:  Calling [phone] from 630am to 4pm Mon thru Fri, utilizing the On-Call Finder functionality of Epic and searching for Perfusion, or by contacting the hospital  from 4pm to 630am and on weekends and asking to speak with the perfusionist on call.    10:57 AM

## 2023-08-13 NOTE — ASSESSMENT & PLAN NOTE
BG goal: 140-180   T2DM. On high doses of insulin at home. CKD.  BG rising despite increasing doses.  Primary team started on insulin drip, but diet not changed.  Will switch to Bariatric CLD as pt should not be on diet while on intensive insulin drip.  Will monitor on drip and likely switch to transition drip when bg stable.    - Continue IIP  - Bariatric CLD  - POCT Glucose every hour  - Hypoglycemia protocol in place      ** Please notify Endocrine for any change and/or advance in diet**  ** Please call Endocrine for any BG related issues **     Discharge Planning:   TBD. Please notify endocrinology prior to discharge.

## 2023-08-13 NOTE — CARE UPDATE
Hemodynamics     8PM    MAP 77  CVP 6  SvO2 65  CO 8.839  CI 3.508  .6      10 PM  MAP 77  CVP 2  SvO2 67  CO 9.375  CI 3.720  .0     cc/hr     Continuous Infusions:   sodium chloride 0.9% 10 mL/hr at 08/13/23 1701    DOBUTamine IV infusion (non-titrating) 5 mcg/kg/min (08/13/23 1701)    furosemide (LASIX) 500 mg in 50 mL infusion (conc: 10 mg/mL) 20 mg/hr (08/13/23 1701)    insulin regular 1 units/mL infusion orderable (DKA) 21 Units/hr (08/13/23 1701)    sodium bicarbonate 25 mEq in dextrose 5 % (D5W) 1,000 mL Purge Solution for Impella 15.1 mL/hr at 08/13/23 1701    sodium chloride 0.9%      heparin (porcine) in D5W 21 Units/kg/hr (08/13/23 1701)       Intake/Output Summary (Last 24 hours) at 8/13/2023 1746  Last data filed at 8/13/2023 1701  Gross per 24 hour   Intake 2881.77 ml   Output 6400 ml   Net -3518.23 ml         Recommendation:   - Decrease lasix gtt to 10   - Continue to monitor UOP   Case discussed with on call attending.    Nell Garnett MD  Cardiology Fellow, PGY4

## 2023-08-13 NOTE — SUBJECTIVE & OBJECTIVE
"Interval HPI:   No acute events overnight. Patient in room [id number] A. Blood glucose worsening. BG above goal on current insulin regimen (IIP). Steroid use- None .      Renal function- Abnormal - Cr 2.3   Vasopressors-  Epinephrine     Diet Clear liquid (no sugar)/Bariatric Fluid - 1500mL     Eating:   <25%  Nausea: No  Hypoglycemia and intervention: No  Fever: No  TPN and/or TF: No    /74 (BP Location: Left arm, Patient Position: Lying)   Pulse (!) 123   Temp 98.1 °F (36.7 °C) (Oral)   Resp (!) 25   Wt 117.9 kg (259 lb 14.8 oz)   SpO2 95%   BMI 33.37 kg/m²     Labs Reviewed and Include    Recent Labs   Lab 08/13/23  0220   *   CALCIUM 8.5*   ALBUMIN 2.7*   PROT 6.5   *   K 4.0   CO2 25   CL 94*   BUN 36*   CREATININE 2.3*   ALKPHOS 73   ALT 24   AST 60*   BILITOT 0.9     Lab Results   Component Value Date    WBC 9.81 08/13/2023    HGB 10.3 (L) 08/13/2023    HCT 31.3 (L) 08/13/2023    MCV 89 08/13/2023     08/13/2023     Recent Labs   Lab 08/10/23  2304   TSH 7.103*     Lab Results   Component Value Date    HGBA1C 10.2 (H) 08/10/2023       Nutritional status:   Body mass index is 33.37 kg/m².  Lab Results   Component Value Date    ALBUMIN 2.7 (L) 08/13/2023    ALBUMIN 2.8 (L) 08/12/2023    ALBUMIN 2.8 (L) 08/12/2023     No results found for: "PREALBUMIN"    Estimated Creatinine Clearance: 51.3 mL/min (A) (based on SCr of 2.3 mg/dL (H)).    Accu-Checks  Recent Labs     08/11/23  0920 08/11/23  1110 08/11/23  1542 08/11/23  2122 08/12/23  0807 08/12/23  1147 08/12/23  1718 08/12/23  2021 08/13/23  0304 08/13/23  0501   POCTGLUCOSE 360* 309* 261* 181* 341* 345* 369* 280* 395* 358*       Current Medications and/or Treatments Impacting Glycemic Control  Immunotherapy:    Immunosuppressants       None          Steroids:   Hormones (From admission, onward)      None          Pressors:    Autonomic Drugs (From admission, onward)      Start     Stop Route Frequency Ordered    08/12/23 " 1200  EPINEPHrine 5 mg in dextrose 5% 250 mL infusion (premix)         -- IV Continuous 08/12/23 1057          Hyperglycemia/Diabetes Medications:   Antihyperglycemics (From admission, onward)      Start     Stop Route Frequency Ordered    08/13/23 0830  insulin regular in 0.9 % NaCl 100 unit/100 mL (1 unit/mL) infusion        Question Answer Comment   Insulin Rate Adjustment (DO NOT MODIFY ANSWER) \\[email or web address] [id number]    Enter initial dose from Infusion Protocol Chart (Units/hr): 5        -- IV Continuous 08/13/23 0719

## 2023-08-13 NOTE — PLAN OF CARE
Cardiac ICU Care Plan    POC reviewed with Itz PRICE Fredy and family. Questions and concerns addressed. See below and flowsheets for full assessment and VS info.   -Impella lowered to 4 this AM. Noted systolic PA pressure in the 70s-80s, notified  MD. MD at bedside. MD ordered SvO2 and stat Lactate. MD raised P level to 5.  - During rounds pt was wedged with attending present, PAWP 22. MD ordered IV push Lasix and to repeat hemodynamic in 2 hours. MD also ordered to have epinephrine on standby incase MAP drops and sustain <65.  - MD at bedside at 1500 to wedge, PAWP 23.  - @1700 MD set impella at P4, ordered SVo2 and Lactate @1900.      Neuro:  [place] Coma Scale  Best Eye Response: 4-->(E4) spontaneous  Best Motor Response: 6-->(M6) obeys commands  Best Verbal Response: 5-->(V5) oriented  [place] Coma Scale Score: 15  Assessment Qualifiers: patient not sedated/intubated  Pupil PERRLA: yes    24 hr Temp:  [98.3 °F (36.8 °C)-99.4 °F (37.4 °C)]      CV:  Rhythm: sinus tachycardia   DVT prophylaxis: VTE Required Core Measure: Pharmacological prophylaxis initiated/maintained    CVP (mean): 8 mmHg (08/12/23 1800)       PAP: 64/36 (08/12/23 1800)       Type: Impella       Pulses  Right Radial Pulse: 2+ (normal)  Left Radial Pulse: 2+ (normal)  Right Dorsalis Pedis Pulse: Doppler  Left Dorsalis Pedis Pulse: 1+ (weak)  Right Posterior Tibial Pulse: Doppler  Left Posterior Tibial Pulse: 1+ (weak)    Resp:  Flow (L/min): 3  Oxygen Concentration (%): 32    GI/:  GI prophylaxis: yes  Diet/Nutrition Received: consistent carb/diabetic diet, low saturated fat/low cholesterol  Last Bowel Movement: 08/09/23  Voiding Characteristics: ureteral catheter       Urethral Catheter 08/12/23 0130 16 Fr.-Reason for Continuing Urinary Catheterization: Critically ill in ICU and requiring hourly monitoring of intake/output   Intake/Output Summary (Last 24 hours) at 8/12/2023 1930  Last data filed at 8/12/2023 1830  Gross per 24 hour    Intake 2397.09 ml   Output 3825 ml   Net -1427.91 ml       Labs/Accuchecks:  Recent Labs   Lab 08/11/23  1837 08/11/23  2126 08/12/23  0426 08/12/23  1343   WBC 11.56* 11.37 12.37  --    RBC 3.91* 3.93* 3.69*  --    HGB 11.8* 11.5* 11.1*  --    HCT 33.3* 34.2* 32.8* 31*    250 216  --       Recent Labs   Lab 08/10/23  2025 08/11/23  2126 08/11/23  2126 08/12/23  0426 08/12/23  0815 08/12/23  1148 08/12/23  1731   INR 1.0 1.0  --  1.0  --   --   --    APTT  --  22.8   < > 28.6 29.3 26.8 28.5    < > = values in this interval not displayed.      Recent Labs     08/12/23  1731   *   K 4.3   CO2 24   CL 98   BUN 40*   CREATININE 2.3*   ALKPHOS 69   ALT 28   AST 82*   BILITOT 0.9       Recent Labs   Lab 08/10/23  2304 08/11/23  0228 08/11/23  1232   CPK  --  1,398*  --    TROPONINI 23.277* 23.035* 38.193*      Recent Labs     08/12/23  0832 08/12/23  1343 08/12/23  1903   PH 7.321* 7.387 7.336*   PCO2 49.6* 47.1* 53.3*   PO2 33* 35* 30*   HCO3 25.6 28.3* 28.5*   POCSATURATED 57* 66* 53*   BE 0 3 3       Electrolytes: N/A - electrolytes WDL  Accuchecks: ACHS and 0200    Gtts/LDAs:   sodium chloride 0.9% 10 mL/hr at 08/12/23 1830    DOBUTamine IV infusion (non-titrating) 5 mcg/kg/min (08/12/23 1830)    EPINEPHrine      sodium bicarbonate 25 mEq in dextrose 5 % (D5W) 1,000 mL Purge Solution for Impella 15.1 mL/hr at 08/12/23 1830    sodium chloride 0.9%      heparin (porcine) in D5W 18 Units/kg/hr (08/12/23 1830)       Lines/Drains/Airways       Central Venous Catheter Line  Duration             Pulmonary Artery Catheter Assessment  08/11/23 1500 Femoral Vein Right 1 day         Percutaneous Central Line Insertion/Assessment - Cordis 08/12/23 0045 Internal Jugular Right <1 day    Percutaneous Central Line Insertion/Assessment - Triple Lumen  08/12/23 0046 Internal Jugular Right <1 day              Drain  Duration                  Sheath 08/11/23 1327 Right 1 day         Urethral Catheter 08/12/23 0130 16 Fr.  <1 day              Arterial Line  Duration             Arterial Line 08/11/23 1800 Right Radial 1 day              Line  Duration                  VAD 08/11/23 1600 1 day              Peripheral Intravenous Line  Duration                  Peripheral IV - Single Lumen 08/11/23 0330 20 G;Other (Comments) Anterior;Right Upper Arm 1 day                    Skin/Wounds  Bathing/Skin Care: bath, complete (08/11/23 2100)  Wounds: No  Wound care consulted: No

## 2023-08-13 NOTE — ASSESSMENT & PLAN NOTE
Lab Results   Component Value Date    HGBA1C 10.2 (H) 08/10/2023     Endocrine following  Now on insulin drip  Diabetic/cardiac diet

## 2023-08-13 NOTE — PROGRESS NOTES
[facility] - Cardiac Intensive Care  Endocrinology  Progress Note    Admit Date: 8/11/2023     Reason for Consult: Management of T2DM, Hyperglycemia     Diabetes diagnosis year: >20 years ago    Home Diabetes Medications:  Farxiga 10 mg QD; Lantus 50 units; Novolog 20 units    How often checking glucose at home?  Dexcom    BG readings on regimen: mid to upper 100s  Hypoglycemia on the regimen?  No  Missed doses on regimen?  No    Diabetes Complications include:     Hyperglycemia    Complicating diabetes co morbidities:   Cardiogenic shock; HTN; HLD      HPI: 52-year-old male with a past medical history of ICM, CABG in 2017 (LIMA to LAD, SVG to OM1, SVG to PDA), DM type II, CKD stage IV (baseline 1.8), and ICD who presented [place] ED on 8/10//23 due to n/v and SOB. Work up concerning for NSTEMI with peak trop of 38. Started on ACS protocol with asa, ticagrelor, and heparin gtt. Also noted to have YVES with Cr 2.5, volume overloaded with BNP of 796, LA 2.8>>1.0. TTE with EF drop from 30 to 15%, LVEDD 6.15, moderately reduced RV function. LHC/RHC on 8/12: s/p MIRELLA to prox Cx. RHC showed RA 20, RV 81/21, PA 81/47 (mean 61), PWCP 45, CO/CI: 3.69/1.52 (VIKKI)  3.91/1.6 (TD), therefore, impella CP was placed in in right femoral artery and leave in swan in right femoral vein. He was then transferred to [facility] for higher level of care. Endocrine consulted to manage hyperglycemia and type 2 diabetes.     Lab Results   Component Value Date    HGBA1C 10.2 (H) 08/10/2023                 Interval HPI:   No acute events overnight. Patient in room [id number] A. Blood glucose worsening. BG above goal on current insulin regimen (IIP). Steroid use- None .      Renal function- Abnormal - Cr 2.3   Vasopressors-  Epinephrine     Diet Clear liquid (no sugar)/Bariatric Fluid - 1500mL     Eating:   <25%  Nausea: No  Hypoglycemia and intervention: No  Fever: No  TPN and/or TF: No    /74 (BP Location: Left arm, Patient Position:  "Lying)   Pulse (!) 123   Temp 98.1 °F (36.7 °C) (Oral)   Resp (!) 25   Wt 117.9 kg (259 lb 14.8 oz)   SpO2 95%   BMI 33.37 kg/m²     Labs Reviewed and Include    Recent Labs   Lab 08/13/23  0220   *   CALCIUM 8.5*   ALBUMIN 2.7*   PROT 6.5   *   K 4.0   CO2 25   CL 94*   BUN 36*   CREATININE 2.3*   ALKPHOS 73   ALT 24   AST 60*   BILITOT 0.9     Lab Results   Component Value Date    WBC 9.81 08/13/2023    HGB 10.3 (L) 08/13/2023    HCT 31.3 (L) 08/13/2023    MCV 89 08/13/2023     08/13/2023     Recent Labs   Lab 08/10/23  2304   TSH 7.103*     Lab Results   Component Value Date    HGBA1C 10.2 (H) 08/10/2023       Nutritional status:   Body mass index is 33.37 kg/m².  Lab Results   Component Value Date    ALBUMIN 2.7 (L) 08/13/2023    ALBUMIN 2.8 (L) 08/12/2023    ALBUMIN 2.8 (L) 08/12/2023     No results found for: "PREALBUMIN"    Estimated Creatinine Clearance: 51.3 mL/min (A) (based on SCr of 2.3 mg/dL (H)).    Accu-Checks  Recent Labs     08/11/23  0920 08/11/23  1110 08/11/23  1542 08/11/23  2122 08/12/23  0807 08/12/23  1147 08/12/23  1718 08/12/23  2021 08/13/23  0304 08/13/23  0501   POCTGLUCOSE 360* 309* 261* 181* 341* 345* 369* 280* 395* 358*       Current Medications and/or Treatments Impacting Glycemic Control  Immunotherapy:    Immunosuppressants       None          Steroids:   Hormones (From admission, onward)      None          Pressors:    Autonomic Drugs (From admission, onward)      Start     Stop Route Frequency Ordered    08/12/23 1200  EPINEPHrine 5 mg in dextrose 5% 250 mL infusion (premix)         -- IV Continuous 08/12/23 1057          Hyperglycemia/Diabetes Medications:   Antihyperglycemics (From admission, onward)      Start     Stop Route Frequency Ordered    08/13/23 0830  insulin regular in 0.9 % NaCl 100 unit/100 mL (1 unit/mL) infusion        Question Answer Comment   Insulin Rate Adjustment (DO NOT MODIFY ANSWER) " [email or web address] [email or web address]    Enter initial dose from Infusion Protocol Chart (Units/hr): 5        -- IV Continuous 08/13/23 0719            ASSESSMENT and PLAN    Cardiac/Vascular  * Ischemic cardiomyopathy  Managed per primary team  Avoid hypoglycemia        Renal/  CKD (chronic kidney disease), stage IV  Titrate insulin slowly to avoid hypoglycemia as the risk of hypoglycemia increases with decreased creatinine clearance.    Estimated Creatinine Clearance: 52.1 mL/min (A) (based on SCr of 2.3 mg/dL (H)).        Endocrine  DM (diabetes mellitus)  BG goal: 140-180   T2DM. On high doses of insulin at home. CKD.  BG rising despite increasing doses.  Primary team started on insulin drip, but diet not changed.  Will switch to Bariatric CLD as pt should not be on diet while on intensive insulin drip.  Will monitor on drip and likely switch to transition drip when bg stable.    - Continue IIP  - Bariatric CLD  - POCT Glucose every hour  - Hypoglycemia protocol in place      ** Please notify Endocrine for any change and/or advance in diet**  ** Please call Endocrine for any BG related issues **     Discharge Planning:   TBD. Please notify endocrinology prior to discharge.              Niles Zeng PA-C  Endocrinology  [facility] - Cardiac Intensive Care

## 2023-08-13 NOTE — ASSESSMENT & PLAN NOTE
Acute kidney injury    Likely secondary to cardiogenic shock  Baseline creatinine of 1.8.   Lab Results   Component Value Date    CREATININE 2.3 (H) 08/13/2023    CREATININE 2.3 (H) 08/13/2023    CREATININE 2.3 (H) 08/12/2023        Creatinine improved from 2.5  Daily weights, strict I/O and chart, renally dose all medications   Avoid nephrotoxic medications, NSAIDs, ACE/ARB, and IV contrast.

## 2023-08-13 NOTE — PLAN OF CARE
Cardiac ICU Care Plan    POC reviewed with Itz C Fredy and family. Questions and concerns addressed. Pt progressing toward goals. Will continue to monitor. See below and flowsheets for full assessment and VS info.       Neuro:  [place] Coma Scale  Best Eye Response: 4-->(E4) spontaneous  Best Motor Response: 6-->(M6) obeys commands  Best Verbal Response: 5-->(V5) oriented  [place] Coma Scale Score: 15  Assessment Qualifiers: patient not sedated/intubated  Pupil PERRLA: yes    24 hr Temp:  [97.8 °F (36.6 °C)-99.4 °F (37.4 °C)]      CV:  Rhythm: sinus tachycardia   DVT prophylaxis: VTE Required Core Measure: Pharmacological prophylaxis initiated/maintained    CVP (mean): (S) 8 mmHg (08/13/23 0405)       PAP: 76/38 (08/13/23 0605)       Type: Impella       Pulses  Right Radial Pulse: 2+ (normal)  Left Radial Pulse: 2+ (normal)  Right Dorsalis Pedis Pulse: Doppler  Left Dorsalis Pedis Pulse: 1+ (weak)  Right Posterior Tibial Pulse: Doppler  Left Posterior Tibial Pulse: 1+ (weak)    Resp:  Flow (L/min): 3  Oxygen Concentration (%): 32    GI/:  GI prophylaxis: yes  Diet/Nutrition Received: consistent carb/diabetic diet, low saturated fat/low cholesterol  Last Bowel Movement: 08/09/23  Voiding Characteristics: ureteral catheter       Urethral Catheter 08/12/23 0130 16 Fr.-Reason for Continuing Urinary Catheterization: Critically ill in ICU and requiring hourly monitoring of intake/output   Intake/Output Summary (Last 24 hours) at 8/13/2023 0652  Last data filed at 8/13/2023 0605  Gross per 24 hour   Intake 2086.88 ml   Output 5975 ml   Net -3888.12 ml        Nutritional Supplement Intake: Quantity none, Type: Boost    Labs/Accuchecks:  Recent Labs   Lab 08/11/23  2126 08/12/23  0426 08/12/23  1343 08/13/23  0220   WBC 11.37 12.37  --  9.81   RBC 3.93* 3.69*  --  3.51*   HGB 11.5* 11.1*  --  10.3*   HCT 34.2* 32.8* 31* 31.3*    216  --  204      Recent Labs   Lab 08/11/23  2126 08/12/23  0426 08/12/23  0815  08/12/23  1148 08/12/23  1731 08/13/23  0005 08/13/23  0220   INR 1.0 1.0  --   --   --   --  1.0   APTT 22.8 28.6   < > 26.8 28.5 34.8*  --     < > = values in this interval not displayed.      Recent Labs     08/13/23  0220   *   K 4.0   CO2 25   CL 94*   BUN 36*   CREATININE 2.3*   ALKPHOS 73   ALT 24   AST 60*   BILITOT 0.9       Recent Labs   Lab 08/10/23  2304 08/11/23  0228 08/11/23  1232   CPK  --  1,398*  --    TROPONINI 23.277* 23.035* 38.193*      Recent Labs     08/12/23  1343 08/12/23  1903 08/13/23  0104   PH 7.387 7.336* 7.381   PCO2 47.1* 53.3* 48.9*   PO2 35* 30* 35*   HCO3 28.3* 28.5* 29.0*   POCSATURATED 66* 53* 64*   BE 3 3 4       Electrolytes: N/A - electrolytes WDL  Accuchecks: ACHS    Gtts/LDAs:   sodium chloride 0.9% 10 mL/hr at 08/13/23 0605    DOBUTamine IV infusion (non-titrating) 5 mcg/kg/min (08/13/23 0605)    EPINEPHrine      furosemide (LASIX) 500 mg in 50 mL infusion (conc: 10 mg/mL) 10 mg/hr (08/13/23 0605)    sodium bicarbonate 25 mEq in dextrose 5 % (D5W) 1,000 mL Purge Solution for Impella 15.1 mL/hr at 08/13/23 0605    sodium chloride 0.9%      heparin (porcine) in D5W 19 Units/kg/hr (08/13/23 0605)       Lines/Drains/Airways       Central Venous Catheter Line  Duration                  Percutaneous Central Line Insertion/Assessment - Cordis 08/12/23 0045 Internal Jugular Right 1 day    Percutaneous Central Line Insertion/Assessment - Triple Lumen  08/12/23 0046 Internal Jugular Right 1 day    Pulmonary Artery Catheter Assessment  08/11/23 1500 Femoral Vein Right 1 day              Drain  Duration                  Sheath 08/11/23 1327 Right 1 day         Urethral Catheter 08/12/23 0130 16 Fr. 1 day              Arterial Line  Duration             Arterial Line 08/11/23 1800 Right Radial 1 day              Line  Duration                  VAD 08/11/23 1600 1 day              Peripheral Intravenous Line  Duration                  Peripheral IV - Single Lumen 08/11/23 0330 20  G;Other (Comments) Anterior;Right Upper Arm 2 days                    Skin/Wounds  Bathing/Skin Care: bath, complete;dressed/undressed;linen changed (08/13/23 0605)  Wounds: No  Wound care consulted: No

## 2023-08-13 NOTE — PLAN OF CARE
Cardiac ICU Care Plan    POC reviewed with Itz Daniel and family. Questions and concerns addressed. No acute events today. Pt progressing toward goals. Will continue to monitor. See below and flowsheets for full assessment and VS info.       Neuro:  [place] Coma Scale  Best Eye Response: 4-->(E4) spontaneous  Best Motor Response: 6-->(M6) obeys commands  Best Verbal Response: 5-->(V5) oriented  [place] Coma Scale Score: 15  Assessment Qualifiers: patient not sedated/intubated  Pupil PERRLA: yes    24 hr Temp:  [97.8 °F (36.6 °C)-98.9 °F (37.2 °C)]      CV:  Rhythm: sinus tachycardia   DVT prophylaxis: VTE Required Core Measure: Pharmacological prophylaxis initiated/maintained    CVP (mean): 8 mmHg (08/13/23 1401)    Pulmonary Artery Catheter Assessment  08/11/23 1500 Femoral Vein Right-Current Insertion Depth (cm): 88 cm (08/13/23 0701)  PAP: 48/23 (08/13/23 1501)  SVO2 (%): 54 % (RIJ TLC) (08/13/23 1401)  CO (L/min): 5 L/min (Joe) (08/13/23 0901)       Type: Impella       Pulses  Right Radial Pulse: 2+ (normal)  Left Radial Pulse: 2+ (normal)  Right Dorsalis Pedis Pulse: 1+ (weak)  Left Dorsalis Pedis Pulse: 1+ (weak)  Right Posterior Tibial Pulse: 1+ (weak)  Left Posterior Tibial Pulse: 1+ (weak)    Resp:  Flow (L/min): 2  Oxygen Concentration (%): 32    GI/:  GI prophylaxis: yes  Diet/Nutrition Received: consistent carb/diabetic diet  Last Bowel Movement: 08/09/23  Voiding Characteristics: urethral catheter (bladder)       Urethral Catheter 08/12/23 0130 16 Fr.-Reason for Continuing Urinary Catheterization: Critically ill in ICU and requiring hourly monitoring of intake/output   Intake/Output Summary (Last 24 hours) at 8/13/2023 1512  Last data filed at 8/13/2023 1503  Gross per 24 hour   Intake 2554.23 ml   Output 6450 ml   Net -3895.77 ml        Nutritional Supplement Intake: Quantity 0, Type:  N/A    Labs/Accuchecks:  Recent Labs   Lab 08/11/23 2126 08/12/23  0426 08/12/23  1343 08/13/23  0220   WBC  11.37 12.37  --  9.81   RBC 3.93* 3.69*  --  3.51*   HGB 11.5* 11.1*  --  10.3*   HCT 34.2* 32.8* 31* 31.3*    216  --  204      Recent Labs   Lab 08/11/23  2126 08/12/23  0426 08/12/23  0815 08/12/23  1731 08/13/23  0005 08/13/23  0220 08/13/23  0644   INR 1.0 1.0  --   --   --  1.0  --    APTT 22.8 28.6   < > 28.5 34.8*  --  35.2*    < > = values in this interval not displayed.      Recent Labs     08/13/23  0220   *   K 4.0   CO2 25   CL 94*   BUN 36*   CREATININE 2.3*   ALKPHOS 73   ALT 24   AST 60*   BILITOT 0.9       Recent Labs   Lab 08/10/23  2304 08/11/23  0228 08/11/23  1232   CPK  --  1,398*  --    TROPONINI 23.277* 23.035* 38.193*      Recent Labs     08/12/23  1343 08/12/23  1903 08/13/23  0104 08/13/23  0801 08/13/23  1358 08/13/23  1410   PH 7.387 7.336* 7.381  --   --   --    PCO2 47.1* 53.3* 48.9*  --   --   --    PO2 35* 30* 35* 28* 22* 29*   HCO3 28.3* 28.5* 29.0*  --   --   --    POCSATURATED 66* 53* 64* 50* 39* 54*   BE 3 3 4  --   --   --        Electrolytes: N/A - electrolytes WDL  Accuchecks: Q1H    Gtts/LDAs:   sodium chloride 0.9% 10 mL/hr at 08/13/23 1501    DOBUTamine IV infusion (non-titrating) 5 mcg/kg/min (08/13/23 1501)    furosemide (LASIX) 500 mg in 50 mL infusion (conc: 10 mg/mL) 20 mg/hr (08/13/23 1501)    insulin regular 1 units/mL infusion orderable (DKA) 18 Units/hr (08/13/23 1502)    sodium bicarbonate 25 mEq in dextrose 5 % (D5W) 1,000 mL Purge Solution for Impella 15.1 mL/hr at 08/13/23 1501    sodium chloride 0.9%      heparin (porcine) in D5W 20 Units/kg/hr (08/13/23 1501)       Lines/Drains/Airways       Central Venous Catheter Line  Duration             Pulmonary Artery Catheter Assessment  08/11/23 1500 Femoral Vein Right 2 days         Percutaneous Central Line Insertion/Assessment - Cordis 08/12/23 0045 Internal Jugular Right 1 day    Percutaneous Central Line Insertion/Assessment - Triple Lumen  08/12/23 0046 Internal Jugular Right 1 day               Drain  Duration                  Sheath 08/11/23 1327 Right 2 days         Urethral Catheter 08/12/23 0130 16 Fr. 1 day              Arterial Line  Duration             Arterial Line 08/11/23 1800 Right Radial 1 day              Line  Duration                  VAD 08/11/23 1600 1 day              Peripheral Intravenous Line  Duration                  Peripheral IV - Single Lumen 08/11/23 0330 20 G;Other (Comments) Anterior;Right Upper Arm 2 days         Peripheral IV - Single Lumen 08/13/23 0921 20 G Left Antecubital <1 day                    Skin/Wounds  Bathing/Skin Care: bath, complete;dressed/undressed;linen changed (08/13/23 0605)  Wounds: No  Wound care consulted: No

## 2023-08-13 NOTE — ASSESSMENT & PLAN NOTE
1 out of 2 blood cultures from outside hospital with Gram-positive cocci resembling Staph  Unclear of contaminant, UA does not appear infectious, no fevers, WBC of 11    Continue ceftriaxone for now  Repeat blood cultures redemonstrated gram-positive cocci in 1/2 bottles  Repeat blood culture pending  No obvious source

## 2023-08-13 NOTE — SUBJECTIVE & OBJECTIVE
History:   P4,  5  CVP: 8  SVO2:  54  Cardiac Output: 7.0  Cardiac Index: 2.8  SVR: 713  PA 73/32  WP 23  LYLE 6.8  CP 1.02    Patient was started on Lasix 10 mg per hour overnight given persistently elevated wedge pressures.  Wedge pressure remains elevated in the low 20s.  Increasing Lasix drip to 20 milligrams/hour.  Continue IP for for now until pressures improved.  Additionally blood glucose remains out of control, started on insulin drip      Continuous Infusions:   sodium chloride 0.9% 10 mL/hr at 08/13/23 1501    DOBUTamine IV infusion (non-titrating) 5 mcg/kg/min (08/13/23 1501)    furosemide (LASIX) 500 mg in 50 mL infusion (conc: 10 mg/mL) 20 mg/hr (08/13/23 1501)    insulin regular 1 units/mL infusion orderable (DKA) 21 Units/hr (08/13/23 1603)    sodium bicarbonate 25 mEq in dextrose 5 % (D5W) 1,000 mL Purge Solution for Impella 15.1 mL/hr at 08/13/23 1501    sodium chloride 0.9%      heparin (porcine) in D5W 21 Units/kg/hr (08/13/23 1531)     Scheduled Meds:   aspirin  81 mg Oral Daily    atorvastatin  80 mg Oral Daily    cefTRIAXone (ROCEPHIN) IVPB  1 g Intravenous Q12H    levothyroxine  125 mcg Oral Before breakfast    LIDOcaine (PF) 10 mg/ml (1%)  10 mL Other Once    LIDOcaine  1 patch Transdermal Q24H    mupirocin   Nasal BID    pantoprazole  40 mg Oral Daily    polyethylene glycol  17 g Oral TID    senna  8.6 mg Oral BID    ticagrelor  90 mg Oral BID     PRN Meds:acetaminophen, dextrose 50%, dextrose 50%, methocarbamoL, oxyCODONE, sodium chloride 0.9%    Review of patient's allergies indicates:  No Known Allergies  Objective:     Vital Signs (Most Recent):  Temp: 98.4 °F (36.9 °C) (08/13/23 1501)  Pulse: (!) 120 (08/13/23 1501)  Resp: (!) 30 (08/13/23 1501)  BP: 114/75 (08/13/23 1501)  SpO2: 99 % (08/13/23 1501) Vital Signs (24h Range):  Temp:  [97.8 °F (36.6 °C)-98.9 °F (37.2 °C)] 98.4 °F (36.9 °C)  Pulse:  [117-125] 120  Resp:  [8-30] 30  SpO2:  [90 %-99 %] 99 %  BP: (104-133)/(65-80)  114/75  Arterial Line BP: ()/(57-87) 97/57     Patient Vitals for the past 72 hrs (Last 3 readings):   Weight   08/13/23 0901 122 kg (268 lb 15.4 oz)   08/11/23 2137 117.9 kg (259 lb 14.8 oz)     Body mass index is 34.53 kg/m².      Intake/Output Summary (Last 24 hours) at 8/13/2023 1612  Last data filed at 8/13/2023 1601  Gross per 24 hour   Intake 2508.14 ml   Output 6300 ml   Net -3791.86 ml       Hemodynamic Parameters:  PAP: (48-79)/(23-38) 48/23  PAP (Mean):  [33 mmHg-55 mmHg] 33 mmHg  CO:  [5 L/min] 5 L/min           Physical Exam  Constitutional:       Appearance: Normal appearance.   HENT:      Head: Normocephalic and atraumatic.      Mouth/Throat:      Mouth: Mucous membranes are moist.   Eyes:      Conjunctiva/sclera: Conjunctivae normal.   Neck:      Comments: [facility] CVC  Cardiovascular:      Rate and Rhythm: Normal rate and regular rhythm.      Comments: CP impella in right femoral artery, swan in right femoral vein   Pulmonary:      Effort: Pulmonary effort is normal. No respiratory distress.      Comments: Bibasilar crackles noted  Abdominal:      General: There is no distension.      Palpations: Abdomen is soft.      Tenderness: There is no abdominal tenderness. There is no guarding.   Musculoskeletal:      Right lower leg: No edema.      Left lower leg: No edema.      Comments: BLE are warm   Skin:     Capillary Refill: Capillary refill takes less than 2 seconds.      Findings: No rash.   Neurological:      General: No focal deficit present.      Mental Status: He is alert and oriented to person, place, and time.   Psychiatric:         Mood and Affect: Mood normal.            Significant Labs:  CBC:  Recent Labs   Lab 08/11/23  2126 08/12/23  0426 08/12/23  1343 08/13/23  0220   WBC 11.37 12.37  --  9.81   RBC 3.93* 3.69*  --  3.51*   HGB 11.5* 11.1*  --  10.3*   HCT 34.2* 32.8* 31* 31.3*    216  --  204   MCV 87 89  --  89   MCH 29.3 30.1  --  29.3   MCHC 33.6 33.8  --  32.9      BNP:  Recent Labs   Lab 08/10/23  1856 08/11/23 2126   .7* 956*     CMP:  Recent Labs   Lab 08/12/23  0426 08/12/23  1731 08/13/23  0220 08/13/23  1412   * 394* 406* 308*   CALCIUM 8.5* 8.8 8.5* 8.4*   ALBUMIN 2.8* 2.8* 2.7*  --    PROT 6.1 6.4 6.5  --     134* 134* 133*   K 3.9 4.3 4.0 3.5   CO2 21* 24 25 25    98 94* 92*   BUN 41* 40* 36* 33*   CREATININE 2.2* 2.3* 2.3* 2.3*   ALKPHOS 68 69 73  --    ALT 26 28 24  --    * 82* 60*  --    BILITOT 1.1* 0.9 0.9  --       Coagulation:   Recent Labs   Lab 08/11/23 2126 08/12/23  0426 08/12/23  0815 08/13/23  0005 08/13/23  0220 08/13/23  0644 08/13/23  1412   INR 1.0 1.0  --   --  1.0  --   --    APTT 22.8 28.6   < > 34.8*  --  35.2* 34.9*    < > = values in this interval not displayed.     LDH:  Recent Labs   Lab 08/11/23 2126 08/12/23 0426 08/13/23  0220   * 1,067* 966*     Microbiology:  Microbiology Results (last 7 days)       Procedure Component Value Units Date/Time    Blood culture [[id number]] Collected: 08/13/23 0920    Order Status: Sent Specimen: Blood from Peripheral, Antecubital, Left Updated: 08/13/23 0932    Blood culture [[id number]] Collected: 08/12/23 0103    Order Status: Completed Specimen: Blood from Peripheral, Hand, Left Updated: 08/13/23 0613     Blood Culture, Routine No Growth to date      No Growth to date    MRSA/SA Rapid ID by PCR from Blood culture [[id number]] Collected: 08/12/23 0042    Order Status: Completed Updated: 08/13/23 0403     Staph aureus ID by PCR Negative     Methicillin Resistant ID by PCR Negative    Blood culture [[id number]] Collected: 08/12/23 0042    Order Status: Completed Specimen: Blood from Line, Jugular, Internal Right Updated: 08/13/23 0257     Blood Culture, Routine Gram stain aer bottle: Gram positive cocci in clusters resembling Staph      Results called to and read back by:Jojo Valdes Rn 08/13/2023  02:56            I have reviewed all pertinent labs within the  past 24 hours.    Estimated Creatinine Clearance: 52.1 mL/min (A) (based on SCr of 2.3 mg/dL (H)).    Diagnostic Results:  I have reviewed all pertinent imaging results/findings within the past 24 hours.

## 2023-08-13 NOTE — ASSESSMENT & PLAN NOTE
Cardiogenic shock    52-year-old male with past medical history of ischemic cardiomyopathy status post CABG in 2017 who presents as a transfer from outside hospital after he presented with an NSTEMI status post revascularization to proximal circumflex.  Associated cardiogenic shock noted from right heart catheterization, CP Impella placed on a 12. 3.4cm from AV to inlet on bedside echo     dobutamine 5  impella P4  Systemic heparin drip with Impella, bicarb for purge  Wedge pressure remains elevated in the low 20s.  Increasing Lasix drip to 20 milligrams/hour.

## 2023-08-13 NOTE — ASSESSMENT & PLAN NOTE
Titrate insulin slowly to avoid hypoglycemia as the risk of hypoglycemia increases with decreased creatinine clearance.    Estimated Creatinine Clearance: 52.1 mL/min (A) (based on SCr of 2.3 mg/dL (H)).

## 2023-08-13 NOTE — PROGRESS NOTES
[facility] - Cardiac Intensive Care  Heart Transplant  Progress Note    Patient Name: Itz Daniel  MRN: [id number]  Admission Date: 8/11/2023  Hospital Length of Stay: 2 days  Attending Physician: Aracelis Cuellar DO  Primary Care Provider: Sunday Boo MD  Principal Problem:Ischemic cardiomyopathy    Subjective:      History:   P4,  5  CVP: 8  SVO2:  54  Cardiac Output: 7.0  Cardiac Index: 2.8  SVR: 713  PA 73/32  WP 23  LYLE 6.8  CP 1.02    Patient was started on Lasix 10 mg per hour overnight given persistently elevated wedge pressures.  Wedge pressure remains elevated in the low 20s.  Increasing Lasix drip to 20 milligrams/hour.  Continue IP for for now until pressures improved.  Additionally blood glucose remains out of control, started on insulin drip      Continuous Infusions:   sodium chloride 0.9% 10 mL/hr at 08/13/23 1501    DOBUTamine IV infusion (non-titrating) 5 mcg/kg/min (08/13/23 1501)    furosemide (LASIX) 500 mg in 50 mL infusion (conc: 10 mg/mL) 20 mg/hr (08/13/23 1501)    insulin regular 1 units/mL infusion orderable (DKA) 21 Units/hr (08/13/23 1603)    sodium bicarbonate 25 mEq in dextrose 5 % (D5W) 1,000 mL Purge Solution for Impella 15.1 mL/hr at 08/13/23 1501    sodium chloride 0.9%      heparin (porcine) in D5W 21 Units/kg/hr (08/13/23 1531)     Scheduled Meds:   aspirin  81 mg Oral Daily    atorvastatin  80 mg Oral Daily    cefTRIAXone (ROCEPHIN) IVPB  1 g Intravenous Q12H    levothyroxine  125 mcg Oral Before breakfast    LIDOcaine (PF) 10 mg/ml (1%)  10 mL Other Once    LIDOcaine  1 patch Transdermal Q24H    mupirocin   Nasal BID    pantoprazole  40 mg Oral Daily    polyethylene glycol  17 g Oral TID    senna  8.6 mg Oral BID    ticagrelor  90 mg Oral BID     PRN Meds:acetaminophen, dextrose 50%, dextrose 50%, methocarbamoL, oxyCODONE, sodium chloride 0.9%    Review of patient's allergies indicates:  No Known Allergies  Objective:     Vital Signs (Most  Recent):  Temp: 98.4 °F (36.9 °C) (08/13/23 1501)  Pulse: (!) 120 (08/13/23 1501)  Resp: (!) 30 (08/13/23 1501)  BP: 114/75 (08/13/23 1501)  SpO2: 99 % (08/13/23 1501) Vital Signs (24h Range):  Temp:  [97.8 °F (36.6 °C)-98.9 °F (37.2 °C)] 98.4 °F (36.9 °C)  Pulse:  [117-125] 120  Resp:  [8-30] 30  SpO2:  [90 %-99 %] 99 %  BP: (104-133)/(65-80) 114/75  Arterial Line BP: ()/(57-87) 97/57     Patient Vitals for the past 72 hrs (Last 3 readings):   Weight   08/13/23 0901 122 kg (268 lb 15.4 oz)   08/11/23 2137 117.9 kg (259 lb 14.8 oz)     Body mass index is 34.53 kg/m².      Intake/Output Summary (Last 24 hours) at 8/13/2023 1612  Last data filed at 8/13/2023 1601  Gross per 24 hour   Intake 2508.14 ml   Output 6300 ml   Net -3791.86 ml       Hemodynamic Parameters:  PAP: (48-79)/(23-38) 48/23  PAP (Mean):  [33 mmHg-55 mmHg] 33 mmHg  CO:  [5 L/min] 5 L/min           Physical Exam  Constitutional:       Appearance: Normal appearance.   HENT:      Head: Normocephalic and atraumatic.      Mouth/Throat:      Mouth: Mucous membranes are moist.   Eyes:      Conjunctiva/sclera: Conjunctivae normal.   Neck:      Comments: [facility] CVC  Cardiovascular:      Rate and Rhythm: Normal rate and regular rhythm.      Comments: CP impella in right femoral artery, swan in right femoral vein   Pulmonary:      Effort: Pulmonary effort is normal. No respiratory distress.      Comments: Bibasilar crackles noted  Abdominal:      General: There is no distension.      Palpations: Abdomen is soft.      Tenderness: There is no abdominal tenderness. There is no guarding.   Musculoskeletal:      Right lower leg: No edema.      Left lower leg: No edema.      Comments: BLE are warm   Skin:     Capillary Refill: Capillary refill takes less than 2 seconds.      Findings: No rash.   Neurological:      General: No focal deficit present.      Mental Status: He is alert and oriented to person, place, and time.   Psychiatric:         Mood and Affect: Mood  normal.            Significant Labs:  CBC:  Recent Labs   Lab 08/11/23 2126 08/12/23 0426 08/12/23  1343 08/13/23  0220   WBC 11.37 12.37  --  9.81   RBC 3.93* 3.69*  --  3.51*   HGB 11.5* 11.1*  --  10.3*   HCT 34.2* 32.8* 31* 31.3*    216  --  204   MCV 87 89  --  89   MCH 29.3 30.1  --  29.3   MCHC 33.6 33.8  --  32.9     BNP:  Recent Labs   Lab 08/10/23  1856 08/11/23 2126   .7* 956*     CMP:  Recent Labs   Lab 08/12/23 0426 08/12/23  1731 08/13/23 0220 08/13/23  1412   * 394* 406* 308*   CALCIUM 8.5* 8.8 8.5* 8.4*   ALBUMIN 2.8* 2.8* 2.7*  --    PROT 6.1 6.4 6.5  --     134* 134* 133*   K 3.9 4.3 4.0 3.5   CO2 21* 24 25 25    98 94* 92*   BUN 41* 40* 36* 33*   CREATININE 2.2* 2.3* 2.3* 2.3*   ALKPHOS 68 69 73  --    ALT 26 28 24  --    * 82* 60*  --    BILITOT 1.1* 0.9 0.9  --       Coagulation:   Recent Labs   Lab 08/11/23 2126 08/12/23 0426 08/12/23  0815 08/13/23  0005 08/13/23  0220 08/13/23  0644 08/13/23  1412   INR 1.0 1.0  --   --  1.0  --   --    APTT 22.8 28.6   < > 34.8*  --  35.2* 34.9*    < > = values in this interval not displayed.     LDH:  Recent Labs   Lab 08/11/23 2126 08/12/23 0426 08/13/23 0220   * 1,067* 966*     Microbiology:  Microbiology Results (last 7 days)       Procedure Component Value Units Date/Time    Blood culture [[id number]] Collected: 08/13/23 0920    Order Status: Sent Specimen: Blood from Peripheral, Antecubital, Left Updated: 08/13/23 0932    Blood culture [[id number]] Collected: 08/12/23 0103    Order Status: Completed Specimen: Blood from Peripheral, Hand, Left Updated: 08/13/23 0613     Blood Culture, Routine No Growth to date      No Growth to date    MRSA/SA Rapid ID by PCR from Blood culture [[id number]] Collected: 08/12/23 0042    Order Status: Completed Updated: 08/13/23 0403     Staph aureus ID by PCR Negative     Methicillin Resistant ID by PCR Negative    Blood culture [[id number]] Collected: 08/12/23 0042     Order Status: Completed Specimen: Blood from Line, Jugular, Internal Right Updated: 08/13/23 0257     Blood Culture, Routine Gram stain aer bottle: Gram positive cocci in clusters resembling Staph      Results called to and read back by:Jojo Valdes Rn 08/13/2023  02:56            I have reviewed all pertinent labs within the past 24 hours.    Estimated Creatinine Clearance: 52.1 mL/min (A) (based on SCr of 2.3 mg/dL (H)).    Diagnostic Results:  I have reviewed all pertinent imaging results/findings within the past 24 hours.    Assessment and Plan:     52-year-old male with a past medical history of ICM, CABG in 2017 (LIMA to LAD, SVG to OM1, SVG to PDA), DM type II, CKD stage IV (baseline 1.8), and ICD who presented [place] ED on 8/10//23 due to n/v and SOB. Work up concerning for NSTEMI with peak trop of 38. Started on ACS protocol with asa, ticagrelor, and heparin gtt. Also noted to have YVES with Cr 2.5, volume overloaded with BNP of 796, LA 2.8>>1.0. TTE with EF drop from 30 to 15%, LVEDD 6.15, moderately reduced RV function. LHC/RHC on 8/12: s/p MIRELLA to prox Cx. RHC showed RA 20, RV 81/21, PA 81/47 (mean 61), PWCP 45, CO/CI: 3.69/1.52 (VIKKI)  3.91/1.6 (TD), therefore, impella CP was placed in in right femoral artery and leave in swan in right femoral vein. He was then transferred to [facility] for higher level of care. Of note, never started on inotropes or pressors. Was receiving metoprolol.     On arrival patient was hemodynamically stable.  Not on any inotropes or pressors.  Impella at P7  Initial hemodynamics  CVP: 9  SVO2:  44  Cardiac Output: 4.9  Cardiac Index: 2.0  SVR: 1250     C on 8/12  Grafts:  SVG-RCA:  Diffuse disease with narrowing of up to 40-50% in the proximal portion  The native PLB and PDA both diffusely diseased.  SVG-OM: Occluded  LIMA-LAD:  Widely patent.  Diffusely diseased native LAD with narrowing of up to 90% in the very apical portion.  Severe native CAD status post PCI of the  proximal circumflex with a 33 x 24 mm Synergy XD stent (post-dilated up to 4.25 mm)      Previous Cardiac Diagnostics:   TTE 7.1.22  The study quality is average.   The left ventricle is mildly enlarged. Global left ventricular systolic function is severely decreased. The left ventricular ejection fraction is 30%.   Mild (1+) mitral regurgitation.  The pulmonary artery systolic pressure is 10 mmHg. The pulmonary artery appears to be normal.     [facility] 5.19.2017  Severe MVCAD as a cause to severe newly diagnosed ischemic congestive heart failure with EF of 20%  Mild MR  Patent L subclavian, & LIMA suitable for bypass graft conduit     BLE Arterial US 10.18.21  The study quality is average.   The arteries of the left lower extremity appear patent with no significant stenosis noted.   Tri-phasic waveforms are obtained throughout the left lower extremity arteries.      Carotid US 10.18.21  The study quality is average.   1-39% stenosis in the proximal right internal carotid artery based on Bluth Criteria.   1-39% stenosis in the proximal left internal carotid artery based on Bluth Criteria.   Less than 50% stenosis throughout the bilateral common carotid arteries.   Antegrade bilateral vertebral artery flow.         * Ischemic cardiomyopathy  Cardiogenic shock    52-year-old male with past medical history of ischemic cardiomyopathy status post CABG in 2017 who presents as a transfer from outside hospital after he presented with an NSTEMI status post revascularization to proximal circumflex.  Associated cardiogenic shock noted from right heart catheterization, CP Impella placed on a 12. 3.4cm from AV to inlet on bedside echo     dobutamine 5  impella P4  Systemic heparin drip with Impella, bicarb for purge  Wedge pressure remains elevated in the low 20s.  Increasing Lasix drip to 20 milligrams/hour.         NSTEMI (non-ST elevated myocardial infarction)  Continue aspirin  Continue ticagrelor  Continue atorvastatin 80    CKD  (chronic kidney disease), stage IV  Acute kidney injury    Likely secondary to cardiogenic shock  Baseline creatinine of 1.8.   Lab Results   Component Value Date    CREATININE 2.3 (H) 08/13/2023    CREATININE 2.3 (H) 08/13/2023    CREATININE 2.3 (H) 08/12/2023        Creatinine improved from 2.5  Daily weights, strict I/O and chart, renally dose all medications   Avoid nephrotoxic medications, NSAIDs, ACE/ARB, and IV contrast.      Bacteremia  1 out of 2 blood cultures from outside hospital with Gram-positive cocci resembling Staph  Unclear of contaminant, UA does not appear infectious, no fevers, WBC of 11    Continue ceftriaxone for now  Repeat blood cultures redemonstrated gram-positive cocci in 1/2 bottles  Repeat blood culture pending  No obvious source    Hypothyroidism  Continue home Synthroid    DM (diabetes mellitus)  Lab Results   Component Value Date    HGBA1C 10.2 (H) 08/10/2023     Endocrine following  Now on insulin drip  Diabetic/cardiac diet          Fransisco Ivan MD  Heart Transplant  [facility] - Cardiac Intensive Care

## 2023-08-14 NOTE — PLAN OF CARE
Cardiac ICU Care Plan    POC reviewed with Itz Daniel and family. Questions and concerns addressed. CVP 3, 2, 3, 3, SvO2 52, 54, 51. Impella weaned from P4-->P3. Lasix gtt D/C, heparin, , and insulin continued. See below and flowsheets for full assessment and VS info.       Neuro:  Ben Coma Scale  Best Eye Response: 4-->(E4) spontaneous  Best Motor Response: 6-->(M6) obeys commands  Best Verbal Response: 5-->(V5) oriented  Ben Coma Scale Score: 15  Assessment Qualifiers: patient not sedated/intubated  Pupil PERRLA: yes    24 hr Temp:  [98.1 °F (36.7 °C)-98.5 °F (36.9 °C)]      CV:  Rhythm: sinus tachycardia   DVT prophylaxis: VTE Required Core Measure: Pharmacological prophylaxis initiated/maintained    CVP (mean): 3 mmHg (08/14/23 1701)    Pulmonary Artery Catheter Assessment  08/11/23 1500 Femoral Vein Right-Current Insertion Depth (cm): (S) 93 cm (adjusted by , MD at bedside) (08/14/23 1802)  PAP: 46/22 (08/14/23 1801)  SVO2 (%): 54 % (08/14/23 1301)  CO (L/min): 5.3 L/min (Joe) (08/13/23 1601)       Type: Impella       Pulses  Right Radial Pulse: 2+ (normal)  Left Radial Pulse: 2+ (normal)  Right Dorsalis Pedis Pulse: Doppler  Left Dorsalis Pedis Pulse: 1+ (weak)  Right Posterior Tibial Pulse: Doppler  Left Posterior Tibial Pulse: Doppler    Resp:  Flow (L/min): 1  Oxygen Concentration (%): 32    GI/:  GI prophylaxis: yes  Diet/Nutrition Received: clear liquid  Last Bowel Movement: 08/09/23  Voiding Characteristics: urethral catheter (bladder)       Urethral Catheter 08/12/23 0130 16 Fr.-Reason for Continuing Urinary Catheterization: Critically ill in ICU and requiring hourly monitoring of intake/output   Intake/Output Summary (Last 24 hours) at 8/14/2023 1836  Last data filed at 8/14/2023 1801  Gross per 24 hour   Intake 2556.08 ml   Output 4980 ml   Net -2423.92 ml          Labs/Accuchecks:  Recent Labs   Lab 08/12/23  0426 08/12/23  1343 08/13/23  0220 08/14/23  0415   WBC 12.37  --   9.81 10.73   RBC 3.69*  --  3.51* 3.71*   HGB 11.1*  --  10.3* 10.9*   HCT 32.8* 31* 31.3* 32.8*     --  204 222      Recent Labs   Lab 08/12/23  0426 08/12/23  0815 08/13/23  0220 08/13/23  0644 08/13/23  2159 08/14/23  0046 08/14/23  0415 08/14/23  1251   INR 1.0  --  1.0  --   --   --  1.0  --    APTT 28.6   < >  --    < > 45.4* 52.2*  --  52.0*    < > = values in this interval not displayed.      Recent Labs     08/14/23  0415 08/14/23  0754 08/14/23  1712   *   < > 134*   K 3.8   < > 3.7   CO2 31*   < > 28   CL 93*   < > 94*   BUN 31*   < > 30*   CREATININE 2.3*   < > 2.1*   ALKPHOS 77  --   --    ALT 28  --   --    AST 50*  --   --    BILITOT 0.8  --   --     < > = values in this interval not displayed.       Recent Labs   Lab 08/10/23  2304 08/11/23  0228 08/11/23  1232   CPK  --  1,398*  --    TROPONINI 23.277* 23.035* 38.193*      Recent Labs     08/14/23  0841 08/14/23  1311 08/14/23  1712   PH 7.421 7.415 7.436   PCO2 52.5* 50.3* 49.5*   PO2 28* 29* 27*   HCO3 34.1* 32.2* 33.3*   POCSATURATED 52* 54* 51*   BE 10 8 9       Electrolytes: Electrolytes replaced  Accuchecks: Q1H    Gtts/LDAs:   sodium chloride 0.9% 10 mL/hr at 08/14/23 1801    DOBUTamine IV infusion (non-titrating) 5 mcg/kg/min (08/14/23 1801)    insulin regular 1 units/mL infusion orderable (DKA) 4 Units/hr (08/14/23 1811)    sodium bicarbonate 25 mEq in dextrose 5 % (D5W) 1,000 mL Purge Solution for Impella 15.1 mL/hr at 08/14/23 1801    sodium chloride 0.9%      heparin (porcine) in D5W 21 Units/kg/hr (08/14/23 1814)       Lines/Drains/Airways       Central Venous Catheter Line  Duration             Pulmonary Artery Catheter Assessment  08/11/23 1500 Femoral Vein Right 3 days         Percutaneous Central Line Insertion/Assessment - Cordis 08/12/23 0045 Internal Jugular Right 2 days    Percutaneous Central Line Insertion/Assessment - Triple Lumen  08/12/23 0046 Internal Jugular Right 2 days              Drain  Duration                   Sheath 08/11/23 1327 Right 3 days         Urethral Catheter 08/12/23 0130 16 Fr. 2 days              Arterial Line  Duration             Arterial Line 08/11/23 1800 Right Radial 3 days              Line  Duration                  VAD 08/11/23 1600 3 days              Peripheral Intravenous Line  Duration                  Peripheral IV - Single Lumen 08/11/23 0330 20 G;Other (Comments) Anterior;Right Upper Arm 3 days         Peripheral IV - Single Lumen 08/13/23 0921 20 G Left Antecubital 1 day                    Skin/Wounds  Bathing/Skin Care: bath, complete;dressed/undressed;linen changed (08/14/23 0605)  Wounds: No  Wound care consulted: No

## 2023-08-14 NOTE — SUBJECTIVE & OBJECTIVE
"Interval HPI:   Overnight events: No acute events overnight. Patient in room [id number] A. Blood glucose improving. BG at goal on current insulin regimen (IIP). Steroid use- None.    Renal function- Abnormal - Creatinine 2.3   Vasopressors-  None       Endocrine will continue to follow and manage insulin orders inpatient.         Diet Clear liquid (no sugar)/Bariatric Fluid - 1500mL     Eating:   <25%  Nausea: No  Hypoglycemia and intervention: No  Fever: No  TPN and/or TF: No    /64 (BP Location: Right arm, Patient Position: Lying)   Pulse (!) 118   Temp 98.1 °F (36.7 °C) (Oral)   Resp 20   Ht 6' 2" (1.88 m)   Wt 122 kg (268 lb 15.4 oz)   SpO2 97%   BMI 34.53 kg/m²     Labs Reviewed and Include    Recent Labs   Lab 08/14/23  0415 08/14/23  0754   *  --    CALCIUM 8.8  --    ALBUMIN 2.7*  --    PROT 6.9  --    * 133*   K 3.8  --    CO2 31*  --    CL 93* 95   BUN 31*  --    CREATININE 2.3*  --    ALKPHOS 77  --    ALT 28  --    AST 50*  --    BILITOT 0.8  --      Lab Results   Component Value Date    WBC 10.73 08/14/2023    HGB 10.9 (L) 08/14/2023    HCT 32.8 (L) 08/14/2023    MCV 88 08/14/2023     08/14/2023     Recent Labs   Lab 08/10/23  2304   TSH 7.103*     Lab Results   Component Value Date    HGBA1C 10.2 (H) 08/10/2023       Nutritional status:   Body mass index is 34.53 kg/m².  Lab Results   Component Value Date    ALBUMIN 2.7 (L) 08/14/2023    ALBUMIN 2.7 (L) 08/13/2023    ALBUMIN 2.8 (L) 08/12/2023     No results found for: "PREALBUMIN"    Estimated Creatinine Clearance: 52.1 mL/min (A) (based on SCr of 2.3 mg/dL (H)).    Accu-Checks  Recent Labs     08/13/23  2204 08/13/23  2308 08/14/23  0014 08/14/23  0103 08/14/23  0219 08/14/23  0336 08/14/23  0518 08/14/23  0628 08/14/23  0735 08/14/23  0833   POCTGLUCOSE 153* 169* 170* 186* 169* 176* 161* 170* 176* 190*       Current Medications and/or Treatments Impacting Glycemic Control  Immunotherapy:    Immunosuppressants  "      None          Steroids:   Hormones (From admission, onward)      None          Pressors:    Autonomic Drugs (From admission, onward)      None          Hyperglycemia/Diabetes Medications:   Antihyperglycemics (From admission, onward)      Start     Stop Route Frequency Ordered    08/13/23 0830  insulin regular in 0.9 % NaCl 100 unit/100 mL (1 unit/mL) infusion        Question Answer Comment   Insulin Rate Adjustment (DO NOT MODIFY ANSWER) [email or web address]    Enter initial dose from Infusion Protocol Chart (Units/hr): 5        -- IV Continuous 08/13/23 0739

## 2023-08-14 NOTE — PLAN OF CARE
Problem: Physical Therapy  Goal: Physical Therapy Goal  Description: Goals to be met by: 23     Patient will increase functional independence with mobility by performin. Supine to sit with Modified Sharp  2. Sit to stand transfer with Modified Sharp  3. Gait  x 250 feet with Supervision .   4. Upper extremity exercise program x10 reps per handout, with supervision    Outcome: Ongoing, Progressing   Evaluation completed and goals remain appropriate. 2023

## 2023-08-14 NOTE — CARE UPDATE
Repeat hemodynamics   2 AM   MAP 77  CVP 2  SvO2 56  CO 7.031  CI 2.790  .3    PA 48/27  WP 18    Recs; Will decrease lasix to 5, continue monitoring on current therapy.

## 2023-08-14 NOTE — PROGRESS NOTES
08/14/2023  Makayla Cunningham    Current provider:  Margoth Mackenzie MD    Device interrogation:  TXP LVAD INTERROGATIONS 8/14/2023 8/14/2023 8/14/2023 8/14/2023 8/14/2023 8/14/2023 8/14/2023   Type Impella Impella Impella Impella Impella Impella Impella   Pulsatility Pulse Pulse Pulse Pulse Pulse Pulse Pulse          Rounded on Itz Daniel to ensure all mechanical assist device settings (IABP or VAD) were appropriate and all parameters were within limits.  I was able to ensure all back up equipment was present, the staff had no issues, and the Perfusion Department daily rounding was complete.      For implantable VADs: Interrogation of Ventricular assist device was performed with analysis of device parameters and review of device function. I have personally reviewed the interrogation findings and agree with findings as stated.     In emergency, the nursing units have been notified to contact the perfusion department either by:  Calling [phone] from 630am to 4pm Mon thru Fri, utilizing the On-Call Finder functionality of Epic and searching for Perfusion, or by contacting the hospital  from 4pm to 630am and on weekends and asking to speak with the perfusionist on call.    1:00 PM

## 2023-08-14 NOTE — PROGRESS NOTES
[facility] - Cardiac Intensive Care  Heart Transplant  Progress Note    Patient Name: Itz Daniel  MRN: [id number]  Admission Date: 8/11/2023  Hospital Length of Stay: 3 days  Attending Physician: Margoth Mackenzie MD  Primary Care Provider: Sunday Boo MD  Principal Problem:Ischemic cardiomyopathy    Subjective:     Interval History:   CVP: 3  SCVO2: 52  Cardiac Output: 6.3  Cardiac Index: 2.5  SVR: 980  PA 65/29  WP 20    Lasix decreased to 5 overnight   Starting hydral/ isosorbid dinitrate 10mg TID    Continuous Infusions:   sodium chloride 0.9% 10 mL/hr at 08/14/23 1401    DOBUTamine IV infusion (non-titrating) 5 mcg/kg/min (08/14/23 1401)    furosemide (LASIX) 500 mg in 50 mL infusion (conc: 10 mg/mL) 5 mg/hr (08/14/23 1401)    insulin regular 1 units/mL infusion orderable (DKA) 11 Units/hr (08/14/23 1401)    sodium bicarbonate 25 mEq in dextrose 5 % (D5W) 1,000 mL Purge Solution for Impella 15.1 mL/hr at 08/14/23 1401    sodium chloride 0.9%      heparin (porcine) in D5W 21 Units/kg/hr (08/14/23 1401)     Scheduled Meds:   aspirin  81 mg Oral Daily    atorvastatin  80 mg Oral Daily    cefTRIAXone (ROCEPHIN) IVPB  1 g Intravenous Q12H    glycerin adult  1 suppository Rectal Once    hydrALAZINE  10 mg Oral Q8H    isosorbide dinitrate  10 mg Oral TID    levothyroxine  125 mcg Oral Before breakfast    LIDOcaine (PF) 10 mg/ml (1%)  10 mL Other Once    LIDOcaine  1 patch Transdermal Q24H    mupirocin   Nasal BID    pantoprazole  40 mg Oral Daily    polyethylene glycol  17 g Oral TID    senna  8.6 mg Oral BID    ticagrelor  90 mg Oral BID     PRN Meds:acetaminophen, dextrose 50%, dextrose 50%, methocarbamoL, oxyCODONE, sodium chloride 0.9%    Review of patient's allergies indicates:  No Known Allergies  Objective:     Vital Signs (Most Recent):  Temp: 98.2 °F (36.8 °C) (08/14/23 1101)  Pulse: (!) 117 (08/14/23 1401)  Resp: 18 (08/14/23 1401)  BP: 113/64 (08/14/23 1101)  SpO2: 100 % (08/14/23  1401) Vital Signs (24h Range):  Temp:  [98.1 °F (36.7 °C)-98.5 °F (36.9 °C)] 98.2 °F (36.8 °C)  Pulse:  [110-123] 117  Resp:  [11-30] 18  SpO2:  [95 %-100 %] 100 %  BP: (111-115)/(63-67) 113/64  Arterial Line BP: ()/() 112/100     Patient Vitals for the past 72 hrs (Last 3 readings):   Weight   08/14/23 1101 121.6 kg (268 lb)   08/13/23 0901 122 kg (268 lb 15.4 oz)   08/11/23 2137 117.9 kg (259 lb 14.8 oz)     Body mass index is 34.41 kg/m².      Intake/Output Summary (Last 24 hours) at 8/14/2023 1501  Last data filed at 8/14/2023 1401  Gross per 24 hour   Intake 2467.15 ml   Output 5275 ml   Net -2807.85 ml       Hemodynamic Parameters:  PAP: (29-73)/(18-34) 41/18  PAP (Mean):  [23 mmHg-50 mmHg] 28 mmHg  CO:  [5.3 L/min] 5.3 L/min         Physical Exam  Constitutional:       Appearance: Normal appearance.   HENT:      Head: Normocephalic and atraumatic.      Mouth/Throat:      Mouth: Mucous membranes are moist.   Eyes:      Conjunctiva/sclera: Conjunctivae normal.   Neck:      Comments: RIJ CVC  Cardiovascular:      Rate and Rhythm: Normal rate and regular rhythm.      Comments: CP impella in right femoral artery, swan in right femoral vein   Pulmonary:      Effort: Pulmonary effort is normal. No respiratory distress.      Comments: Bibasilar crackles noted  Abdominal:      General: There is no distension.      Palpations: Abdomen is soft.      Tenderness: There is no abdominal tenderness. There is no guarding.   Musculoskeletal:      Right lower leg: No edema.      Left lower leg: No edema.      Comments: BLE are warm   Skin:     Capillary Refill: Capillary refill takes less than 2 seconds.      Findings: No rash.   Neurological:      General: No focal deficit present.      Mental Status: He is alert and oriented to person, place, and time.   Psychiatric:         Mood and Affect: Mood normal.            Significant Labs:  CBC:  Recent Labs   Lab 08/12/23  0426 08/12/23  1343 08/13/23  0224  08/14/23 0415   WBC 12.37  --  9.81 10.73   RBC 3.69*  --  3.51* 3.71*   HGB 11.1*  --  10.3* 10.9*   HCT 32.8* 31* 31.3* 32.8*     --  204 222   MCV 89  --  89 88   MCH 30.1  --  29.3 29.4   MCHC 33.8  --  32.9 33.2     BNP:  Recent Labs   Lab 08/10/23  1856 08/11/23  2126   .7* 956*     CMP:  Recent Labs   Lab 08/12/23  1731 08/13/23  0220 08/13/23  1412 08/13/23  1835 08/14/23 0415 08/14/23  0754   * 406*   < > 173* 182* 189*   CALCIUM 8.8 8.5*   < > 8.6* 8.8 8.6*   ALBUMIN 2.8* 2.7*  --   --  2.7*  --    PROT 6.4 6.5  --   --  6.9  --    * 134*   < > 134* 135* 133*   K 4.3 4.0   < > 3.6 3.8 4.0   CO2 24 25   < > 29 31* 25   CL 98 94*   < > 94* 93* 95   BUN 40* 36*   < > 34* 31* 31*   CREATININE 2.3* 2.3*   < > 2.3* 2.3* 2.1*   ALKPHOS 69 73  --   --  77  --    ALT 28 24  --   --  28  --    AST 82* 60*  --   --  50*  --    BILITOT 0.9 0.9  --   --  0.8  --     < > = values in this interval not displayed.      Coagulation:   Recent Labs   Lab 08/12/23  0426 08/12/23  0815 08/13/23  0220 08/13/23  0644 08/13/23  2159 08/14/23  0046 08/14/23 0415 08/14/23  1251   INR 1.0  --  1.0  --   --   --  1.0  --    APTT 28.6   < >  --    < > 45.4* 52.2*  --  52.0*    < > = values in this interval not displayed.     LDH:  Recent Labs   Lab 08/11/23  2126 08/12/23  0426 08/13/23  0220 08/14/23  0415   * 1,067* 966* 930*     Microbiology:  Microbiology Results (last 7 days)       Procedure Component Value Units Date/Time    Blood culture [[id number]] Collected: 08/13/23 0920    Order Status: Completed Specimen: Blood from Peripheral, Antecubital, Left Updated: 08/14/23 1012     Blood Culture, Routine No Growth to date      No Growth to date    Blood culture [[id number]]  (Abnormal) Collected: 08/12/23 0042    Order Status: Completed Specimen: Blood from Line, Jugular, Internal Right Updated: 08/14/23 0901     Blood Culture, Routine Gram stain aer bottle: Gram positive cocci in clusters  resembling Staph      Results called to and read back by:Jojo Valdes Rn 08/13/2023  02:56      COAGULASE-NEGATIVE STAPHYLOCOCCUS SPECIES  Organism is a probable contaminant      Blood culture [[id number]] Collected: 08/12/23 0103    Order Status: Completed Specimen: Blood from Peripheral, Hand, Left Updated: 08/14/23 0613     Blood Culture, Routine No Growth to date      No Growth to date      No Growth to date    MRSA/SA Rapid ID by PCR from Blood culture [[id number]] Collected: 08/12/23 0042    Order Status: Completed Updated: 08/13/23 0403     Staph aureus ID by PCR Negative     Methicillin Resistant ID by PCR Negative                Estimated Creatinine Clearance: 57 mL/min (A) (based on SCr of 2.1 mg/dL (H)).        Assessment and Plan:     52-year-old male with a past medical history of ICM, CABG in 2017 (LIMA to LAD, SVG to OM1, SVG to PDA), DM type II, CKD stage IV (baseline 1.8), and ICD who presented [place] ED on 8/10//23 due to n/v and SOB. Work up concerning for NSTEMI with peak trop of 38. Started on ACS protocol with asa, ticagrelor, and heparin gtt. Also noted to have YVES with Cr 2.5, volume overloaded with BNP of 796, LA 2.8>>1.0. TTE with EF drop from 30 to 15%, LVEDD 6.15, moderately reduced RV function. LHC/RHC on 8/12: s/p MIRELLA to prox Cx. RHC showed RA 20, RV 81/21, PA 81/47 (mean 61), PWCP 45, CO/CI: 3.69/1.52 (VIKKI)  3.91/1.6 (TD), therefore, impella CP was placed in in right femoral artery and leave in swan in right femoral vein. He was then transferred to [facility] for higher level of care. Of note, never started on inotropes or pressors. Was receiving metoprolol.     On arrival patient was hemodynamically stable.  Not on any inotropes or pressors.  Impella at P7  Initial hemodynamics  CVP: 9  SVO2:  44  Cardiac Output: 4.9  Cardiac Index: 2.0  SVR: 1250     LHC on 8/12  Grafts:  SVG-RCA:  Diffuse disease with narrowing of up to 40-50% in the proximal portion  The native PLB and PDA both  diffusely diseased.  SVG-OM: Occluded  LIMA-LAD:  Widely patent.  Diffusely diseased native LAD with narrowing of up to 90% in the very apical portion.  Severe native CAD status post PCI of the proximal circumflex with a 33 x 24 mm Synergy XD stent (post-dilated up to 4.25 mm)      Previous Cardiac Diagnostics:   TTE 7.1.22  The study quality is average.   The left ventricle is mildly enlarged. Global left ventricular systolic function is severely decreased. The left ventricular ejection fraction is 30%.   Mild (1+) mitral regurgitation.  The pulmonary artery systolic pressure is 10 mmHg. The pulmonary artery appears to be normal.     [facility] 5.19.2017  Severe MVCAD as a cause to severe newly diagnosed ischemic congestive heart failure with EF of 20%  Mild MR  Patent L subclavian, & LIMA suitable for bypass graft conduit     BLE Arterial US 10.18.21  The study quality is average.   The arteries of the left lower extremity appear patent with no significant stenosis noted.   Tri-phasic waveforms are obtained throughout the left lower extremity arteries.      Carotid US 10.18.21  The study quality is average.   1-39% stenosis in the proximal right internal carotid artery based on Bluth Criteria.   1-39% stenosis in the proximal left internal carotid artery based on Bluth Criteria.   Less than 50% stenosis throughout the bilateral common carotid arteries.   Antegrade bilateral vertebral artery flow.         * Ischemic cardiomyopathy  Cardiogenic shock    52-year-old male with past medical history of ischemic cardiomyopathy status post CABG in 2017 who presents as a transfer from outside hospital after he presented with an NSTEMI status post revascularization to proximal circumflex.  Associated cardiogenic shock noted from right heart catheterization, CP Impella placed on a 12. 3.4cm from AV to inlet on bedside echo     dobutamine 5  impella P4  Systemic heparin drip with Impella, bicarb for purge  Lasix decreased to 5  mg/hr  Start hydral/ iso 10 TID for afterload reduction         NSTEMI (non-ST elevated myocardial infarction)  Continue aspirin  Continue ticagrelor  Continue atorvastatin 80    CKD (chronic kidney disease), stage IV  Acute kidney injury    Likely secondary to cardiogenic shock  Baseline creatinine of 1.8.   Lab Results   Component Value Date    CREATININE 2.3 (H) 08/13/2023    CREATININE 2.3 (H) 08/13/2023    CREATININE 2.3 (H) 08/12/2023        Creatinine improved from 2.5  Daily weights, strict I/O and chart, renally dose all medications   Avoid nephrotoxic medications, NSAIDs, ACE/ARB, and IV contrast.      Bacteremia  1 out of 2 blood cultures from outside hospital with Gram-positive cocci resembling Staph  Unclear of contaminant, UA does not appear infectious, no fevers, WBC of 11    Continue ceftriaxone for now  Repeat blood cultures redemonstrated gram-positive cocci in 1/2 bottles  Repeat blood culture pending  No obvious source    Hypothyroidism  Continue home Synthroid    DM (diabetes mellitus)  Lab Results   Component Value Date    HGBA1C 10.2 (H) 08/10/2023     Endocrine following  Now on insulin drip  Diabetic/cardiac diet          Fransisco Ivan MD  Heart Transplant  [facility] - Cardiac Intensive Care

## 2023-08-14 NOTE — PLAN OF CARE
Cardiac ICU Care Plan    POC reviewed with Itz ALBERT Daniel and family. Questions and concerns addressed. Pt progressing toward goals. Will continue to monitor. See below and flowsheets for full assessment and VS info.       Neuro:  Independence Coma Scale  Best Eye Response: 4-->(E4) spontaneous  Best Motor Response: 6-->(M6) obeys commands  Best Verbal Response: 5-->(V5) oriented  Independence Coma Scale Score: 15  Assessment Qualifiers: patient not sedated/intubated  Pupil PERRLA: yes    24 hr Temp:  [97.9 °F (36.6 °C)-98.5 °F (36.9 °C)]      CV:  Rhythm: sinus tachycardia   DVT prophylaxis: VTE Required Core Measure: Pharmacological prophylaxis initiated/maintained    CVP (mean): (S) 2 mmHg (08/14/23 0401)    Pulmonary Artery Catheter Assessment  08/11/23 1500 Femoral Vein Right-Current Insertion Depth (cm): 88 cm (08/13/23 0701)  PAP: 62/30 (08/14/23 0605)  SVO2 (%): 54 % (RIJ TLC) (08/13/23 1401)  CO (L/min): 5.3 L/min (Joe) (08/13/23 1601)       Type: Impella       Pulses  Right Radial Pulse: 2+ (normal)  Left Radial Pulse: 2+ (normal)  Right Dorsalis Pedis Pulse: 1+ (weak)  Left Dorsalis Pedis Pulse: 1+ (weak)  Right Posterior Tibial Pulse: 1+ (weak)  Left Posterior Tibial Pulse: 1+ (weak)    Resp:  Flow (L/min): 3  Oxygen Concentration (%): 32    GI/:  GI prophylaxis: yes  Diet/Nutrition Received: consistent carb/diabetic diet  Last Bowel Movement: 08/09/23  Voiding Characteristics: ureteral catheter       Urethral Catheter 08/12/23 0130 16 Fr.-Reason for Continuing Urinary Catheterization: Critically ill in ICU and requiring hourly monitoring of intake/output   Intake/Output Summary (Last 24 hours) at 8/14/2023 0649  Last data filed at 8/14/2023 0605  Gross per 24 hour   Intake 3064.65 ml   Output 6370 ml   Net -3305.35 ml        Nutritional Supplement Intake: Quantity none, Type: Boost    Labs/Accuchecks:  Recent Labs   Lab 08/12/23  0426 08/12/23  1343 08/13/23  0220 08/14/23  0415   WBC 12.37  --  9.81 10.73    RBC 3.69*  --  3.51* 3.71*   HGB 11.1*  --  10.3* 10.9*   HCT 32.8* 31* 31.3* 32.8*     --  204 222      Recent Labs   Lab 08/12/23  0426 08/12/23  0815 08/13/23  0220 08/13/23  0644 08/13/23  1412 08/13/23  2159 08/14/23  0046 08/14/23  0415   INR 1.0  --  1.0  --   --   --   --  1.0   APTT 28.6   < >  --    < > 34.9* 45.4* 52.2*  --     < > = values in this interval not displayed.      Recent Labs     08/14/23  0415   *   K 3.8   CO2 31*   CL 93*   BUN 31*   CREATININE 2.3*   ALKPHOS 77   ALT 28   AST 50*   BILITOT 0.8       Recent Labs   Lab 08/10/23  2304 08/11/23  0228 08/11/23  1232   CPK  --  1,398*  --    TROPONINI 23.277* 23.035* 38.193*      Recent Labs     08/13/23  0104 08/13/23  0801 08/13/23  1410 08/13/23  2028 08/14/23  0242   PH 7.381  --   --  7.417 7.418   PCO2 48.9*  --   --  52.3* 53.5*   PO2 35*   < > 29* 34* 30*   HCO3 29.0*  --   --  33.6* 34.5*   POCSATURATED 64*   < > 54* 65* 56*   BE 4  --   --  9 10    < > = values in this interval not displayed.       Electrolytes: Electrolytes replaced  Accuchecks: ACHS    Gtts/LDAs:   sodium chloride 0.9% 10 mL/hr at 08/14/23 0605    DOBUTamine IV infusion (non-titrating) 5 mcg/kg/min (08/14/23 0605)    furosemide (LASIX) 500 mg in 50 mL infusion (conc: 10 mg/mL) 5 mg/hr (08/14/23 0605)    insulin regular 1 units/mL infusion orderable (DKA) 8 Units/hr (08/14/23 0605)    sodium bicarbonate 25 mEq in dextrose 5 % (D5W) 1,000 mL Purge Solution for Impella 15.1 mL/hr at 08/14/23 0605    sodium chloride 0.9%      heparin (porcine) in D5W 21 Units/kg/hr (08/14/23 0605)       Lines/Drains/Airways       Central Venous Catheter Line  Duration                  Percutaneous Central Line Insertion/Assessment - Cordis 08/12/23 0045 Internal Jugular Right 2 days    Percutaneous Central Line Insertion/Assessment - Triple Lumen  08/12/23 0046 Internal Jugular Right 2 days    Pulmonary Artery Catheter Assessment  08/11/23 1500 Femoral Vein Right 2 days               Drain  Duration                  Sheath 08/11/23 1327 Right 2 days         Urethral Catheter 08/12/23 0130 16 Fr. 2 days              Arterial Line  Duration             Arterial Line 08/11/23 1800 Right Radial 2 days              Line  Duration                  VAD 08/11/23 1600 2 days              Peripheral Intravenous Line  Duration                  Peripheral IV - Single Lumen 08/11/23 0330 20 G;Other (Comments) Anterior;Right Upper Arm 3 days         Peripheral IV - Single Lumen 08/13/23 0921 20 G Left Antecubital <1 day                    Skin/Wounds  Bathing/Skin Care: bath, complete;dressed/undressed;linen changed (08/14/23 0605)  Wounds: No  Wound care consulted: No

## 2023-08-14 NOTE — PT/OT/SLP EVAL
Physical Therapy Evaluation and treatment    Patient Name:  Itz Daniel   MRN:  [id number]    Recommendations:     Discharge Recommendations: home   Discharge Equipment Recommendations: none   Barriers to discharge: None    Assessment:     Itz Daniel is a 52 y.o. male admitted with a medical diagnosis of Ischemic cardiomyopathy.  He presents with the following impairments/functional limitations: impaired endurance, impaired functional mobility, gait instability, decreased lower extremity function pt tolerated treatment well and will benefit from skilled PT 2x/wk to progress physically. Pt should be able to discharge home with no needs when medically stable. Pt frequency can increase with more mobility.     Rehab Prognosis: Good; patient would benefit from acute skilled PT services to address these deficits and reach maximum level of function.    Recent Surgery: * No surgery found *      Plan:     During this hospitalization, patient to be seen 2 x/week to address the identified rehab impairments via gait training, therapeutic activities, therapeutic exercises and progress toward the following goals:    Plan of Care Expires:  09/10/23    Subjective     Chief Complaint: pt had no complaints during treatment.   Patient/Family Comments/goals: to get better and go home.   Pain/Comfort:  Pain Rating 1: 0/10  Pain Rating Post-Intervention 1: 0/10    Patients cultural, spiritual, Orthodox conflicts given the current situation: no    Living Environment:  Pt lives with his 17 and 20 yo sons in 1 story slab. Pt works full-time as [employer]. 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  Prior to admission, patients level of function was Independent .  Equipment used at home: walker, rolling (walk in shower).  DME owned (not currently used): none.      Objective:     Communicated with nurse prior to session.  Patient found supine with telemetry, arterial line, pulse ox (continuous), central line (L femoral impella, CVP)   upon PT entry to room.    General Precautions: Standard, fall (bedrest due to femoral impella)  Orthopedic Precautions:    Braces:    Respiratory Status: Room air    Exams:  Cognitive Exam:  Patient is oriented to Person, Place, Time, and Situation    ROM and MMT not tested due to bedrest.     Functional Mobility:  Not tested due to bed rest with femoral impella.       AM-PAC 6 CLICK MOBILITY  Total Score:18       Treatment & Education:  Pt, pt aunt and pt uncle received verbal instructions in role of PT and POC. All verbally expressed understanding of such.     Pt performed AROM there exer BUE with red theraband x 10 reps. Pt did not perform L bicep curls due to IV placement. Pt was instructed to perform there exer 2x/day.     Patient left supine with all lines intact, call button in reach, and aunt and uncle present.    GOALS:   Multidisciplinary Problems       Physical Therapy Goals          Problem: Physical Therapy    Goal Priority Disciplines Outcome Goal Variances Interventions   Physical Therapy Goal     PT, PT/OT Ongoing, Progressing     Description: Goals to be met by: 23     Patient will increase functional independence with mobility by performin. Supine to sit with Modified Sandoval  2. Sit to stand transfer with Modified Sandoval  3. Gait  x 250 feet with Supervision .   4. Upper extremity exercise program x10 reps per handout, with supervision                         History:     Past Medical History:   Diagnosis Date    CKD stage 4, GFR 15-29 ml/min     HLD (hyperlipidemia)     Hypertension     Ischemic cardiomyopathy/Chronic HFrEF s/p CABG and AICD     T2DM (type 2 diabetes mellitus)        Past Surgical History:   Procedure Laterality Date    CORONARY ARTERY BYPASS GRAFT      3 vessel    CORONARY BYPASS GRAFT ANGIOGRAPHY  2023    Procedure: Bypass graft study;  Surgeon: Michele Hirsch MD;  Location: [facility] CATH LAB;  Service: Cardiology;;    INSERTION OF  INTRAVASCULAR MICROAXIAL BLOOD PUMP N/A 8/11/2023    Procedure: INSERTION, IMPELLA;  Surgeon: Michele Hirsch MD;  Location: [facility] CATH LAB;  Service: Cardiology;  Laterality: N/A;    IVUS, CORONARY  8/11/2023    Procedure: IVUS, Coronary;  Surgeon: Michele Hirsch MD;  Location: [facility] CATH LAB;  Service: Cardiology;;    LEFT HEART CATHETERIZATION N/A 8/11/2023    Procedure: Left heart cath;  Surgeon: Michele Hirsch MD;  Location: [facility] CATH LAB;  Service: Cardiology;  Laterality: N/A;    PERCUTANEOUS CORONARY INTERVENTION, ARTERY N/A 8/11/2023    Procedure: Percutaneous coronary intervention;  Surgeon: Michele Hirsch MD;  Location: [facility] CATH LAB;  Service: Cardiology;  Laterality: N/A;    RIGHT HEART CATHETERIZATION Right 8/11/2023    Procedure: INSERTION, CATHETER, RIGHT HEART;  Surgeon: Michele Hirsch MD;  Location: [facility] CATH LAB;  Service: Cardiology;  Laterality: Right;       Time Tracking:     PT Received On: 08/14/23  PT Start Time: 1346     PT Stop Time: 1412  PT Total Time (min): 26 min     Billable Minutes: Evaluation 8 min  and Therapeutic Exercise 18 min       08/14/2023

## 2023-08-14 NOTE — CARE UPDATE
Lasix dcd, turned to P3  CVP: 3  SVO2:  51  Cardiac Output: 6.1  Cardiac Index: 2.5  SVR: 937  PA 44/19  WP 15  Will reassess and likely increase isordil/hydralazine dose tonight

## 2023-08-14 NOTE — SUBJECTIVE & OBJECTIVE
Interval History:   CVP: 3  SCVO2: 52  Cardiac Output: 6.3  Cardiac Index: 2.5  SVR: 980  PA 65/29  WP 20    Lasix decreased to 5 overnight   Starting hydral/ isosorbid dinitrate 10mg TID    Continuous Infusions:   sodium chloride 0.9% 10 mL/hr at 08/14/23 1401    DOBUTamine IV infusion (non-titrating) 5 mcg/kg/min (08/14/23 1401)    furosemide (LASIX) 500 mg in 50 mL infusion (conc: 10 mg/mL) 5 mg/hr (08/14/23 1401)    insulin regular 1 units/mL infusion orderable (DKA) 11 Units/hr (08/14/23 1401)    sodium bicarbonate 25 mEq in dextrose 5 % (D5W) 1,000 mL Purge Solution for Impella 15.1 mL/hr at 08/14/23 1401    sodium chloride 0.9%      heparin (porcine) in D5W 21 Units/kg/hr (08/14/23 1401)     Scheduled Meds:   aspirin  81 mg Oral Daily    atorvastatin  80 mg Oral Daily    cefTRIAXone (ROCEPHIN) IVPB  1 g Intravenous Q12H    glycerin adult  1 suppository Rectal Once    hydrALAZINE  10 mg Oral Q8H    isosorbide dinitrate  10 mg Oral TID    levothyroxine  125 mcg Oral Before breakfast    LIDOcaine (PF) 10 mg/ml (1%)  10 mL Other Once    LIDOcaine  1 patch Transdermal Q24H    mupirocin   Nasal BID    pantoprazole  40 mg Oral Daily    polyethylene glycol  17 g Oral TID    senna  8.6 mg Oral BID    ticagrelor  90 mg Oral BID     PRN Meds:acetaminophen, dextrose 50%, dextrose 50%, methocarbamoL, oxyCODONE, sodium chloride 0.9%    Review of patient's allergies indicates:  No Known Allergies  Objective:     Vital Signs (Most Recent):  Temp: 98.2 °F (36.8 °C) (08/14/23 1101)  Pulse: (!) 117 (08/14/23 1401)  Resp: 18 (08/14/23 1401)  BP: 113/64 (08/14/23 1101)  SpO2: 100 % (08/14/23 1401) Vital Signs (24h Range):  Temp:  [98.1 °F (36.7 °C)-98.5 °F (36.9 °C)] 98.2 °F (36.8 °C)  Pulse:  [110-123] 117  Resp:  [11-30] 18  SpO2:  [95 %-100 %] 100 %  BP: (111-115)/(63-67) 113/64  Arterial Line BP: ()/() 112/100     Patient Vitals for the past 72 hrs (Last 3 readings):   Weight   08/14/23 1101 121.6 kg (268 lb)    08/13/23 0901 122 kg (268 lb 15.4 oz)   08/11/23 2137 117.9 kg (259 lb 14.8 oz)     Body mass index is 34.41 kg/m².      Intake/Output Summary (Last 24 hours) at 8/14/2023 1501  Last data filed at 8/14/2023 1401  Gross per 24 hour   Intake 2467.15 ml   Output 5275 ml   Net -2807.85 ml       Hemodynamic Parameters:  PAP: (29-73)/(18-34) 41/18  PAP (Mean):  [23 mmHg-50 mmHg] 28 mmHg  CO:  [5.3 L/min] 5.3 L/min         Physical Exam  Constitutional:       Appearance: Normal appearance.   HENT:      Head: Normocephalic and atraumatic.      Mouth/Throat:      Mouth: Mucous membranes are moist.   Eyes:      Conjunctiva/sclera: Conjunctivae normal.   Neck:      Comments: [facility] CVC  Cardiovascular:      Rate and Rhythm: Normal rate and regular rhythm.      Comments: CP impella in right femoral artery, swan in right femoral vein   Pulmonary:      Effort: Pulmonary effort is normal. No respiratory distress.      Comments: Bibasilar crackles noted  Abdominal:      General: There is no distension.      Palpations: Abdomen is soft.      Tenderness: There is no abdominal tenderness. There is no guarding.   Musculoskeletal:      Right lower leg: No edema.      Left lower leg: No edema.      Comments: BLE are warm   Skin:     Capillary Refill: Capillary refill takes less than 2 seconds.      Findings: No rash.   Neurological:      General: No focal deficit present.      Mental Status: He is alert and oriented to person, place, and time.   Psychiatric:         Mood and Affect: Mood normal.            Significant Labs:  CBC:  Recent Labs   Lab 08/12/23  0426 08/12/23  1343 08/13/23  0220 08/14/23  0415   WBC 12.37  --  9.81 10.73   RBC 3.69*  --  3.51* 3.71*   HGB 11.1*  --  10.3* 10.9*   HCT 32.8* 31* 31.3* 32.8*     --  204 222   MCV 89  --  89 88   MCH 30.1  --  29.3 29.4   MCHC 33.8  --  32.9 33.2     BNP:  Recent Labs   Lab 08/10/23  1856 08/11/23  2126   .7* 956*     CMP:  Recent Labs   Lab 08/12/23  1732  08/13/23  0220 08/13/23  1412 08/13/23  1835 08/14/23  0415 08/14/23  0754   * 406*   < > 173* 182* 189*   CALCIUM 8.8 8.5*   < > 8.6* 8.8 8.6*   ALBUMIN 2.8* 2.7*  --   --  2.7*  --    PROT 6.4 6.5  --   --  6.9  --    * 134*   < > 134* 135* 133*   K 4.3 4.0   < > 3.6 3.8 4.0   CO2 24 25   < > 29 31* 25   CL 98 94*   < > 94* 93* 95   BUN 40* 36*   < > 34* 31* 31*   CREATININE 2.3* 2.3*   < > 2.3* 2.3* 2.1*   ALKPHOS 69 73  --   --  77  --    ALT 28 24  --   --  28  --    AST 82* 60*  --   --  50*  --    BILITOT 0.9 0.9  --   --  0.8  --     < > = values in this interval not displayed.      Coagulation:   Recent Labs   Lab 08/12/23  0426 08/12/23  0815 08/13/23  0220 08/13/23  0644 08/13/23  2159 08/14/23  0046 08/14/23  0415 08/14/23  1251   INR 1.0  --  1.0  --   --   --  1.0  --    APTT 28.6   < >  --    < > 45.4* 52.2*  --  52.0*    < > = values in this interval not displayed.     LDH:  Recent Labs   Lab 08/11/23 2126 08/12/23  0426 08/13/23  0220 08/14/23  0415   * 1,067* 966* 930*     Microbiology:  Microbiology Results (last 7 days)       Procedure Component Value Units Date/Time    Blood culture [[id number]] Collected: 08/13/23 0920    Order Status: Completed Specimen: Blood from Peripheral, Antecubital, Left Updated: 08/14/23 1012     Blood Culture, Routine No Growth to date      No Growth to date    Blood culture [[id number]]  (Abnormal) Collected: 08/12/23 0042    Order Status: Completed Specimen: Blood from Line, Jugular, Internal Right Updated: 08/14/23 0901     Blood Culture, Routine Gram stain aer bottle: Gram positive cocci in clusters resembling Staph      Results called to and read back by:Jojo Valdes Rn 08/13/2023  02:56      COAGULASE-NEGATIVE STAPHYLOCOCCUS SPECIES  Organism is a probable contaminant      Blood culture [[id number]] Collected: 08/12/23 0103    Order Status: Completed Specimen: Blood from Peripheral, Hand, Left Updated: 08/14/23 0613     Blood Culture,  Routine No Growth to date      No Growth to date      No Growth to date    MRSA/SA Rapid ID by PCR from Blood culture [[id number]] Collected: 08/12/23 0042    Order Status: Completed Updated: 08/13/23 0403     Staph aureus ID by PCR Negative     Methicillin Resistant ID by PCR Negative                Estimated Creatinine Clearance: 57 mL/min (A) (based on SCr of 2.1 mg/dL (H)).

## 2023-08-14 NOTE — PROGRESS NOTES
[facility] - Cardiac Intensive Care  Endocrinology  Progress Note    Admit Date: 8/11/2023     Reason for Consult: Management of T2DM, Hyperglycemia     Diabetes diagnosis year: >20 years ago    Home Diabetes Medications:  Farxiga 10 mg QD; Lantus 50 units; Novolog 20 units    How often checking glucose at home?  Dexcom    BG readings on regimen: mid to upper 100s  Hypoglycemia on the regimen?  No  Missed doses on regimen?  No    Diabetes Complications include:     Hyperglycemia    Complicating diabetes co morbidities:   Cardiogenic shock; HTN; HLD      HPI: 52-year-old male with a past medical history of ICM, CABG in 2017 (LIMA to LAD, SVG to OM1, SVG to PDA), DM type II, CKD stage IV (baseline 1.8), and ICD who presented [place] ED on 8/10//23 due to n/v and SOB. Work up concerning for NSTEMI with peak trop of 38. Started on ACS protocol with asa, ticagrelor, and heparin gtt. Also noted to have YVES with Cr 2.5, volume overloaded with BNP of 796, LA 2.8>>1.0. TTE with EF drop from 30 to 15%, LVEDD 6.15, moderately reduced RV function. LHC/RHC on 8/12: s/p MIRELLA to prox Cx. RHC showed RA 20, RV 81/21, PA 81/47 (mean 61), PWCP 45, CO/CI: 3.69/1.52 (VIKKI)  3.91/1.6 (TD), therefore, impella CP was placed in in right femoral artery and leave in swan in right femoral vein. He was then transferred to [facility] for higher level of care. Endocrine consulted to manage hyperglycemia and type 2 diabetes.     Lab Results   Component Value Date    HGBA1C 10.2 (H) 08/10/2023                 Interval HPI:   Overnight events: No acute events overnight. Patient in room [id number] A. Blood glucose improving. BG at goal on current insulin regimen (IIP). Steroid use- None.    Renal function- Abnormal - Creatinine 2.3   Vasopressors-  None       Endocrine will continue to follow and manage insulin orders inpatient.         Diet Clear liquid (no sugar)/Bariatric Fluid - 1500mL     Eating:   <25%  Nausea: No  Hypoglycemia and intervention:  "No  Fever: No  TPN and/or TF: No    /64 (BP Location: Right arm, Patient Position: Lying)   Pulse (!) 118   Temp 98.1 °F (36.7 °C) (Oral)   Resp 20   Ht 6' 2" (1.88 m)   Wt 122 kg (268 lb 15.4 oz)   SpO2 97%   BMI 34.53 kg/m²     Labs Reviewed and Include    Recent Labs   Lab 08/14/23  0415 08/14/23  0754   *  --    CALCIUM 8.8  --    ALBUMIN 2.7*  --    PROT 6.9  --    * 133*   K 3.8  --    CO2 31*  --    CL 93* 95   BUN 31*  --    CREATININE 2.3*  --    ALKPHOS 77  --    ALT 28  --    AST 50*  --    BILITOT 0.8  --      Lab Results   Component Value Date    WBC 10.73 08/14/2023    HGB 10.9 (L) 08/14/2023    HCT 32.8 (L) 08/14/2023    MCV 88 08/14/2023     08/14/2023     Recent Labs   Lab 08/10/23  2304   TSH 7.103*     Lab Results   Component Value Date    HGBA1C 10.2 (H) 08/10/2023       Nutritional status:   Body mass index is 34.53 kg/m².  Lab Results   Component Value Date    ALBUMIN 2.7 (L) 08/14/2023    ALBUMIN 2.7 (L) 08/13/2023    ALBUMIN 2.8 (L) 08/12/2023     No results found for: "PREALBUMIN"    Estimated Creatinine Clearance: 52.1 mL/min (A) (based on SCr of 2.3 mg/dL (H)).    Accu-Checks  Recent Labs     08/13/23  2204 08/13/23  2308 08/14/23  0014 08/14/23  0103 08/14/23  0219 08/14/23  0336 08/14/23  0518 08/14/23  0628 08/14/23  0735 08/14/23  0833   POCTGLUCOSE 153* 169* 170* 186* 169* 176* 161* 170* 176* 190*       Current Medications and/or Treatments Impacting Glycemic Control  Immunotherapy:    Immunosuppressants       None          Steroids:   Hormones (From admission, onward)      None          Pressors:    Autonomic Drugs (From admission, onward)      None          Hyperglycemia/Diabetes Medications:   Antihyperglycemics (From admission, onward)      Start     Stop Route Frequency Ordered    08/13/23 0830  insulin regular in 0.9 % NaCl 100 unit/100 mL (1 unit/mL) infusion        Question Answer Comment   Insulin Rate Adjustment (DO NOT MODIFY ANSWER) " [email or web address]    Enter initial dose from Infusion Protocol Chart (Units/hr): 5        -- IV Continuous 08/13/23 0719            ASSESSMENT and PLAN    Cardiac/Vascular  * Ischemic cardiomyopathy  Managed per primary team  Avoid hypoglycemia        Renal/  CKD (chronic kidney disease), stage IV  Titrate insulin slowly to avoid hypoglycemia as the risk of hypoglycemia increases with decreased creatinine clearance.    Estimated Creatinine Clearance: 52.1 mL/min (A) (based on SCr of 2.3 mg/dL (H)).        Endocrine  Hypothyroidism  Lab Results   Component Value Date    TSH 7.103 (H) 08/10/2023     Om Synthroid 125 mcg      DM (diabetes mellitus)  Endocrinology consulted for BG management.   BG goal 140-180      - IIP  - Requires intensive BG monitoring.   - BG checks q1hr  - Hypoglycemia protocol in place    - Notify endocrine if patient becomes hypokalemic (K < 3.3) and/or is not responding to replacement.   - Notify endocrine if patient requiring > 20 units/hr.      Will likely transition later today.     - Transition drip at 8 units/hr with step-down parameters.   - Novolog (aspart) insulin 10-16 Units SQ TIDWM and prn for BG excursions [facility] SSI (150/25).  - BG checks AC/HS/0200  - Hypoglycemia protocol in place    ** Please notify Endocrine for any change and/or advance in diet**  ** Please call Endocrine for any BG related issues **    Discharge Planning:   TBD. Please notify endocrinology prior to discharge.             Héctor Vines, DNP, FNP  Endocrinology  [facility] - Cardiac Intensive Care

## 2023-08-14 NOTE — PROGRESS NOTES
Admit Note     Met with patient to assess needs. Patient is a 52 y.o.  male, admitted for MI (myocardial infarction) [I21.9] per medical record.       Patient admitted on 8/11/2023.  At this time, patient presents as alert and oriented x 4, pleasant, good eye contact, calm, communicative, cooperative, and asking and answering questions appropriately.  At this time, patients caregiver is not present..    Household/Family Systems     Patient resides with patient's  2 sons ages 21 and 17 , at     [address] [place] [address]    Mailing address:   [address]  [place] [address].      Cell [phone]  Father Ozzie Daniel,  [phone]    Support system includes father, sons and 18 y/o dgtr who lives with her mother.  Patient does not have dependents that are need of being cared for.     Patients primary caregiver is self..    During admission, patient's caregiver plans to stay at home.  Confirmed patient and patients caregivers do have access to reliable transportation.    Cognitive Status/Learning     Patient reports reading ability as 12th grade and states patient does not have difficulty with reading, writing, seeing, hearing, comprehension, learning, and memory.  Patient reports patient learns best by hands-on.   Needed: No.   Highest education level: Associate/Bachelor Degree-associates degree    Vocation/Disability   .  Working for Income: yes  If yes, working activity level: Working Full Time  Patient is employed as a .    Adherence     Patient reports a high level of adherence to patients health care regimen.  Adherence counseling and education provided. Patient verbalizes understanding.    Substance Use    Patient reports the following substance usage.    Tobacco:  pt reports he smoked 1ppd for 50 years. Pt stopped when he had open-heart surgery .  Alcohol:  pt reports use is occasional and very rare and pt prefers mixed drinks or beer .  Illicit  Drugs/Non-prescribed Medications: none, patient denies any use.  Patient states clear understanding of the potential impact of substance use.  Substance abstinence/cessation counseling, education and resources provided and reviewed.     Services Utilizing/ADLS    Infusion Service: Prior to admission, patient utilizing? no  Home Health: Prior to admission, patient utilizing?  Pt reports he had home health after his CABG in . Pt believes it was [facility].  DME: Prior to admission, no  Pulmonary/Cardiac Rehab: Prior to admission, no  Dialysis:  Prior to admission, no  Transplant Specialty Pharmacy:  Prior to admission, no.    Prior to admission, patient reports patient was independent with ADLS and was driving.  Patient reports patient is not able to care for self at this time due to compromised medical condition (as documented in medical record) and physical weakness..  Patient indicates a willingness to care for self once medically cleared to do so.    Insurance/Medications    Insured by   Payer/Plan Subscr  Sex Relation Sub. Ins. ID Effective Group Num   1. BLUE CROSS BL* YOGESH RODRIGUEZ ALBERT 1970 Male Self [id number]                                    [address] [id number]      Primary Insurance (for UNOS reporting): Private Insurance  Secondary Insurance (for UNOS reporting): None    Patient reports patient is able to obtain and afford medications at this time and at time of discharge.    Living Will/Healthcare Power of     Patient states patient does not have a LW and/or HCPA.   provided education regarding LW and HCPA and the completion of forms.    Coping/Mental Health    Patient is coping well with the aid of  family members.  Patient denies mental health difficulties. Pt is on impella and states he is tired of being on his back. Support provided.    Discharge Planning    At time of discharge, patient plans to return to patient's home under the care of self.  Patients  daughter will transport patient.  Per rounds today, expected discharge date has not been medically determined at this time. Patient and patients caregiver  verbalize understanding and are involved in treatment planning and discharge process.    Additional Concerns    Patient is being followed for needs, education, resources, information, emotional support, supportive counseling, and for supportive and skilled discharge plan of care.  providing ongoing psychosocial support, education, resources and d/c planning as needed.  SW remains available. Patient denies additional needs and/or concerns at this time. Patient verbalizes understanding and agreement with information reviewed, social work availability, and how to access available resources as needed.

## 2023-08-14 NOTE — ASSESSMENT & PLAN NOTE
Cardiogenic shock    52-year-old male with past medical history of ischemic cardiomyopathy status post CABG in 2017 who presents as a transfer from outside hospital after he presented with an NSTEMI status post revascularization to proximal circumflex.  Associated cardiogenic shock noted from right heart catheterization, CP Impella placed on a 12. 3.4cm from AV to inlet on bedside echo     dobutamine 5  impella P4  Systemic heparin drip with Impella, bicarb for purge  Lasix decreased to 5 mg/hr  Start hydral/ iso 10 TID for afterload reduction

## 2023-08-14 NOTE — ASSESSMENT & PLAN NOTE
Endocrinology consulted for BG management.   BG goal 140-180      - IIP  - Requires intensive BG monitoring.   - BG checks q1hr  - Hypoglycemia protocol in place    - Notify endocrine if patient becomes hypokalemic (K < 3.3) and/or is not responding to replacement.   - Notify endocrine if patient requiring > 20 units/hr.      Will likely transition later today.     - Transition drip at 8 units/hr with step-down parameters.   - Novolog (aspart) insulin 10-16 Units SQ TIDWM and prn for BG excursions [facility] SSI (150/25).  - BG checks AC/HS/0200  - Hypoglycemia protocol in place    ** Please notify Endocrine for any change and/or advance in diet**  ** Please call Endocrine for any BG related issues **    Discharge Planning:   TBD. Please notify endocrinology prior to discharge.

## 2023-08-15 NOTE — ASSESSMENT & PLAN NOTE
Cardiogenic shock    52-year-old male with past medical history of ischemic cardiomyopathy status post CABG in 2017 who presents as a transfer from outside hospital after he presented with an NSTEMI status post revascularization to proximal circumflex.  Associated cardiogenic shock noted from right heart catheterization, CP Impella placed on a 12. 3.4cm from AV to inlet on bedside echo     dobutamine 5  impella P3, IC to remove today and place leave in swan   Systemic heparin drip with Impella, bicarb for purge  Lasix dcd  hydralazine 25 TID / isordil 10 TID for afterload reduction

## 2023-08-15 NOTE — BRIEF OP NOTE
Brief Operative Note:    : Colin Sibley MD     Referring Physician: Rin Luke     All Operators: Surgeon(s):  Vimal Kinney MD Maitas, Oscar, MD Patel, Rajan Amish G, MD     Preoperative Diagnosis: Cardiogenic shock [R57.0]     Postop Diagnosis: Cardiogenic shock [R57.0]    Treatments/Procedures: Procedure(s) (LRB):  Impella, Removal (Right)  INSERTION, CATHETER, SWAN-MARLENI, WITH IMAGING GUIDANCE (Left)  REMOVAL, CATHETER, CENTRAL VENOUS, TUNNELED (N/A)    Access: Right CFA, L IJ     Intervention:     L IJ Carson placed   R CFA Impella removed     Closure device: Perclose, Angioseal     Plan:  - Post cath protocol   - Bed rest x 6 hours     Estimated Blood loss: 20 cc    Specimens removed: Maura Kinney MD  Interventional Cardiology PGY-6

## 2023-08-15 NOTE — HPI
Consult Reason: Revascularization options    53/M with ICM, CABGin 2017 (LIMA to LAD, SVG to OM1, SVG to PDA), DM type II, CKD stage IV (baseline 1.8), and ICD who was admitted for NSTEMI and cardiogenic shock. On 8/10 had SOB with trop to 38, EF drop 30->15%, renal failure. Had LHC/RHC on 8/12 w/ MIRELLA to prox Cx and impella was placed in the R femoral artery. LHC on 8/12 with 50% narrowing of the SVG-RCA, occluded SVG-OM, patent LIMA-LAD, and severe stenosis of proximal circumflex which was stented.    Weaned off pressors and Impella. Currently on  and Caleb. Morales worked for advanced HF options with LVAD.     Hemodynamics:  CVP 12  SvO2 65  CO 14.5  CI 5.7

## 2023-08-15 NOTE — PHYSICIAN QUERY
PT Name: Itz Daniel  MR #: [id number]     DOCUMENTATION CLARIFICATION     CDS/: Caitlin Mccollum RN              Contact information:[email or web address]  This form is a permanent document in the medical record.     Query Date: August 15, 2023    By submitting this query, we are merely seeking further clarification of documentation.  Please utilize your independent clinical judgment when addressing the question(s) below.  The Medical Record contains the following:  Indicators Supporting Clinical Findings Location in Medical Record   x HR         RR          BP        Temp VITAL SIGNS:24 HRS MIN & MAX LAST  Temp  Min: 97 °F (36.1 °C)  Max: 98.5 °F (36.9 °C)   BP  Min: 99/69  Max: 132/93   Pulse  Min: 100  Max: 113   Resp  Min: 17  Max: 38    PN 8/11      HM   x Lactic Acid          Procalcitonin  08/10/23 18:56 08/10/23 20:25 08/11/23 02:28 08/11/23 15:41 08/11/23 17:33 08/11/23 21:26   Lactate, Varun 2.7 (H) 2.8 (H) 2.1 2.3 (H) 1.5 1.0    Lab   x WBC           Bands          CRP     08/10/23 18:56 08/11/23 02:28 08/11/23 18:37 08/11/23 21:26   WBC 11.57 (H) 14.26 (H) 11.56 (H) 11.37    Lab               x Culture(s) BLOOD CULTURE   No Growth At 96 Hours       BLOOD CULTURE   Staphylococcus hominis Abnormal      Staphylococcus warneri Abnormal         Gram Stain Result   Panic     Gram Positive Cocci, probable Staphylococcus    Seen in gram stain of broth only    1 of 1 Aerobic bottle positive               Lab 8/10      Lab 8/10    AMS, Confusion, LOC, etc.     x Organ Dysfunction/Failure  Acute on chronic HFrEF (LVEF 30%)   YVES on CKD (baseline crt unknown, pt reports baseline GFR 20) H&P 8/10       HM     x Bacteremia or Sepsis / Septic NSTEMI, cardiogenic shock, SIRS, staph sepsis      Sirs with leukocytosis-likely stress induced       New changes noted  Blood cultures from admit with possible GPC, BC ID panel-Staph species  Pharmacy consulted for vancomycin Consult 8/11       Nephro    PN 8/11       HM    PN 8/11      HM    Known or Suspected Source of Infection documented      (Failed) Outpatient Treatment     x Medication  Staph sepsis - on Rocephin and Vanc per attending Consult 8/11  Nephro    Treatment      Other          Provider, please clarify conflicting documentation regarding Sepsis/ SIRS  diagnosis or diagnoses associated with above clinical findings.  [   ] Sepsis due to (suspected) organism (please specify): __________     [  x ] SIRS without infectious etiology Sepsis ruled out     [   ] Other Infectious Disease (please specify): _____ sepsis was not mentioned in my note so Why sent a query to confirm if sepsis was present _____         Please document in your progress notes daily for the duration of treatment until resolved and include in your discharge summary.

## 2023-08-15 NOTE — ASSESSMENT & PLAN NOTE
52 year old male with NSTEMI transferred to [facility] for higher level of care.  ID consulted to assess positive blood cultures for coag neg staph.  Blood culture from aug 10 had just 1 out of 4 blood culture bottles positive for 2 different types of coag neg staphylococci.  Repeat blood cultures on aug 12 again has 1 out of 4 blood culture bottles positive for coag neg staph.  Blood cultures from aug 13 are still pending.  Patient is afebrile.  His central line sites look clean.  These blood cultures are contaminants.    Plan    1. No further antibiotics are needed.    2. Re-consult ID as needed.

## 2023-08-15 NOTE — ASSESSMENT & PLAN NOTE
Endocrinology consulted for BG management.   BG goal 140-180     - D/C IIP  - Transition drip at 5 units/hr with step-down parameters. (based on needs while on IIP)  - Novolog (aspart) insulin prn for BG excursions [facility] (180/25).  - BG checks //0200  - Hypoglycemia protocol in place    **Will plan on starting 8-16 units with meals WBD 0.8 units/kg/day (Administer    8    units if patient eats 25-50% of meal, administer   16    units if patient eats > 50% of meal.)    ** Please notify Endocrine for any change and/or advance in diet**  ** Please call Endocrine for any BG related issues **    Discharge Planning:   TBD. Please notify endocrinology prior to discharge.

## 2023-08-15 NOTE — HPI
52 year old male with a history of ischemic cardiomyopathy, CABG in 2017 and AICD placement.  He was admitted to a hospital in [place] with an NSTEMI.  He has been transferred to [facility] for a higher level of care.  ID is consulted to assess blood cultures that have been positive for coag neg staph.  Patient has been afebrile.

## 2023-08-15 NOTE — SUBJECTIVE & OBJECTIVE
"Interval HPI:   Overnight events: No acute events overnight. Patient in room [id number] A. Blood glucose stable. BG at goal on current insulin regimen (IIP). Steroid use- None. Day of Surgery  Renal function- Abnormal - Creatinine 2.2   Vasopressors-  None       Endocrine will continue to follow and manage insulin orders inpatient.         Diet NPO Except for: Medication     Eating:   NPO  Nausea: No  Hypoglycemia and intervention: No  Fever: No  TPN and/or TF: No      /60 (BP Location: Right arm, Patient Position: Lying)   Pulse (!) 112   Temp 98.2 °F (36.8 °C) (Oral)   Resp 18   Ht 6' 2" (1.88 m)   Wt 121.6 kg (268 lb)   SpO2 100%   BMI 34.41 kg/m²     Labs Reviewed and Include    Recent Labs   Lab 08/15/23  0425 08/15/23  0851   * 176*   CALCIUM 8.8 8.7   ALBUMIN 2.6*  --    PROT 6.5  --    * 133*   K 3.7 4.1   CO2 28 24   CL 93* 96   BUN 31* 30*   CREATININE 2.3* 2.2*   ALKPHOS 75  --    ALT 25  --    AST 39  --    BILITOT 0.7  --      Lab Results   Component Value Date    WBC 10.09 08/15/2023    HGB 10.2 (L) 08/15/2023    HCT 31.2 (L) 08/15/2023    MCV 91 08/15/2023     08/15/2023     Recent Labs   Lab 08/10/23  2304   TSH 7.103*     Lab Results   Component Value Date    HGBA1C 10.2 (H) 08/10/2023       Nutritional status:   Body mass index is 34.41 kg/m².  Lab Results   Component Value Date    ALBUMIN 2.6 (L) 08/15/2023    ALBUMIN 2.7 (L) 08/14/2023    ALBUMIN 2.7 (L) 08/13/2023     No results found for: "PREALBUMIN"    Estimated Creatinine Clearance: 54.4 mL/min (A) (based on SCr of 2.2 mg/dL (H)).    Accu-Checks  Recent Labs     08/15/23  0027 08/15/23  0215 08/15/23  0316 08/15/23  0419 08/15/23  0424 08/15/23  0517 08/15/23  0624 08/15/23  0738 08/15/23  0850 08/15/23  1210   POCTGLUCOSE 194* 168* 158* 176* 156* 156* 159* 174* 166* 179*       Current Medications and/or Treatments Impacting Glycemic Control  Immunotherapy:    Immunosuppressants       None      "     Steroids:   Hormones (From admission, onward)      None          Pressors:    Autonomic Drugs (From admission, onward)      None          Hyperglycemia/Diabetes Medications:   Antihyperglycemics (From admission, onward)      Start     Stop Route Frequency Ordered    08/15/23 1015  insulin regular in 0.9 % NaCl 100 unit/100 mL (1 unit/mL) infusion        Question:  Enter initial dose (Units/hr):  Answer:  5    -- IV Continuous 08/15/23 0904    08/15/23 1003  insulin aspart U-100 pen 0-10 Units         -- SubQ As needed (PRN) 08/15/23 0904

## 2023-08-15 NOTE — ASSESSMENT & PLAN NOTE
Titrate insulin slowly to avoid hypoglycemia as the risk of hypoglycemia increases with decreased creatinine clearance.    Estimated Creatinine Clearance: 54.4 mL/min (A) (based on SCr of 2.2 mg/dL (H)).

## 2023-08-15 NOTE — PHYSICIAN QUERY
PT Name: Itz Daniel  MR #: [id number]  DOCUMENTATION CLARIFICATION     CDS/: Caitlin Mccollum RN              Contact information:[email or web address]  This form is a permanent document in the medical record.     Query Date: August 15, 2023    By submitting this query, we are merely seeking further clarification of documentation. Please utilize your independent clinical judgment when addressing the question(s) below.    The Medical Record contains the following:   Indicators Supporting Clinical Findings Location in Medical Record   x CKD or Chronic Kidney (Renal) Failure/Disease      53 y/o consulted for CKD (known to Dr. Leyva with baseline Scr 1.8).    CKD 3b - baseline Scr 1.8   YVES - cardiorenal +/- AMI. Scr was improving this morning, but then had contrast today for LHC, so Scr might worsen over the next few days. No need for HD at this time. Cont supportive care. Avoid further nephrotoxins if possible.      Yves on CKD, stage IV     Consult 8/11       Nephro                  H&P 8/11      BUN/Creatinine                          GFR  08/10/23 18:56 08/11/23 02:28 08/11/23 18:37 08/11/23 21:26   BUN 37.3 (H) 43.2 (H) 43.8 (H) 41 (H)   Creatinine 2.51 (H) 2.48 (H) 2.32 (H) 2.2 (H)   eGFR 30 30 33 35.2 !        Dehydration      Nausea / Vomiting      Dialysis / CRRT      Medication      Treatment      Other Chronic Conditions      Other       Patient with YVES on CKD. Please clarify conflicting documentation regarding  the stage of Chronic Kidney Disease (CKD):    [   ] Chronic Kidney Disease (CKD) stage 3b - Moderately to severely decreased eGFR 30-44   [  x ] Chronic Kidney Disease (CKD) stage 4 - Severely decreased eGFR 15-29   [   ] Other (please specify): ___ CKD stage 4 is already return in my note, do not understand the purpose of this query ______________     Please document in your progress notes daily for the duration of treatment until resolved and include in your discharge  summary.    Reference:     AMY Stevens MD, & TRACI Willett MD, MS. (2020, June 18). Definition and staging of chronic kidney disease in adults ([id number] NIK Pappas MD, ScD & [id number] CHARAN Trejo MD, MSc, Eds.). Retrieved October 21, 2020, from https://www.[employer]/contents/definition-and-staging-of-chronic-kidney-disease-in-adults?search=ckd%20staging&source=search_result&selectedTitle=1~150&usage_type=default&display_rank=1    Form No. 94154

## 2023-08-15 NOTE — CARE UPDATE
[facility] Care Update Note    8PM     Hemodynamics    CVP:  4  SVO2:  54  CO 6.8  CI 2.7  SVR:  933    RAP 4  sPAP 53.00  dPAP 22  mPAP 32  PCWP 16  PVR 2.36  Lizett 7.75  RAP/PCWP 0.25   1.24    I/Os    In 1.704L  Out 1.655L  Net 0.049L  UOP 1.655L      Intake/Output Summary (Last 24 hours) at 8/14/2023 2255  Last data filed at 8/14/2023 2205  Gross per 24 hour   Intake 2435.58 ml   Output 3965 ml   Net -1529.42 ml       Net IO Since Admission: -7,559.17 mL [08/14/23 2255]      Continuous Infusions:      sodium chloride 0.9% 10 mL/hr at 08/14/23 2205    DOBUTamine IV infusion (non-titrating) 5 mcg/kg/min (08/14/23 2205)    insulin regular 1 units/mL infusion orderable (DKA) 4 Units/hr (08/14/23 2205)    sodium bicarbonate 25 mEq in dextrose 5 % (D5W) 1,000 mL Purge Solution for Impella 15.1 mL/hr at 08/14/23 2205    sodium chloride 0.9%      heparin (porcine) in D5W 21 Units/kg/hr (08/14/23 2205)       Mechanical support: Impella CP    Plan:  - Changed next dose of hydralazine tomorrow am to 25mg  - No other changes made, continue current plan of care  - Plan discussed with attending     [facility] Care Update Note    12AM     Hemodynamics    CVP:  3  SVO2:  64  CO 8.8  CI 3.5  SVR:  725    Plan:  - No changes made, continue current plan of care  - Plan discussed with attending     Paul Roach MD  Cardiovascular Disease PGY-IV  [facility]

## 2023-08-15 NOTE — PLAN OF CARE
Cardiac ICU Care Plan    POC reviewed with Itz ALBERT Daniel and family. Questions and concerns addressed. Pt progressing toward goals. Will continue to monitor. See below and flowsheets for full assessment and VS info.       Neuro:  Pillow Coma Scale  Best Eye Response: 4-->(E4) spontaneous  Best Motor Response: 6-->(M6) obeys commands  Best Verbal Response: 5-->(V5) oriented  Pillow Coma Scale Score: 15  Assessment Qualifiers: patient not sedated/intubated  Pupil PERRLA: yes      24 hr Temp:  [97.9 °F (36.6 °C)-98.4 °F (36.9 °C)]      CV:  Rhythm: sinus tachycardia   DVT prophylaxis: VTE Required Core Measure: Pharmacological prophylaxis initiated/maintained    CVP (mean): 3 mmHg (08/15/23 0005)    Pulmonary Artery Catheter Assessment  08/11/23 1500 Femoral Vein Right-Current Insertion Depth (cm): (S) 93 cm (adjusted by , MD at bedside) (08/14/23 1802)  PAP: 62/32 (08/15/23 0605)  SVO2 (%): 54 % (08/14/23 1301)  CO (L/min): 5.3 L/min (Joe) (08/13/23 1601)       Type: Impella       Pulses  Right Radial Pulse: 2+ (normal)  Left Radial Pulse: 2+ (normal)  Right Dorsalis Pedis Pulse: 1+ (weak)  Left Dorsalis Pedis Pulse: 1+ (weak)  Right Posterior Tibial Pulse: 1+ (weak)  Left Posterior Tibial Pulse: 1+ (weak)    Resp:  Flow (L/min): 2  Oxygen Concentration (%): 28    GI/:  GI prophylaxis: no  Diet/Nutrition Received: clear liquid  Last Bowel Movement: 08/09/23  Voiding Characteristics: ureteral catheter       Urethral Catheter 08/12/23 0130 16 Fr.-Reason for Continuing Urinary Catheterization: Critically ill in ICU and requiring hourly monitoring of intake/output   Intake/Output Summary (Last 24 hours) at 8/15/2023 0633  Last data filed at 8/15/2023 0605  Gross per 24 hour   Intake 2419.05 ml   Output 2495 ml   Net -75.95 ml        Nutritional Supplement Intake: Quantity none, Type: Boost    Labs/Accuchecks:  Recent Labs   Lab 08/13/23  0220 08/14/23  0415 08/15/23  0425   WBC 9.81 10.73 10.09   RBC 3.51*  3.71* 3.44*   HGB 10.3* 10.9* 10.2*   HCT 31.3* 32.8* 31.2*    222 225      Recent Labs   Lab 08/13/23  0220 08/13/23  0644 08/14/23  0046 08/14/23  0415 08/14/23  1251 08/15/23  0425 08/15/23  0428   INR 1.0  --   --  1.0  --   --  1.0   APTT  --    < > 52.2*  --  52.0* 58.8*  --     < > = values in this interval not displayed.      Recent Labs     08/15/23  0425   *   K 3.7   CO2 28   CL 93*   BUN 31*   CREATININE 2.3*   ALKPHOS 75   ALT 25   AST 39   BILITOT 0.7       Recent Labs   Lab 08/10/23  2304 08/11/23 0228 08/11/23  1232   CPK  --  1,398*  --    TROPONINI 23.277* 23.035* 38.193*      Recent Labs     08/14/23  1958 08/15/23  0030 08/15/23  0043   PH 7.422 7.438 7.397   PCO2 49.8* 46.1* 51.3*   PO2 29* 38* 34*   HCO3 32.5* 31.2* 31.6*   POCSATURATED 54* 74* 64*   BE 8 7 7       Electrolytes: Electrolytes replaced  Accuchecks: ACHS    Gtts/LDAs:   sodium chloride 0.9% 10 mL/hr at 08/15/23 0605    DOBUTamine IV infusion (non-titrating) 5 mcg/kg/min (08/15/23 0605)    insulin regular 1 units/mL infusion orderable (DKA) 5 Units/hr (08/15/23 0605)    sodium bicarbonate 25 mEq in dextrose 5 % (D5W) 1,000 mL Purge Solution for Impella 15.1 mL/hr at 08/15/23 0605    sodium chloride 0.9%      heparin (porcine) in D5W 19 Units/kg/hr (08/15/23 0605)       Lines/Drains/Airways       Central Venous Catheter Line  Duration                  Percutaneous Central Line Insertion/Assessment - Cordis 08/12/23 0045 Internal Jugular Right 3 days    Percutaneous Central Line Insertion/Assessment - Triple Lumen  08/12/23 0046 Internal Jugular Right 3 days    Pulmonary Artery Catheter Assessment  08/11/23 1500 Femoral Vein Right 3 days              Drain  Duration                  Sheath 08/11/23 1327 Right 3 days         Urethral Catheter 08/12/23 0130 16 Fr. 3 days              Arterial Line  Duration             Arterial Line 08/11/23 1800 Right Radial 3 days              Line  Duration                  VAD  08/11/23 1600 3 days              Peripheral Intravenous Line  Duration                  Peripheral IV - Single Lumen 08/11/23 0330 20 G;Other (Comments) Anterior;Right Upper Arm 4 days         Peripheral IV - Single Lumen 08/13/23 0921 20 G Left Antecubital 1 day                    Skin/Wounds  Bathing/Skin Care: bath, complete;dressed/undressed;linen changed (08/15/23 0501)  Wounds: No  Wound care consulted: No

## 2023-08-15 NOTE — PROGRESS NOTES
[facility]  Heart Transplant  Progress Note    Patient Name: Itz Daniel  MRN: [id number]  Admission Date: 8/11/2023  Hospital Length of Stay: 4 days  Attending Physician: Margoth Mackenzie MD  Primary Care Provider: Sunday Boo MD  Principal Problem:Ischemic cardiomyopathy    Subjective:     Interval History:   CVP: 2  SVO2:  58  Cardiac Output: 8.2  Cardiac Index: 3.3  SVR: 764  PA 40/20  WP 20  CP 1.45    Plan to remove impella today     Continuous Infusions:   sodium chloride 0.9% 10 mL/hr at 08/15/23 1501    DOBUTamine IV infusion (non-titrating) 5 mcg/kg/min (08/15/23 1501)    insulin regular 1 units/mL infusion orderable (TRANSFER) 5 Units/hr (08/15/23 1501)    sodium bicarbonate 25 mEq in dextrose 5 % (D5W) 1,000 mL Purge Solution for Impella 15.1 mL/hr at 08/15/23 1501    sodium chloride 0.9%      heparin (porcine) in D5W Stopped (08/15/23 0846)     Scheduled Meds:   aspirin  81 mg Oral Daily    atorvastatin  80 mg Oral Daily    glycerin adult  1 suppository Rectal Once    hydrALAZINE  25 mg Oral TID    isosorbide dinitrate  10 mg Oral TID    levothyroxine  125 mcg Oral Before breakfast    LIDOcaine (PF) 10 mg/ml (1%)  10 mL Other Once    LIDOcaine  1 patch Transdermal Q24H    mupirocin   Nasal BID    pantoprazole  40 mg Oral Daily    polyethylene glycol  17 g Oral TID    senna  8.6 mg Oral BID    ticagrelor  90 mg Oral BID     PRN Meds:acetaminophen, dextrose 10%, dextrose 10%, glucagon (human recombinant), glucose, glucose, heparin (porcine), insulin aspart U-100, LIDOcaine HCL 10 mg/ml (1%), methocarbamoL, oxyCODONE, oxymetazoline, sodium chloride 0.9%, sodium chloride 0.9%    Review of patient's allergies indicates:  No Known Allergies  Objective:     Vital Signs (Most Recent):  Temp: 98.2 °F (36.8 °C) (08/15/23 1101)  Pulse: 109 (08/15/23 1501)  Resp: 18 (08/15/23 1501)  BP: 106/60 (08/15/23 0005)  SpO2: 100 % (08/15/23 1501) Vital Signs (24h Range):  Temp:  [97.9 °F (36.6 °C)-98.4 °F  (36.9 °C)] 98.2 °F (36.8 °C)  Pulse:  [109-122] 109  Resp:  [11-25] 18  SpO2:  [91 %-100 %] 100 %  BP: ()/(60-86) 106/60  Arterial Line BP: ()/() 130/63     Patient Vitals for the past 72 hrs (Last 3 readings):   Weight   08/14/23 1101 121.6 kg (268 lb)   08/13/23 0901 122 kg (268 lb 15.4 oz)     Body mass index is 34.41 kg/m².      Intake/Output Summary (Last 24 hours) at 8/15/2023 1613  Last data filed at 8/15/2023 1501  Gross per 24 hour   Intake 1321.89 ml   Output 1780 ml   Net -458.11 ml       Hemodynamic Parameters:  PAP: (30-66)/(18-32) 40/19  PAP (Mean):  [22 mmHg-42 mmHg] 28 mmHg  RAP (mean):  [51] 51         Physical Exam  Constitutional:       Appearance: Normal appearance.   HENT:      Head: Normocephalic and atraumatic.      Mouth/Throat:      Mouth: Mucous membranes are moist.   Eyes:      Conjunctiva/sclera: Conjunctivae normal.   Neck:      Comments: [facility] CVC  Cardiovascular:      Rate and Rhythm: Normal rate and regular rhythm.      Comments: CP impella in right femoral artery, swan in right femoral vein   Pulmonary:      Effort: Pulmonary effort is normal. No respiratory distress.      Comments: Bibasilar crackles noted  Abdominal:      General: There is no distension.      Palpations: Abdomen is soft.      Tenderness: There is no abdominal tenderness. There is no guarding.   Musculoskeletal:      Right lower leg: No edema.      Left lower leg: No edema.      Comments: BLE are warm   Skin:     Capillary Refill: Capillary refill takes less than 2 seconds.      Findings: No rash.   Neurological:      General: No focal deficit present.      Mental Status: He is alert and oriented to person, place, and time.   Psychiatric:         Mood and Affect: Mood normal.            Significant Labs:  CBC:  Recent Labs   Lab 08/13/23  0220 08/14/23  0415 08/15/23  0425   WBC 9.81 10.73 10.09   RBC 3.51* 3.71* 3.44*   HGB 10.3* 10.9* 10.2*   HCT 31.3* 32.8* 31.2*    222 225   MCV 89 88 91    MCH 29.3 29.4 29.7   MCHC 32.9 33.2 32.7     BNP:  Recent Labs   Lab 08/10/23  1856 08/11/23  2126   .7* 956*     CMP:  Recent Labs   Lab 08/13/23 0220 08/13/23  1412 08/14/23  0415 08/14/23  0754 08/14/23  1712 08/15/23  0425 08/15/23  0851   *   < > 182*   < > 129* 163* 176*   CALCIUM 8.5*   < > 8.8   < > 8.6* 8.8 8.7   ALBUMIN 2.7*  --  2.7*  --   --  2.6*  --    PROT 6.5  --  6.9  --   --  6.5  --    *   < > 135*   < > 134* 133* 133*   K 4.0   < > 3.8   < > 3.7 3.7 4.1   CO2 25   < > 31*   < > 28 28 24   CL 94*   < > 93*   < > 94* 93* 96   BUN 36*   < > 31*   < > 30* 31* 30*   CREATININE 2.3*   < > 2.3*   < > 2.1* 2.3* 2.2*   ALKPHOS 73  --  77  --   --  75  --    ALT 24  --  28  --   --  25  --    AST 60*  --  50*  --   --  39  --    BILITOT 0.9  --  0.8  --   --  0.7  --     < > = values in this interval not displayed.      Coagulation:   Recent Labs   Lab 08/13/23 0220 08/13/23  0644 08/14/23 0415 08/14/23  1251 08/15/23  0425 08/15/23  0428 08/15/23  1211   INR 1.0  --  1.0  --   --  1.0  --    APTT  --    < >  --  52.0* 58.8*  --  47.5*    < > = values in this interval not displayed.     LDH:  Recent Labs   Lab 08/13/23 0220 08/14/23 0415 08/15/23  0425   * 930* 756*     Microbiology:  Microbiology Results (last 7 days)       Procedure Component Value Units Date/Time    Blood culture [[id number]] Collected: 08/13/23 0920    Order Status: Completed Specimen: Blood from Peripheral, Antecubital, Left Updated: 08/15/23 1012     Blood Culture, Routine No Growth to date      No Growth to date      No Growth to date    Blood culture [[id number]]  (Abnormal) Collected: 08/12/23 0042    Order Status: Completed Specimen: Blood from Line, Jugular, Internal Right Updated: 08/15/23 0744     Blood Culture, Routine Gram stain aer bottle: Gram positive cocci in clusters resembling Staph      Results called to and read back by:Jojo Valdes Rn 08/13/2023  02:56      COAGULASE-NEGATIVE  STAPHYLOCOCCUS SPECIES  Organism is a probable contaminant      Blood culture [[id number]] Collected: 08/12/23 0103    Order Status: Completed Specimen: Blood from Peripheral, Hand, Left Updated: 08/15/23 0612     Blood Culture, Routine No Growth to date      No Growth to date      No Growth to date      No Growth to date    MRSA/SA Rapid ID by PCR from Blood culture [[id number]] Collected: 08/12/23 0042    Order Status: Completed Updated: 08/13/23 0403     Staph aureus ID by PCR Negative     Methicillin Resistant ID by PCR Negative            I have reviewed all pertinent labs within the past 24 hours.    Estimated Creatinine Clearance: 54.4 mL/min (A) (based on SCr of 2.2 mg/dL (H)).    Diagnostic Results:  I have reviewed all pertinent imaging results/findings within the past 24 hours.    Assessment and Plan:     52-year-old male with a past medical history of ICM, CABG in 2017 (LIMA to LAD, SVG to OM1, SVG to PDA), DM type II, CKD stage IV (baseline 1.8), and ICD who presented [place] ED on 8/10//23 due to n/v and SOB. Work up concerning for NSTEMI with peak trop of 38. Started on ACS protocol with asa, ticagrelor, and heparin gtt. Also noted to have YVES with Cr 2.5, volume overloaded with BNP of 796, LA 2.8>>1.0. TTE with EF drop from 30 to 15%, LVEDD 6.15, moderately reduced RV function. LHC/RHC on 8/12: s/p MIRELLA to prox Cx. RHC showed RA 20, RV 81/21, PA 81/47 (mean 61), PWCP 45, CO/CI: 3.69/1.52 (VIKKI)  3.91/1.6 (TD), therefore, impella CP was placed in in right femoral artery and leave in swan in right femoral vein. [place] [facility] [place]. Of note, never started on inotropes or pressors. Was receiving metoprolol.     On arrival patient was hemodynamically stable.  Not on any inotropes or pressors.  Impella at P7  Initial hemodynamics  CVP: 9  SVO2:  44  Cardiac Output: 4.9  Cardiac Index: 2.0  SVR: 1250     LHC on 8/12  Grafts:  SVG-RCA:  Diffuse disease with narrowing of up  to 40-50% in the proximal portion  The native PLB and PDA both diffusely diseased.  SVG-OM: Occluded  LIMA-LAD:  Widely patent.  Diffusely diseased native LAD with narrowing of up to 90% in the very apical portion.  Severe native CAD status post PCI of the proximal circumflex with a 33 x 24 mm Synergy XD stent (post-dilated up to 4.25 mm)      Previous Cardiac Diagnostics:   TTE 7.1.22  The study quality is average.   The left ventricle is mildly enlarged. Global left ventricular systolic function is severely decreased. The left ventricular ejection fraction is 30%.   Mild (1+) mitral regurgitation.  The pulmonary artery systolic pressure is 10 mmHg. The pulmonary artery appears to be normal.     [facility] 5.19.2017  Severe MVCAD as a cause to severe newly diagnosed ischemic congestive heart failure with EF of 20%  Mild MR  Patent L subclavian, & LIMA suitable for bypass graft conduit     BLE Arterial US 10.18.21  The study quality is average.   The arteries of the left lower extremity appear patent with no significant stenosis noted.   Tri-phasic waveforms are obtained throughout the left lower extremity arteries.      Carotid US 10.18.21  The study quality is average.   1-39% stenosis in the proximal right internal carotid artery based on Bluth Criteria.   1-39% stenosis in the proximal left internal carotid artery based on Bluth Criteria.   Less than 50% stenosis throughout the bilateral common carotid arteries.   Antegrade bilateral vertebral artery flow.         * Ischemic cardiomyopathy  Cardiogenic shock    52-year-old male with past medical history of ischemic cardiomyopathy status post CABG in 2017 who presents as a transfer from outside hospital after he presented with an NSTEMI status post revascularization to proximal circumflex.  Associated cardiogenic shock noted from right heart catheterization, CP Impella placed on a 12. 3.4cm from AV to inlet on bedside echo     dobutamine 5  impella P3, IC to remove  today and place leave in [place]   Systemic heparin drip with Impella, bicarb for purge  Lasix dcd  hydralazine 25 TID / isordil 10 TID for afterload reduction         NSTEMI (non-ST elevated myocardial infarction)  Continue aspirin  Continue ticagrelor  Continue atorvastatin 80    CKD (chronic kidney disease), stage IV  Acute kidney injury    Likely secondary to cardiogenic shock  Baseline creatinine of 1.8.   Lab Results   Component Value Date    CREATININE 2.3 (H) 08/13/2023    CREATININE 2.3 (H) 08/13/2023    CREATININE 2.3 (H) 08/12/2023        Creatinine improved from 2.5  Daily weights, strict I/O and chart, renally dose all medications   Avoid nephrotoxic medications, NSAIDs, ACE/ARB, and IV contrast.      Hypothyroidism  Continue home Synthroid    DM (diabetes mellitus)  Lab Results   Component Value Date    HGBA1C 10.2 (H) 08/10/2023     Endocrine following  Now on insulin drip  Diabetic/cardiac diet          Fransisco Ivan MD  Heart Transplant  [facility] - Cath Lab

## 2023-08-15 NOTE — CONSULTS
[facility] Cardiac [facility] Care  Interventional Cardiology  Consult Note    Patient Name: Itz Daniel  MRN: [id number]  Admission Date: 8/11/2023  Hospital Length of Stay: 4 days  Code Status: Full Code   Attending Provider: Margoth Mackenzie MD  Consulting Provider: Star Olmos MD  Primary Care Physician: Sunday Boo MD  Principal Problem:Ischemic cardiomyopathy    Patient information was obtained from patient and ER records.     Consults  Subjective:     Chief Complaint:  nstemi     HPI:  Consult Reason: Iimpella removal, R fem swan removal, R IJ cordis removal, and new cordis+kobi insertion at the neck.    52M pmhx ICM, CABGin 2017 (LIMA to LAD, SVG to OM1, SVG to PDA), DM type II, CKD stage IV (baseline 1.8), and ICD who was admitted for NSTEMI and cardiogenic shock. On 8/10 had SOB with trop to 38, EF drop 30->15%, renal failure. Had LHC/RHC on 8/12 w/ MIRELLA to prox Cx and impella was placed in the R femoral artery. LHC on 8/12 with 50% narrowing of the SVG-RCA, occluded SVG-OM, patent LIMA-LAD, and severe stenosis of proximal circumflex which was stented.    Slowly weaning pressor and mechanical support. Overnight, decreased lasix gtt to 5mg/hr and started hydral/imdur 10mg TID. Currently on dobutamine gttat 5mcg/kg/min. Impella at P2.      /hr  CVP: 3  SCVO2: 52  Cardiac Output: 6.3  Cardiac Index: 2.5  SVR: 980  PA 65/29  WP 20    hgb 10.2, plt 225, Cr 2.2, EF 15%, on 8/14/23        Past Medical History:   Diagnosis Date    CKD stage 4, GFR 15-29 ml/min     HLD (hyperlipidemia)     Hypertension     Ischemic cardiomyopathy/Chronic HFrEF s/p CABG and AICD 2017    T2DM (type 2 diabetes mellitus)        Past Surgical History:   Procedure Laterality Date    CORONARY ARTERY BYPASS GRAFT  2017    3 vessel    CORONARY BYPASS GRAFT ANGIOGRAPHY  8/11/2023    Procedure: Bypass graft study;  Surgeon: Michele Hirsch MD;  Location: [facility] CATH LAB;  Service: Cardiology;;    INSERTION OF INTRAVASCULAR  MICROAXIAL BLOOD PUMP N/A 8/11/2023    Procedure: INSERTION, IMPELLA;  Surgeon: Michele Hirsch MD;  Location: [facility] CATH LAB;  Service: Cardiology;  Laterality: N/A;    IVUS, CORONARY  8/11/2023    Procedure: IVUS, Coronary;  Surgeon: Michele Hirsch MD;  Location: [facility] CATH LAB;  Service: Cardiology;;    LEFT HEART CATHETERIZATION N/A 8/11/2023    Procedure: Left heart cath;  Surgeon: Michele Hirsch MD;  Location: [facility] CATH LAB;  Service: Cardiology;  Laterality: N/A;    PERCUTANEOUS CORONARY INTERVENTION, ARTERY N/A 8/11/2023    Procedure: Percutaneous coronary intervention;  Surgeon: Michele Hirsch MD;  Location: [facility] CATH LAB;  Service: Cardiology;  Laterality: N/A;    RIGHT HEART CATHETERIZATION Right 8/11/2023    Procedure: INSERTION, CATHETER, RIGHT HEART;  Surgeon: Michele Hirsch MD;  Location: [facility] CATH LAB;  Service: Cardiology;  Laterality: Right;       Review of patient's allergies indicates:  No Known Allergies    PTA Medications   Medication Sig    aspirin (ECOTRIN) 81 MG EC tablet Take 81 mg by mouth.    FARXIGA 10 mg tablet Take 10 mg by mouth once daily.    LANTUS SOLOSTAR U-100 INSULIN glargine 100 units/mL SubQ pen Inject 50 Units into the skin once daily.    levothyroxine (SYNTHROID) 125 MCG tablet Take 125 mcg by mouth before breakfast.    metoprolol succinate (TOPROL-XL) 25 MG 24 hr tablet Take 12.5 mg by mouth once daily.    NOVOLOG FLEXPEN U-100 INSULIN 100 unit/mL (3 mL) InPn pen Inject 20 Units into the skin 3 (three) times daily with meals.    rosuvastatin (CRESTOR) 40 MG Tab Take 40 mg by mouth once daily.    torsemide (DEMADEX) 100 MG Tab Take 50 mg by mouth once daily.     Family History       Problem Relation (Age of Onset)    Hypertension Mother          Tobacco Use    Smoking status: Former     Current packs/day: 0.00     Types: Cigarettes    Smokeless tobacco: Never   Substance and Sexual Activity    Alcohol use: Yes    Drug use: No    Sexual activity: Not on file     Review  of Systems   Constitutional: Negative for chills, fever, malaise/fatigue and night sweats.   HENT:  Negative for congestion, nosebleeds, sore throat, stridor and tinnitus.    Eyes:  Negative for blurred vision, discharge, double vision, pain, vision loss in left eye, vision loss in right eye, visual disturbance and visual halos.   Cardiovascular:  Negative for chest pain, dyspnea on exertion, leg swelling, near-syncope, orthopnea, palpitations and syncope.   Respiratory:  Negative for cough, hemoptysis, shortness of breath, snoring, sputum production and wheezing.    Endocrine: Negative for cold intolerance, heat intolerance and polydipsia.   Hematologic/Lymphatic: Negative for adenopathy and bleeding problem. Does not bruise/bleed easily.   Skin:  Negative for color change, dry skin, flushing, poor wound healing and suspicious lesions.   Musculoskeletal:  Negative for arthritis, back pain, gout, joint pain and joint swelling.   Gastrointestinal:  Negative for bloating, abdominal pain, constipation and diarrhea.   Genitourinary:  Negative for dysuria, frequency and hematuria.   Neurological:  Negative for dizziness, focal weakness, headaches, light-headedness, loss of balance, numbness and weakness.   Psychiatric/Behavioral:  Negative for altered mental status, hallucinations and hypervigilance. The patient is not nervous/anxious.      Objective:     Vital Signs (Most Recent):  Temp: 98.1 °F (36.7 °C) (08/15/23 0701)  Pulse: (!) 116 (08/15/23 1001)  Resp: 11 (08/15/23 1001)  BP: 106/60 (08/15/23 0005)  SpO2: 96 % (08/15/23 1001) Vital Signs (24h Range):  Temp:  [97.9 °F (36.6 °C)-98.4 °F (36.9 °C)] 98.1 °F (36.7 °C)  Pulse:  [109-122] 116  Resp:  [11-25] 11  SpO2:  [91 %-100 %] 96 %  BP: ()/(60-86) 106/60  Arterial Line BP: ()/() 109/59     Weight: 121.6 kg (268 lb)  Body mass index is 34.41 kg/m².    SpO2: 96 %         Intake/Output Summary (Last 24 hours) at 8/15/2023 1100  Last data filed at  8/15/2023 1001  Gross per 24 hour   Intake 1746.86 ml   Output 2130 ml   Net -383.14 ml       Lines/Drains/Airways       Central Venous Catheter Line  Duration                  Percutaneous Central Line Insertion/Assessment - Cordis 08/12/23 0045 Internal Jugular Right 3 days    Percutaneous Central Line Insertion/Assessment - Triple Lumen  08/12/23 0046 Internal Jugular Right 3 days    Pulmonary Artery Catheter Assessment  08/11/23 1500 Femoral Vein Right 3 days              Drain  Duration                  Sheath 08/11/23 1327 Right 3 days         Urethral Catheter 08/12/23 0130 16 Fr. 3 days              Arterial Line  Duration             Arterial Line 08/11/23 1800 Right Radial 3 days              Line  Duration                  VAD 08/11/23 1600 3 days              Peripheral Intravenous Line  Duration                  Peripheral IV - Single Lumen 08/11/23 0330 20 G;Other (Comments) Anterior;Right Upper Arm 4 days         Peripheral IV - Single Lumen 08/13/23 0921 20 G Left Antecubital 2 days                     Physical Exam  Constitutional:       General: He is not in acute distress.     Appearance: Normal appearance. He is normal weight. He is not toxic-appearing.   HENT:      Head: Normocephalic and atraumatic.   Eyes:      General:         Right eye: No discharge.         Left eye: No discharge.      Extraocular Movements: Extraocular movements intact.      Conjunctiva/sclera: Conjunctivae normal.      Pupils: Pupils are equal, round, and reactive to light.   Cardiovascular:      Rate and Rhythm: Normal rate and regular rhythm.      Pulses: Normal pulses.      Comments: Impella at R fem, swan in R fem, old cordis at R neck IJ,  Pulmonary:      Effort: Pulmonary effort is normal. No respiratory distress.      Breath sounds: Normal breath sounds. No wheezing or rales.   Abdominal:      General: Abdomen is flat. Bowel sounds are normal. There is no distension.      Palpations: Abdomen is soft.       Tenderness: There is no abdominal tenderness. There is no guarding.   Musculoskeletal:         General: No swelling, tenderness or deformity. Normal range of motion.      Cervical back: Normal range of motion and neck supple. No rigidity.      Right lower leg: No edema.      Left lower leg: No edema.   Skin:     General: Skin is warm and dry.      Capillary Refill: Capillary refill takes less than 2 seconds.      Coloration: Skin is not jaundiced or pale.      Findings: No bruising.   Neurological:      General: No focal deficit present.      Mental Status: He is alert and oriented to person, place, and time. Mental status is at baseline.   Psychiatric:         Mood and Affect: Mood normal.         Thought Content: Thought content normal.         Judgment: Judgment normal.            Assessment and Plan:     Cardiac/Vascular  Cardiogenic shock    - Will take for impella removal, R fem swan removal, R IJ cordis removal, and new cordis+kobi insertion at the neck.  - hgb 10.2, plt 225, Cr 2.2, EF 15%, on 8/14/23  - Anti-platelet Therapy: aspirin, brillinta, heparin  - Access: Impella in R fem  - Allergies: No shellfish / Iodine contrast allergy  - Pre-Hydration: NS  - Pre-Op Med: Bendaryl 50mg pO   - All patient's questions were answered.  -The risks, benefits and alternatives of the procedure were explained to the patient.   -The risks of Impella removal include but are not limited to: bleeding, infection, heart rhythm abnormalities, allergic reactions, kidney injury and potential need for dialysis, stroke and death.   - Should stenting be indicated, the patient has agreed to dual anti-platelet therapy for 1-consecutive year with a drug-eluting stent and a minimum of 1-month with the use of a bare metal stent  - Additionally, pt is aware that non-compliance is likely to result in stent clotting with heart attack, heart failure, and/or death  -The risks of moderate sedation include hypotension, respiratory depression,  arrhythmias, bronchospasm, and death.   - Informed consent was obtained and the  patient is agreeable to proceed with the procedure.          VTE Risk Mitigation (From admission, onward)           Ordered     Systemic heparin WITHOUT HEPARIN PURGE - heparin 25,000 units in dextrose 5% 250 ml (100 units/mL) infusion Impella nomogram - [facility]  Continuous        Question:  Begin at (in units/kg/hr)  Answer:  12    08/11/23 2157     IP VTE HIGH RISK PATIENT  Once         08/11/23 2117     Place sequential compression device  Until discontinued         08/11/23 2117                    Thank you for your consult.    Star Olmos MD  Interventional Cardiology   [facility]

## 2023-08-15 NOTE — PROGRESS NOTES
[facility]  Endocrinology  Progress Note    Admit Date: 8/11/2023     Reason for Consult: Management of T2DM, Hyperglycemia     Diabetes diagnosis year: >20 years ago    Home Diabetes Medications:  Farxiga 10 mg QD; Lantus 50 units; Novolog 20 units    How often checking glucose at home?  Dexcom    BG readings on regimen: mid to upper 100s  Hypoglycemia on the regimen?  No  Missed doses on regimen?  No    Diabetes Complications include:     Hyperglycemia    Complicating diabetes co morbidities:   Cardiogenic shock; HTN; HLD      HPI: 52-year-old male with a past medical history of ICM, CABG in 2017 (LIMA to LAD, SVG to OM1, SVG to PDA), DM type II, CKD stage IV (baseline 1.8), and ICD who presented [place] ED on 8/10//23 due to n/v and SOB. Work up concerning for NSTEMI with peak trop of 38. Started on ACS protocol with asa, ticagrelor, and heparin gtt. Also noted to have YVES with Cr 2.5, volume overloaded with BNP of 796, LA 2.8>>1.0. TTE with EF drop from 30 to 15%, LVEDD 6.15, moderately reduced RV function. LHC/RHC on 8/12: s/p MIRELLA to prox Cx. RHC showed RA 20, RV 81/21, PA 81/47 (mean 61), PWCP 45, CO/CI: 3.69/1.52 (VIKKI)  3.91/1.6 (TD), therefore, impella CP was placed in in right femoral artery and leave in swan in right femoral vein. He was then transferred to [facility] [place] for higher level of care. Endocrine consulted to manage hyperglycemia and type 2 diabetes.     Lab Results   Component Value Date    HGBA1C 10.2 (H) 08/10/2023                 Interval HPI:   Overnight events: No acute events overnight. Patient in room [id number] A. Blood glucose stable. BG at goal on current insulin regimen (IIP). Steroid use- None. Day of Surgery  Renal function- Abnormal - Creatinine 2.2   Vasopressors-  None       Endocrine will continue to follow and manage insulin orders inpatient.         Diet NPO Except for: Medication     Eating:   NPO  Nausea: No  Hypoglycemia and intervention: No  Fever:  "No  TPN and/or TF: No      /60 (BP Location: Right arm, Patient Position: Lying)   Pulse (!) 112   Temp 98.2 °F (36.8 °C) (Oral)   Resp 18   Ht 6' 2" (1.88 m)   Wt 121.6 kg (268 lb)   SpO2 100%   BMI 34.41 kg/m²     Labs Reviewed and Include    Recent Labs   Lab 08/15/23  0425 08/15/23  0851   * 176*   CALCIUM 8.8 8.7   ALBUMIN 2.6*  --    PROT 6.5  --    * 133*   K 3.7 4.1   CO2 28 24   CL 93* 96   BUN 31* 30*   CREATININE 2.3* 2.2*   ALKPHOS 75  --    ALT 25  --    AST 39  --    BILITOT 0.7  --      Lab Results   Component Value Date    WBC 10.09 08/15/2023    HGB 10.2 (L) 08/15/2023    HCT 31.2 (L) 08/15/2023    MCV 91 08/15/2023     08/15/2023     Recent Labs   Lab 08/10/23  2304   TSH 7.103*     Lab Results   Component Value Date    HGBA1C 10.2 (H) 08/10/2023       Nutritional status:   Body mass index is 34.41 kg/m².  Lab Results   Component Value Date    ALBUMIN 2.6 (L) 08/15/2023    ALBUMIN 2.7 (L) 08/14/2023    ALBUMIN 2.7 (L) 08/13/2023     No results found for: "PREALBUMIN"    Estimated Creatinine Clearance: 54.4 mL/min (A) (based on SCr of 2.2 mg/dL (H)).    Accu-Checks  Recent Labs     08/15/23  0027 08/15/23  0215 08/15/23  0316 08/15/23  0419 08/15/23  0424 08/15/23  0517 08/15/23  0624 08/15/23  0738 08/15/23  0850 08/15/23  1210   POCTGLUCOSE 194* 168* 158* 176* 156* 156* 159* 174* 166* 179*       Current Medications and/or Treatments Impacting Glycemic Control  Immunotherapy:    Immunosuppressants       None          Steroids:   Hormones (From admission, onward)      None          Pressors:    Autonomic Drugs (From admission, onward)      None          Hyperglycemia/Diabetes Medications:   Antihyperglycemics (From admission, onward)      Start     Stop Route Frequency Ordered    08/15/23 1015  insulin regular in 0.9 % NaCl 100 unit/100 mL (1 unit/mL) infusion        Question:  Enter initial dose (Units/hr):  Answer:  5    -- IV Continuous 08/15/23 0904    08/15/23 " 1003  insulin aspart U-100 pen 0-10 Units         -- SubQ As needed (PRN) 08/15/23 0904            ASSESSMENT and PLAN    Cardiac/Vascular  * Ischemic cardiomyopathy  Managed per primary team  Avoid hypoglycemia        Renal/  CKD (chronic kidney disease), stage IV  Titrate insulin slowly to avoid hypoglycemia as the risk of hypoglycemia increases with decreased creatinine clearance.    Estimated Creatinine Clearance: 54.4 mL/min (A) (based on SCr of 2.2 mg/dL (H)).        Endocrine  Hypothyroidism  Lab Results   Component Value Date    TSH 7.103 (H) 08/10/2023     Om Synthroid 125 mcg      DM (diabetes mellitus)  Endocrinology consulted for BG management.   BG goal 140-180     - D/C IIP  - Transition drip at 5 units/hr with step-down parameters. (based on needs while on IIP)  - Novolog (aspart) insulin prn for BG excursions [facility] (180/25).  - BG checks AC/HS/0200  - Hypoglycemia protocol in place    **Will plan on starting 8-16 units with meals WBD 0.8 units/kg/day (Administer    8    units if patient eats 25-50% of meal, administer   16    units if patient eats > 50% of meal.)    ** Please notify Endocrine for any change and/or advance in diet**  ** Please call Endocrine for any BG related issues **    Discharge Planning:   TBD. Please notify endocrinology prior to discharge.               Héctor Vines, DNP, FNP  Endocrinology  [facility]

## 2023-08-15 NOTE — SUBJECTIVE & OBJECTIVE
Past Medical History:   Diagnosis Date    CKD stage 4, GFR 15-29 ml/min     HLD (hyperlipidemia)     Hypertension     Ischemic cardiomyopathy/Chronic HFrEF s/p CABG and AICD 2017    T2DM (type 2 diabetes mellitus)        Past Surgical History:   Procedure Laterality Date    CORONARY ARTERY BYPASS GRAFT  2017    3 vessel    CORONARY BYPASS GRAFT ANGIOGRAPHY  8/11/2023    Procedure: Bypass graft study;  Surgeon: Michele Hirsch MD;  Location: [facility] CATH LAB;  Service: Cardiology;;    INSERTION OF INTRAVASCULAR MICROAXIAL BLOOD PUMP N/A 8/11/2023    Procedure: INSERTION, IMPELLA;  Surgeon: Michele Hirsch MD;  Location: [facility] CATH LAB;  Service: Cardiology;  Laterality: N/A;    IVUS, CORONARY  8/11/2023    Procedure: IVUS, Coronary;  Surgeon: Michele Hirsch MD;  Location: [facility] CATH LAB;  Service: Cardiology;;    LEFT HEART CATHETERIZATION N/A 8/11/2023    Procedure: Left heart cath;  Surgeon: Michele Hirsch MD;  Location: [facility] CATH LAB;  Service: Cardiology;  Laterality: N/A;    PERCUTANEOUS CORONARY INTERVENTION, ARTERY N/A 8/11/2023    Procedure: Percutaneous coronary intervention;  Surgeon: Michele Hirsch MD;  Location: [facility] CATH LAB;  Service: Cardiology;  Laterality: N/A;    RIGHT HEART CATHETERIZATION Right 8/11/2023    Procedure: INSERTION, CATHETER, RIGHT HEART;  Surgeon: Michele Hirsch MD;  Location: [facility] CATH LAB;  Service: Cardiology;  Laterality: Right;       Review of patient's allergies indicates:  No Known Allergies    Medications:  Medications Prior to Admission   Medication Sig    aspirin (ECOTRIN) 81 MG EC tablet Take 81 mg by mouth.    FARXIGA 10 mg tablet Take 10 mg by mouth once daily.    LANTUS SOLOSTAR U-100 INSULIN glargine 100 units/mL SubQ pen Inject 50 Units into the skin once daily.    levothyroxine (SYNTHROID) 125 MCG tablet Take 125 mcg by mouth before breakfast.    metoprolol succinate (TOPROL-XL) 25 MG 24 hr tablet Take 12.5 mg by mouth once daily.    NOVOLOG FLEXPEN U-100  INSULIN 100 unit/mL (3 mL) InPn pen Inject 20 Units into the skin 3 (three) times daily with meals.    rosuvastatin (CRESTOR) 40 MG Tab Take 40 mg by mouth once daily.    torsemide (DEMADEX) 100 MG Tab Take 50 mg by mouth once daily.     Antibiotics (From admission, onward)      Start     Stop Route Frequency Ordered    08/12/23 0900  mupirocin 2 % ointment         08/17/23 0859 Nasl 2 times daily 08/12/23 0007          Antifungals (From admission, onward)      None          Antivirals (From admission, onward)      None               There is no immunization history on file for this patient.    Family History       Problem Relation (Age of Onset)    Hypertension Mother          Social History     Socioeconomic History    Marital status:    Tobacco Use    Smoking status: Former     Current packs/day: 0.00     Types: Cigarettes    Smokeless tobacco: Never   Substance and Sexual Activity    Alcohol use: Yes    Drug use: No     Review of Systems   All other systems reviewed and are negative.    Objective:     Vital Signs (Most Recent):  Temp: 98.4 °F (36.9 °C) (08/14/23 1905)  Pulse: (!) 115 (08/14/23 2005)  Resp: 20 (08/14/23 2011)  BP: 94/86 (08/14/23 1905)  SpO2: 95 % (08/14/23 2005) Vital Signs (24h Range):  Temp:  [98.1 °F (36.7 °C)-98.5 °F (36.9 °C)] 98.4 °F (36.9 °C)  Pulse:  [110-119] 115  Resp:  [11-30] 20  SpO2:  [92 %-100 %] 95 %  BP: ()/(64-86) 94/86  Arterial Line BP: ()/() 123/69     Weight: 121.6 kg (268 lb)  Body mass index is 34.41 kg/m².    Estimated Creatinine Clearance: 57 mL/min (A) (based on SCr of 2.1 mg/dL (H)).     Physical Exam  Constitutional:       Appearance: Normal appearance.   HENT:      Head: Normocephalic and atraumatic.      Mouth/Throat:      Mouth: Mucous membranes are moist.   Eyes:      Conjunctiva/sclera: Conjunctivae normal.   Neck:      Comments: RIJ CVC  Cardiovascular:      Rate and Rhythm: Normal rate and regular rhythm.      Comments: Impella in  right femoral artery  Pulmonary:      Effort: Pulmonary effort is normal. No respiratory distress.   Abdominal:      General: There is no distension.      Palpations: Abdomen is soft.      Tenderness: There is no abdominal tenderness. There is no guarding.   Musculoskeletal:      Right lower leg: No edema.      Left lower leg: No edema.      Comments: BLE are warm   Skin:     Capillary Refill: Capillary refill takes less than 2 seconds.      Findings: No rash.   Neurological:      General: No focal deficit present.      Mental Status: He is alert and oriented to person, place, and time.   Psychiatric:         Mood and Affect: Mood normal.          Significant Labs:   Microbiology Results (last 7 days)       Procedure Component Value Units Date/Time    Blood culture [[id number]] Collected: 08/13/23 0920    Order Status: Completed Specimen: Blood from Peripheral, Antecubital, Left Updated: 08/14/23 1012     Blood Culture, Routine No Growth to date      No Growth to date    Blood culture [[id number]]  (Abnormal) Collected: 08/12/23 0042    Order Status: Completed Specimen: Blood from Line, Jugular, Internal Right Updated: 08/14/23 0901     Blood Culture, Routine Gram stain aer bottle: Gram positive cocci in clusters resembling Staph      Results called to and read back by:Jojo Valdes Rn 08/13/2023  02:56      COAGULASE-NEGATIVE STAPHYLOCOCCUS SPECIES  Organism is a probable contaminant      Blood culture [[id number]] Collected: 08/12/23 0103    Order Status: Completed Specimen: Blood from Peripheral, Hand, Left Updated: 08/14/23 0613     Blood Culture, Routine No Growth to date      No Growth to date      No Growth to date    MRSA/SA Rapid ID by PCR from Blood culture [[id number]] Collected: 08/12/23 0042    Order Status: Completed Updated: 08/13/23 0403     Staph aureus ID by PCR Negative     Methicillin Resistant ID by PCR Negative            Significant Imaging: I have reviewed all pertinent imaging  results/findings within the past 24 hours.

## 2023-08-15 NOTE — SUBJECTIVE & OBJECTIVE
Interval History:   CVP: 2  SVO2:  58  Cardiac Output: 8.2  Cardiac Index: 3.3  SVR: 764  PA 40/20  WP 20  CP 1.45    Plan to remove impella today     Continuous Infusions:   sodium chloride 0.9% 10 mL/hr at 08/15/23 1501    DOBUTamine IV infusion (non-titrating) 5 mcg/kg/min (08/15/23 1501)    insulin regular 1 units/mL infusion orderable (TRANSFER) 5 Units/hr (08/15/23 1501)    sodium bicarbonate 25 mEq in dextrose 5 % (D5W) 1,000 mL Purge Solution for Impella 15.1 mL/hr at 08/15/23 1501    sodium chloride 0.9%      heparin (porcine) in D5W Stopped (08/15/23 0846)     Scheduled Meds:   aspirin  81 mg Oral Daily    atorvastatin  80 mg Oral Daily    glycerin adult  1 suppository Rectal Once    hydrALAZINE  25 mg Oral TID    isosorbide dinitrate  10 mg Oral TID    levothyroxine  125 mcg Oral Before breakfast    LIDOcaine (PF) 10 mg/ml (1%)  10 mL Other Once    LIDOcaine  1 patch Transdermal Q24H    mupirocin   Nasal BID    pantoprazole  40 mg Oral Daily    polyethylene glycol  17 g Oral TID    senna  8.6 mg Oral BID    ticagrelor  90 mg Oral BID     PRN Meds:acetaminophen, dextrose 10%, dextrose 10%, glucagon (human recombinant), glucose, glucose, heparin (porcine), insulin aspart U-100, LIDOcaine HCL 10 mg/ml (1%), methocarbamoL, oxyCODONE, oxymetazoline, sodium chloride 0.9%, sodium chloride 0.9%    Review of patient's allergies indicates:  No Known Allergies  Objective:     Vital Signs (Most Recent):  Temp: 98.2 °F (36.8 °C) (08/15/23 1101)  Pulse: 109 (08/15/23 1501)  Resp: 18 (08/15/23 1501)  BP: 106/60 (08/15/23 0005)  SpO2: 100 % (08/15/23 1501) Vital Signs (24h Range):  Temp:  [97.9 °F (36.6 °C)-98.4 °F (36.9 °C)] 98.2 °F (36.8 °C)  Pulse:  [109-122] 109  Resp:  [11-25] 18  SpO2:  [91 %-100 %] 100 %  BP: ()/(60-86) 106/60  Arterial Line BP: ()/() 130/63     Patient Vitals for the past 72 hrs (Last 3 readings):   Weight   08/14/23 1101 121.6 kg (268 lb)   08/13/23 0901 122 kg (268 lb 15.4 oz)      Body mass index is 34.41 kg/m².      Intake/Output Summary (Last 24 hours) at 8/15/2023 1613  Last data filed at 8/15/2023 1501  Gross per 24 hour   Intake 1321.89 ml   Output 1780 ml   Net -458.11 ml       Hemodynamic Parameters:  PAP: (30-66)/(18-32) 40/19  PAP (Mean):  [22 mmHg-42 mmHg] 28 mmHg  RAP (mean):  [51] 51         Physical Exam  Constitutional:       Appearance: Normal appearance.   HENT:      Head: Normocephalic and atraumatic.      Mouth/Throat:      Mouth: Mucous membranes are moist.   Eyes:      Conjunctiva/sclera: Conjunctivae normal.   Neck:      Comments: [facility] CVC  Cardiovascular:      Rate and Rhythm: Normal rate and regular rhythm.      Comments: CP impella in right femoral artery, swan in right femoral vein   Pulmonary:      Effort: Pulmonary effort is normal. No respiratory distress.      Comments: Bibasilar crackles noted  Abdominal:      General: There is no distension.      Palpations: Abdomen is soft.      Tenderness: There is no abdominal tenderness. There is no guarding.   Musculoskeletal:      Right lower leg: No edema.      Left lower leg: No edema.      Comments: BLE are warm   Skin:     Capillary Refill: Capillary refill takes less than 2 seconds.      Findings: No rash.   Neurological:      General: No focal deficit present.      Mental Status: He is alert and oriented to person, place, and time.   Psychiatric:         Mood and Affect: Mood normal.            Significant Labs:  CBC:  Recent Labs   Lab 08/13/23 0220 08/14/23 0415 08/15/23  0425   WBC 9.81 10.73 10.09   RBC 3.51* 3.71* 3.44*   HGB 10.3* 10.9* 10.2*   HCT 31.3* 32.8* 31.2*    222 225   MCV 89 88 91   MCH 29.3 29.4 29.7   MCHC 32.9 33.2 32.7     BNP:  Recent Labs   Lab 08/10/23  1856 08/11/23  2126   .7* 956*     CMP:  Recent Labs   Lab 08/13/23  0220 08/13/23  1412 08/14/23  0415 08/14/23  0754 08/14/23  1712 08/15/23  0425 08/15/23  0851   *   < > 182*   < > 129* 163* 176*   CALCIUM 8.5*   <  > 8.8   < > 8.6* 8.8 8.7   ALBUMIN 2.7*  --  2.7*  --   --  2.6*  --    PROT 6.5  --  6.9  --   --  6.5  --    *   < > 135*   < > 134* 133* 133*   K 4.0   < > 3.8   < > 3.7 3.7 4.1   CO2 25   < > 31*   < > 28 28 24   CL 94*   < > 93*   < > 94* 93* 96   BUN 36*   < > 31*   < > 30* 31* 30*   CREATININE 2.3*   < > 2.3*   < > 2.1* 2.3* 2.2*   ALKPHOS 73  --  77  --   --  75  --    ALT 24  --  28  --   --  25  --    AST 60*  --  50*  --   --  39  --    BILITOT 0.9  --  0.8  --   --  0.7  --     < > = values in this interval not displayed.      Coagulation:   Recent Labs   Lab 08/13/23 0220 08/13/23  0644 08/14/23  0415 08/14/23  1251 08/15/23  0425 08/15/23  0428 08/15/23  1211   INR 1.0  --  1.0  --   --  1.0  --    APTT  --    < >  --  52.0* 58.8*  --  47.5*    < > = values in this interval not displayed.     LDH:  Recent Labs   Lab 08/13/23 0220 08/14/23  0415 08/15/23  0425   * 930* 756*     Microbiology:  Microbiology Results (last 7 days)       Procedure Component Value Units Date/Time    Blood culture [[id number]] Collected: 08/13/23 0920    Order Status: Completed Specimen: Blood from Peripheral, Antecubital, Left Updated: 08/15/23 1012     Blood Culture, Routine No Growth to date      No Growth to date      No Growth to date    Blood culture [[id number]]  (Abnormal) Collected: 08/12/23 0042    Order Status: Completed Specimen: Blood from Line, Jugular, Internal Right Updated: 08/15/23 0744     Blood Culture, Routine Gram stain aer bottle: Gram positive cocci in clusters resembling Staph      Results called to and read back by:Jojo Valdes Rn 08/13/2023  02:56      COAGULASE-NEGATIVE STAPHYLOCOCCUS SPECIES  Organism is a probable contaminant      Blood culture [[id number]] Collected: 08/12/23 0103    Order Status: Completed Specimen: Blood from Peripheral, Hand, Left Updated: 08/15/23 0612     Blood Culture, Routine No Growth to date      No Growth to date      No Growth to date      No Growth to  date    MRSA/SA Rapid ID by PCR from Blood culture [[id number]] Collected: 08/12/23 0042    Order Status: Completed Updated: 08/13/23 0403     Staph aureus ID by PCR Negative     Methicillin Resistant ID by PCR Negative            I have reviewed all pertinent labs within the past 24 hours.    Estimated Creatinine Clearance: 54.4 mL/min (A) (based on SCr of 2.2 mg/dL (H)).    Diagnostic Results:  I have reviewed all pertinent imaging results/findings within the past 24 hours.

## 2023-08-15 NOTE — SUBJECTIVE & OBJECTIVE
Past Medical History:   Diagnosis Date    CKD stage 4, GFR 15-29 ml/min     HLD (hyperlipidemia)     Hypertension     Ischemic cardiomyopathy/Chronic HFrEF s/p CABG and AICD 2017    T2DM (type 2 diabetes mellitus)        Past Surgical History:   Procedure Laterality Date    CORONARY ARTERY BYPASS GRAFT  2017    3 vessel    CORONARY BYPASS GRAFT ANGIOGRAPHY  8/11/2023    Procedure: Bypass graft study;  Surgeon: Michele Hirsch MD;  Location: [facility] CATH LAB;  Service: Cardiology;;    INSERTION OF INTRAVASCULAR MICROAXIAL BLOOD PUMP N/A 8/11/2023    Procedure: INSERTION, IMPELLA;  Surgeon: Michele Hirsch MD;  Location: [facility] CATH LAB;  Service: Cardiology;  Laterality: N/A;    IVUS, CORONARY  8/11/2023    Procedure: IVUS, Coronary;  Surgeon: Michele Hirsch MD;  Location: [facility] CATH LAB;  Service: Cardiology;;    LEFT HEART CATHETERIZATION N/A 8/11/2023    Procedure: Left heart cath;  Surgeon: Michele Hirsch MD;  Location: [facility] CATH LAB;  Service: Cardiology;  Laterality: N/A;    PERCUTANEOUS CORONARY INTERVENTION, ARTERY N/A 8/11/2023    Procedure: Percutaneous coronary intervention;  Surgeon: Michele Hirsch MD;  Location: [facility] CATH LAB;  Service: Cardiology;  Laterality: N/A;    RIGHT HEART CATHETERIZATION Right 8/11/2023    Procedure: INSERTION, CATHETER, RIGHT HEART;  Surgeon: Michele Hirsch MD;  Location: [facility] CATH LAB;  Service: Cardiology;  Laterality: Right;       Review of patient's allergies indicates:  No Known Allergies    PTA Medications   Medication Sig    aspirin (ECOTRIN) 81 MG EC tablet Take 81 mg by mouth.    FARXIGA 10 mg tablet Take 10 mg by mouth once daily.    LANTUS SOLOSTAR U-100 INSULIN glargine 100 units/mL SubQ pen Inject 50 Units into the skin once daily.    levothyroxine (SYNTHROID) 125 MCG tablet Take 125 mcg by mouth before breakfast.    metoprolol succinate (TOPROL-XL) 25 MG 24 hr tablet Take 12.5 mg by mouth once daily.    NOVOLOG FLEXPEN U-100 INSULIN 100 unit/mL (3 mL) InPn pen  Inject 20 Units into the skin 3 (three) times daily with meals.    rosuvastatin (CRESTOR) 40 MG Tab Take 40 mg by mouth once daily.    torsemide (DEMADEX) 100 MG Tab Take 50 mg by mouth once daily.     Family History       Problem Relation (Age of Onset)    Hypertension Mother          Tobacco Use    Smoking status: Former     Current packs/day: 0.00     Types: Cigarettes    Smokeless tobacco: Never   Substance and Sexual Activity    Alcohol use: Yes    Drug use: No    Sexual activity: Not on file     Review of Systems   Constitutional: Negative for chills, fever, malaise/fatigue and night sweats.   HENT:  Negative for congestion, nosebleeds, sore throat, stridor and tinnitus.    Eyes:  Negative for blurred vision, discharge, double vision, pain, vision loss in left eye, vision loss in right eye, visual disturbance and visual halos.   Cardiovascular:  Negative for chest pain, dyspnea on exertion, leg swelling, near-syncope, orthopnea, palpitations and syncope.   Respiratory:  Negative for cough, hemoptysis, shortness of breath, snoring, sputum production and wheezing.    Endocrine: Negative for cold intolerance, heat intolerance and polydipsia.   Hematologic/Lymphatic: Negative for adenopathy and bleeding problem. Does not bruise/bleed easily.   Skin:  Negative for color change, dry skin, flushing, poor wound healing and suspicious lesions.   Musculoskeletal:  Negative for arthritis, back pain, gout, joint pain and joint swelling.   Gastrointestinal:  Negative for bloating, abdominal pain, constipation and diarrhea.   Genitourinary:  Negative for dysuria, frequency and hematuria.   Neurological:  Negative for dizziness, focal weakness, headaches, light-headedness, loss of balance, numbness and weakness.   Psychiatric/Behavioral:  Negative for altered mental status, hallucinations and hypervigilance. The patient is not nervous/anxious.      Objective:     Vital Signs (Most Recent):  Temp: 98.1 °F (36.7 °C) (08/15/23  0701)  Pulse: (!) 116 (08/15/23 1001)  Resp: 11 (08/15/23 1001)  BP: 106/60 (08/15/23 0005)  SpO2: 96 % (08/15/23 1001) Vital Signs (24h Range):  Temp:  [97.9 °F (36.6 °C)-98.4 °F (36.9 °C)] 98.1 °F (36.7 °C)  Pulse:  [109-122] 116  Resp:  [11-25] 11  SpO2:  [91 %-100 %] 96 %  BP: ()/(60-86) 106/60  Arterial Line BP: ()/() 109/59     Weight: 121.6 kg (268 lb)  Body mass index is 34.41 kg/m².    SpO2: 96 %         Intake/Output Summary (Last 24 hours) at 8/15/2023 1103  Last data filed at 8/15/2023 1001  Gross per 24 hour   Intake 1746.86 ml   Output 2130 ml   Net -383.14 ml       Lines/Drains/Airways       Central Venous Catheter Line  Duration                  Percutaneous Central Line Insertion/Assessment - Cordis 08/12/23 0045 Internal Jugular Right 3 days    Percutaneous Central Line Insertion/Assessment - Triple Lumen  08/12/23 0046 Internal Jugular Right 3 days    Pulmonary Artery Catheter Assessment  08/11/23 1500 Femoral Vein Right 3 days              Drain  Duration                  Sheath 08/11/23 1327 Right 3 days         Urethral Catheter 08/12/23 0130 16 Fr. 3 days              Arterial Line  Duration             Arterial Line 08/11/23 1800 Right Radial 3 days              Line  Duration                  VAD 08/11/23 1600 3 days              Peripheral Intravenous Line  Duration                  Peripheral IV - Single Lumen 08/11/23 0330 20 G;Other (Comments) Anterior;Right Upper Arm 4 days         Peripheral IV - Single Lumen 08/13/23 0921 20 G Left Antecubital 2 days                     Physical Exam  Constitutional:       General: He is not in acute distress.     Appearance: Normal appearance. He is normal weight. He is not toxic-appearing.   HENT:      Head: Normocephalic and atraumatic.   Eyes:      General:         Right eye: No discharge.         Left eye: No discharge.      Extraocular Movements: Extraocular movements intact.      Conjunctiva/sclera: Conjunctivae normal.       Pupils: Pupils are equal, round, and reactive to light.   Cardiovascular:      Rate and Rhythm: Normal rate and regular rhythm.      Pulses: Normal pulses.      Comments: Impella at R fem, swan in R fem, old cordis at R neck IJ,  Pulmonary:      Effort: Pulmonary effort is normal. No respiratory distress.      Breath sounds: Normal breath sounds. No wheezing or rales.   Abdominal:      General: Abdomen is flat. Bowel sounds are normal. There is no distension.      Palpations: Abdomen is soft.      Tenderness: There is no abdominal tenderness. There is no guarding.   Musculoskeletal:         General: No swelling, tenderness or deformity. Normal range of motion.      Cervical back: Normal range of motion and neck supple. No rigidity.      Right lower leg: No edema.      Left lower leg: No edema.   Skin:     General: Skin is warm and dry.      Capillary Refill: Capillary refill takes less than 2 seconds.      Coloration: Skin is not jaundiced or pale.      Findings: No bruising.   Neurological:      General: No focal deficit present.      Mental Status: He is alert and oriented to person, place, and time. Mental status is at baseline.   Psychiatric:         Mood and Affect: Mood normal.         Thought Content: Thought content normal.         Judgment: Judgment normal.

## 2023-08-15 NOTE — CONSULTS
[facility] - Cardiac Intensive Care  Infectious Disease  Consult Note    Patient Name: Itz Daniel  MRN: [id number]  Admission Date: 8/11/2023  Hospital Length of Stay: 3 days  Attending Physician: Margoth Mackenzie MD  Primary Care Provider: Sunday Boo MD     Isolation Status: No active isolations    Patient information was obtained from patient, past medical records and ER records.      Inpatient consult to Infectious Diseases  Consult performed by: Beau Zaidi MD  Consult ordered by: Fransisco MD        Assessment/Plan:     ID  Bacteremia  52 year old male with NSTEMI transferred [place] [facility] [place] higher level of care.  ID consulted to assess positive blood cultures for coag neg staph.  Blood culture from aug 10 had just 1 out of 4 blood culture bottles positive for 2 different types of coag neg staphylococci.  Repeat blood cultures on aug 12 again has 1 out of 4 blood culture bottles positive for coag neg staph.  Blood cultures from aug 13 are still pending.  Patient is afebrile.  His central line sites look clean.  These blood cultures are contaminants.    Plan    1. No further antibiotics are needed.    2. Re-consult ID as needed.        Thank you for your consult. I will sign off. Please contact us if you have any additional questions.    Beau Zaidi MD  Infectious Disease  [facility] - Cardiac Intensive Care    Subjective:     Principal Problem: Ischemic cardiomyopathy    HPI: 52 year old male with a history of ischemic cardiomyopathy, CABG in 2017 and AICD placement.  He was admitted to a hospital in [place] with an NSTEMI.  He has been transferred to [facility] [place] a higher level of care.  ID is consulted to assess blood cultures that have been positive for coag neg staph.  Patient has been afebrile.      Past Medical History:   Diagnosis Date    CKD stage 4, GFR 15-29 ml/min     HLD (hyperlipidemia)     Hypertension     Ischemic cardiomyopathy/Chronic HFrEF s/p CABG and AICD 2017    T2DM (type 2  diabetes mellitus)        Past Surgical History:   Procedure Laterality Date    CORONARY ARTERY BYPASS GRAFT  2017    3 vessel    CORONARY BYPASS GRAFT ANGIOGRAPHY  8/11/2023    Procedure: Bypass graft study;  Surgeon: Michele Hirsch MD;  Location: [facility] CATH LAB;  Service: Cardiology;;    INSERTION OF INTRAVASCULAR MICROAXIAL BLOOD PUMP N/A 8/11/2023    Procedure: INSERTION, IMPELLA;  Surgeon: Michele Hirsch MD;  Location: [facility] CATH LAB;  Service: Cardiology;  Laterality: N/A;    IVUS, CORONARY  8/11/2023    Procedure: IVUS, Coronary;  Surgeon: Michele Hirsch MD;  Location: [facility] CATH LAB;  Service: Cardiology;;    LEFT HEART CATHETERIZATION N/A 8/11/2023    Procedure: Left heart cath;  Surgeon: Michele Hirsch MD;  Location: [facility] CATH LAB;  Service: Cardiology;  Laterality: N/A;    PERCUTANEOUS CORONARY INTERVENTION, ARTERY N/A 8/11/2023    Procedure: Percutaneous coronary intervention;  Surgeon: Michele Hirsch MD;  Location: [facility] CATH LAB;  Service: Cardiology;  Laterality: N/A;    RIGHT HEART CATHETERIZATION Right 8/11/2023    Procedure: INSERTION, CATHETER, RIGHT HEART;  Surgeon: Michele Hirsch MD;  Location: [facility] CATH LAB;  Service: Cardiology;  Laterality: Right;       Review of patient's allergies indicates:  No Known Allergies    Medications:  Medications Prior to Admission   Medication Sig    aspirin (ECOTRIN) 81 MG EC tablet Take 81 mg by mouth.    FARXIGA 10 mg tablet Take 10 mg by mouth once daily.    LANTUS SOLOSTAR U-100 INSULIN glargine 100 units/mL SubQ pen Inject 50 Units into the skin once daily.    levothyroxine (SYNTHROID) 125 MCG tablet Take 125 mcg by mouth before breakfast.    metoprolol succinate (TOPROL-XL) 25 MG 24 hr tablet Take 12.5 mg by mouth once daily.    NOVOLOG FLEXPEN U-100 INSULIN 100 unit/mL (3 mL) InPn pen Inject 20 Units into the skin 3 (three) times daily with meals.    rosuvastatin (CRESTOR) 40 MG Tab Take 40 mg by mouth once daily.    torsemide  (DEMADEX) 100 MG Tab Take 50 mg by mouth once daily.     Antibiotics (From admission, onward)      Start     Stop Route Frequency Ordered    08/12/23 0900  mupirocin 2 % ointment         08/17/23 0859 Nasl 2 times daily 08/12/23 0007          Antifungals (From admission, onward)      None          Antivirals (From admission, onward)      None               There is no immunization history on file for this patient.    Family History       Problem Relation (Age of Onset)    Hypertension Mother          Social History     Socioeconomic History    Marital status:    Tobacco Use    Smoking status: Former     Current packs/day: 0.00     Types: Cigarettes    Smokeless tobacco: Never   Substance and Sexual Activity    Alcohol use: Yes    Drug use: No     Review of Systems   All other systems reviewed and are negative.    Objective:     Vital Signs (Most Recent):  Temp: 98.4 °F (36.9 °C) (08/14/23 1905)  Pulse: (!) 115 (08/14/23 2005)  Resp: 20 (08/14/23 2011)  BP: 94/86 (08/14/23 1905)  SpO2: 95 % (08/14/23 2005) Vital Signs (24h Range):  Temp:  [98.1 °F (36.7 °C)-98.5 °F (36.9 °C)] 98.4 °F (36.9 °C)  Pulse:  [110-119] 115  Resp:  [11-30] 20  SpO2:  [92 %-100 %] 95 %  BP: ()/(64-86) 94/86  Arterial Line BP: ()/() 123/69     Weight: 121.6 kg (268 lb)  Body mass index is 34.41 kg/m².    Estimated Creatinine Clearance: 57 mL/min (A) (based on SCr of 2.1 mg/dL (H)).     Physical Exam  Constitutional:       Appearance: Normal appearance.   HENT:      Head: Normocephalic and atraumatic.      Mouth/Throat:      Mouth: Mucous membranes are moist.   Eyes:      Conjunctiva/sclera: Conjunctivae normal.   Neck:      Comments: [facility] CVC  Cardiovascular:      Rate and Rhythm: Normal rate and regular rhythm.      Comments: Impella in right femoral artery  Pulmonary:      Effort: Pulmonary effort is normal. No respiratory distress.   Abdominal:      General: There is no distension.      Palpations: Abdomen is  soft.      Tenderness: There is no abdominal tenderness. There is no guarding.   Musculoskeletal:      Right lower leg: No edema.      Left lower leg: No edema.      Comments: BLE are warm   Skin:     Capillary Refill: Capillary refill takes less than 2 seconds.      Findings: No rash.   Neurological:      General: No focal deficit present.      Mental Status: He is alert and oriented to person, place, and time.   Psychiatric:         Mood and Affect: Mood normal.          Significant Labs:   Microbiology Results (last 7 days)       Procedure Component Value Units Date/Time    Blood culture [[id number]] Collected: 08/13/23 0920    Order Status: Completed Specimen: Blood from Peripheral, Antecubital, Left Updated: 08/14/23 1012     Blood Culture, Routine No Growth to date      No Growth to date    Blood culture [[id number]]  (Abnormal) Collected: 08/12/23 0042    Order Status: Completed Specimen: Blood from Line, Jugular, Internal Right Updated: 08/14/23 0901     Blood Culture, Routine Gram stain aer bottle: Gram positive cocci in clusters resembling Staph      Results called to and read back by:Jojo Valdes Rn 08/13/2023  02:56      COAGULASE-NEGATIVE STAPHYLOCOCCUS SPECIES  Organism is a probable contaminant      Blood culture [[id number]] Collected: 08/12/23 0103    Order Status: Completed Specimen: Blood from Peripheral, Hand, Left Updated: 08/14/23 0613     Blood Culture, Routine No Growth to date      No Growth to date      No Growth to date    MRSA/SA Rapid ID by PCR from Blood culture [[id number]] Collected: 08/12/23 0042    Order Status: Completed Updated: 08/13/23 0403     Staph aureus ID by PCR Negative     Methicillin Resistant ID by PCR Negative            Significant Imaging: I have reviewed all pertinent imaging results/findings within the past 24 hours.

## 2023-08-15 NOTE — ASSESSMENT & PLAN NOTE
- Will take for impella removal, R fem swan removal, R IJ cordis removal, and new cordis+kobi insertion at the neck.  - hgb 10.2, plt 225, Cr 2.2, EF 15%, on 8/14/23  - Anti-platelet Therapy: aspirin, brillinta, heparin  - Access: Impella in R fem  - Allergies: No shellfish / Iodine contrast allergy  - Pre-Hydration: NS  - Pre-Op Med: Bendaryl 50mg pO   - All patient's questions were answered.  -The risks, benefits and alternatives of the procedure were explained to the patient.   -The risks of Impella removal include but are not limited to: bleeding, infection, heart rhythm abnormalities, allergic reactions, kidney injury and potential need for dialysis, stroke and death.   - Should stenting be indicated, the patient has agreed to dual anti-platelet therapy for 1-consecutive year with a drug-eluting stent and a minimum of 1-month with the use of a bare metal stent  - Additionally, pt is aware that non-compliance is likely to result in stent clotting with heart attack, heart failure, and/or death  -The risks of moderate sedation include hypotension, respiratory depression, arrhythmias, bronchospasm, and death.   - Informed consent was obtained and the  patient is agreeable to proceed with the procedure.

## 2023-08-15 NOTE — PROGRESS NOTES
08/15/2023  Makayla Cunningham    Current provider:  Margoth Mackenzie MD    Device interrogation:  TXP LVAD INTERROGATIONS 8/15/2023 8/15/2023 8/15/2023 8/15/2023 8/15/2023 8/15/2023 8/15/2023   Type Impella Impella Impella Impella Impella Impella Impella   Pulsatility Pulse Pulse Pulse Pulse Pulse Pulse Pulse          Rounded on Itz Daniel to ensure all mechanical assist device settings (IABP or VAD) were appropriate and all parameters were within limits.  I was able to ensure all back up equipment was present, the staff had no issues, and the Perfusion Department daily rounding was complete.      For implantable VADs: Interrogation of Ventricular assist device was performed with analysis of device parameters and review of device function. I have personally reviewed the interrogation findings and agree with findings as stated.     In emergency, the nursing units have been notified to contact the perfusion department either by:  Calling [phone] from 630am to 4pm Mon thru Fri, utilizing the On-Call Finder functionality of Epic and searching for Perfusion, or by contacting the hospital  from 4pm to 630am and on weekends and asking to speak with the perfusionist on call.    1:50 PM

## 2023-08-16 PROBLEM — R78.81 BACTEREMIA: Status: RESOLVED | Noted: 2023-01-01 | Resolved: 2023-01-01

## 2023-08-16 NOTE — CARE UPDATE
[facility] Care Update Note    8PM     Hemodynamics    CVP:  2  SVO2:  41  CO 5.6  CI 2.2  SVR:  964    I/Os    In 0.48  Out 0.76  Net -0.279  UOP 0.76  Last h UOP 135cc/h      Intake/Output Summary (Last 24 hours) at 8/15/2023 2318  Last data filed at 8/15/2023 1501  Gross per 24 hour   Intake 900.76 ml   Output 1180 ml   Net -279.24 ml       Net IO Since Admission: -7,840.18 mL [08/15/23 2318]    Diuretics: None    Continuous Infusions:      sodium chloride 0.9% 10 mL/hr at 08/15/23 1501    DOBUTamine IV infusion (non-titrating) 5 mcg/kg/min (08/15/23 1501)    insulin regular 1 units/mL infusion orderable (TRANSFER) 5 Units/hr (08/15/23 1825)    sodium bicarbonate 25 mEq in dextrose 5 % (D5W) 1,000 mL Purge Solution for Impella 15.1 mL/hr at 08/15/23 1501    sodium chloride 0.9%      heparin (porcine) in D5W Stopped (08/15/23 0846)     Mechanical support: None    Plan:  - No changes made, continue current plan of care  - Plan discussed with attending     [facility] Care Update Note    10:30PM     Hemodynamics    CVP 2    sPAP 78  dPAP 32  mPAP 49  PCWP 39    Plan:  - Recheck hemodynamics  - Plan discussed with attending     [facility] [place] Care Update Note    12AM     Hemodynamics    CVP 3    sPAP 72  dPAP 31  mPAP 46  PCWP 36    Plan:  - Increase next dose of Isosorbide from 10mg to 20mg   - Plan discussed with attending     Paul Roach MD  Cardiovascular Disease PGY-IV  [facility]

## 2023-08-16 NOTE — PROGRESS NOTES
[facility] - Cardiac Intensive Care  Endocrinology  Progress Note    Admit Date: 8/11/2023     Reason for Consult: Management of T2DM, Hyperglycemia     Diabetes diagnosis year: >20 years ago    Home Diabetes Medications:  Farxiga 10 mg QD; Lantus 50 units; Novolog 20 units    How often checking glucose at home?  Dexcom    BG readings on regimen: mid to upper 100s  Hypoglycemia on the regimen?  No  Missed doses on regimen?  No    Diabetes Complications include:     Hyperglycemia    Complicating diabetes co morbidities:   Cardiogenic shock; HTN; HLD      HPI: 52-year-old male with a past medical history of ICM, CABG in 2017 (LIMA to LAD, SVG to OM1, SVG to PDA), DM type II, CKD stage IV (baseline 1.8), and ICD who presented [facility] on 8/10//23 due to n/v and SOB. Work up concerning for NSTEMI with peak trop of 38. Started on ACS protocol with asa, ticagrelor, and heparin gtt. Also noted to have YVES with Cr 2.5, volume overloaded with BNP of 796, LA 2.8>>1.0. TTE with EF drop from 30 to 15%, LVEDD 6.15, moderately reduced RV function. LHC/RHC on 8/12: s/p MIRELLA to prox Cx. RHC showed RA 20, RV 81/21, PA 81/47 (mean 61), PWCP 45, CO/CI: 3.69/1.52 (VIKKI)  3.91/1.6 (TD), therefore, impella CP was placed in in right femoral artery and leave in swan in right femoral vein. He was then transferred to [facility] for higher level of care. Endocrine consulted to manage hyperglycemia and type 2 diabetes.     Lab Results   Component Value Date    HGBA1C 10.2 (H) 08/10/2023                 Interval HPI:   Overnight events: No acute events overnight. Patient in room [id number] A. Blood glucose stable. BG at goal on current insulin regimen (Transition Insulin Drip). Steroid use- None. 1 Day Post-Op  Renal function- Abnormal - Creatinine 2.5   Vasopressors-  None       Endocrine will continue to follow and manage insulin orders inpatient.         Diet Cardiac Fluid - 1800mL     Eating:   <25%  Nausea: No  Hypoglycemia and  "intervention: No  Fever: No  TPN and/or TF: No      /74 (BP Location: Left arm, Patient Position: Lying)   Pulse (!) 115   Temp 98.4 °F (36.9 °C) (Oral)   Resp (!) 22   Ht 6' 2" (1.88 m)   Wt 121.6 kg (268 lb)   SpO2 96%   BMI 34.41 kg/m²     Labs Reviewed and Include    Recent Labs   Lab 08/16/23  0220   *   CALCIUM 8.8   ALBUMIN 2.5*   PROT 6.2   *   K 4.3   CO2 27   CL 98   BUN 33*   CREATININE 2.5*   ALKPHOS 76   ALT 18   AST 33   BILITOT 0.6     Lab Results   Component Value Date    WBC 8.97 08/16/2023    HGB 9.7 (L) 08/16/2023    HCT 29.8 (L) 08/16/2023    MCV 91 08/16/2023     08/16/2023     Recent Labs   Lab 08/10/23  2304   TSH 7.103*     Lab Results   Component Value Date    HGBA1C 10.2 (H) 08/10/2023       Nutritional status:   Body mass index is 34.41 kg/m².  Lab Results   Component Value Date    ALBUMIN 2.5 (L) 08/16/2023    ALBUMIN 2.6 (L) 08/15/2023    ALBUMIN 2.7 (L) 08/14/2023     No results found for: "PREALBUMIN"    Estimated Creatinine Clearance: 47.9 mL/min (A) (based on SCr of 2.5 mg/dL (H)).    Accu-Checks  Recent Labs     08/15/23  0624 08/15/23  0738 08/15/23  0850 08/15/23  1210 08/15/23  1702 08/15/23  1812 08/15/23  2208 08/16/23  0221 08/16/23  0835 08/16/23  0928   POCTGLUCOSE 159* 174* 166* 179* 186* 153* 151* 150* 129* 129*       Current Medications and/or Treatments Impacting Glycemic Control  Immunotherapy:    Immunosuppressants       None          Steroids:   Hormones (From admission, onward)      None          Pressors:    Autonomic Drugs (From admission, onward)      None          Hyperglycemia/Diabetes Medications:   Antihyperglycemics (From admission, onward)      Start     Stop Route Frequency Ordered    08/16/23 0815  insulin aspart U-100 pen 6-12 Units         -- SubQ 3 times daily with meals 08/16/23 0813    08/15/23 1015  insulin regular in 0.9 % NaCl 100 unit/100 mL (1 unit/mL) infusion        Question:  Enter initial dose (Units/hr):  " Answer:  5    -- IV Continuous 08/15/23 0904    08/15/23 1003  insulin aspart U-100 pen 0-10 Units         -- SubQ As needed (PRN) 08/15/23 0904            ASSESSMENT and PLAN    Cardiac/Vascular  * Ischemic cardiomyopathy  Managed per primary team  Avoid hypoglycemia        Renal/  CKD (chronic kidney disease), stage IV  Titrate insulin slowly to avoid hypoglycemia as the risk of hypoglycemia increases with decreased creatinine clearance.    Estimated Creatinine Clearance: 47.9 mL/min (A) (based on SCr of 2.5 mg/dL (H)).        Endocrine  Hypothyroidism  Lab Results   Component Value Date    TSH 7.103 (H) 08/10/2023     Om Synthroid 125 mcg      DM (diabetes mellitus)  Endocrinology consulted for BG management.   BG goal 140-180     - Transition drip at 4 units/hr with step-down parameters.   - Novolog 6-12 units with meals (Administer    8    units if patient eats 25-50% of meal, administer   16    units if patient eats > 50% of meal.)  - Novolog (aspart) insulin prn for BG excursions [facility] (180/25).  - BG checks /HS/0200  - Hypoglycemia protocol in place      ** Please notify Endocrine for any change and/or advance in diet**  ** Please call Endocrine for any BG related issues **    Discharge Planning:   TBD. Please notify endocrinology prior to discharge.               Héctor Vines, DNP, FNP  Endocrinology  [facility] - [facility]

## 2023-08-16 NOTE — SUBJECTIVE & OBJECTIVE
"Interval HPI:   Overnight events: No acute events overnight. Patient in room [id number] A. Blood glucose stable. BG at goal on current insulin regimen (Transition Insulin Drip). Steroid use- None. 1 Day Post-Op  Renal function- Abnormal - Creatinine 2.5   Vasopressors-  None       Endocrine will continue to follow and manage insulin orders inpatient.         Diet Cardiac Fluid - 1800mL     Eating:   <25%  Nausea: No  Hypoglycemia and intervention: No  Fever: No  TPN and/or TF: No      /74 (BP Location: Left arm, Patient Position: Lying)   Pulse (!) 115   Temp 98.4 °F (36.9 °C) (Oral)   Resp (!) 22   Ht 6' 2" (1.88 m)   Wt 121.6 kg (268 lb)   SpO2 96%   BMI 34.41 kg/m²     Labs Reviewed and Include    Recent Labs   Lab 08/16/23 0220   *   CALCIUM 8.8   ALBUMIN 2.5*   PROT 6.2   *   K 4.3   CO2 27   CL 98   BUN 33*   CREATININE 2.5*   ALKPHOS 76   ALT 18   AST 33   BILITOT 0.6     Lab Results   Component Value Date    WBC 8.97 08/16/2023    HGB 9.7 (L) 08/16/2023    HCT 29.8 (L) 08/16/2023    MCV 91 08/16/2023     08/16/2023     Recent Labs   Lab 08/10/23  2304   TSH 7.103*     Lab Results   Component Value Date    HGBA1C 10.2 (H) 08/10/2023       Nutritional status:   Body mass index is 34.41 kg/m².  Lab Results   Component Value Date    ALBUMIN 2.5 (L) 08/16/2023    ALBUMIN 2.6 (L) 08/15/2023    ALBUMIN 2.7 (L) 08/14/2023     No results found for: "PREALBUMIN"    Estimated Creatinine Clearance: 47.9 mL/min (A) (based on SCr of 2.5 mg/dL (H)).    Accu-Checks  Recent Labs     08/15/23  0624 08/15/23  0738 08/15/23  0850 08/15/23  1210 08/15/23  1702 08/15/23  1812 08/15/23  2208 08/16/23  0221 08/16/23  0835 08/16/23  0928   POCTGLUCOSE 159* 174* 166* 179* 186* 153* 151* 150* 129* 129*       Current Medications and/or Treatments Impacting Glycemic Control  Immunotherapy:    Immunosuppressants       None          Steroids:   Hormones (From admission, onward)      None      "     Pressors:    Autonomic Drugs (From admission, onward)      None          Hyperglycemia/Diabetes Medications:   Antihyperglycemics (From admission, onward)      Start     Stop Route Frequency Ordered    08/16/23 0815  insulin aspart U-100 pen 6-12 Units         -- SubQ 3 times daily with meals 08/16/23 0813    08/15/23 1015  insulin regular in 0.9 % NaCl 100 unit/100 mL (1 unit/mL) infusion        Question:  Enter initial dose (Units/hr):  Answer:  5    -- IV Continuous 08/15/23 0904    08/15/23 1003  insulin aspart U-100 pen 0-10 Units         -- SubQ As needed (PRN) 08/15/23 0904

## 2023-08-16 NOTE — SUBJECTIVE & OBJECTIVE
Interval History:     weaned to 3.5, hydralazine increased to 37.5, isordil decreased to 10  CVP: 3  SVO2:  49  Cardiac Output: 6.5  Cardiac Index: 2.6  SVR: 1073  PA 74/33  WP 25-27    Continuous Infusions:   sodium chloride 0.9% Stopped (08/15/23 1533)    DOBUTamine IV infusion (non-titrating) 3 mcg/kg/min (08/16/23 1601)    insulin regular 1 units/mL infusion orderable (TRANSFER) 4 Units/hr (08/16/23 1601)    sodium chloride 0.9%       Scheduled Meds:   aspirin  81 mg Oral Daily    atorvastatin  80 mg Oral Daily    enoxparin  40 mg Subcutaneous Q24H (prophylaxis, 1700)    glycerin adult  1 suppository Rectal Once    hydrALAZINE  35 mg Oral TID    insulin aspart U-100  6-12 Units Subcutaneous TIDWM    isosorbide dinitrate  10 mg Oral TID    levothyroxine  125 mcg Oral Before breakfast    LIDOcaine (PF) 10 mg/ml (1%)  10 mL Other Once    LIDOcaine  1 patch Transdermal Q24H    mupirocin   Nasal BID    pantoprazole  40 mg Oral Daily    polyethylene glycol  17 g Oral TID    senna  8.6 mg Oral BID    ticagrelor  90 mg Oral BID     PRN Meds:acetaminophen, dextrose 10%, dextrose 10%, glucagon (human recombinant), glucose, glucose, insulin aspart U-100, methocarbamoL, ondansetron, oxyCODONE, oxymetazoline, sodium chloride 0.9%, sodium chloride 0.9%    Review of patient's allergies indicates:  No Known Allergies  Objective:     Vital Signs (Most Recent):  Temp: 98.1 °F (36.7 °C) (08/16/23 1501)  Pulse: (!) 111 (08/16/23 1501)  Resp: (!) 24 (08/16/23 1501)  BP: 135/63 (08/16/23 1501)  SpO2: 99 % (08/16/23 1501) Vital Signs (24h Range):  Temp:  [98.1 °F (36.7 °C)-98.4 °F (36.9 °C)] 98.1 °F (36.7 °C)  Pulse:  [106-124] 111  Resp:  [14-26] 24  SpO2:  [88 %-99 %] 99 %  BP: ()/(46-74) 135/63  Arterial Line BP: ()/(59-74) 82/74     Patient Vitals for the past 72 hrs (Last 3 readings):   Weight   08/14/23 1101 121.6 kg (268 lb)     Body mass index is 34.41 kg/m².      Intake/Output Summary (Last 24 hours) at  8/16/2023 1631  Last data filed at 8/16/2023 1601  Gross per 24 hour   Intake 1409.35 ml   Output 1595 ml   Net -185.65 ml       Hemodynamic Parameters:  PAP: (42-78)/(-9-38) 49/-9  PAP (Mean):  [-11 mmHg-52 mmHg] -11 mmHg           Physical Exam  Constitutional:       Appearance: Normal appearance.   HENT:      Head: Normocephalic and atraumatic.      Mouth/Throat:      Mouth: Mucous membranes are moist.   Eyes:      Conjunctiva/sclera: Conjunctivae normal.   Neck:      Comments: BORIS camilo  Cardiovascular:      Rate and Rhythm: Normal rate and regular rhythm.      Comments: R groin with no hematoma  Pulmonary:      Effort: Pulmonary effort is normal. No respiratory distress.      Comments: Bibasilar crackles noted  Abdominal:      General: There is no distension.      Palpations: Abdomen is soft.      Tenderness: There is no abdominal tenderness. There is no guarding.   Musculoskeletal:      Right lower leg: No edema.      Left lower leg: No edema.      Comments: BLE are warm   Skin:     Capillary Refill: Capillary refill takes less than 2 seconds.      Findings: No rash.   Neurological:      General: No focal deficit present.      Mental Status: He is alert and oriented to person, place, and time.   Psychiatric:         Mood and Affect: Mood normal.            Significant Labs:  CBC:  Recent Labs   Lab 08/14/23 0415 08/15/23  0425 08/16/23 0415   WBC 10.73 10.09 8.97   RBC 3.71* 3.44* 3.26*   HGB 10.9* 10.2* 9.7*   HCT 32.8* 31.2* 29.8*    225 212   MCV 88 91 91   MCH 29.4 29.7 29.8   MCHC 33.2 32.7 32.6     BNP:  Recent Labs   Lab 08/10/23  1856 08/11/23  2126   .7* 956*     CMP:  Recent Labs   Lab 08/14/23  0415 08/14/23  0754 08/15/23  0425 08/15/23  0851 08/15/23  2013 08/16/23  0220 08/16/23  1206   *   < > 163*   < > 159* 143* 150*   CALCIUM 8.8   < > 8.8   < > 8.8 8.8 8.8   ALBUMIN 2.7*  --  2.6*  --   --  2.5*  --    PROT 6.9  --  6.5  --   --  6.2  --    *   < > 133*   < > 134*  134* 133*   K 3.8   < > 3.7   < > 4.2 4.3 4.6   CO2 31*   < > 28   < > 27 27 22*   CL 93*   < > 93*   < > 97 98 99   BUN 31*   < > 31*   < > 29* 33* 32*   CREATININE 2.3*   < > 2.3*   < > 2.3* 2.5* 2.5*   ALKPHOS 77  --  75  --   --  76  --    ALT 28  --  25  --   --  18  --    AST 50*  --  39  --   --  33  --    BILITOT 0.8  --  0.7  --   --  0.6  --     < > = values in this interval not displayed.      Coagulation:   Recent Labs   Lab 08/14/23  0415 08/14/23  1251 08/15/23  0425 08/15/23  0428 08/15/23  1211 08/16/23  0415   INR 1.0  --   --  1.0  --  1.0   APTT  --  52.0* 58.8*  --  47.5*  --      LDH:  Recent Labs   Lab 08/14/23  0415 08/15/23  0425 08/16/23  0220   * 756* 631*     Microbiology:  Microbiology Results (last 7 days)       Procedure Component Value Units Date/Time    Blood culture [[id number]] Collected: 08/13/23 0920    Order Status: Completed Specimen: Blood from Peripheral, Antecubital, Left Updated: 08/16/23 1012     Blood Culture, Routine No Growth to date      No Growth to date      No Growth to date      No Growth to date    Blood culture [[id number]] Collected: 08/12/23 0103    Order Status: Completed Specimen: Blood from Peripheral, Hand, Left Updated: 08/16/23 0612     Blood Culture, Routine No Growth to date      No Growth to date      No Growth to date      No Growth to date      No Growth to date    Blood culture [[id number]]  (Abnormal) Collected: 08/12/23 0042    Order Status: Completed Specimen: Blood from Line, Jugular, Internal Right Updated: 08/15/23 0744     Blood Culture, Routine Gram stain aer bottle: Gram positive cocci in clusters resembling Staph      Results called to and read back by:Jojo Valdes Rn 08/13/2023  02:56      COAGULASE-NEGATIVE STAPHYLOCOCCUS SPECIES  Organism is a probable contaminant      MRSA/SA Rapid ID by PCR from Blood culture [[id number]] Collected: 08/12/23 0042    Order Status: Completed Updated: 08/13/23 0403     Staph aureus ID by PCR  Negative     Methicillin Resistant ID by PCR Negative            I have reviewed all pertinent labs within the past 24 hours.    Estimated Creatinine Clearance: 47.9 mL/min (A) (based on SCr of 2.5 mg/dL (H)).    Diagnostic Results:  I have reviewed all pertinent imaging results/findings within the past 24 hours.

## 2023-08-16 NOTE — ASSESSMENT & PLAN NOTE
Endocrinology consulted for BG management.   BG goal 140-180     - Transition drip at 4 units/hr with step-down parameters.   - Novolog 6-12 units with meals (Administer    8    units if patient eats 25-50% of meal, administer   16    units if patient eats > 50% of meal.)  - Novolog (aspart) insulin prn for BG excursions [facility] (180/25).  - BG checks /HS/0200  - Hypoglycemia protocol in place      ** Please notify Endocrine for any change and/or advance in diet**  ** Please call Endocrine for any BG related issues **    Discharge Planning:   TBD. Please notify endocrinology prior to discharge.

## 2023-08-16 NOTE — ASSESSMENT & PLAN NOTE
Titrate insulin slowly to avoid hypoglycemia as the risk of hypoglycemia increases with decreased creatinine clearance.    Estimated Creatinine Clearance: 47.9 mL/min (A) (based on SCr of 2.5 mg/dL (H)).

## 2023-08-16 NOTE — PLAN OF CARE
Cardiac ICU Care Plan    POC reviewed with Itz Daniel and family. Impella removed this afternoon, after removal CVP 2, SvO2 44%.  and insulin gtt continued. See below and flowsheets for full assessment and VS info.       Neuro:  Ben Coma Scale  Best Eye Response: 4-->(E4) spontaneous  Best Motor Response: 6-->(M6) obeys commands  Best Verbal Response: 5-->(V5) oriented  [place] Coma Scale Score: 15  Assessment Qualifiers: patient not sedated/intubated  Pupil PERRLA: yes    24 hr Temp:  [97.9 °F (36.6 °C)-98.2 °F (36.8 °C)]      CV:  Rhythm: sinus tachycardia   DVT prophylaxis: VTE Required Core Measure: Pharmacological prophylaxis initiated/maintained    CVP (mean): 133 mmHg (08/15/23 1500)    [REMOVED] Pulmonary Artery Catheter Assessment  08/11/23 1500 Femoral Vein Right-Current Insertion Depth (cm): 78.5 cm (08/15/23 1101)  PAP: 40/19 (08/15/23 1501)  SVO2 (%): 58 % (08/15/23 0701)  CO (L/min): 5.3 L/min (Joe) (08/13/23 1601)       Type: Impella       Pulses  Right Radial Pulse: 2+ (normal)  Left Radial Pulse: 2+ (normal)  Right Dorsalis Pedis Pulse: 0 (absent) (MD aware)  Left Dorsalis Pedis Pulse: 1+ (weak)  Right Posterior Tibial Pulse: Doppler  Left Posterior Tibial Pulse: Doppler    Resp:  Flow (L/min): 2  Oxygen Concentration (%): 28    GI/:  GI prophylaxis: yes  Diet/Nutrition Received: clear liquid  Last Bowel Movement: 08/09/23  Voiding Characteristics: ureteral catheter       Urethral Catheter 08/12/23 0130 16 Fr.-Reason for Continuing Urinary Catheterization: Critically ill in ICU and requiring hourly monitoring of intake/output   Intake/Output Summary (Last 24 hours) at 8/15/2023 1905  Last data filed at 8/15/2023 1501  Gross per 24 hour   Intake 1133.16 ml   Output 1510 ml   Net -376.84 ml         Labs/Accuchecks:  Recent Labs   Lab 08/13/23  0220 08/14/23  0415 08/15/23  0425   WBC 9.81 10.73 10.09   RBC 3.51* 3.71* 3.44*   HGB 10.3* 10.9* 10.2*   HCT 31.3* 32.8* 31.2*    222  225      Recent Labs   Lab 08/13/23  0220 08/13/23  0644 08/14/23  0415 08/14/23  1251 08/15/23  0425 08/15/23  0428 08/15/23  1211   INR 1.0  --  1.0  --   --  1.0  --    APTT  --    < >  --  52.0* 58.8*  --  47.5*    < > = values in this interval not displayed.      Recent Labs     08/15/23  0425 08/15/23  0851   * 133*   K 3.7 4.1   CO2 28 24   CL 93* 96   BUN 31* 30*   CREATININE 2.3* 2.2*   ALKPHOS 75  --    ALT 25  --    AST 39  --    BILITOT 0.7  --        Recent Labs   Lab 08/10/23  2304 08/11/23  0228 08/11/23  1232   CPK  --  1,398*  --    TROPONINI 23.277* 23.035* 38.193*      Recent Labs     08/15/23  0043 08/15/23  0746 08/15/23  1813   PH 7.397 7.428 7.386   PCO2 51.3* 49.5* 49.8*   PO2 34* 30* 25*   HCO3 31.6* 32.7* 29.9*   POCSATURATED 64* 58* 44*   BE 7 8 5       Electrolytes: N/A - electrolytes WDL  Accuchecks: Q4H    Gtts/LDAs:   sodium chloride 0.9% 10 mL/hr at 08/15/23 1501    DOBUTamine IV infusion (non-titrating) 5 mcg/kg/min (08/15/23 1501)    insulin regular 1 units/mL infusion orderable (TRANSFER) 5 Units/hr (08/15/23 1825)    sodium bicarbonate 25 mEq in dextrose 5 % (D5W) 1,000 mL Purge Solution for Impella 15.1 mL/hr at 08/15/23 1501    sodium chloride 0.9%      heparin (porcine) in D5W Stopped (08/15/23 0846)       Lines/Drains/Airways       Central Venous Catheter Line  Duration             Introducer 08/15/23 1630 Internal Jugular Left <1 day              Drain  Duration                  Urethral Catheter 08/12/23 0130 16 Fr. 3 days              Arterial Line  Duration             Arterial Line 08/11/23 1800 Right Radial 4 days              Peripheral Intravenous Line  Duration                  Peripheral IV - Single Lumen 08/11/23 0330 20 G;Other (Comments) Anterior;Right Upper Arm 4 days         Peripheral IV - Single Lumen 08/13/23 0921 20 G Left Antecubital 2 days                    Skin/Wounds  Bathing/Skin Care: bath, complete;dressed/undressed;linen changed (08/15/23  0501)  Wounds: Yes  Wound care consulted: No

## 2023-08-16 NOTE — PROGRESS NOTES
08/16/23 0916   Vital Signs   BP (!) 76/46   MAP (mmHg) 56   BP Location Left arm   BP Method Automatic   Patient Position Sitting     Pt pale, c/o lightheadedness while sitting in chair. , MD notified, orders to return patient to bed. Pt returned to bed w/ x2 RN assist. Patient placed in reverse trendelenburg, BP improved. SvO2 and lactic drawn.

## 2023-08-16 NOTE — ASSESSMENT & PLAN NOTE
Cardiogenic shock    52-year-old male with past medical history of ischemic cardiomyopathy status post CABG in 2017 who presents as a transfer from outside hospital after he presented with an NSTEMI status post revascularization to proximal circumflex.  Associated cardiogenic shock noted from right heart catheterization, CP Impella placed on a 12. 3.4cm from AV to inlet on bedside echo     Continued high PA and WP   hydralazine 37.5 TID / isordil 10 TID for afterload reduction    decreased to 3  CVP remains low, hold lasix

## 2023-08-16 NOTE — PROGRESS NOTES
08/16/2023  Wisam Driver    Current provider:  Margoth Mackenzie MD    Device interrogation:  TXP LVAD INTERROGATIONS 8/15/2023 8/15/2023 8/15/2023 8/15/2023 8/15/2023 8/15/2023 8/15/2023   Type Impella Impella Impella Impella Impella Impella Impella   Pulsatility Pulse Pulse Pulse Pulse Pulse Pulse Pulse          Rounded on Itz Daniel to ensure all mechanical assist device settings (IABP or VAD) were appropriate and all parameters were within limits.  I was able to ensure all back up equipment was present, the staff had no issues, and the Perfusion Department daily rounding was complete.      For implantable VADs: Interrogation of Ventricular assist device was performed with analysis of device parameters and review of device function. I have personally reviewed the interrogation findings and agree with findings as stated.     In emergency, the nursing units have been notified to contact the perfusion department either by:  Calling [phone] from 630am to 4pm Mon thru Fri, utilizing the On-Call Finder functionality of Epic and searching for Perfusion, or by contacting the hospital  from 4pm to 630am and on weekends and asking to speak with the perfusionist on call.    1:17 PM

## 2023-08-16 NOTE — PROGRESS NOTES
[facility] - Cardiac Intensive Care  Heart Transplant  Progress Note    Patient Name: Itz Daniel  MRN: [id number]  Admission Date: 8/11/2023  Hospital Length of Stay: 5 days  Attending Physician: Margoth Mackenzie MD  Primary Care Provider: Sunday Boo MD  Principal Problem:Ischemic cardiomyopathy    Subjective:     Interval History:     weaned to 3.5, hydralazine increased to 37.5, isordil decreased to 10  CVP: 3  SVO2:  49  Cardiac Output: 6.5  Cardiac Index: 2.6  SVR: 1073  PA 74/33  WP 25-27    Continuous Infusions:   sodium chloride 0.9% Stopped (08/15/23 1533)    DOBUTamine IV infusion (non-titrating) 3 mcg/kg/min (08/16/23 1601)    insulin regular 1 units/mL infusion orderable (TRANSFER) 4 Units/hr (08/16/23 1601)    sodium chloride 0.9%       Scheduled Meds:   aspirin  81 mg Oral Daily    atorvastatin  80 mg Oral Daily    enoxparin  40 mg Subcutaneous Q24H (prophylaxis, 1700)    glycerin adult  1 suppository Rectal Once    hydrALAZINE  35 mg Oral TID    insulin aspart U-100  6-12 Units Subcutaneous TIDWM    isosorbide dinitrate  10 mg Oral TID    levothyroxine  125 mcg Oral Before breakfast    LIDOcaine (PF) 10 mg/ml (1%)  10 mL Other Once    LIDOcaine  1 patch Transdermal Q24H    mupirocin   Nasal BID    pantoprazole  40 mg Oral Daily    polyethylene glycol  17 g Oral TID    senna  8.6 mg Oral BID    ticagrelor  90 mg Oral BID     PRN Meds:acetaminophen, dextrose 10%, dextrose 10%, glucagon (human recombinant), glucose, glucose, insulin aspart U-100, methocarbamoL, ondansetron, oxyCODONE, oxymetazoline, sodium chloride 0.9%, sodium chloride 0.9%    Review of patient's allergies indicates:  No Known Allergies  Objective:     Vital Signs (Most Recent):  Temp: 98.1 °F (36.7 °C) (08/16/23 1501)  Pulse: (!) 111 (08/16/23 1501)  Resp: (!) 24 (08/16/23 1501)  BP: 135/63 (08/16/23 1501)  SpO2: 99 % (08/16/23 1501) Vital Signs (24h Range):  Temp:  [98.1 °F (36.7 °C)-98.4 °F (36.9 °C)] 98.1  °F (36.7 °C)  Pulse:  [106-124] 111  Resp:  [14-26] 24  SpO2:  [88 %-99 %] 99 %  BP: ()/(46-74) 135/63  Arterial Line BP: ()/(59-74) 82/74     Patient Vitals for the past 72 hrs (Last 3 readings):   Weight   08/14/23 1101 121.6 kg (268 lb)     Body mass index is 34.41 kg/m².      Intake/Output Summary (Last 24 hours) at 8/16/2023 1631  Last data filed at 8/16/2023 1601  Gross per 24 hour   Intake 1409.35 ml   Output 1595 ml   Net -185.65 ml       Hemodynamic Parameters:  PAP: (42-78)/(-9-38) 49/-9  PAP (Mean):  [-11 mmHg-52 mmHg] -11 mmHg           Physical Exam  Constitutional:       Appearance: Normal appearance.   HENT:      Head: Normocephalic and atraumatic.      Mouth/Throat:      Mouth: Mucous membranes are moist.   Eyes:      Conjunctiva/sclera: Conjunctivae normal.   Neck:      Comments: BORIS camilo  Cardiovascular:      Rate and Rhythm: Normal rate and regular rhythm.      Comments: R groin with no hematoma  Pulmonary:      Effort: Pulmonary effort is normal. No respiratory distress.      Comments: Bibasilar crackles noted  Abdominal:      General: There is no distension.      Palpations: Abdomen is soft.      Tenderness: There is no abdominal tenderness. There is no guarding.   Musculoskeletal:      Right lower leg: No edema.      Left lower leg: No edema.      Comments: BLE are warm   Skin:     Capillary Refill: Capillary refill takes less than 2 seconds.      Findings: No rash.   Neurological:      General: No focal deficit present.      Mental Status: He is alert and oriented to person, place, and time.   Psychiatric:         Mood and Affect: Mood normal.            Significant Labs:  CBC:  Recent Labs   Lab 08/14/23  0415 08/15/23  0425 08/16/23  0415   WBC 10.73 10.09 8.97   RBC 3.71* 3.44* 3.26*   HGB 10.9* 10.2* 9.7*   HCT 32.8* 31.2* 29.8*    225 212   MCV 88 91 91   MCH 29.4 29.7 29.8   MCHC 33.2 32.7 32.6     BNP:  Recent Labs   Lab 08/10/23  1856 08/11/23 2126   .7* 956*      CMP:  Recent Labs   Lab 08/14/23  0415 08/14/23  0754 08/15/23  0425 08/15/23  0851 08/15/23  2013 08/16/23  0220 08/16/23  1206   *   < > 163*   < > 159* 143* 150*   CALCIUM 8.8   < > 8.8   < > 8.8 8.8 8.8   ALBUMIN 2.7*  --  2.6*  --   --  2.5*  --    PROT 6.9  --  6.5  --   --  6.2  --    *   < > 133*   < > 134* 134* 133*   K 3.8   < > 3.7   < > 4.2 4.3 4.6   CO2 31*   < > 28   < > 27 27 22*   CL 93*   < > 93*   < > 97 98 99   BUN 31*   < > 31*   < > 29* 33* 32*   CREATININE 2.3*   < > 2.3*   < > 2.3* 2.5* 2.5*   ALKPHOS 77  --  75  --   --  76  --    ALT 28  --  25  --   --  18  --    AST 50*  --  39  --   --  33  --    BILITOT 0.8  --  0.7  --   --  0.6  --     < > = values in this interval not displayed.      Coagulation:   Recent Labs   Lab 08/14/23  0415 08/14/23  1251 08/15/23  0425 08/15/23  0428 08/15/23  1211 08/16/23 0415   INR 1.0  --   --  1.0  --  1.0   APTT  --  52.0* 58.8*  --  47.5*  --      LDH:  Recent Labs   Lab 08/14/23  0415 08/15/23  0425 08/16/23 0220   * 756* 631*     Microbiology:  Microbiology Results (last 7 days)       Procedure Component Value Units Date/Time    Blood culture [[id number]] Collected: 08/13/23 0920    Order Status: Completed Specimen: Blood from Peripheral, Antecubital, Left Updated: 08/16/23 1012     Blood Culture, Routine No Growth to date      No Growth to date      No Growth to date      No Growth to date    Blood culture [[id number]] Collected: 08/12/23 0103    Order Status: Completed Specimen: Blood from Peripheral, Hand, Left Updated: 08/16/23 0612     Blood Culture, Routine No Growth to date      No Growth to date      No Growth to date      No Growth to date      No Growth to date    Blood culture [[id number]]  (Abnormal) Collected: 08/12/23 0042    Order Status: Completed Specimen: Blood from Line, Jugular, Internal Right Updated: 08/15/23 0744     Blood Culture, Routine Gram stain aer bottle: Gram positive cocci in clusters resembling  Staph      Results called to and read back by:Jojo Valdes Rn 08/13/2023  02:56      COAGULASE-NEGATIVE STAPHYLOCOCCUS SPECIES  Organism is a probable contaminant      MRSA/SA Rapid ID by PCR from Blood culture [[id number]] Collected: 08/12/23 0042    Order Status: Completed Updated: 08/13/23 0403     Staph aureus ID by PCR Negative     Methicillin Resistant ID by PCR Negative            I have reviewed all pertinent labs within the past 24 hours.    Estimated Creatinine Clearance: 47.9 mL/min (A) (based on SCr of 2.5 mg/dL (H)).    Diagnostic Results:  I have reviewed all pertinent imaging results/findings within the past 24 hours.    Assessment and Plan:     52-year-old male with a past medical history of ICM, CABG in 2017 (LIMA to LAD, SVG to OM1, SVG to PDA), DM type II, CKD stage IV (baseline 1.8), and ICD who presented [facility] ED on 8/10//23 due to n/v and SOB. Work up concerning for NSTEMI with peak trop of 38. Started on ACS protocol with asa, ticagrelor, and heparin gtt. Also noted to have YVES with Cr 2.5, volume overloaded with BNP of 796, LA 2.8>>1.0. TTE with EF drop from 30 to 15%, LVEDD 6.15, moderately reduced RV function. LHC/RHC on 8/12: s/p MIRELLA to prox Cx. RHC showed RA 20, RV 81/21, PA 81/47 (mean 61), PWCP 45, CO/CI: 3.69/1.52 (VIKKI)  3.91/1.6 (TD), therefore, impella CP was placed in in right femoral artery and leave in swan in right femoral vein. He was then transferred to [facility] for higher level of care. Of note, never started on inotropes or pressors. Was receiving metoprolol.     On arrival patient was hemodynamically stable.  Not on any inotropes or pressors.  Impella at P7  Initial hemodynamics  CVP: 9  SVO2:  44  Cardiac Output: 4.9  Cardiac Index: 2.0  SVR: 1250     LHC on 8/12  Grafts:  SVG-RCA:  Diffuse disease with narrowing of up to 40-50% in the proximal portion  The native PLB and PDA both diffusely diseased.  SVG-OM: Occluded  LIMA-LAD:  Widely patent.  Diffusely diseased native  LAD with narrowing of up to 90% in the very apical portion.  Severe native CAD status post PCI of the proximal circumflex with a 33 x 24 mm Synergy XD stent (post-dilated up to 4.25 mm)      Previous Cardiac Diagnostics:   TTE 7.1.22  The study quality is average.   The left ventricle is mildly enlarged. Global left ventricular systolic function is severely decreased. The left ventricular ejection fraction is 30%.   Mild (1+) mitral regurgitation.  The pulmonary artery systolic pressure is 10 mmHg. The pulmonary artery appears to be normal.     [facility] 5.19.2017  Severe MVCAD as a cause to severe newly diagnosed ischemic congestive heart failure with EF of 20%  Mild MR  Patent L subclavian, & LIMA suitable for bypass graft conduit     BLE Arterial US 10.18.21  The study quality is average.   The arteries of the left lower extremity appear patent with no significant stenosis noted.   Tri-phasic waveforms are obtained throughout the left lower extremity arteries.      Carotid US 10.18.21  The study quality is average.   1-39% stenosis in the proximal right internal carotid artery based on Bluth Criteria.   1-39% stenosis in the proximal left internal carotid artery based on Bluth Criteria.   Less than 50% stenosis throughout the bilateral common carotid arteries.   Antegrade bilateral vertebral artery flow.         * Ischemic cardiomyopathy  Cardiogenic shock    52-year-old male with past medical history of ischemic cardiomyopathy status post CABG in 2017 who presents as a transfer from outside hospital after he presented with an NSTEMI status post revascularization to proximal circumflex.  Associated cardiogenic shock noted from right heart catheterization, CP Impella placed on a 12. 3.4cm from AV to inlet on bedside echo     Continued high PA and WP   hydralazine 37.5 TID / isordil 10 TID for afterload reduction    decreased to 3  CVP remains low, hold lasix         NSTEMI (non-ST elevated myocardial  infarction)  Continue aspirin  Continue ticagrelor  Continue atorvastatin 80    CKD (chronic kidney disease), stage IV  Acute kidney injury    Likely secondary to cardiogenic shock  Baseline creatinine of 1.8.   Lab Results   Component Value Date    CREATININE 2.3 (H) 08/13/2023    CREATININE 2.3 (H) 08/13/2023    CREATININE 2.3 (H) 08/12/2023        Creatinine improved from 2.5  Daily weights, strict I/O and chart, renally dose all medications   Avoid nephrotoxic medications, NSAIDs, ACE/ARB, and IV contrast.      Hypothyroidism  Continue home Synthroid    DM (diabetes mellitus)  Lab Results   Component Value Date    HGBA1C 10.2 (H) 08/10/2023     Endocrine following  Now on insulin drip  Diabetic/cardiac diet          Fransisco Ivan MD  Heart Transplant  [facility] - Cardiac Intensive Care

## 2023-08-17 NOTE — PLAN OF CARE
CICU Care Plan    POC reviewed with Itz Daniel at 1905. Pt verbalized understanding. Questions and concerns addressed. No acute events today. Pt progressing toward goals. HTS Fellow updated throughout shift, please see orders for interventions. Dobutamine @ 5mcg/kg/min and transitional insulin @ 4 units/kg. Security measures in place, plan of care to continue. See below and flowsheets for full assessment and VS info.       Neuro:  Ben Coma Scale  Best Eye Response: 4-->(E4) spontaneous  Best Motor Response: 6-->(M6) obeys commands  Best Verbal Response: 5-->(V5) oriented  Ben Coma Scale Score: 15  Assessment Qualifiers: patient not sedated/intubated, no eye obstruction present  Pupil PERRLA: yes     24 hr Temp:  [97.8 °F (36.6 °C)-98.5 °F (36.9 °C)]     CV:   Rhythm: sinus tachycardia  BP goals:   MAP > 65    Resp:      Oxygen Concentration (%): 28 2L Nasal Cannula while sleeping.    GI/:     Diet/Nutrition Received: clear liquid  Last Bowel Movement: 08/09/23  Voiding Characteristics: urethral catheter (bladder)    Intake/Output Summary (Last 24 hours) at 8/17/2023 0603  Last data filed at 8/17/2023 0505  Gross per 24 hour   Intake 2270.13 ml   Output 1300 ml   Net 970.13 ml            Labs/Accuchecks:  Recent Labs   Lab 08/16/23  0415   WBC 8.97   RBC 3.26*   HGB 9.7*   HCT 29.8*         Recent Labs   Lab 08/17/23  0417   *   K 4.0   CO2 24   CL 96   BUN 38*   CREATININE 2.8*   ALKPHOS 79   ALT 16   AST 31   BILITOT 0.7      Recent Labs   Lab 08/10/23  2025 08/11/23  2126 08/15/23  1211 08/16/23  0415 08/17/23  0417   PROTIME 12.9  --   --   --   --    INR 1.0   < >  --    < > 1.1   APTT  --    < > 47.5*  --   --     < > = values in this interval not displayed.      Recent Labs   Lab 08/11/23 0228 08/11/23  1232   CPK 1,398*  --    TROPONINI 23.035* 38.193*       Electrolytes: N/A - electrolytes WDL  Accuchecks: Q4H    Gtts:   sodium chloride 0.9% Stopped (08/15/23 8452)     DOBUTamine IV infusion (non-titrating) 5 mcg/kg/min (08/17/23 0541)    insulin regular 1 units/mL infusion orderable (TRANSFER) 4 Units/hr (08/17/23 0105)    sodium chloride 0.9%         LDA/Wounds:  Lines/Drains/Airways       Central Venous Catheter Line  Duration             Introducer 08/15/23 1630 Internal Jugular Left 1 day    Pulmonary Artery Catheter Assessment  08/15/23 1500 Internal Jugular Left 1 day              Drain  Duration                  Urethral Catheter 08/12/23 0130 16 Fr. 5 days              Peripheral Intravenous Line  Duration                  Peripheral IV - Single Lumen 08/11/23 0330 20 G;Other (Comments) Anterior;Right Upper Arm 6 days         Peripheral IV - Single Lumen 08/13/23 0921 20 G Left Antecubital 3 days                  Wounds: Yes  Wound care consulted: Yes

## 2023-08-17 NOTE — ASSESSMENT & PLAN NOTE
Cardiogenic shock    52-year-old male with past medical history of ischemic cardiomyopathy status post CABG in 2017 who presents as a transfer from outside hospital after he presented with an NSTEMI status post revascularization to proximal circumflex.  Associated cardiogenic shock noted from right heart catheterization, CP Impella placed on a 12. 3.4cm from AV to inlet on bedside echo     Continued high PA and WP   Overnight, decreased UOP with increase in Cr.  increased back to 5  Isosorbide and hydralazine held overnight, restart at 10/10.   Repeat BMP this afternoon.   Echo pending    Holding lasix for now, monitor CVP/HDs

## 2023-08-17 NOTE — ASSESSMENT & PLAN NOTE
Endocrinology consulted for BG management.   BG goal 140-180     - Transition drip at 4 units/hr with step-down parameters.   - Novolog 6-12 units with meals (Administer    8    units if patient eats 25-50% of meal, administer   16    units if patient eats > 50% of meal.)  - Novolog (aspart) insulin prn for BG excursions [facility] (180/25).  - BG checks /HS/0200  - Hypoglycemia protocol in place      ** Please notify Endocrine for any change and/or advance in diet**  ** Please call Endocrine for any BG related issues **    Discharge Planning:   TBD. Please notify endocrinology prior to discharge.

## 2023-08-17 NOTE — ASSESSMENT & PLAN NOTE
Acute kidney injury    Likely secondary to cardiogenic shock  Baseline creatinine of 1.8.   Lab Results   Component Value Date    CREATININE 3.0 (H) 08/17/2023    CREATININE 2.8 (H) 08/17/2023    CREATININE 2.5 (H) 08/16/2023        Cr now uptrending   Daily weights, strict I/O and chart, renally dose all medications   Avoid nephrotoxic medications, NSAIDs, ACE/ARB, and IV contrast.

## 2023-08-17 NOTE — PLAN OF CARE
Cardiac ICU Care Plan    POC reviewed with Itz Daniel and family. Questions and concerns addressed.See below and flowsheets for full assessment and VS info.     CVP 9 & 8  SV02 52  Nitric 10 added today   Renal US done today  Lasix 80mg IV push given today    Neuro:  [place] Coma Scale  Best Eye Response: 4-->(E4) spontaneous  Best Motor Response: 6-->(M6) obeys commands  Best Verbal Response: 5-->(V5) oriented  [place] Coma Scale Score: 15  Assessment Qualifiers: patient not sedated/intubated  Pupil PERRLA: yes    24 hr Temp:  [97.8 °F (36.6 °C)-98.5 °F (36.9 °C)]      CV:  Rhythm: sinus tachycardia   DVT prophylaxis: VTE Required Core Measure: Pharmacological prophylaxis initiated/maintained    CVP (mean): 9 mmHg (08/17/23 1500)    Pulmonary Artery Catheter Assessment  08/15/23 1500 Internal Jugular Left-Current Insertion Depth (cm): 56.5 cm (Moved by  MD) (08/17/23 0702)  [REMOVED] Pulmonary Artery Catheter Assessment  08/11/23 1500 Femoral Vein Right-Current Insertion Depth (cm): 78.5 cm (08/15/23 1101)  PAP: 77/37 (08/17/23 1700)  SVO2 (%): 40 % (08/17/23 0505)  CO (L/min): 5.3 L/min (Joe) (08/13/23 1601)       Type: Impella       Pulses  Right Radial Pulse: 2+ (normal)  Left Radial Pulse: 2+ (normal)  Right Dorsalis Pedis Pulse: 0 (absent) (MD aware)  Left Dorsalis Pedis Pulse: Doppler  Right Posterior Tibial Pulse: Doppler  Left Posterior Tibial Pulse: Doppler    Resp:  Flow (L/min): 4  Oxygen Concentration (%): 28    GI/:  GI prophylaxis: yes  Diet/Nutrition Received: low saturated fat/low cholesterol, 2 gram sodium  Last Bowel Movement: 08/17/23  Voiding Characteristics: urethral catheter (bladder)       Urethral Catheter 08/12/23 0130 16 Fr.-Reason for Continuing Urinary Catheterization: Critically ill in ICU and requiring hourly monitoring of intake/output   Intake/Output Summary (Last 24 hours) at 8/17/2023 1847  Last data filed at 8/17/2023 1800  Gross per 24 hour   Intake 2214.29 ml    Output 1390 ml   Net 824.29 ml        Nutritional Supplement Intake: Quantity 0, Type:  n/a    Labs/Accuchecks:  Recent Labs   Lab 08/15/23  0425 08/16/23  0415 08/17/23  0639   WBC 10.09 8.97 10.83   RBC 3.44* 3.26* 3.07*   HGB 10.2* 9.7* 8.9*   HCT 31.2* 29.8* 27.2*    212 209      Recent Labs   Lab 08/14/23  1251 08/15/23  0425 08/15/23  0428 08/15/23  1211 08/16/23  0415 08/17/23  0417   INR  --   --  1.0  --  1.0 1.1   APTT 52.0* 58.8*  --  47.5*  --   --       Recent Labs     08/17/23  0417 08/17/23  0801 08/17/23  1333   *   < > 130*   K 4.0   < > 4.3   CO2 24   < > 23   CL 96   < > 98   BUN 38*   < > 40*   CREATININE 2.8*   < > 3.3*   ALKPHOS 79  --   --    ALT 16  --   --    AST 31  --   --    BILITOT 0.7  --   --     < > = values in this interval not displayed.       Recent Labs   Lab 08/10/23  2304 08/11/23  0228 08/11/23  1232   CPK  --  1,398*  --    TROPONINI 23.277* 23.035* 38.193*      Recent Labs     08/17/23  0822 08/17/23  1606 08/17/23  1829   PH 7.392 7.409 7.383   PCO2 43.4 41.8 46.4*   PO2 23* 23* 28*   HCO3 26.4 26.4 27.7   POCSATURATED 38* 41* 52*   BE 1 2 3       Electrolytes: N/A - electrolytes WDL  Accuchecks: Q4H    Gtts/LDAs:   sodium chloride 0.9% Stopped (08/15/23 1533)    DOBUTamine IV infusion (non-titrating) 5 mcg/kg/min (08/17/23 1800)    insulin regular 1 units/mL infusion orderable (TRANSFER) 4 Units/hr (08/17/23 1800)    nitric oxide gas      sodium chloride 0.9%         Lines/Drains/Airways       Central Venous Catheter Line  Duration             Introducer 08/15/23 1630 Internal Jugular Left 2 days    Pulmonary Artery Catheter Assessment  08/15/23 1500 Internal Jugular Left 2 days              Drain  Duration                  Urethral Catheter 08/12/23 0130 16 Fr. 5 days              Peripheral Intravenous Line  Duration                  Peripheral IV - Single Lumen 08/13/23 0921 20 G Left Antecubital 4 days                    Skin/Wounds  Bathing/Skin Care:  bath, complete;dressed/undressed;back care;foot care;incontinence care;linen changed (08/17/23 1700)  Wounds: Yes  Wound care consulted: No

## 2023-08-17 NOTE — PLAN OF CARE
Problem: Physical Therapy  Goal: Physical Therapy Goal  Description: Goals to be met by: 23     Patient will increase functional independence with mobility by performin. Supine to sit with Modified [place]  2. Sit to stand transfer with Modified [place]  3. Gait  x 250 feet with Supervision .   4. Upper extremity exercise program x10 reps per handout, with supervision    Outcome: Ongoing, Progressing  Goals remain appropriate. 2023

## 2023-08-17 NOTE — PT/OT/SLP PROGRESS
Physical Therapy Treatment/Change Plan of Care    Patient Name:  Itz Daniel   MRN:  [id number]    Recommendations:     Discharge Recommendations: home  Discharge Equipment Recommendations: none  Barriers to discharge: None    Assessment:     Itz Daniel is a 52 y.o. male admitted with a medical diagnosis of Ischemic cardiomyopathy.  He presents with the following impairments/functional limitations: weakness, impaired balance, decreased safety awareness, impaired endurance, impaired functional mobility, gait instability pt tolerated treatment well but has decreased tolerance to activities and will benefit from cont skilled PT 3x/wk to progress physically. Pt should be able to discharge home with no needs when medically stable.    Rehab Prognosis: Good; patient would benefit from acute skilled PT services to address these deficits and reach maximum level of function.    Recent Surgery: Procedure(s) (LRB):  Impella, Removal (Right)  INSERTION, CATHETER, SWAN-SUDHA, WITH IMAGING GUIDANCE (Left)  REMOVAL, CATHETER, CENTRAL VENOUS, TUNNELED (N/A) 2 Days Post-Op    Plan:     During this hospitalization, patient to be seen 3 x/week to address the identified rehab impairments via gait training, therapeutic activities and progress toward the following goals:    Plan of Care Expires:  09/10/23    Subjective     Chief Complaint: pt c/o feeling dizzy with sitting on EOB and BLE feeling weak.   Patient/Family Comments/goals: to get stronger and go home.   Pain/Comfort:  Pain Rating 1: 0/10  Pain Rating Post-Intervention 1: 0/10      Objective:     Communicated with nurse  prior to session.  Patient found supine with telemetry, pulse ox (continuous), oxygen, bhardwaj catheter (swan-sudha, CVP) upon PT entry to room.     General Precautions: Standard, fall (swan-sudha) Dr. Moody  approved gait training with swan-sudha when appropriate.   Orthopedic Precautions:    Braces:    Respiratory Status: Nasal cannula, flow 2  L/min     Functional Mobility:  Bed Mobility:  pt needed verbal cues for hand placement and sequencing for functional mobility.    Rolling Left:  contact guard assistance  Supine to Sit: contact guard assistance    Transfers:     Sit to Stand:  contact guard assistance with hand-held assist  Bed to Chair: contact guard assistance with  hand-held assist  using  Stand Pivot    Gait: pt received gait training ~ 2 side steps from bed to bedside chair with min assist.     Balance: pt sat on EOB with SBA.     Pt MAP decreased to 62 with sitting on EOB. Pt has a MAP goal of 65. Pt sat on EOB x 4 min and MAP increased to 68.       AM-PAC 6 CLICK MOBILITY  Turning over in bed (including adjusting bedclothes, sheets and blankets)?: 3  Sitting down on and standing up from a chair with arms (e.g., wheelchair, bedside commode, etc.): 3  Moving from lying on back to sitting on the side of the bed?: 3  Moving to and from a bed to a chair (including a wheelchair)?: 3  Need to walk in hospital room?: 1  Climbing 3-5 steps with a railing?: 1  Basic Mobility Total Score: 14       Treatment & Education:  Pt and 2 Aunts received verbal instruction in  PT POC and all verbally expressed understanding of such.     Patient left up in chair with all lines intact, call button in reach, RN  notified, and Aunts present..    GOALS:   Multidisciplinary Problems       Physical Therapy Goals          Problem: Physical Therapy    Goal Priority Disciplines Outcome Goal Variances Interventions   Physical Therapy Goal     PT, PT/OT Ongoing, Progressing     Description: Goals to be met by: 23     Patient will increase functional independence with mobility by performin. Supine to sit with Modified [place]  2. Sit to stand transfer with Modified [place]  3. Gait  x 250 feet with Supervision .   4. Upper extremity exercise program x10 reps per handout, with supervision                         Time Tracking:     PT Received On:  08/17/23  PT Start Time: 1029     PT Stop Time: 1052  PT Total Time (min): 23 min     Billable Minutes: Therapeutic Activity 23 min     Treatment Type: Treatment  PT/PTA: PT     Number of PTA visits since last PT visit: 0     08/17/2023

## 2023-08-17 NOTE — SUBJECTIVE & OBJECTIVE
"Interval HPI:   Overnight events: No acute events overnight. Patient in room [id number] A. Blood glucose stable. BG at goal or above on current insulin regimen (Transition Insulin Drip). Steroid use- None. 2 Day Post-Op  Renal function- Abnormal - Creatinine 3.0  Vasopressors-  Dobutamine    BP (!) 100/58   Pulse 109   Temp 98.1 °F (36.7 °C) (Oral)   Resp (!) 22   Ht 6' 2" (1.88 m)   Wt 121.6 kg (268 lb)   SpO2 96%   BMI 34.41 kg/m²     Labs Reviewed and Include    Recent Labs   Lab 08/17/23  0417 08/17/23  0801   * 140*   CALCIUM 8.6* 8.5*   ALBUMIN 2.4*  --    PROT 6.1  --    * 133*   K 4.0 4.0   CO2 24 22*   CL 96 98   BUN 38* 40*   CREATININE 2.8* 3.0*   ALKPHOS 79  --    ALT 16  --    AST 31  --    BILITOT 0.7  --      Lab Results   Component Value Date    WBC 10.83 08/17/2023    HGB 8.9 (L) 08/17/2023    HCT 27.2 (L) 08/17/2023    MCV 89 08/17/2023     08/17/2023     Recent Labs   Lab 08/10/23  2304   TSH 7.103*     Lab Results   Component Value Date    HGBA1C 10.2 (H) 08/10/2023       Nutritional status:   Body mass index is 34.41 kg/m².  Lab Results   Component Value Date    ALBUMIN 2.4 (L) 08/17/2023    ALBUMIN 2.5 (L) 08/16/2023    ALBUMIN 2.6 (L) 08/15/2023     No results found for: "PREALBUMIN"    Estimated Creatinine Clearance: 39.9 mL/min (A) (based on SCr of 3 mg/dL (H)).    Accu-Checks  Recent Labs     08/15/23  2208 08/16/23  0221 08/16/23  0835 08/16/23  0928 08/16/23  1205 08/16/23  1843 08/16/23  2039 08/17/23  0032 08/17/23  0415 08/17/23  0800   POCTGLUCOSE 151* 150* 129* 129* 161* 205* 208* 182* 174* 148*       Current Medications and/or Treatments Impacting Glycemic Control  Immunotherapy:    Immunosuppressants       None          Steroids:   Hormones (From admission, onward)      None          Pressors:    Autonomic Drugs (From admission, onward)      None          Hyperglycemia/Diabetes Medications:   Antihyperglycemics (From admission, onward)      Start   "   Stop Route Frequency Ordered    08/16/23 1045  insulin regular in 0.9 % NaCl 100 unit/100 mL (1 unit/mL) infusion        Question:  Enter initial dose (Units/hr):  Answer:  4    -- IV Continuous 08/16/23 1041    08/16/23 0815  insulin aspart U-100 pen 6-12 Units         -- SubQ 3 times daily with meals 08/16/23 0813    08/15/23 1003  insulin aspart U-100 pen 0-10 Units         -- SubQ As needed (PRN) 08/15/23 0904

## 2023-08-17 NOTE — PROGRESS NOTES
"[facility] - Cardiac Intensive Care  Endocrinology  Progress Note    Admit Date: 8/11/2023     Reason for Consult: Management of T2DM, Hyperglycemia     Diabetes diagnosis year: >20 years ago    Home Diabetes Medications:  Farxiga 10 mg QD; Lantus 50 units; Novolog 20 units    How often checking glucose at home?  Dexcom    BG readings on regimen: mid to upper 100s  Hypoglycemia on the regimen?  No  Missed doses on regimen?  No    Diabetes Complications include:     Hyperglycemia    Complicating diabetes co morbidities:   Cardiogenic shock; HTN; HLD      HPI: 52-year-old male with a past medical history of ICM, CABG in 2017 (LIMA to LAD, SVG to OM1, SVG to PDA), DM type II, CKD stage IV (baseline 1.8), and ICD who presented [place] [facility] on 8/10//23 due to n/v and SOB. Work up concerning for NSTEMI with peak trop of 38. Started on ACS protocol with asa, ticagrelor, and heparin gtt. Also noted to have YVES with Cr 2.5, volume overloaded with BNP of 796, LA 2.8>>1.0. TTE with EF drop from 30 to 15%, LVEDD 6.15, moderately reduced RV function. LHC/RHC on 8/12: s/p MIRELLA to prox Cx. RHC showed RA 20, RV 81/21, PA 81/47 (mean 61), PWCP 45, CO/CI: 3.69/1.52 (VIKKI)  3.91/1.6 (TD), therefore, impella CP was placed in in right femoral artery and leave in swan in right femoral vein. He was then transferred to [facility] for higher level of care. Endocrine consulted to manage hyperglycemia and type 2 diabetes.     Lab Results   Component Value Date    HGBA1C 10.2 (H) 08/10/2023                 Interval HPI:   Overnight events: No acute events overnight. Patient in room [id number] A. Blood glucose stable. BG at goal or above on current insulin regimen (Transition Insulin Drip). Steroid use- None. 2 Day Post-Op  Renal function- Abnormal - Creatinine 3.0  Vasopressors-  Dobutamine    BP (!) 100/58   Pulse 109   Temp 98.1 °F (36.7 °C) (Oral)   Resp (!) 22   Ht 6' 2" (1.88 m)   Wt 121.6 kg (268 lb)   SpO2 96%   BMI 34.41 kg/m² " "    Labs Reviewed and Include    Recent Labs   Lab 08/17/23  0417 08/17/23  0801   * 140*   CALCIUM 8.6* 8.5*   ALBUMIN 2.4*  --    PROT 6.1  --    * 133*   K 4.0 4.0   CO2 24 22*   CL 96 98   BUN 38* 40*   CREATININE 2.8* 3.0*   ALKPHOS 79  --    ALT 16  --    AST 31  --    BILITOT 0.7  --      Lab Results   Component Value Date    WBC 10.83 08/17/2023    HGB 8.9 (L) 08/17/2023    HCT 27.2 (L) 08/17/2023    MCV 89 08/17/2023     08/17/2023     Recent Labs   Lab 08/10/23  2304   TSH 7.103*     Lab Results   Component Value Date    HGBA1C 10.2 (H) 08/10/2023       Nutritional status:   Body mass index is 34.41 kg/m².  Lab Results   Component Value Date    ALBUMIN 2.4 (L) 08/17/2023    ALBUMIN 2.5 (L) 08/16/2023    ALBUMIN 2.6 (L) 08/15/2023     No results found for: "PREALBUMIN"    Estimated Creatinine Clearance: 39.9 mL/min (A) (based on SCr of 3 mg/dL (H)).    Accu-Checks  Recent Labs     08/15/23  2208 08/16/23  0221 08/16/23  0835 08/16/23  0928 08/16/23  1205 08/16/23  1843 08/16/23  2039 08/17/23  0032 08/17/23  0415 08/17/23  0800   POCTGLUCOSE 151* 150* 129* 129* 161* 205* 208* 182* 174* 148*       Current Medications and/or Treatments Impacting Glycemic Control  Immunotherapy:    Immunosuppressants       None          Steroids:   Hormones (From admission, onward)      None          Pressors:    Autonomic Drugs (From admission, onward)      None          Hyperglycemia/Diabetes Medications:   Antihyperglycemics (From admission, onward)      Start     Stop Route Frequency Ordered    08/16/23 1045  insulin regular in 0.9 % NaCl 100 unit/100 mL (1 unit/mL) infusion        Question:  Enter initial dose (Units/hr):  Answer:  4    -- IV Continuous 08/16/23 1041    08/16/23 0815  insulin aspart U-100 pen 6-12 Units         -- SubQ 3 times daily with meals 08/16/23 0813    08/15/23 1003  insulin aspart U-100 pen 0-10 Units         -- SubQ As needed (PRN) 08/15/23 0904            ASSESSMENT and " PLAN    Cardiac/Vascular  * Ischemic cardiomyopathy  Managed per primary team  Avoid hypoglycemia        Renal/  CKD (chronic kidney disease), stage IV  Titrate insulin slowly to avoid hypoglycemia as the risk of hypoglycemia increases with decreased creatinine clearance.    Estimated Creatinine Clearance: 39.9 mL/min (A) (based on SCr of 3 mg/dL (H)).        Endocrine  Hypothyroidism  Lab Results   Component Value Date    TSH 7.103 (H) 08/10/2023     On Synthroid 125 mcg      DM (diabetes mellitus)  Endocrinology consulted for BG management.   BG goal 140-180     - Transition drip at 4 units/hr with step-down parameters.   - Novolog 6-12 units with meals (Administer    8    units if patient eats 25-50% of meal, administer   16    units if patient eats > 50% of meal.)  - Novolog (aspart) insulin prn for BG excursions [facility] (180/25).  - BG checks /HS/0200  - Hypoglycemia protocol in place      ** Please notify Endocrine for any change and/or advance in diet**  ** Please call Endocrine for any BG related issues **    Discharge Planning:   TBD. Please notify endocrinology prior to discharge.              Inocente Devlin,   Endocrinology  [facility] - Cardiac Intensive Care

## 2023-08-17 NOTE — PLAN OF CARE
Nurses Note -- 4 Eyes      8/16/2023  7:05 PM      Skin assessed during: Q Shift Change      [] No Altered Skin Integrity Present    []Prevention Measures Documented      [x] Yes- Altered Skin Integrity Present or Discovered   [x] LDA Added if Not in Epic (Describe Wound)   [] New Altered Skin Integrity was Present on Admit and Documented in LDA   [] Wound Image Taken    Wound Care Consulted? No    Attending Nurse:  RASHID Gray    Second RN/Staff Member:  RASHID Sherman

## 2023-08-17 NOTE — CARE UPDATE
HTS Care Update Note    8PM     Hemodynamics    CVP:  5  SVO2:  52  CO 7.3  CI 2.9  SVR:  759    RAP 5  sPAP 66.00  dPAP 28  mPAP 43  PCWP 22  PVR 2.8  Liz[place] 7.6  RAP/PCWP 0.2   1.2    I/Os    In 1.2  Out 0.845  Net 0.355  UOP 0.845    This shift low urine output      Intake/Output Summary (Last 24 hours) at 8/16/2023 2341  Last data filed at 8/16/2023 2305  Gross per 24 hour   Intake 1551.06 ml   Output 1505 ml   Net 46.06 ml       Net IO Since Admission: -8,052.21 mL [08/16/23 2341]    Diuretics: None    Continuous Infusions:      sodium chloride 0.9% Stopped (08/15/23 1533)    DOBUTamine IV infusion (non-titrating) 3 mcg/kg/min (08/16/23 1801)    insulin regular 1 units/mL infusion orderable (TRANSFER) 4 Units/hr (08/16/23 1801)    sodium chloride 0.9%         Mechanical support: None    Plan:  - NaCl 0.9% 250cc in one hour and reassess HD  - Will attempt to downtitrate dobutamine if possible  - Plan discussed with attending     12AM     IVF 250cc given during one hour.  decreased to 2. Hydralazine and isosorbide not given due to BP normal-low.     Hemodynamics    86/57 MAP 66    CVP:  6  SVO2:  39  CO 5.6  CI 2.2  SVR:  964    sPAP 64  dPAP 30  mPAP 41  PCWP 23  PVR 3.1  L[place] 5.6   0.81    I/Os    UOP increased to 70, but then decreased to 45    Plan:  - No changes made, continue current plan of care  - Plan discussed with attending     5AM     Hemodynamics    86/57 MAP 66    CVP:  5  SVO2:  40  CO 5.7  CI 2.3  SVR:  961    I/Os    Urine output decreased to 30cc/h    I/O this shift:  In: 801.9 [P.O.:480; I.V.:321.9]  Out: 380 [Urine:380]      Intake/Output Summary (Last 24 hours) at 8/17/2023 0543  Last data filed at 8/17/2023 0505  Gross per 24 hour   Intake 2270.13 ml   Output 1300 ml   Net 970.13 ml       Net IO Since Admission: -7,495.3 mL [08/17/23 0543]    Diuretics: None    Continuous Infusions:      sodium chloride 0.9% Stopped (08/15/23 1533)    DOBUTamine IV infusion (non-titrating) 2  mcg/kg/min (08/17/23 5772)    insulin regular 1 units/mL infusion orderable (TRANSFER) 4 Units/hr (08/17/23 0105)    sodium chloride 0.9%         Mechanical support: None    SvO2 remains low, and UOP minimal    Plan:  - Increase the  to 5 again  - Monitor BP and UOP  - Plan discussed with attending     Paul Roach MD  Cardiovascular Disease PGY-IV  [facility]

## 2023-08-17 NOTE — ASSESSMENT & PLAN NOTE
Titrate insulin slowly to avoid hypoglycemia as the risk of hypoglycemia increases with decreased creatinine clearance.    Estimated Creatinine Clearance: 39.9 mL/min (A) (based on SCr of 3 mg/dL (H)).

## 2023-08-17 NOTE — SUBJECTIVE & OBJECTIVE
Interval History:   CVP: 9  SVO2:  38  Cardiac Output: 5.1  Cardiac Index: 2.1  SVR: 1028  PA 75/31  WP 27    Overnight, decreased UOP with increase in Cr.  increased back to 5  Isosorbide and hydralazine held overnight, restart at 10/10. Repeat BMP this afternoon. Echo pending      Continuous Infusions:   sodium chloride 0.9% Stopped (08/15/23 1533)    DOBUTamine IV infusion (non-titrating) 5 mcg/kg/min (08/17/23 1000)    insulin regular 1 units/mL infusion orderable (TRANSFER) 4 Units/hr (08/17/23 1000)    sodium chloride 0.9%       Scheduled Meds:   aspirin  81 mg Oral Daily    atorvastatin  80 mg Oral Daily    enoxparin  40 mg Subcutaneous Q24H (prophylaxis, 1700)    glycerin 99.5%  100 mL Rectal ED 1 Time    And    magnesium citrate  296 mL Rectal ED 1 Time    And    sodium chloride 0.9%  500 mL Rectal ED 1 Time    hydrALAZINE  10 mg Oral TID    insulin aspart U-100  6-12 Units Subcutaneous TIDWM    isosorbide dinitrate  10 mg Oral TID    lactulose  20 g Oral TID    levothyroxine  125 mcg Oral Before breakfast    LIDOcaine (PF) 10 mg/ml (1%)  10 mL Other Once    LIDOcaine  1 patch Transdermal Q24H    pantoprazole  40 mg Oral Daily    polyethylene glycol  17 g Oral TID    senna  8.6 mg Oral BID    ticagrelor  90 mg Oral BID     PRN Meds:acetaminophen, dextrose 10%, dextrose 10%, glucagon (human recombinant), glucose, glucose, insulin aspart U-100, methocarbamoL, ondansetron, oxyCODONE, oxymetazoline, sodium chloride 0.9%, sodium chloride 0.9%    Review of patient's allergies indicates:  No Known Allergies  Objective:     Vital Signs (Most Recent):  Temp: 98.1 °F (36.7 °C) (08/17/23 0700)  Pulse: (!) 111 (08/17/23 1000)  Resp: (!) 22 (08/17/23 1000)  BP: 102/62 (08/17/23 1000)  SpO2: 98 % (08/17/23 1000) Vital Signs (24h Range):  Temp:  [97.8 °F (36.6 °C)-98.5 °F (36.9 °C)] 98.1 °F (36.7 °C)  Pulse:  [108-121] 111  Resp:  [14-30] 22  SpO2:  [94 %-100 %] 98 %  BP: ()/(52-70) 102/62     No data  found.  Body mass index is 34.41 kg/m².      Intake/Output Summary (Last 24 hours) at 8/17/2023 1113  Last data filed at 8/17/2023 1000  Gross per 24 hour   Intake 1686.56 ml   Output 985 ml   Net 701.56 ml       Hemodynamic Parameters:  PAP: (32-81)/(-9-47) 77/36  PAP (Mean):  [-11 mmHg-56 mmHg] 50 mmHg         Physical Exam  Constitutional:       Appearance: Normal appearance.   HENT:      Head: Normocephalic and atraumatic.      Mouth/Throat:      Mouth: Mucous membranes are moist.   Eyes:      Conjunctiva/sclera: Conjunctivae normal.   Neck:      Comments: BORIS camilo  Cardiovascular:      Rate and Rhythm: Normal rate and regular rhythm.      Comments: R groin with no hematoma  Pulmonary:      Effort: Pulmonary effort is normal. No respiratory distress.      Comments: Bibasilar crackles noted  Abdominal:      General: There is no distension.      Palpations: Abdomen is soft.      Tenderness: There is no abdominal tenderness. There is no guarding.   Musculoskeletal:      Right lower leg: No edema.      Left lower leg: No edema.      Comments: BLE are warm   Skin:     Capillary Refill: Capillary refill takes less than 2 seconds.      Findings: No rash.   Neurological:      General: No focal deficit present.      Mental Status: He is alert and oriented to person, place, and time.   Psychiatric:         Mood and Affect: Mood normal.            Significant Labs:  CBC:  Recent Labs   Lab 08/15/23  0425 08/16/23  0415 08/17/23  0639   WBC 10.09 8.97 10.83   RBC 3.44* 3.26* 3.07*   HGB 10.2* 9.7* 8.9*   HCT 31.2* 29.8* 27.2*    212 209   MCV 91 91 89   MCH 29.7 29.8 29.0   MCHC 32.7 32.6 32.7     BNP:  Recent Labs   Lab 08/10/23  1856 08/11/23  2126   .7* 956*     CMP:  Recent Labs   Lab 08/15/23  0425 08/15/23  0851 08/16/23  0220 08/16/23  1206 08/16/23  1844 08/17/23  0417 08/17/23  0801   *   < > 143*   < > 206* 149* 140*   CALCIUM 8.8   < > 8.8   < > 8.4* 8.6* 8.5*   ALBUMIN 2.6*  --  2.5*  --    --  2.4*  --    PROT 6.5  --  6.2  --   --  6.1  --    *   < > 134*   < > 131* 131* 133*   K 3.7   < > 4.3   < > 4.1 4.0 4.0   CO2 28   < > 27   < > 24 24 22*   CL 93*   < > 98   < > 95 96 98   BUN 31*   < > 33*   < > 38* 38* 40*   CREATININE 2.3*   < > 2.5*   < > 2.5* 2.8* 3.0*   ALKPHOS 75  --  76  --   --  79  --    ALT 25  --  18  --   --  16  --    AST 39  --  33  --   --  31  --    BILITOT 0.7  --  0.6  --   --  0.7  --     < > = values in this interval not displayed.      Coagulation:   Recent Labs   Lab 08/14/23  1251 08/15/23  0425 08/15/23  0428 08/15/23  1211 08/16/23  0415 08/17/23  0417   INR  --   --  1.0  --  1.0 1.1   APTT 52.0* 58.8*  --  47.5*  --   --      LDH:  Recent Labs   Lab 08/15/23  0425 08/16/23  0220 08/17/23  0417   * 631* 628*     Microbiology:  Microbiology Results (last 7 days)       Procedure Component Value Units Date/Time    Blood culture [[id number]] Collected: 08/13/23 0920    Order Status: Completed Specimen: Blood from Peripheral, Antecubital, Left Updated: 08/17/23 1012     Blood Culture, Routine No Growth to date      No Growth to date      No Growth to date      No Growth to date      No Growth to date    Blood culture [[id number]] Collected: 08/12/23 0103    Order Status: Completed Specimen: Blood from Peripheral, Hand, Left Updated: 08/17/23 0612     Blood Culture, Routine No growth after 5 days.    Blood culture [[id number]]  (Abnormal) Collected: 08/12/23 0042    Order Status: Completed Specimen: Blood from Line, Jugular, Internal Right Updated: 08/15/23 0744     Blood Culture, Routine Gram stain aer bottle: Gram positive cocci in clusters resembling Staph      Results called to and read back by:Jojo Valdes Rn 08/13/2023  02:56      COAGULASE-NEGATIVE STAPHYLOCOCCUS SPECIES  Organism is a probable contaminant      MRSA/SA Rapid ID by PCR from Blood culture [[id number]] Collected: 08/12/23 0042    Order Status: Completed Updated: 08/13/23 0403     Joe  aureus ID by PCR Negative     Methicillin Resistant ID by PCR Negative            I have reviewed all pertinent labs within the past 24 hours.    Estimated Creatinine Clearance: 39.9 mL/min (A) (based on SCr of 3 mg/dL (H)).    Diagnostic Results:  I have reviewed all pertinent imaging results/findings within the past 24 hours.

## 2023-08-18 NOTE — PLAN OF CARE
Problem: Occupational Therapy  Goal: Occupational Therapy Goal  Description: Goals to be met by: 9/18/23 (1 month)     Patient will increase functional independence with ADLs by performing:    UE Dressing with [place].  LE Dressing with [place].  Grooming while standing at sink with [place].  Rolling to Bilateral with [place].   Supine to sit with [place].  Step transfer with [place]  Toilet transfer to toilet with [place].    Evaluated pt and established OT POC. Continue OT as  tolerated.  Zulema Moon OT  8/18/2023  Outcome: Ongoing, Progressing

## 2023-08-18 NOTE — ASSESSMENT & PLAN NOTE
Acute kidney injury    Likely secondary to cardiogenic shock  Baseline creatinine of 1.8.   Lab Results   Component Value Date    CREATININE 3.3 (H) 08/18/2023    CREATININE 3.2 (H) 08/18/2023    CREATININE 2.8 (H) 08/17/2023        Cr now uptrending, trending q4hr  Daily weights, strict I/O and chart, renally dose all medications   Avoid nephrotoxic medications, NSAIDs, ACE/ARB, and IV contrast.

## 2023-08-18 NOTE — SUBJECTIVE & OBJECTIVE
"Interval HPI:   Overnight events: Remains in CICU. BG stable on current IV/SQ insulin regimen. POD 3. Diet Cardiac Consistent Carbohydrate; Standard Tray    Eatin%  Nausea: No  Hypoglycemia and intervention: No  Fever: No  TPN and/or TF: No  If yes, type of TF/TPN and rate: n/a    BP (!) 98/54   Pulse 101   Temp 97.6 °F (36.4 °C) (Oral)   Resp 18   Ht 6' 2" (1.88 m)   Wt 121.6 kg (268 lb)   SpO2 98%   BMI 34.41 kg/m²     Labs Reviewed and Include    Recent Labs   Lab 23  0309 23  0841 23  1235   *   < > 123*   CALCIUM 8.5*   < > 8.8   ALBUMIN 2.4*  --   --    PROT 6.2  --   --    *   < > 134*   K 4.0   < > 4.1   CO2 23   < > 26   CL 98   < > 97   BUN 43*   < > 44*   CREATININE 3.2*   < > 3.2*   ALKPHOS 81  --   --    ALT 23  --   --    AST 38  --   --    BILITOT 0.8  --   --     < > = values in this interval not displayed.     Lab Results   Component Value Date    WBC 9.71 2023    HGB 8.2 (L) 2023    HCT 25.0 (L) 2023    MCV 89 2023     2023     No results for input(s): "TSH", "FREET4" in the last 168 hours.  Lab Results   Component Value Date    HGBA1C 10.2 (H) 08/10/2023       Nutritional status:   Body mass index is 34.41 kg/m².  Lab Results   Component Value Date    ALBUMIN 2.4 (L) 2023    ALBUMIN 2.4 (L) 2023    ALBUMIN 2.5 (L) 2023     No results found for: "PREALBUMIN"    Estimated Creatinine Clearance: 37 mL/min (A) (based on SCr of 3.2 mg/dL (H)).    Accu-Checks  Recent Labs     23  0032 23  0415 23  0800 23  1126 23  1650 23  2201 23  0443 23  0840 23  1234 23  1328   POCTGLUCOSE 182* 174* 148* 160* 152* 173* 157* 151* 124* 118*       Current Medications and/or Treatments Impacting Glycemic Control  Immunotherapy:    Immunosuppressants       None          Steroids:   Hormones (From admission, onward)      None          Pressors:    Autonomic Drugs " (From admission, onward)      None          Hyperglycemia/Diabetes Medications:   Antihyperglycemics (From admission, onward)      Start     Stop Route Frequency Ordered    08/16/23 1045  insulin regular in 0.9 % NaCl 100 unit/100 mL (1 unit/mL) infusion        Question:  Enter initial dose (Units/hr):  Answer:  4    -- IV Continuous 08/16/23 1041    08/16/23 0815  insulin aspart U-100 pen 6-12 Units         -- SubQ 3 times daily with meals 08/16/23 0813    08/15/23 1003  insulin aspart U-100 pen 0-10 Units         -- SubQ As needed (PRN) 08/15/23 0904

## 2023-08-18 NOTE — NURSING
Nurses Note -- 4 Eyes      8/18/2023   5:39 PM      Skin assessed during: Daily Assessment      [] No Altered Skin Integrity Present    []Prevention Measures Documented      [x] Yes- Altered Skin Integrity Present or Discovered   [] LDA Added if Not in Epic (Describe Wound)   [] New Altered Skin Integrity was Present on Admit and Documented in LDA   [] Wound Image Taken    Wound Care Consulted? No    Attending Nurse: RASHID Lambert    Second RN/Staff Member:  RASHID Will

## 2023-08-18 NOTE — PROGRESS NOTES
Update    Pt presents in room on phone. Pt aaox4 with open affect. No caregivers present at time of visit. Pt reports coping well and denies further needs, questions, concerns at this time and none indicated. Providing ongoing psychosocial and counseling support, education, resources, assistance and discharge planning as indicated. Following and available.

## 2023-08-18 NOTE — CARE UPDATE
HTS Care Update Note    8PM     Hemodynamics    CVP:  7  SVO2:  50  CO 7.4  CI 3.0  SVR:  753    RAP 6  sPAP 68  dPAP 28  mPAP 44  PCWP 28  PVR 2.2  Lizett 6.67   1.3    I/Os    In 1.37  Out 0.95  Net 0.422  UOP 0.95  Last h       Intake/Output Summary (Last 24 hours) at 8/17/2023 2203  Last data filed at 8/17/2023 2105  Gross per 24 hour   Intake 1985.38 ml   Output 1715 ml   Net 270.38 ml       Net IO Since Admission: -7,428.31 mL [08/17/23 2203]    Diuretics: Furosemide 80mg 1611h     Continuous Infusions:      sodium chloride 0.9% Stopped (08/15/23 1533)    DOBUTamine IV infusion (non-titrating) 5 mcg/kg/min (08/17/23 2105)    insulin regular 1 units/mL infusion orderable (TRANSFER) 4 Units/hr (08/17/23 2105)    nitric oxide gas      sodium chloride 0.9%         Mechanical support: None    Plan:  - No changes made, continue current plan of care  - monitor BMP  - Plan discussed with attending     HTS Care Update Note    11PM     Hemodynamics    CVP:  8  SVO2:  50  CO 7.4  CI 3.0  SVR:  742    I/Os    UOP last hour 110cc    I/O this shift:  In: 65.1 [I.V.:65.1]  Out: 550 [Urine:550]      Intake/Output Summary (Last 24 hours) at 8/18/2023 0016  Last data filed at 8/17/2023 2305  Gross per 24 hour   Intake 1725.03 ml   Output 1735 ml   Net -9.97 ml       Net IO Since Admission: -7,577.87 mL [08/18/23 0016]    Plan:  - No changes made, continue current plan of care  - Plan discussed with attending     Paul Roach MD  Cardiovascular Disease PGY-IV  [facility]

## 2023-08-18 NOTE — SUBJECTIVE & OBJECTIVE
Interval History:   RA: 10  SVO2:  44  Cardiac Output: 7.1  Cardiac Index: 2.8  SVR:729  PA 71/33  WP 20  LA 0.74    Unfortunately creatinine arnulfo to 3.3 yesterday, started on nitric oxide and given dose of Lasix with improvement overnight; however, Cr arnulfo to 3.2 again this morning.  Given prior improvement of Cr with aggressive diuresis, restarted on Lasix drip at 15 milligrams/hour.  Continue nitric oxide at 10.  Continued close hemodynamic and kidney function monitoring.    Continuous Infusions:   sodium chloride 0.9% Stopped (08/15/23 1533)    DOBUTamine IV infusion (non-titrating) 5 mcg/kg/min (08/18/23 0605)    furosemide (LASIX) 500 mg in 50 mL infusion (conc: 10 mg/mL)      insulin regular 1 units/mL infusion orderable (TRANSFER) 4 Units/hr (08/18/23 0605)    nitric oxide gas      sodium chloride 0.9%       Scheduled Meds:   aspirin  81 mg Oral Daily    atorvastatin  80 mg Oral Daily    enoxparin  40 mg Subcutaneous Q24H (prophylaxis, 1700)    furosemide (LASIX) injection  80 mg Intravenous Once    hydrALAZINE  10 mg Oral TID    insulin aspart U-100  6-12 Units Subcutaneous TIDWM    isosorbide dinitrate  10 mg Oral TID    levothyroxine  125 mcg Oral Before breakfast    LIDOcaine (PF) 10 mg/ml (1%)  10 mL Other Once    LIDOcaine  1 patch Transdermal Q24H    oxymetazoline  2 spray Each Nostril BID    pantoprazole  40 mg Oral Daily    polyethylene glycol  17 g Oral TID    senna  8.6 mg Oral BID    ticagrelor  90 mg Oral BID     PRN Meds:acetaminophen, dextrose 10%, dextrose 10%, glucagon (human recombinant), glucose, glucose, insulin aspart U-100, methocarbamoL, ondansetron, oxyCODONE, sodium chloride 0.9%, sodium chloride 0.9%    Review of patient's allergies indicates:  No Known Allergies  Objective:     Vital Signs (Most Recent):  Temp: 97.6 °F (36.4 °C) (08/18/23 0305)  Pulse: 106 (08/18/23 0605)  Resp: (!) 21 (08/18/23 0605)  BP: 98/61 (08/18/23 0605)  SpO2: 99 % (08/18/23 0605) Vital Signs (24h  Range):  Temp:  [97.6 °F (36.4 °C)-98 °F (36.7 °C)] 97.6 °F (36.4 °C)  Pulse:  [100-113] 106  Resp:  [14-29] 21  SpO2:  [93 %-100 %] 99 %  BP: ()/(50-70) 98/61     Patient Vitals for the past 72 hrs (Last 3 readings):   Weight   08/17/23 1525 121.6 kg (268 lb)     Body mass index is 34.41 kg/m².      Intake/Output Summary (Last 24 hours) at 8/18/2023 1135  Last data filed at 8/18/2023 1000  Gross per 24 hour   Intake 1224.51 ml   Output 2005 ml   Net -780.49 ml       Hemodynamic Parameters:  PAP: ()/(-6-237) 70/32  PAP (Mean):  [14 mmHg-237 mmHg] 47 mmHg           Physical Exam  Constitutional:       Appearance: Normal appearance.   HENT:      Head: Normocephalic and atraumatic.      Mouth/Throat:      Mouth: Mucous membranes are moist.   Eyes:      Conjunctiva/sclera: Conjunctivae normal.   Neck:      Comments: BORIS camilo  Cardiovascular:      Rate and Rhythm: Normal rate and regular rhythm.      Comments: R groin with no hematoma  Pulmonary:      Effort: Pulmonary effort is normal. No respiratory distress.      Comments: Bibasilar crackles noted  Abdominal:      General: There is no distension.      Palpations: Abdomen is soft.      Tenderness: There is no abdominal tenderness. There is no guarding.   Musculoskeletal:      Right lower leg: No edema.      Left lower leg: No edema.      Comments: BLE are warm   Skin:     Capillary Refill: Capillary refill takes less than 2 seconds.      Findings: No rash.   Neurological:      General: No focal deficit present.      Mental Status: He is alert and oriented to person, place, and time.   Psychiatric:         Mood and Affect: Mood normal.            Significant Labs:  CBC:  Recent Labs   Lab 08/16/23  0415 08/17/23  0639 08/17/23 2006 08/17/23  2334 08/18/23  0309   WBC 8.97 10.83  --   --  9.71   RBC 3.26* 3.07*  --   --  2.81*   HGB 9.7* 8.9*  --   --  8.2*   HCT 29.8* 27.2* 25* 27* 25.0*    209  --   --  236   MCV 91 89  --   --  89   MCH 29.8 29.0   --   --  29.2   MCHC 32.6 32.7  --   --  32.8     BNP:  Recent Labs   Lab 08/11/23  2126   *     CMP:  Recent Labs   Lab 08/16/23  0220 08/16/23  1206 08/17/23  0417 08/17/23  0801 08/17/23  2335 08/18/23  0309 08/18/23  0841   *   < > 149*   < > 157* 155* 147*   CALCIUM 8.8   < > 8.6*   < > 8.3* 8.5* 8.6*   ALBUMIN 2.5*  --  2.4*  --   --  2.4*  --    PROT 6.2  --  6.1  --   --  6.2  --    *   < > 131*   < > 132* 133* 132*   K 4.3   < > 4.0   < > 3.8 4.0 4.0   CO2 27   < > 24   < > 25 23 26   CL 98   < > 96   < > 97 98 97   BUN 33*   < > 38*   < > 43* 43* 42*   CREATININE 2.5*   < > 2.8*   < > 2.8* 3.2* 3.3*   ALKPHOS 76  --  79  --   --  81  --    ALT 18  --  16  --   --  23  --    AST 33  --  31  --   --  38  --    BILITOT 0.6  --  0.7  --   --  0.8  --     < > = values in this interval not displayed.      Coagulation:   Recent Labs   Lab 08/14/23  1251 08/15/23  0425 08/15/23  0428 08/15/23  1211 08/16/23  0415 08/17/23  0417 08/18/23  0309   INR  --   --    < >  --  1.0 1.1 1.0   APTT 52.0* 58.8*  --  47.5*  --   --   --     < > = values in this interval not displayed.     LDH:  Recent Labs   Lab 08/16/23  0220 08/17/23  0417 08/18/23  0309   * 628* 572*     Microbiology:  Microbiology Results (last 7 days)       Procedure Component Value Units Date/Time    Blood culture [[id number]] Collected: 08/13/23 0920    Order Status: Completed Specimen: Blood from Peripheral, Antecubital, Left Updated: 08/18/23 1012     Blood Culture, Routine No growth after 5 days.    Blood culture [[id number]] Collected: 08/12/23 0103    Order Status: Completed Specimen: Blood from Peripheral, Hand, Left Updated: 08/17/23 0612     Blood Culture, Routine No growth after 5 days.    Blood culture [[id number]]  (Abnormal) Collected: 08/12/23 0042    Order Status: Completed Specimen: Blood from Line, Jugular, Internal Right Updated: 08/15/23 0744     Blood Culture, Routine Gram stain aer bottle: Gram positive cocci  in clusters resembling Staph      Results called to and read back by:Jojo Valdes Rn 08/13/2023  02:56      COAGULASE-NEGATIVE STAPHYLOCOCCUS SPECIES  Organism is a probable contaminant      MRSA/SA Rapid ID by PCR from Blood culture [[id number]] Collected: 08/12/23 0042    Order Status: Completed Updated: 08/13/23 0403     Staph aureus ID by PCR Negative     Methicillin Resistant ID by PCR Negative            I have reviewed all pertinent labs within the past 24 hours.    Estimated Creatinine Clearance: 35.9 mL/min (A) (based on SCr of 3.3 mg/dL (H)).    Diagnostic Results:  I have reviewed all pertinent imaging results/findings within the past 24 hours.

## 2023-08-18 NOTE — PROGRESS NOTES
[facility] - Cardiac Intensive Care  Heart Transplant  Progress Note    Patient Name: Itz Daniel  MRN: [id number]  Admission Date: 8/11/2023  Hospital Length of Stay: 7 days  Attending Physician: Margoth Mackenzie MD  Primary Care Provider: Sunday Boo MD  Principal Problem:Ischemic cardiomyopathy    Subjective:     Interval History:   RA: 10  SVO2:  44  Cardiac Output: 7.1  Cardiac Index: 2.8  SVR:729  PA 71/33  WP 20  LA 0.74    Unfortunately creatinine arnulfo to 3.3 yesterday, started on nitric oxide and given dose of Lasix with improvement overnight; however, Cr arnulfo to 3.2 again this morning.  Given prior improvement of Cr with aggressive diuresis, restarted on Lasix drip at 15 milligrams/hour.  Continue nitric oxide at 10.  Continued close hemodynamic and kidney function monitoring.    Continuous Infusions:   sodium chloride 0.9% Stopped (08/15/23 1533)    DOBUTamine IV infusion (non-titrating) 5 mcg/kg/min (08/18/23 0605)    furosemide (LASIX) 500 mg in 50 mL infusion (conc: 10 mg/mL)      insulin regular 1 units/mL infusion orderable (TRANSFER) 4 Units/hr (08/18/23 0605)    nitric oxide gas      sodium chloride 0.9%       Scheduled Meds:   aspirin  81 mg Oral Daily    atorvastatin  80 mg Oral Daily    enoxparin  40 mg Subcutaneous Q24H (prophylaxis, 1700)    furosemide (LASIX) injection  80 mg Intravenous Once    hydrALAZINE  10 mg Oral TID    insulin aspart U-100  6-12 Units Subcutaneous TIDWM    isosorbide dinitrate  10 mg Oral TID    levothyroxine  125 mcg Oral Before breakfast    LIDOcaine (PF) 10 mg/ml (1%)  10 mL Other Once    LIDOcaine  1 patch Transdermal Q24H    oxymetazoline  2 spray Each Nostril BID    pantoprazole  40 mg Oral Daily    polyethylene glycol  17 g Oral TID    senna  8.6 mg Oral BID    ticagrelor  90 mg Oral BID     PRN Meds:acetaminophen, dextrose 10%, dextrose 10%, glucagon (human recombinant), glucose, glucose, insulin aspart U-100, methocarbamoL,  ondansetron, oxyCODONE, sodium chloride 0.9%, sodium chloride 0.9%    Review of patient's allergies indicates:  No Known Allergies  Objective:     Vital Signs (Most Recent):  Temp: 97.6 °F (36.4 °C) (08/18/23 0305)  Pulse: 106 (08/18/23 0605)  Resp: (!) 21 (08/18/23 0605)  BP: 98/61 (08/18/23 0605)  SpO2: 99 % (08/18/23 0605) Vital Signs (24h Range):  Temp:  [97.6 °F (36.4 °C)-98 °F (36.7 °C)] 97.6 °F (36.4 °C)  Pulse:  [100-113] 106  Resp:  [14-29] 21  SpO2:  [93 %-100 %] 99 %  BP: ()/(50-70) 98/61     Patient Vitals for the past 72 hrs (Last 3 readings):   Weight   08/17/23 1525 121.6 kg (268 lb)     Body mass index is 34.41 kg/m².      Intake/Output Summary (Last 24 hours) at 8/18/2023 1135  Last data filed at 8/18/2023 1000  Gross per 24 hour   Intake 1224.51 ml   Output 2005 ml   Net -780.49 ml       Hemodynamic Parameters:  PAP: ()/(-6-237) 70/32  PAP (Mean):  [14 mmHg-237 mmHg] 47 mmHg           Physical Exam  Constitutional:       Appearance: Normal appearance.   HENT:      Head: Normocephalic and atraumatic.      Mouth/Throat:      Mouth: Mucous membranes are moist.   Eyes:      Conjunctiva/sclera: Conjunctivae normal.   Neck:      Comments: BORIS camilo  Cardiovascular:      Rate and Rhythm: Normal rate and regular rhythm.      Comments: R groin with no hematoma  Pulmonary:      Effort: Pulmonary effort is normal. No respiratory distress.      Comments: Bibasilar crackles noted  Abdominal:      General: There is no distension.      Palpations: Abdomen is soft.      Tenderness: There is no abdominal tenderness. There is no guarding.   Musculoskeletal:      Right lower leg: No edema.      Left lower leg: No edema.      Comments: BLE are warm   Skin:     Capillary Refill: Capillary refill takes less than 2 seconds.      Findings: No rash.   Neurological:      General: No focal deficit present.      Mental Status: He is alert and oriented to person, place, and time.   Psychiatric:         Mood and  Affect: Mood normal.            Significant Labs:  CBC:  Recent Labs   Lab 08/16/23 0415 08/17/23  0639 08/17/23 2006 08/17/23 2334 08/18/23 0309   WBC 8.97 10.83  --   --  9.71   RBC 3.26* 3.07*  --   --  2.81*   HGB 9.7* 8.9*  --   --  8.2*   HCT 29.8* 27.2* 25* 27* 25.0*    209  --   --  236   MCV 91 89  --   --  89   MCH 29.8 29.0  --   --  29.2   MCHC 32.6 32.7  --   --  32.8     BNP:  Recent Labs   Lab 08/11/23 2126   *     CMP:  Recent Labs   Lab 08/16/23  0220 08/16/23  1206 08/17/23 0417 08/17/23 0801 08/17/23 2335 08/18/23 0309 08/18/23  0841   *   < > 149*   < > 157* 155* 147*   CALCIUM 8.8   < > 8.6*   < > 8.3* 8.5* 8.6*   ALBUMIN 2.5*  --  2.4*  --   --  2.4*  --    PROT 6.2  --  6.1  --   --  6.2  --    *   < > 131*   < > 132* 133* 132*   K 4.3   < > 4.0   < > 3.8 4.0 4.0   CO2 27   < > 24   < > 25 23 26   CL 98   < > 96   < > 97 98 97   BUN 33*   < > 38*   < > 43* 43* 42*   CREATININE 2.5*   < > 2.8*   < > 2.8* 3.2* 3.3*   ALKPHOS 76  --  79  --   --  81  --    ALT 18  --  16  --   --  23  --    AST 33  --  31  --   --  38  --    BILITOT 0.6  --  0.7  --   --  0.8  --     < > = values in this interval not displayed.      Coagulation:   Recent Labs   Lab 08/14/23  1251 08/15/23  0425 08/15/23  0428 08/15/23  1211 08/16/23 0415 08/17/23  0417 08/18/23  0309   INR  --   --    < >  --  1.0 1.1 1.0   APTT 52.0* 58.8*  --  47.5*  --   --   --     < > = values in this interval not displayed.     LDH:  Recent Labs   Lab 08/16/23  0220 08/17/23  0417 08/18/23  0309   * 628* 572*     Microbiology:  Microbiology Results (last 7 days)       Procedure Component Value Units Date/Time    Blood culture [[id number]] Collected: 08/13/23 0920    Order Status: Completed Specimen: Blood from Peripheral, Antecubital, Left Updated: 08/18/23 1012     Blood Culture, Routine No growth after 5 days.    Blood culture [[id number]] Collected: 08/12/23 0103    Order Status: Completed  Specimen: Blood from Peripheral, Hand, Left Updated: 08/17/23 0612     Blood Culture, Routine No growth after 5 days.    Blood culture [[id number]]  (Abnormal) Collected: 08/12/23 0042    Order Status: Completed Specimen: Blood from Line, Jugular, Internal Right Updated: 08/15/23 0744     Blood Culture, Routine Gram stain aer bottle: Gram positive cocci in clusters resembling Staph      Results called to and read back by:Jojo Valdes Rn 08/13/2023  02:56      COAGULASE-NEGATIVE STAPHYLOCOCCUS SPECIES  Organism is a probable contaminant      MRSA/SA Rapid ID by PCR from Blood culture [[id number]] Collected: 08/12/23 0042    Order Status: Completed Updated: 08/13/23 0403     Staph aureus ID by PCR Negative     Methicillin Resistant ID by PCR Negative            I have reviewed all pertinent labs within the past 24 hours.    Estimated Creatinine Clearance: 35.9 mL/min (A) (based on SCr of 3.3 mg/dL (H)).    Diagnostic Results:  I have reviewed all pertinent imaging results/findings within the past 24 hours.    Assessment and Plan:     52-year-old male with a past medical history of ICM, CABG in 2017 (LIMA to LAD, SVG to OM1, SVG to PDA), DM type II, CKD stage IV (baseline 1.8), and ICD who presented [place] ED on 8/10//23 due to n/v and SOB. Work up concerning for NSTEMI with peak trop of 38. Started on ACS protocol with asa, ticagrelor, and heparin gtt. Also noted to have YVES with Cr 2.5, volume overloaded with BNP of 796, LA 2.8>>1.0. TTE with EF drop from 30 to 15%, LVEDD 6.15, moderately reduced RV function. LHC/RHC on 8/12: s/p MIRELLA to prox Cx. RHC showed RA 20, RV 81/21, PA 81/47 (mean 61), PWCP 45, CO/CI: 3.69/1.52 (VIKKI)  3.91/1.6 (TD), therefore, impella CP was placed in in right femoral artery and leave in swan in right femoral vein. He was then transferred to [facility] for higher level of care. Of note, never started on inotropes or pressors. Was receiving metoprolol.     On arrival patient was hemodynamically  stable.  Not on any inotropes or pressors.  Impella at P7  Initial hemodynamics  CVP: 9  SVO2:  44  Cardiac Output: 4.9  Cardiac Index: 2.0  SVR: 1250     [facility] on 8/12  Grafts:  SVG-RCA:  Diffuse disease with narrowing of up to 40-50% in the proximal portion  The native PLB and PDA both diffusely diseased.  SVG-OM: Occluded  LIMA-LAD:  Widely patent.  Diffusely diseased native LAD with narrowing of up to 90% in the very apical portion.  Severe native CAD status post PCI of the proximal circumflex with a 33 x 24 mm Synergy XD stent (post-dilated up to 4.25 mm)      Previous Cardiac Diagnostics:   TTE 7.1.22  The study quality is average.   The left ventricle is mildly enlarged. Global left ventricular systolic function is severely decreased. The left ventricular ejection fraction is 30%.   Mild (1+) mitral regurgitation.  The pulmonary artery systolic pressure is 10 mmHg. The pulmonary artery appears to be normal.     [facility] 5.19.2017  Severe MVCAD as a cause to severe newly diagnosed ischemic congestive heart failure with EF of 20%  Mild MR  Patent L subclavian, & LIMA suitable for bypass graft conduit     BLE Arterial US 10.18.21  The study quality is average.   The arteries of the left lower extremity appear patent with no significant stenosis noted.   Tri-phasic waveforms are obtained throughout the left lower extremity arteries.      Carotid US 10.18.21  The study quality is average.   1-39% stenosis in the proximal right internal carotid artery based on Bluth Criteria.   1-39% stenosis in the proximal left internal carotid artery based on Bluth Criteria.   Less than 50% stenosis throughout the bilateral common carotid arteries.   Antegrade bilateral vertebral artery flow.         * Ischemic cardiomyopathy  Cardiogenic shock    52-year-old male with past medical history of ischemic cardiomyopathy status post CABG in 2017 who presents as a transfer from outside hospital after he presented with an NSTEMI status post  revascularization to proximal circumflex.  Associated cardiogenic shock noted from right heart catheterization, CP Impella placed on a 12. 3.4cm from AV to inlet on bedside echo     Continued high PA and WP, now with worsening Cr approx 1 day after impella removed   5  Isosorbide and hydralazine held overnight, continue 10/10 mg TID  Trend BMP q4 hour   Echo below  Given prior improvement of creatinine with aggressive diuresis restarting Lasix 15 milligrams/hour    Results for orders placed during the hospital encounter of 08/11/23    Echo    Interpretation Summary    Left Ventricle: The left ventricle is severely dilated. Ventricular mass is normal. Normal wall thickness. regional wall motion abnormalities present. See diagram for wall motion findings. There is severely reduced systolic function with a visually estimated ejection fraction of 15 - 20%. Grade III diastolic dysfunction.    Left Atrium: Left atrium is moderately dilated.    Right Ventricle: Right ventricle was not well visualized due to poor acoustic window.    Mitral Valve: There is mild regurgitation.    Tricuspid Valve: There is mild regurgitation.    Pulmonary Artery: The estimated pulmonary artery systolic pressure is 41 mmHg.    IVC/SVC: Normal venous pressure at 3 mmHg.          NSTEMI (non-ST elevated myocardial infarction)  Continue aspirin  Continue ticagrelor  Continue atorvastatin 80    CKD (chronic kidney disease), stage IV  Acute kidney injury    Likely secondary to cardiogenic shock  Baseline creatinine of 1.8.   Lab Results   Component Value Date    CREATININE 3.3 (H) 08/18/2023    CREATININE 3.2 (H) 08/18/2023    CREATININE 2.8 (H) 08/17/2023        Cr now uptrending, trending q4hr  Daily weights, strict I/O and chart, renally dose all medications   Avoid nephrotoxic medications, NSAIDs, ACE/ARB, and IV contrast.      Hypothyroidism  Continue home Synthroid    DM (diabetes mellitus)  Lab Results   Component Value Date     HGBA1C 10.2 (H) 08/10/2023     Endocrine following  Now on insulin drip  Diabetic/cardiac diet          Fransisco Ivan MD  Heart Transplant  [facility] - Cardiac Intensive Care

## 2023-08-18 NOTE — ASSESSMENT & PLAN NOTE
Endocrinology consulted for BG management.   BG goal 140-180     - Transition drip at 4 units/hr with step-down parameters.   - Novolog 6-12 units with meals (Administer    6   units if patient eats 25-50% of meal, administer   12    units if patient eats > 50% of meal.)  - Novolog (aspart) insulin prn for BG excursions [facility] (180/25).  - BG checks /HS/0200  - Hypoglycemia protocol in place      ** Please notify Endocrine for any change and/or advance in diet**  ** Please call Endocrine for any BG related issues **    Discharge Planning:   TBD. Please notify endocrinology prior to discharge.

## 2023-08-18 NOTE — ASSESSMENT & PLAN NOTE
Cardiogenic shock    52-year-old male with past medical history of ischemic cardiomyopathy status post CABG in 2017 who presents as a transfer from outside hospital after he presented with an NSTEMI status post revascularization to proximal circumflex.  Associated cardiogenic shock noted from right heart catheterization, CP Impella placed on a 12. 3.4cm from AV to inlet on bedside echo     Continued high PA and WP, now with worsening Cr approx 1 day after impella removed   5  Isosorbide and hydralazine held overnight, continue 10/10 mg TID  Trend BMP q4 hour   Echo below  Given prior improvement of creatinine with aggressive diuresis restarting Lasix 15 milligrams/hour    Results for orders placed during the hospital encounter of 08/11/23    Echo    Interpretation Summary    Left Ventricle: The left ventricle is severely dilated. Ventricular mass is normal. Normal wall thickness. regional wall motion abnormalities present. See diagram for wall motion findings. There is severely reduced systolic function with a visually estimated ejection fraction of 15 - 20%. Grade III diastolic dysfunction.    Left Atrium: Left atrium is moderately dilated.    Right Ventricle: Right ventricle was not well visualized due to poor acoustic window.    Mitral Valve: There is mild regurgitation.    Tricuspid Valve: There is mild regurgitation.    Pulmonary Artery: The estimated pulmonary artery systolic pressure is 41 mmHg.    IVC/SVC: Normal venous pressure at 3 mmHg.

## 2023-08-18 NOTE — PT/OT/SLP EVAL
Occupational Therapy   Evaluation    Name: Itz Daniel  MRN: [id number]  Admitting Diagnosis: Ischemic cardiomyopathy  Recent Surgery: Procedure(s) (LRB):  Impella, Removal (Right)  INSERTION, CATHETER, SWAN-MARLENI, WITH IMAGING GUIDANCE (Left)  REMOVAL, CATHETER, CENTRAL VENOUS, TUNNELED (N/A) 3 Days Post-Op    Recommendations:     Discharge Recommendations: home  Discharge Equipment Recommendations:  none  Barriers to discharge:  None    Assessment:     Itz Daniel is a 53 y.o. male with a medical diagnosis of Ischemic cardiomyopathy. Pt presents with decreased endurance and impaired mobility performance as limited by cardiovascular status and generalized weakness. Pt found upright in bed and agreeable for therapy. Session focused on bed mobility and stand pivot to chair. Pt with slight unsteady gait upon standing however resolved with increased time for recovery. PTA, pt was (I) and was working/driving. Pt is motivated to return to [facility]. Pt would benefit from continued OT skilled services 3x/wk to improve daily living skills to optimize QOL. Pt is recommended to discharge to home at this time.   Performance deficits affecting function: weakness, impaired functional mobility, impaired endurance, gait instability, impaired self care skills, impaired cardiopulmonary response to activity, impaired balance.      Rehab Prognosis: Good; patient would benefit from acute skilled OT services to address these deficits and reach maximum level of function.       Plan:     Patient to be seen 3 x/week to address the above listed problems via therapeutic activities, therapeutic exercises, self-care/home management  Plan of Care Expires:    Plan of Care Reviewed with: patient    Subjective     Chief Complaint: none stated   Patient/Family Comments/goals: to get OOB     Occupational Profile:  Living Environment: Pt lives with sons in  a [facility] with no GEOFFREY.   Previous level of function: I with ADLs and mobility  Roles and  Routines: Pt was working as a draftsman; enjoys fishing   Equipment Used at Home: walker, rolling  Assistance upon Discharge: family    Pain/Comfort:  Pain Rating 1: 0/10  Pain Rating Post-Intervention 1: 0/10  Pain Rating Post-Intervention 2: 0/10    Patients cultural, spiritual, Restorationist conflicts given the current situation: no    Objective:     Communicated with: RN prior to session.  Patient found HOB elevated with telemetry, pulse ox (continuous), oxygen, bhardwaj catheter, Other (comments) (chai haley) upon OT entry to room.    General Precautions: Standard, fall  Orthopedic Precautions: N/A  Braces: N/A  Respiratory Status: nitric oxide    Occupational Performance:    Bed Mobility:    Patient completed Rolling/Turning to Right with stand by assistance  Patient completed Scooting/Bridging with stand by assistance  Patient completed Supine to Sit with stand by assistance    Functional Mobility/Transfers:  Patient completed Sit <> Stand Transfer with contact guard assistance  with  hand-held assist   Patient completed Bed <> Chair Transfer using Stand Pivot technique with contact guard assistance with hand-held assist  Functional Mobility: Pt stood ~30 second and completed stand pivot with steps to bedside chair     Activities of Daily Living:  Grooming: politely declined .  Upper Body Dressing: minimum assistance donning gown seated at EOB     Cognitive/Visual Perceptual:  Cognitive/Psychosocial Skills:     -       Oriented to: Person, Place, Time, and Situation   -       Follows Commands/attention:Follows multistep  commands  -       Communication: clear/fluent  -       Memory: No Deficits noted  -       Safety awareness/insight to disability: intact   -       Mood/Affect/Coping skills/emotional control: Pleasant    Physical Exam:  Balance:    -       demo good sitting and standing balance, one small minor LOB that pt self corrected during turning during pivot to chair   Upper Extremity Range of Motion:     -        Right Upper Extremity: WFL  -       Left Upper Extremity: WFL  Upper Extremity Strength:    -       Right Upper Extremity: WFL  -       Left Upper Extremity: WFL   Strength:    -       Right Upper Extremity: WFL  -       Left Upper Extremity: WFL    AMPAC 6 Click ADL:  AMPAC Total Score: 11    Treatment & Education:  Pt educated on role of occupational therapy, POC, and safety during ADLs and functional mobility. Pt and OT discussed importance of safe, continued mobility to optimize daily living skills. Pt verbalized understanding.     White board updated during session. Pt given instruction to call for medical staff/nurse for assistance.       Patient left up in chair with all lines intact, call button in reach, RN notified, and RN present    GOALS:   Multidisciplinary Problems       Occupational Therapy Goals          Problem: Occupational Therapy    Goal Priority Disciplines Outcome Interventions   Occupational Therapy Goal     OT, PT/OT Ongoing, Progressing    Description: Goals to be met by: 9/18/23 (1 month)     Patient will increase functional independence with ADLs by performing:    UE Dressing with Johnston.  LE Dressing with Johnston.  Grooming while standing at sink with Johnston.  Rolling to Bilateral with Johnston.   Supine to sit with Johnston.  Step transfer with Johnston  Toilet transfer to toilet with Johnston.                         History:     Past Medical History:   Diagnosis Date    CKD stage 4, GFR 15-29 ml/min     HLD (hyperlipidemia)     Hypertension     Ischemic cardiomyopathy/Chronic HFrEF s/p CABG and AICD 2017    T2DM (type 2 diabetes mellitus)          Past Surgical History:   Procedure Laterality Date    CORONARY ARTERY BYPASS GRAFT  2017    3 vessel    CORONARY BYPASS GRAFT ANGIOGRAPHY  8/11/2023    Procedure: Bypass graft study;  Surgeon: Michele Hirsch MD;  Location: [facility] CATH LAB;  Service: Cardiology;;    IMPELLA, REMOVAL Right 8/15/2023     Procedure: Impella, Removal;  Surgeon: Colin Sibley MD;  Location: [facility] CATH LAB;  Service: Cardiology;  Laterality: Right;    INSERTION OF INTRAVASCULAR MICROAXIAL BLOOD PUMP N/A 8/11/2023    Procedure: INSERTION, IMPELLA;  Surgeon: Michele Hirsch MD;  Location: [facility] CATH LAB;  Service: Cardiology;  Laterality: N/A;    IVUS, CORONARY  8/11/2023    Procedure: IVUS, Coronary;  Surgeon: Michele Hirsch MD;  Location: [facility] CATH LAB;  Service: Cardiology;;    LEFT HEART CATHETERIZATION N/A 8/11/2023    Procedure: Left heart cath;  Surgeon: Michele Hirsch MD;  Location: [facility] CATH LAB;  Service: Cardiology;  Laterality: N/A;    PERCUTANEOUS CORONARY INTERVENTION, ARTERY N/A 8/11/2023    Procedure: Percutaneous coronary intervention;  Surgeon: Michele Hirsch MD;  Location: [facility] CATH LAB;  Service: Cardiology;  Laterality: N/A;    PLACEMENT OF SWAN MARLENI CATHETER WITH IMAGING GUIDANCE Left 8/15/2023    Procedure: INSERTION, CATHETER, SWAN-MARLENI, WITH IMAGING GUIDANCE;  Surgeon: Colin Sibley MD;  Location: [facility] CATH LAB;  Service: Cardiology;  Laterality: Left;    REMOVAL OF TUNNELED CENTRAL VENOUS CATHETER (CVC) N/A 8/15/2023    Procedure: REMOVAL, CATHETER, CENTRAL VENOUS, TUNNELED;  Surgeon: Colin Sibley MD;  Location: [facility] CATH LAB;  Service: Cardiology;  Laterality: N/A;    RIGHT HEART CATHETERIZATION Right 8/11/2023    Procedure: INSERTION, CATHETER, RIGHT HEART;  Surgeon: Michele Hirsch MD;  Location: [facility] CATH LAB;  Service: Cardiology;  Laterality: Right;       Time Tracking:     OT Date of Treatment: 08/18/23  OT Start Time: 1038  OT Stop Time: 1059  OT Total Time (min): 21 min    Billable Minutes:Evaluation 10 min  Self Care/Home Management 11 min    8/18/2023

## 2023-08-18 NOTE — ASSESSMENT & PLAN NOTE
Titrate insulin slowly to avoid hypoglycemia as the risk of hypoglycemia increases with decreased creatinine clearance.    Estimated Creatinine Clearance: 37 mL/min (A) (based on SCr of 3.2 mg/dL (H)).

## 2023-08-18 NOTE — PLAN OF CARE
CICU Care Plan    POC reviewed with Itz Daniel at 1905. Pt verbalized understanding. Questions and concerns addressed. No acute events today. Dobutamine @ 5 mcg/kg/min, Nitric @ 10 ppm and insulin @ 4 units/hr. CHG given, NPO since midnight. Pt progressing toward goals. Security measures in place, plan of care to continue. See below and flowsheets for full assessment and VS info.       Neuro:  [place] Coma Scale  Best Eye Response: 4-->(E4) spontaneous  Best Motor Response: 6-->(M6) obeys commands  Best Verbal Response: 5-->(V5) oriented  [place] Coma Scale Score: 15  Assessment Qualifiers: patient not sedated/intubated, no eye obstruction present  Pupil PERRLA: yes     24 hr Temp:  [97.6 °F (36.4 °C)-98.1 °F (36.7 °C)]     CV:   Rhythm: sinus tachycardia  BP goals:   MAP > 65    Resp:      Oxygen Concentration (%): 28      GI/:     Diet/Nutrition Received: NPO  Last Bowel Movement: 08/17/23  Voiding Characteristics: urethral catheter (bladder)    Intake/Output Summary (Last 24 hours) at 8/18/2023 0553  Last data filed at 8/18/2023 0505  Gross per 24 hour   Intake 1524.39 ml   Output 1960 ml   Net -435.61 ml     Unmeasured Output  Stool Occurrence: 1    Labs/Accuchecks:  Recent Labs   Lab 08/18/23  0309   WBC 9.71   RBC 2.81*   HGB 8.2*   HCT 25.0*         Recent Labs   Lab 08/18/23  0309   *   K 4.0   CO2 23   CL 98   BUN 43*   CREATININE 3.2*   ALKPHOS 81   ALT 23   AST 38   BILITOT 0.8      Recent Labs   Lab 08/15/23  1211 08/16/23  0415 08/18/23  0309   INR  --    < > 1.0   APTT 47.5*  --   --     < > = values in this interval not displayed.      Recent Labs   Lab 08/11/23  1232   TROPONINI 38.193*       Electrolytes: N/A - electrolytes WDL  Accuchecks: Q4H    Gtts:   sodium chloride 0.9% Stopped (08/15/23 1533)    DOBUTamine IV infusion (non-titrating) 5 mcg/kg/min (08/18/23 8141)    insulin regular 1 units/mL infusion orderable (TRANSFER) 4 Units/hr (08/18/23 5749)    nitric oxide gas       sodium chloride 0.9%         LDA/Wounds:  Lines/Drains/Airways       Central Venous Catheter Line  Duration             Introducer 08/15/23 1630 Internal Jugular Left 2 days    Pulmonary Artery Catheter Assessment  08/15/23 1500 Internal Jugular Left 2 days              Drain  Duration                  Urethral Catheter 08/12/23 0130 16 Fr. 6 days              Peripheral Intravenous Line  Duration                  Peripheral IV - Single Lumen 08/13/23 0921 20 G Left Antecubital 4 days                  Wounds: Yes  Wound care consulted: No

## 2023-08-18 NOTE — PROGRESS NOTES
"[facility] - Cardiac Intensive Care  Endocrinology  Progress Note    Admit Date: 2023     Reason for Consult: Management of T2DM, Hyperglycemia     Diabetes diagnosis year: >20 years ago    Home Diabetes Medications:  Farxiga 10 mg QD; Lantus 50 units; Novolog 20 units    How often checking glucose at home?  Dexcom    BG readings on regimen: mid to upper 100s  Hypoglycemia on the regimen?  No  Missed doses on regimen?  No    Diabetes Complications include:     Hyperglycemia    Complicating diabetes co morbidities:   Cardiogenic shock; HTN; HLD      HPI: 52-year-old male with a past medical history of ICM, CABG in 2017 (LIMA to LAD, SVG to OM1, SVG to PDA), DM type II, CKD stage IV (baseline 1.8), and ICD who presented [facility] ED on 8/10//23 due to n/v and SOB. Work up concerning for NSTEMI with peak trop of 38. Started on ACS protocol with asa, ticagrelor, and heparin gtt. Also noted to have YVES with Cr 2.5, volume overloaded with BNP of 796, LA 2.8>>1.0. TTE with EF drop from 30 to 15%, LVEDD 6.15, moderately reduced RV function. LHC/RHC on : s/p MIRELLA to prox Cx. RHC showed RA 20, RV 81/21, PA 81/47 (mean 61), PWCP 45, CO/CI: 3.69/1.52 (VIKKI)  3.91/1.6 (TD), therefore, impella CP was placed in in right femoral artery and leave in swan in right femoral vein. He was then transferred to [facility] for higher level of care. Endocrine consulted to manage hyperglycemia and type 2 diabetes.     Lab Results   Component Value Date    HGBA1C 10.2 (H) 08/10/2023                 Interval HPI:   Overnight events: Remains in CICU. BG stable on current IV/SQ insulin regimen. POD 3. Diet Cardiac Consistent Carbohydrate; Standard Tray    Eatin%  Nausea: No  Hypoglycemia and intervention: No  Fever: No  TPN and/or TF: No  If yes, type of TF/TPN and rate: n/a    BP (!) 98/54   Pulse 101   Temp 97.6 °F (36.4 °C) (Oral)   Resp 18   Ht 6' 2" (1.88 m)   Wt 121.6 kg (268 lb)   SpO2 98%   BMI 34.41 kg/m²     Labs Reviewed " "and Include    Recent Labs   Lab 08/18/23  0309 08/18/23  0841 08/18/23  1235   *   < > 123*   CALCIUM 8.5*   < > 8.8   ALBUMIN 2.4*  --   --    PROT 6.2  --   --    *   < > 134*   K 4.0   < > 4.1   CO2 23   < > 26   CL 98   < > 97   BUN 43*   < > 44*   CREATININE 3.2*   < > 3.2*   ALKPHOS 81  --   --    ALT 23  --   --    AST 38  --   --    BILITOT 0.8  --   --     < > = values in this interval not displayed.     Lab Results   Component Value Date    WBC 9.71 08/18/2023    HGB 8.2 (L) 08/18/2023    HCT 25.0 (L) 08/18/2023    MCV 89 08/18/2023     08/18/2023     No results for input(s): "TSH", "FREET4" in the last 168 hours.  Lab Results   Component Value Date    HGBA1C 10.2 (H) 08/10/2023       Nutritional status:   Body mass index is 34.41 kg/m².  Lab Results   Component Value Date    ALBUMIN 2.4 (L) 08/18/2023    ALBUMIN 2.4 (L) 08/17/2023    ALBUMIN 2.5 (L) 08/16/2023     No results found for: "PREALBUMIN"    Estimated Creatinine Clearance: 37 mL/min (A) (based on SCr of 3.2 mg/dL (H)).    Accu-Checks  Recent Labs     08/17/23  0032 08/17/23  0415 08/17/23  0800 08/17/23  1126 08/17/23  1650 08/17/23  2201 08/18/23  0443 08/18/23  0840 08/18/23  1234 08/18/23  1328   POCTGLUCOSE 182* 174* 148* 160* 152* 173* 157* 151* 124* 118*       Current Medications and/or Treatments Impacting Glycemic Control  Immunotherapy:    Immunosuppressants       None          Steroids:   Hormones (From admission, onward)      None          Pressors:    Autonomic Drugs (From admission, onward)      None          Hyperglycemia/Diabetes Medications:   Antihyperglycemics (From admission, onward)      Start     Stop Route Frequency Ordered    08/16/23 1045  insulin regular in 0.9 % NaCl 100 unit/100 mL (1 unit/mL) infusion        Question:  Enter initial dose (Units/hr):  Answer:  4    -- IV Continuous 08/16/23 1041    08/16/23 0815  insulin aspart U-100 pen 6-12 Units         -- SubQ 3 times daily with meals 08/16/23 " 0813    08/15/23 1003  insulin aspart U-100 pen 0-10 Units         -- SubQ As needed (PRN) 08/15/23 0904            ASSESSMENT and PLAN    Cardiac/Vascular  * Ischemic cardiomyopathy  Managed per primary team  Avoid hypoglycemia        Renal/  CKD (chronic kidney disease), stage IV  Titrate insulin slowly to avoid hypoglycemia as the risk of hypoglycemia increases with decreased creatinine clearance.    Estimated Creatinine Clearance: 37 mL/min (A) (based on SCr of 3.2 mg/dL (H)).        Endocrine  Hypothyroidism  Lab Results   Component Value Date    TSH 7.103 (H) 08/10/2023     On Synthroid 125 mcg      DM (diabetes mellitus)  Endocrinology consulted for BG management.   BG goal 140-180     - Transition drip at 4 units/hr with step-down parameters.   - Novolog 6-12 units with meals (Administer    6   units if patient eats 25-50% of meal, administer   12    units if patient eats > 50% of meal.)  - Novolog (aspart) insulin prn for BG excursions [facility] (180/25).  - BG checks //0200  - Hypoglycemia protocol in place      ** Please notify Endocrine for any change and/or advance in diet**  ** Please call Endocrine for any BG related issues **    Discharge Planning:   TBD. Please notify endocrinology prior to discharge.              Kori Paez, NP  Endocrinology  [facility] - Cardiac Intensive Care

## 2023-08-19 PROBLEM — U07.1 COVID-19: Status: ACTIVE | Noted: 2022-08-02

## 2023-08-19 PROBLEM — N17.9 ACUTE RENAL FAILURE SUPERIMPOSED ON STAGE 3B CHRONIC KIDNEY DISEASE: Status: ACTIVE | Noted: 2023-01-01

## 2023-08-19 PROBLEM — N18.32 ACUTE RENAL FAILURE SUPERIMPOSED ON STAGE 3B CHRONIC KIDNEY DISEASE: Status: ACTIVE | Noted: 2023-01-01

## 2023-08-19 PROBLEM — I25.10 CAD (CORONARY ARTERY DISEASE): Status: ACTIVE | Noted: 2023-01-01

## 2023-08-19 NOTE — ASSESSMENT & PLAN NOTE
Lab Results   Component Value Date    HGBA1C 10.2 (H) 08/10/2023     - Endocrine following  - Now on insulin drip  - Diabetic/cardiac diet

## 2023-08-19 NOTE — SUBJECTIVE & OBJECTIVE
"Interval HPI:   Overnight events: Remains in CICU. BG trending down and IV insulin infusion titrated down to 2.5 u/hr overnight. POD 4. Diet Cardiac Consistent Carbohydrate; Standard Tray    Eatin%  Nausea: No  Hypoglycemia and intervention: No  Fever: No  TPN and/or TF: No  If yes, type of TF/TPN and rate: n/a    /64   Pulse 105   Temp 97.7 °F (36.5 °C) (Oral)   Resp 18   Ht 6' 2" (1.88 m)   Wt 122.1 kg (269 lb 3.2 oz)   SpO2 100%   BMI 34.56 kg/m²     Labs Reviewed and Include    Recent Labs   Lab 23  0427 23  0804   * 190*   CALCIUM 8.2* 8.5*   ALBUMIN 2.4*  --    PROT 6.2  --    * 133*   K 4.5 4.0   CO2 23 27   CL 97 96   BUN 49* 50*   CREATININE 3.4* 3.7*   ALKPHOS 84  --    ALT 19  --    AST 35  --    BILITOT 0.7  --      Lab Results   Component Value Date    WBC 7.38 2023    HGB 7.8 (L) 2023    HCT 23 (L) 2023    MCV 89 2023     2023     No results for input(s): "TSH", "FREET4" in the last 168 hours.  Lab Results   Component Value Date    HGBA1C 10.2 (H) 08/10/2023       Nutritional status:   Body mass index is 34.56 kg/m².  Lab Results   Component Value Date    ALBUMIN 2.4 (L) 2023    ALBUMIN 2.4 (L) 2023    ALBUMIN 2.4 (L) 2023     No results found for: "PREALBUMIN"    Estimated Creatinine Clearance: 32.1 mL/min (A) (based on SCr of 3.7 mg/dL (H)).    Accu-Checks  Recent Labs     23  0840 23  1234 23  1328 23  1622 23  1817 23  2007 23  0006 23  0424 23  0751 23  0917   POCTGLUCOSE 151* 124* 118* 103 103 151* 214* 239* 191* 193*       Current Medications and/or Treatments Impacting Glycemic Control  Immunotherapy:    Immunosuppressants       None          Steroids:   Hormones (From admission, onward)      None          Pressors:    Autonomic Drugs (From admission, onward)      None          Hyperglycemia/Diabetes Medications:   Antihyperglycemics " (From admission, onward)      Start     Stop Route Frequency Ordered    08/16/23 1045  insulin regular in 0.9 % NaCl 100 unit/100 mL (1 unit/mL) infusion        Question:  Enter initial dose (Units/hr):  Answer:  4    -- IV Continuous 08/16/23 1041    08/16/23 0815  insulin aspart U-100 pen 6-12 Units         -- SubQ 3 times daily with meals 08/16/23 0813    08/15/23 1003  insulin aspart U-100 pen 0-10 Units         -- SubQ As needed (PRN) 08/15/23 0904

## 2023-08-19 NOTE — ASSESSMENT & PLAN NOTE
Endocrinology consulted for BG management.   BG goal 140-180     Insulin infusion titrated down overnight but BG now trending up to higher end of goal ranges.     - Rewrite Transition drip at 3 units/hr with step-down parameters.   - Novolog 6-12 units with meals (Administer    6   units if patient eats 25-50% of meal, administer   12    units if patient eats > 50% of meal.)  - Novolog (aspart) insulin prn for BG excursions [facility] (180/25).  - BG checks /HS/0200  - Hypoglycemia protocol in place      ** Please notify Endocrine for any change and/or advance in diet**  ** Please call Endocrine for any BG related issues **    Discharge Planning:   TBD. Please notify endocrinology prior to discharge.

## 2023-08-19 NOTE — NURSING
Cardiac ICU Care Plan    POC reviewed with Itz ALBERT Daniel and family. Questions and concerns addressed. No acute events overnight. Pt progressing toward goals.  See below and flowsheets for full assessment and VS info.       Neuro:  [place] Coma Scale  Best Eye Response: 4-->(E4) spontaneous  Best Motor Response: 6-->(M6) obeys commands  Best Verbal Response: 5-->(V5) oriented  [place] Coma Scale Score: 15  Assessment Qualifiers: patient not sedated/intubated  Pupil PERRLA: yes    24 hr Temp:  [97.7 °F (36.5 °C)-98.1 °F (36.7 °C)]      CV:  Rhythm: sinus tachycardia   DVT prophylaxis: VTE Required Core Measure: Pharmacological prophylaxis initiated/maintained    CVP (mean): 11 mmHg (08/19/23 0701)    Pulmonary Artery Catheter Assessment  08/15/23 1500 Internal Jugular Left-Current Insertion Depth (cm): 56.5 cm (08/19/23 0701)  [REMOVED] Pulmonary Artery Catheter Assessment  08/11/23 1500 Femoral Vein Right-Current Insertion Depth (cm): 78.5 cm (08/15/23 1101)  PAP: 66/33 (08/19/23 0801)  SVO2 (%): (S) 49 % (08/18/23 1905)  CO (L/min): 5.3 L/min (Joe) (08/13/23 1601)       Type: Impella       Pulses  Right Radial Pulse: 1+ (weak)  Left Radial Pulse: 1+ (weak)  Right Dorsalis Pedis Pulse: 0 (absent)  Left Dorsalis Pedis Pulse: 1+ (weak)  Right Posterior Tibial Pulse: 1+ (weak)  Left Posterior Tibial Pulse: 1+ (weak)    Resp:  Flow (L/min): 4  Oxygen Concentration (%): 28    GI/:  GI prophylaxis: yes  Diet/Nutrition Received: consistent carb/diabetic diet, low saturated fat/low cholesterol, 2 gram sodium  Last Bowel Movement: 08/17/23  Voiding Characteristics: urethral catheter (bladder)       Urethral Catheter 08/12/23 0130 16 Fr.-Reason for Continuing Urinary Catheterization: Critically ill in ICU and requiring hourly monitoring of intake/output   Intake/Output Summary (Last 24 hours) at 8/19/2023 0806  Last data filed at 8/19/2023 0801  Gross per 24 hour   Intake 1544.63 ml   Output 2380 ml   Net -835.37 ml  "       Nutritional Supplement Intake: Quantity 0, Type:  n/a    Labs/Accuchecks:  Recent Labs   Lab 08/17/23  0639 08/17/23 2006 08/17/23 2334 08/18/23 0309 08/19/23 0427   WBC 10.83  --   --  9.71 7.38   RBC 3.07*  --   --  2.81* 2.69*   HGB 8.9*  --   --  8.2* 7.8*   HCT 27.2*   < > 27* 25.0* 23.8*     --   --  236 273    < > = values in this interval not displayed.      Recent Labs   Lab 08/14/23  1251 08/15/23  0425 08/15/23  0428 08/15/23  1211 08/16/23  0415 08/17/23  0417 08/18/23 0309 08/19/23 0427   INR  --   --    < >  --    < > 1.1 1.0 1.0   APTT 52.0* 58.8*  --  47.5*  --   --   --   --     < > = values in this interval not displayed.      Recent Labs     08/19/23 0427   *   K 4.5   CO2 23   CL 97   BUN 49*   CREATININE 3.4*   ALKPHOS 84   ALT 19   AST 35   BILITOT 0.7     No results for input(s): "CPK", "CPKMB", "MB", "TROPONINI" in the last 168 hours.   Recent Labs     08/17/23  2334 08/18/23  0502 08/18/23  0845 08/18/23 2006   PH 7.390  --  7.376 7.337*   PCO2 45.7*  --  46.4* 49.4*   PO2 27*  --  25* 29*   HCO3 27.7  --  27.2 26.4   POCSATURATED 50* 49.6 44* 49*   BE 3  --  2 1       Electrolytes: No replacement orders  Accuchecks: Q4H    Gtts/LDAs:   sodium chloride 0.9% Stopped (08/15/23 1533)    DOBUTamine IV infusion (non-titrating) 5 mcg/kg/min (08/19/23 0801)    furosemide (LASIX) 500 mg in 50 mL infusion (conc: 10 mg/mL) 15 mg/hr (08/19/23 0801)    insulin regular 1 units/mL infusion orderable (TRANSFER) 2.5 Units/hr (08/19/23 0801)    nitric oxide gas      sodium chloride 0.9%         Lines/Drains/Airways       Central Venous Catheter Line  Duration             Introducer 08/15/23 1630 Internal Jugular Left 3 days    Pulmonary Artery Catheter Assessment  08/15/23 1500 Internal Jugular Left 3 days              Drain  Duration                  Urethral Catheter 08/12/23 0130 16 Fr. 7 days              Peripheral Intravenous Line  Duration                  Peripheral IV - " Single Lumen 08/18/23 1702 20 G Anterior;Right Upper Arm <1 day                    Skin/Wounds  Bathing/Skin Care: bath, complete;back care;dressed/undressed;foot care;incontinence care;linen changed (08/18/23 1800)  Wounds: No  Wound care consulted: No

## 2023-08-19 NOTE — HPI
"Itz Daniel is a 53 y.o. male w/ known CKD III/V 2/2 DM and HTN, CABG x3 (2017) with known occluded VG-OM, AICD placement, CAD, HFrEF, hypothyroidism, COVID-19 (2022), Diabetic foot infection s/p amputation (2022), and obesity presented to [facility] on  8/10/2023 with nausea, vomiting, weakness and shortness of breath. Pt's workup revealed pt in cardiogenic shock in setting of NSTEMI. Initial labs at [facility] showed troponin of 24.8, lactic acid of 2.7, , Na 132, glucose 487, Cr 2.51, and WBC 11.57, pt was loaded with heparin. Pt underwent a LHC (LCX had 90-95% proximal stenosis, distal 70% calcified stenosis, diffusely diseased OM1,  of OM2, RHC revealed: RA 20, RV 81/21, PA 81/47 (mean 61), PWCP 45, Ao sat 96%, PA sat 49% (on 6L NC)  CO/CI:   3.69/1.52 (VIKKI)  3.91/1.6 (TD), and impella placement, CABG w/ PCI of the proximal circumflex with a 33 x 24 mm Synergy XD stent (post-dilated up to 4.25 mm)    [place] [facility] [place] the following VS:   Initial Vitals   BP Pulse Resp Temp SpO2   08/12/23 0600 08/11/23 2109 08/11/23 2115 08/11/23 2100 08/11/23 2115   125/72 (!) 114 20 98.3 °F (36.8 °C) 95 %     Nephrology was consulted for: "Worsening kidney function"  Baseline sCr: appears to be around ~2.  Admission sCr: 2.5 at [facility]  Pt follows with nephrology for CKD  [place] [facility] [place] has had significant YVES, highest sCr during hospitalization is 3.7 (today), his urine output has also decreased.   Pt has been aggressively diuresed with ~9L of urine output during hospitalization   Primary team ordered a renal ultrasound w/ doppler with for evaluation of renal artery stenosis, this was negative  Pt has remained on 4L of O2 since 8/17, he does not use oxygen at home.   Pt does not endorse a history of kidney stones, chronic NSAID use, trauma to the kidneys or bladder.   As for a family history, pt denies family history of kidney diease including " family members on dialysis, autoimmune diseases, kidney stones or cancer.     Creatinine   Date Value Ref Range Status   08/19/2023 3.6 (H) 0.5 - 1.4 mg/dL Final   08/19/2023 3.7 (H) 0.5 - 1.4 mg/dL Final   08/19/2023 3.4 (H) 0.5 - 1.4 mg/dL Final     BUN   Date Value Ref Range Status   08/19/2023 51 (H) 6 - 20 mg/dL Final   08/19/2023 50 (H) 6 - 20 mg/dL Final   08/19/2023 49 (H) 6 - 20 mg/dL Final

## 2023-08-19 NOTE — ASSESSMENT & PLAN NOTE
Titrate insulin slowly to avoid hypoglycemia as the risk of hypoglycemia increases with decreased creatinine clearance.    Estimated Creatinine Clearance: 32.1 mL/min (A) (based on SCr of 3.7 mg/dL (H)).

## 2023-08-19 NOTE — PLAN OF CARE
Cardiac ICU Care Plan    POC reviewed with Itz Daniel and family. Questions and concerns addressed.  See below and flowsheets for full assessment and VS info.     CVP 10 & 11  SV02 44  Lasix gtt started      Neuro:  Ben Coma Scale  Best Eye Response: 4-->(E4) spontaneous  Best Motor Response: 6-->(M6) obeys commands  Best Verbal Response: 5-->(V5) oriented  Ben Coma Scale Score: 15  Assessment Qualifiers: patient not sedated/intubated  Pupil PERRLA: yes    24 hr Temp:  [97.6 °F (36.4 °C)-98 °F (36.7 °C)]      CV:  Rhythm: sinus tachycardia   DVT prophylaxis: VTE Required Core Measure: Pharmacological prophylaxis initiated/maintained    CVP (mean): 11 mmHg (08/18/23 1610)    Pulmonary Artery Catheter Assessment  08/15/23 1500 Internal Jugular Left-Current Insertion Depth (cm): 56.5 cm (08/18/23 1100)  [REMOVED] Pulmonary Artery Catheter Assessment  08/11/23 1500 Femoral Vein Right-Current Insertion Depth (cm): 78.5 cm (08/15/23 1101)  PAP: 68/33 (08/18/23 1702)  SVO2 (%): 40 % (08/17/23 0505)  CO (L/min): 5.3 L/min (Joe) (08/13/23 1601)       Type: Impella       Pulses  Right Radial Pulse: 2+ (normal)  Left Radial Pulse: 2+ (normal)  Right Dorsalis Pedis Pulse: 0 (absent) (MD aware)  Left Dorsalis Pedis Pulse: Doppler  Right Posterior Tibial Pulse: Doppler  Left Posterior Tibial Pulse: Doppler    Resp:  Flow (L/min): 4  Oxygen Concentration (%): 28    GI/:  GI prophylaxis: yes  Diet/Nutrition Received: consistent carb/diabetic diet, 2 gram sodium, low saturated fat/low cholesterol  Last Bowel Movement: 08/17/23  Voiding Characteristics: urethral catheter (bladder)       Urethral Catheter 08/12/23 0130 16 Fr.-Reason for Continuing Urinary Catheterization: Critically ill in ICU and requiring hourly monitoring of intake/output   Intake/Output Summary (Last 24 hours) at 8/18/2023 1939  Last data filed at 8/18/2023 1800  Gross per 24 hour   Intake 789.81 ml   Output 2155 ml   Net -1365.19 ml       "  Nutritional Supplement Intake: Quantity 0, Type:  n/a    Labs/Accuchecks:  Recent Labs   Lab 08/16/23 0415 08/17/23  0639 08/17/23 2006 08/17/23  2334 08/18/23  0309   WBC 8.97 10.83  --   --  9.71   RBC 3.26* 3.07*  --   --  2.81*   HGB 9.7* 8.9*  --   --  8.2*   HCT 29.8* 27.2* 25* 27* 25.0*    209  --   --  236      Recent Labs   Lab 08/14/23  1251 08/15/23  0425 08/15/23  0428 08/15/23  1211 08/16/23 0415 08/17/23 0417 08/18/23  0309   INR  --   --    < >  --  1.0 1.1 1.0   APTT 52.0* 58.8*  --  47.5*  --   --   --     < > = values in this interval not displayed.      Recent Labs     08/18/23  0309 08/18/23  0841 08/18/23  1623   *   < > 133*   K 4.0   < > 4.1   CO2 23   < > 22*   CL 98   < > 98   BUN 43*   < > 43*   CREATININE 3.2*   < > 3.3*   ALKPHOS 81  --   --    ALT 23  --   --    AST 38  --   --    BILITOT 0.8  --   --     < > = values in this interval not displayed.     No results for input(s): "CPK", "CPKMB", "MB", "TROPONINI" in the last 168 hours.   Recent Labs     08/17/23 2006 08/17/23  2334 08/18/23  0502 08/18/23  0845   PH 7.396 7.390  --  7.376   PCO2 44.9 45.7*  --  46.4*   PO2 27* 27*  --  25*   HCO3 27.6 27.7  --  27.2   POCSATURATED 50* 50* 49.6 44*   BE 3 3  --  2       Electrolytes: N/A - electrolytes WDL  Accuchecks: Q4H    Gtts/LDAs:   sodium chloride 0.9% Stopped (08/15/23 1533)    DOBUTamine IV infusion (non-titrating) 5 mcg/kg/min (08/18/23 1800)    furosemide (LASIX) 500 mg in 50 mL infusion (conc: 10 mg/mL) 15 mg/hr (08/18/23 1800)    insulin regular 1 units/mL infusion orderable (TRANSFER) 2.5 Units/hr (08/18/23 1828)    nitric oxide gas      sodium chloride 0.9%         Lines/Drains/Airways       Central Venous Catheter Line  Duration             Introducer 08/15/23 1630 Internal Jugular Left 3 days    Pulmonary Artery Catheter Assessment  08/15/23 1500 Internal Jugular Left 3 days              Drain  Duration                  Urethral Catheter 08/12/23 0130 16 " Fr. 6 days              Peripheral Intravenous Line  Duration                  Peripheral IV - Single Lumen 08/18/23 1702 20 G Anterior;Right Upper Arm <1 day                    Skin/Wounds  Bathing/Skin Care: bath, complete;back care;dressed/undressed;foot care;incontinence care;linen changed (08/18/23 1800)  Wounds: No  Wound care consulted: No

## 2023-08-19 NOTE — PROGRESS NOTES
[facility] - Cardiac Intensive Care  Heart Transplant  Progress Note    Patient Name: Itz Daniel  MRN: [id number]  Admission Date: 8/11/2023  Hospital Length of Stay: 8 days  Attending Physician: Margoth Mackenzie MD  Primary Care Provider: Sunday Boo MD  Principal Problem:Ischemic cardiomyopathy    Subjective:     Interval History: Yesterday Creatinine increased to 3.3, at night it stayed around 3.2, but today am it worsened to 3.7, however improved UOP after meds. Continued on Caleb.    HD    CVP:  11  SVO2:  51  CO 8.2  CI 3.3  SVR:  563    RAP 6  sPAP 63  dPAP 28  mPAP 42  PCWP 20  PVR 4.9  [place]izett 5.8   0.68    I/Os    In 0.875  Out 1.085  Net -0.209  UOP 1.085  Last h UOP 60cc  Since Adm -8.8    Continuous Infusions:   sodium chloride 0.9% Stopped (08/15/23 1533)    DOBUTamine IV infusion (non-titrating) 5 mcg/kg/min (08/19/23 0801)    furosemide (LASIX) 500 mg in 50 mL infusion (conc: 10 mg/mL) 15 mg/hr (08/19/23 0801)    insulin regular 1 units/mL infusion orderable (TRANSFER) 2.5 Units/hr (08/19/23 0801)    nitric oxide gas      sodium chloride 0.9%       Scheduled Meds:   aspirin  81 mg Oral Daily    atorvastatin  80 mg Oral Daily    enoxparin  40 mg Subcutaneous Q24H (prophylaxis, 1700)    hydrALAZINE  10 mg Oral TID    insulin aspart U-100  6-12 Units Subcutaneous TIDWM    isosorbide dinitrate  10 mg Oral TID    levothyroxine  125 mcg Oral Before breakfast    LIDOcaine (PF) 10 mg/ml (1%)  10 mL Other Once    LIDOcaine  1 patch Transdermal Q24H    oxymetazoline  2 spray Each Nostril BID    pantoprazole  40 mg Oral Daily    polyethylene glycol  17 g Oral TID    senna  8.6 mg Oral BID    ticagrelor  90 mg Oral BID     PRN Meds:acetaminophen, dextrose 10%, dextrose 10%, glucagon (human recombinant), glucose, glucose, insulin aspart U-100, methocarbamoL, ondansetron, oxyCODONE, sodium chloride 0.9%, sodium chloride 0.9%    Review of patient's allergies indicates:  No Known  Allergies  Objective:     Vital Signs (Most Recent):  Temp: 97.7 °F (36.5 °C) (08/19/23 0701)  Pulse: 105 (08/19/23 0801)  Resp: 20 (08/19/23 0801)  BP: 102/64 (08/19/23 0801)  SpO2: 100 % (08/19/23 0801) Vital Signs (24h Range):  Temp:  [97.7 °F (36.5 °C)-98.1 °F (36.7 °C)] 97.7 °F (36.5 °C)  Pulse:  [101-114] 105  Resp:  [10-28] 20  SpO2:  [91 %-100 %] 100 %  BP: ()/(50-68) 102/64     Patient Vitals for the past 72 hrs (Last 3 readings):   Weight   08/19/23 0626 122.1 kg (269 lb 3.2 oz)   08/17/23 1525 121.6 kg (268 lb)     Body mass index is 34.56 kg/m².      Intake/Output Summary (Last 24 hours) at 8/19/2023 0849  Last data filed at 8/19/2023 0801  Gross per 24 hour   Intake 1544.63 ml   Output 2380 ml   Net -835.37 ml       Hemodynamic Parameters:  PAP: (63-76)/(28-38) 66/33  PAP (Mean):  [41 mmHg-53 mmHg] 45 mmHg    Telemetry: NSR       Physical Exam  Vitals and nursing note reviewed.   Constitutional:       General: He is not in acute distress.     Appearance: Normal appearance. He is normal weight. He is not ill-appearing.   HENT:      Head: Normocephalic and atraumatic.   Eyes:      Pupils: Pupils are equal, round, and reactive to light.   Neck:      Comments: Left IJ PA catheter  Cardiovascular:      Rate and Rhythm: Normal rate and regular rhythm.      Pulses: Normal pulses.      Heart sounds: Normal heart sounds. No murmur heard.     No friction rub. No gallop.   Pulmonary:      Effort: Pulmonary effort is normal. No respiratory distress.      Breath sounds: No wheezing or rhonchi.      Comments: Bibasilar crackles  Abdominal:      General: Abdomen is flat. Bowel sounds are normal.      Palpations: Abdomen is soft.   Musculoskeletal:         General: Normal range of motion.      Cervical back: Normal range of motion and neck supple.      Right lower leg: No edema.      Left lower leg: No edema.      Comments: Warm BL LEs   Skin:     General: Skin is warm and dry.      Capillary Refill: Capillary  "refill takes less than 2 seconds.   Neurological:      General: No focal deficit present.      Mental Status: He is alert.          Significant Labs:  CBC:  Recent Labs   Lab 08/17/23  0639 08/17/23 2006 08/17/23  2334 08/18/23  0309 08/19/23 0427   WBC 10.83  --   --  9.71 7.38   RBC 3.07*  --   --  2.81* 2.69*   HGB 8.9*  --   --  8.2* 7.8*   HCT 27.2*   < > 27* 25.0* 23.8*     --   --  236 273   MCV 89  --   --  89 89   MCH 29.0  --   --  29.2 29.0   MCHC 32.7  --   --  32.8 32.8    < > = values in this interval not displayed.     BNP:  No results for input(s): "BNP" in the last 168 hours.    Invalid input(s): "BNPTRIAGELBLO"  CMP:  Recent Labs   Lab 08/17/23  0417 08/17/23  0801 08/18/23  0309 08/18/23  0841 08/19/23  0005 08/19/23  0427 08/19/23  0804   *   < > 155*   < > 200* 226* 190*   CALCIUM 8.6*   < > 8.5*   < > 8.0* 8.2* 8.5*   ALBUMIN 2.4*  --  2.4*  --   --  2.4*  --    PROT 6.1  --  6.2  --   --  6.2  --    *   < > 133*   < > 132* 131* 133*   K 4.0   < > 4.0   < > 4.1 4.5 4.0   CO2 24   < > 23   < > 23 23 27   CL 96   < > 98   < > 96 97 96   BUN 38*   < > 43*   < > 45* 49* 50*   CREATININE 2.8*   < > 3.2*   < > 3.3* 3.4* 3.7*   ALKPHOS 79  --  81  --   --  84  --    ALT 16  --  23  --   --  19  --    AST 31  --  38  --   --  35  --    BILITOT 0.7  --  0.8  --   --  0.7  --     < > = values in this interval not displayed.      Coagulation:   Recent Labs   Lab 08/14/23  1251 08/15/23  0425 08/15/23  0428 08/15/23  1211 08/16/23  0415 08/17/23  0417 08/18/23  0309 08/19/23  0427   INR  --   --    < >  --    < > 1.1 1.0 1.0   APTT 52.0* 58.8*  --  47.5*  --   --   --   --     < > = values in this interval not displayed.     LDH:  Recent Labs   Lab 08/17/23  0417 08/18/23  0309   * 572*     Microbiology:  Microbiology Results (last 7 days)       Procedure Component Value Units Date/Time    Blood culture [[id number]] Collected: 08/13/23 0920    Order Status: Completed Specimen: " "Blood from Peripheral, Antecubital, Left Updated: 08/18/23 1012     Blood Culture, Routine No growth after 5 days.    Blood culture [[id number]] Collected: 08/12/23 0103    Order Status: Completed Specimen: Blood from Peripheral, Hand, Left Updated: 08/17/23 0612     Blood Culture, Routine No growth after 5 days.    Blood culture [[id number]]  (Abnormal) Collected: 08/12/23 0042    Order Status: Completed Specimen: Blood from Line, Jugular, Internal Right Updated: 08/15/23 0744     Blood Culture, Routine Gram stain aer bottle: Gram positive cocci in clusters resembling Staph      Results called to and read back by:Jojo Valdes Rn 08/13/2023  02:56      COAGULASE-NEGATIVE STAPHYLOCOCCUS SPECIES  Organism is a probable contaminant      MRSA/SA Rapid ID by PCR from Blood culture [[id number]] Collected: 08/12/23 0042    Order Status: Completed Updated: 08/13/23 0403     Staph aureus ID by PCR Negative     Methicillin Resistant ID by PCR Negative            ABGs:   Recent Labs   Lab 08/18/23 2006   PH 7.337*  7.337*   PCO2 49.4*  49.4*   HCO3 26.4  26.4   POCSATURATED 49*  49*   BE 1  1     BMP:   Recent Labs   Lab 08/19/23 0427 08/19/23  0804   * 190*   * 133*   K 4.5 4.0   CL 97 96   CO2 23 27   BUN 49* 50*   CREATININE 3.4* 3.7*   CALCIUM 8.2* 8.5*   MG 2.5  --      Cardiac Markers: No results for input(s): "CKMB", "TROPONINT", "MYOGLOBIN" in the last 72 hours.  Coagulation:   Recent Labs   Lab 08/19/23 0427   INR 1.0     Prealbumin: No results for input(s): "PREALBUMIN" in the last 72 hours.  Microbiology Results (last 7 days)       Procedure Component Value Units Date/Time    Blood culture [[id number]] Collected: 08/13/23 0920    Order Status: Completed Specimen: Blood from Peripheral, Antecubital, Left Updated: 08/18/23 1012     Blood Culture, Routine No growth after 5 days.    Blood culture [[id number]] Collected: 08/12/23 0103    Order Status: Completed Specimen: Blood from Peripheral, Hand, " Left Updated: 08/17/23 0612     Blood Culture, Routine No growth after 5 days.    Blood culture [[id number]]  (Abnormal) Collected: 08/12/23 0042    Order Status: Completed Specimen: Blood from Line, Jugular, Internal Right Updated: 08/15/23 0744     Blood Culture, Routine Gram stain aer bottle: Gram positive cocci in clusters resembling Staph      Results called to and read back by:Jojo Valdes Rn 08/13/2023  02:56      COAGULASE-NEGATIVE STAPHYLOCOCCUS SPECIES  Organism is a probable contaminant      MRSA/SA Rapid ID by PCR from Blood culture [[id number]] Collected: 08/12/23 0042    Order Status: Completed Updated: 08/13/23 0403     Staph aureus ID by PCR Negative     Methicillin Resistant ID by PCR Negative          Specimen (24h ago, onward)      None          I have reviewed all pertinent labs within the past 24 hours.    Estimated Creatinine Clearance: 32.1 mL/min (A) (based on SCr of 3.7 mg/dL (H)).    Diagnostic Results:    CXR 08/18/23  Postoperative changes and supporting devices as before.  Mild diffuse edema more marked at the lung bases similar to the previous study.  No significant pleural effusion.  Heart size upper limit of normal.    CT chest 08/18/23  1. No evidence for hematoma as clinically questioned, noting noncontrast technique.  2. Small bilateral pleural effusions.  Bilateral pulmonary edema.  3. Intraluminal fluid in the esophagus to the level of the aortic arch.  Consider correlation for aspiration risk.  4. Bilateral pulmonary nodules, largest measuring 9 mm.  For multiple solid nodules with any 6 mm or greater, Fleischner Society guidelines recommend follow up with non-contrast chest CT at 3-6 months and 18-24 months after discovery.  5. Gallbladder sludge and mild gallbladder distension, nonspecific.  6. Additional findings as above.    US renal 08/17/23  Limited study due to inability to evaluate segmental renal arterial resistive indices.  Additional evaluation, as clinically  warranted.     Elevated main renal arterial resistive indices, nonspecific, but can be seen in the setting of medical renal disease.     Renal arteries and veins are patent.     No hydronephrosis     Bilateral pleural effusions.    TTE 08/17/23    Left Ventricle: The left ventricle is severely dilated. Ventricular mass is normal. Normal wall thickness. regional wall motion abnormalities present. See diagram for wall motion findings. There is severely reduced systolic function with a visually estimated ejection fraction of 15 - 20%. Grade III diastolic dysfunction.    Left Atrium: Left atrium is moderately dilated.    Right Ventricle: Right ventricle was not well visualized due to poor acoustic window.    Mitral Valve: There is mild regurgitation.    Tricuspid Valve: There is mild regurgitation.    Pulmonary Artery: The estimated pulmonary artery systolic pressure is 41 mmHg.    IVC/SVC: Normal venous pressure at 3 mmHg.    [facility] 08/12/23  Coronary angiogram:  Left main: Patent  Left anterior descending artery:   just distal to the 1st septal   Left circumflex artery:  90-95% proximal stenosis, distal 70% calcified stenosis.  Diffusely diseased OM1,  of OM2  Right coronary artery:  Ostial      Grafts:  SVG-RCA:  Diffuse disease with narrowing of up to 40-50% in the proximal portion  The native PLB and PDA both diffusely diseased.  SVG-OM: Occluded  LIMA-LAD:  Widely patent.  Diffusely diseased native LAD with narrowing of up to 90% in the very apical portion.     Assessment:  Cardiogenic shock with extremely low cardiac outputs and elevated left and right filling pressures  NSTEMI - suspect acute graft closure of the SVG to OM2.  Severe native CAD status post PCI of the proximal circumflex with a 33 x 24 mm Synergy XD stent (post-dilated up to 4.25 mm)      [facility] 08/11/23  RA 20, RV 81/21, PA 81/47 (mean 61), PWCP 45, Ao sat 96%, PA sat 49% (on 6L NC)  CO/CI:   3.69/1.52 (VIKKI)  3.91/1.6 (TD)          Assessment and Plan:     52-year-old male with a past medical history of ICM, CABG in 2017 (LIMA to LAD, SVG to OM1, SVG to PDA), DM type II, CKD stage IV (baseline 1.8), and ICD who presented [place] ED on 8/10//23 due to n/v and SOB. Work up concerning for NSTEMI with peak trop of 38. Started on ACS protocol with asa, ticagrelor, and heparin gtt. Also noted to have YVES with Cr 2.5, volume overloaded with BNP of 796, LA 2.8>>1.0. TTE with EF drop from 30 to 15%, LVEDD 6.15, moderately reduced RV function. LHC/RHC on 8/12: s/p MIRELLA to prox Cx. RHC showed RA 20, RV 81/21, PA 81/47 (mean 61), PWCP 45, CO/CI: 3.69/1.52 (VIKKI)  3.91/1.6 (TD), therefore, impella CP was placed in in right femoral artery and leave in swan in right femoral vein. He was then [place] [facility] [place]. Of note, never started on inotropes or pressors. Was receiving metoprolol.     On arrival patient was hemodynamically stable.  Not on any inotropes or pressors.  Impella at P7  Initial hemodynamics  CVP: 9  SVO2:  44  Cardiac Output: 4.9  Cardiac Index: 2.0  SVR: 1250     [facility] on 8/12  Grafts:  SVG-RCA:  Diffuse disease with narrowing of up to 40-50% in the proximal portion  The native PLB and PDA both diffusely diseased.  SVG-OM: Occluded  LIMA-LAD:  Widely patent.  Diffusely diseased native LAD with narrowing of up to 90% in the very apical portion.  Severe native CAD status post PCI of the proximal circumflex with a 33 x 24 mm Synergy XD stent (post-dilated up to 4.25 mm)      Previous Cardiac Diagnostics:   TTE 7.1.22  The study quality is average.   The left ventricle is mildly enlarged. Global left ventricular systolic function is severely decreased. The left ventricular ejection fraction is 30%.   Mild (1+) mitral regurgitation.  The pulmonary artery systolic pressure is 10 mmHg. The pulmonary artery appears to be normal.     [facility] 5.19.2017  Severe MVCAD as a cause to severe newly diagnosed ischemic congestive  heart failure with EF of 20%  Mild MR  Patent L subclavian, & LIMA suitable for bypass graft conduit     BLE Arterial US 10.18.21  The study quality is average.   The arteries of the left lower extremity appear patent with no significant stenosis noted.   Tri-phasic waveforms are obtained throughout the left lower extremity arteries.      Carotid US 10.18.21  The study quality is average.   1-39% stenosis in the proximal right internal carotid artery based on Bluth Criteria.   1-39% stenosis in the proximal left internal carotid artery based on Bluth Criteria.   Less than 50% stenosis throughout the bilateral common carotid arteries.   Antegrade bilateral vertebral artery flow.         * Ischemic cardiomyopathy  Cardiogenic shock    52-year-old male with past medical history of ischemic cardiomyopathy status post CABG in 2017 who presents as a transfer from outside hospital after he presented with an NSTEMI status post revascularization to proximal circumflex.  Associated cardiogenic shock noted from right heart catheterization, CP Impella placed on a 12. 3.4cm from AV to inlet on bedside echo   - Continued with high PA and WP, and CVP 10    - Worsening Cr after impella removal, stable overnight with diuresis, but worsened this am  - Continue furosemide 15mg/h  - Continue Caleb 10ppm  -  5  - Continue Isosorbide and hydralazine, but increase from 10/10mg to 20/20mg TID  - Trend BMP q8 hour   - Consult Interventional cardiology for possible recommendations regarding ischemic cardiomyopathy  - Consult CT surgery for possible advance therapies evaluation  - Consult palliative  - Consult nephrology for kidney disease  - TTE below    Results for orders placed during the hospital encounter of 08/11/23    Echo    Interpretation Summary    Left Ventricle: The left ventricle is severely dilated. Ventricular mass is normal. Normal wall thickness. regional wall motion abnormalities present. See diagram for wall motion  findings. There is severely reduced systolic function with a visually estimated ejection fraction of 15 - 20%. Grade III diastolic dysfunction.    Left Atrium: Left atrium is moderately dilated.    Right Ventricle: Right ventricle was not well visualized due to poor acoustic window.    Mitral Valve: There is mild regurgitation.    Tricuspid Valve: There is mild regurgitation.    Pulmonary Artery: The estimated pulmonary artery systolic pressure is 41 mmHg.    IVC/SVC: Normal venous pressure at 3 mmHg.          Hypothyroidism  - Continue home Synthroid    CKD (chronic kidney disease), stage IV  Acute kidney injury    Likely secondary to cardiogenic shock  Baseline creatinine of 1.8.   Worsening creatinine, will consult nephrology  Recent Labs   Lab 08/18/23  1235 08/18/23  1623 08/18/23 2008   CREATININE 3.2* 3.3* 3.2*     - Monitor Cr  - Daily weights, strict I/O and chart, renally dose all medications   - Avoid nephrotoxic medications, NSAIDs, ACE/ARB, and IV contrast.  - Consult neprhology      NSTEMI (non-ST elevated myocardial infarction)  - Continue Aspirin  - Continue ticagrelor  - Continue Atorvastatin 80    DM (diabetes mellitus)  Lab Results   Component Value Date    HGBA1C 10.2 (H) 08/10/2023     - Endocrine following  - Now on insulin drip  - Diabetic/cardiac diet          Paul Roach MD  Heart Transplant  [facility] - Cardiac Intensive Care

## 2023-08-19 NOTE — ASSESSMENT & PLAN NOTE
Acute kidney injury    Likely secondary to cardiogenic shock  Baseline creatinine of 1.8.   Worsening creatinine, will consult nephrology  Recent Labs   Lab 08/18/23  1235 08/18/23  1623 08/18/23 2008   CREATININE 3.2* 3.3* 3.2*     - Monitor Cr  - Daily weights, strict I/O and chart, renally dose all medications   - Avoid nephrotoxic medications, NSAIDs, ACE/ARB, and IV contrast.  - Consult neprhology

## 2023-08-19 NOTE — NURSING
Nurses Note -- 4 Eyes      8/19/2023   2:54 PM      Skin assessed during: Q Shift Change      [x] No Altered Skin Integrity Present    []Prevention Measures Documented      [] Yes- Altered Skin Integrity Present or Discovered   [] LDA Added if Not in Epic (Describe Wound)   [] New Altered Skin Integrity was Present on Admit and Documented in LDA   [] Wound Image Taken    Wound Care Consulted? No    Attending Nurse:  Meka Vallejo RN/Staff Member:  Mila

## 2023-08-19 NOTE — ASSESSMENT & PLAN NOTE
Cardiogenic shock    52-year-old male with past medical history of ischemic cardiomyopathy status post CABG in 2017 who presents as a transfer from outside hospital after he presented with an NSTEMI status post revascularization to proximal circumflex.  Associated cardiogenic shock noted from right heart catheterization, CP Impella placed on a 12. 3.4cm from AV to inlet on bedside echo   - Continued with high PA and WP, and CVP 10    - Worsening Cr after impella removal, stable overnight with diuresis, but worsened this am  - Continue furosemide 15mg/h  - Continue Caleb 10ppm  -  5  - Continue Isosorbide and hydralazine, but increase from 10/10mg to 20/20mg TID  - Trend BMP q8 hour   - Consult Interventional cardiology for possible recommendations regarding ischemic cardiomyopathy  - Consult CT surgery for possible advance therapies evaluation  - Consult palliative  - Consult nephrology for kidney disease  - TTE below    Results for orders placed during the hospital encounter of 08/11/23    Echo    Interpretation Summary    Left Ventricle: The left ventricle is severely dilated. Ventricular mass is normal. Normal wall thickness. regional wall motion abnormalities present. See diagram for wall motion findings. There is severely reduced systolic function with a visually estimated ejection fraction of 15 - 20%. Grade III diastolic dysfunction.    Left Atrium: Left atrium is moderately dilated.    Right Ventricle: Right ventricle was not well visualized due to poor acoustic window.    Mitral Valve: There is mild regurgitation.    Tricuspid Valve: There is mild regurgitation.    Pulmonary Artery: The estimated pulmonary artery systolic pressure is 41 mmHg.    IVC/SVC: Normal venous pressure at 3 mmHg.

## 2023-08-19 NOTE — SUBJECTIVE & OBJECTIVE
Interval History: Yesterday Creatinine increased to 3.3, at night it stayed around 3.2, but today am it worsened to 3.7, however improved UOP after meds. Continued on Caleb.    HD    CVP:  11  SVO2:  51  CO 8.2  CI 3.3  SVR:  563    RAP 6  sPAP 63  dPAP 28  mPAP 42  PCWP 20  PVR 4.9  Lizett 5.8   0.68    I/Os    In 0.875  Out 1.085  Net -0.209  UOP 1.085  Last h UOP 60cc  Since Adm -8.8    Continuous Infusions:   sodium chloride 0.9% Stopped (08/15/23 1533)    DOBUTamine IV infusion (non-titrating) 5 mcg/kg/min (08/19/23 0801)    furosemide (LASIX) 500 mg in 50 mL infusion (conc: 10 mg/mL) 15 mg/hr (08/19/23 0801)    insulin regular 1 units/mL infusion orderable (TRANSFER) 2.5 Units/hr (08/19/23 0801)    nitric oxide gas      sodium chloride 0.9%       Scheduled Meds:   aspirin  81 mg Oral Daily    atorvastatin  80 mg Oral Daily    enoxparin  40 mg Subcutaneous Q24H (prophylaxis, 1700)    hydrALAZINE  10 mg Oral TID    insulin aspart U-100  6-12 Units Subcutaneous TIDWM    isosorbide dinitrate  10 mg Oral TID    levothyroxine  125 mcg Oral Before breakfast    LIDOcaine (PF) 10 mg/ml (1%)  10 mL Other Once    LIDOcaine  1 patch Transdermal Q24H    oxymetazoline  2 spray Each Nostril BID    pantoprazole  40 mg Oral Daily    polyethylene glycol  17 g Oral TID    senna  8.6 mg Oral BID    ticagrelor  90 mg Oral BID     PRN Meds:acetaminophen, dextrose 10%, dextrose 10%, glucagon (human recombinant), glucose, glucose, insulin aspart U-100, methocarbamoL, ondansetron, oxyCODONE, sodium chloride 0.9%, sodium chloride 0.9%    Review of patient's allergies indicates:  No Known Allergies  Objective:     Vital Signs (Most Recent):  Temp: 97.7 °F (36.5 °C) (08/19/23 0701)  Pulse: 105 (08/19/23 0801)  Resp: 20 (08/19/23 0801)  BP: 102/64 (08/19/23 0801)  SpO2: 100 % (08/19/23 0801) Vital Signs (24h Range):  Temp:  [97.7 °F (36.5 °C)-98.1 °F (36.7 °C)] 97.7 °F (36.5 °C)  Pulse:  [101-114] 105  Resp:  [10-28] 20  SpO2:  [91 %-100  %] 100 %  BP: ()/(50-68) 102/64     Patient Vitals for the past 72 hrs (Last 3 readings):   Weight   08/19/23 0626 122.1 kg (269 lb 3.2 oz)   08/17/23 1525 121.6 kg (268 lb)     Body mass index is 34.56 kg/m².      Intake/Output Summary (Last 24 hours) at 8/19/2023 0849  Last data filed at 8/19/2023 0801  Gross per 24 hour   Intake 1544.63 ml   Output 2380 ml   Net -835.37 ml       Hemodynamic Parameters:  PAP: (63-76)/(28-38) 66/33  PAP (Mean):  [41 mmHg-53 mmHg] 45 mmHg    Telemetry: NSR       Physical Exam  Vitals and nursing note reviewed.   Constitutional:       General: He is not in acute distress.     Appearance: Normal appearance. He is normal weight. He is not ill-appearing.   HENT:      Head: Normocephalic and atraumatic.   Eyes:      Pupils: Pupils are equal, round, and reactive to light.   Neck:      Comments: Left IJ PA catheter  Cardiovascular:      Rate and Rhythm: Normal rate and regular rhythm.      Pulses: Normal pulses.      Heart sounds: Normal heart sounds. No murmur heard.     No friction rub. No gallop.   Pulmonary:      Effort: Pulmonary effort is normal. No respiratory distress.      Breath sounds: No wheezing or rhonchi.      Comments: Bibasilar crackles  Abdominal:      General: Abdomen is flat. Bowel sounds are normal.      Palpations: Abdomen is soft.   Musculoskeletal:         General: Normal range of motion.      Cervical back: Normal range of motion and neck supple.      Right lower leg: No edema.      Left lower leg: No edema.      Comments: Warm BL LEs   Skin:     General: Skin is warm and dry.      Capillary Refill: Capillary refill takes less than 2 seconds.   Neurological:      General: No focal deficit present.      Mental Status: He is alert.          Significant Labs:  CBC:  Recent Labs   Lab 08/17/23  0639 08/17/23 2006 08/17/23  2334 08/18/23  0309 08/19/23  0427   WBC 10.83  --   --  9.71 7.38   RBC 3.07*  --   --  2.81* 2.69*   HGB 8.9*  --   --  8.2* 7.8*   HCT  "27.2*   < > 27* 25.0* 23.8*     --   --  236 273   MCV 89  --   --  89 89   MCH 29.0  --   --  29.2 29.0   MCHC 32.7  --   --  32.8 32.8    < > = values in this interval not displayed.     BNP:  No results for input(s): "BNP" in the last 168 hours.    Invalid input(s): "BNPTRIAGELBLO"  CMP:  Recent Labs   Lab 08/17/23  0417 08/17/23  0801 08/18/23  0309 08/18/23  0841 08/19/23  0005 08/19/23 0427 08/19/23 0804   *   < > 155*   < > 200* 226* 190*   CALCIUM 8.6*   < > 8.5*   < > 8.0* 8.2* 8.5*   ALBUMIN 2.4*  --  2.4*  --   --  2.4*  --    PROT 6.1  --  6.2  --   --  6.2  --    *   < > 133*   < > 132* 131* 133*   K 4.0   < > 4.0   < > 4.1 4.5 4.0   CO2 24   < > 23   < > 23 23 27   CL 96   < > 98   < > 96 97 96   BUN 38*   < > 43*   < > 45* 49* 50*   CREATININE 2.8*   < > 3.2*   < > 3.3* 3.4* 3.7*   ALKPHOS 79  --  81  --   --  84  --    ALT 16  --  23  --   --  19  --    AST 31  --  38  --   --  35  --    BILITOT 0.7  --  0.8  --   --  0.7  --     < > = values in this interval not displayed.      Coagulation:   Recent Labs   Lab 08/14/23  1251 08/15/23  0425 08/15/23  0428 08/15/23  1211 08/16/23 0415 08/17/23 0417 08/18/23 0309 08/19/23 0427   INR  --   --    < >  --    < > 1.1 1.0 1.0   APTT 52.0* 58.8*  --  47.5*  --   --   --   --     < > = values in this interval not displayed.     LDH:  Recent Labs   Lab 08/17/23  0417 08/18/23  0309   * 572*     Microbiology:  Microbiology Results (last 7 days)       Procedure Component Value Units Date/Time    Blood culture [[id number]] Collected: 08/13/23 0920    Order Status: Completed Specimen: Blood from Peripheral, Antecubital, Left Updated: 08/18/23 1012     Blood Culture, Routine No growth after 5 days.    Blood culture [[id number]] Collected: 08/12/23 0103    Order Status: Completed Specimen: Blood from Peripheral, Hand, Left Updated: 08/17/23 0612     Blood Culture, Routine No growth after 5 days.    Blood culture [[id number]]  (Abnormal) " "Collected: 08/12/23 0042    Order Status: Completed Specimen: Blood from Line, Jugular, Internal Right Updated: 08/15/23 0744     Blood Culture, Routine Gram stain aer bottle: Gram positive cocci in clusters resembling Staph      Results called to and read back by:Jojo Valdes Rn 08/13/2023  02:56      COAGULASE-NEGATIVE STAPHYLOCOCCUS SPECIES  Organism is a probable contaminant      MRSA/SA Rapid ID by PCR from Blood culture [[id number]] Collected: 08/12/23 0042    Order Status: Completed Updated: 08/13/23 0403     Staph aureus ID by PCR Negative     Methicillin Resistant ID by PCR Negative            ABGs:   Recent Labs   Lab 08/18/23 2006   PH 7.337*  7.337*   PCO2 49.4*  49.4*   HCO3 26.4  26.4   POCSATURATED 49*  49*   BE 1  1     BMP:   Recent Labs   Lab 08/19/23 0427 08/19/23 0804   * 190*   * 133*   K 4.5 4.0   CL 97 96   CO2 23 27   BUN 49* 50*   CREATININE 3.4* 3.7*   CALCIUM 8.2* 8.5*   MG 2.5  --      Cardiac Markers: No results for input(s): "CKMB", "TROPONINT", "MYOGLOBIN" in the last 72 hours.  Coagulation:   Recent Labs   Lab 08/19/23 0427   INR 1.0     Prealbumin: No results for input(s): "PREALBUMIN" in the last 72 hours.  Microbiology Results (last 7 days)       Procedure Component Value Units Date/Time    Blood culture [[id number]] Collected: 08/13/23 0920    Order Status: Completed Specimen: Blood from Peripheral, Antecubital, Left Updated: 08/18/23 1012     Blood Culture, Routine No growth after 5 days.    Blood culture [[id number]] Collected: 08/12/23 0103    Order Status: Completed Specimen: Blood from Peripheral, Hand, Left Updated: 08/17/23 0612     Blood Culture, Routine No growth after 5 days.    Blood culture [[id number]]  (Abnormal) Collected: 08/12/23 0042    Order Status: Completed Specimen: Blood from Line, Jugular, Internal Right Updated: 08/15/23 0744     Blood Culture, Routine Gram stain aer bottle: Gram positive cocci in clusters resembling Staph      " Results called to and read back by:Jojo Valdes Rn 08/13/2023  02:56      COAGULASE-NEGATIVE STAPHYLOCOCCUS SPECIES  Organism is a probable contaminant      MRSA/SA Rapid ID by PCR from Blood culture [[id number]] Collected: 08/12/23 0042    Order Status: Completed Updated: 08/13/23 0403     Staph aureus ID by PCR Negative     Methicillin Resistant ID by PCR Negative          Specimen (24h ago, onward)      None          I have reviewed all pertinent labs within the past 24 hours.    Estimated Creatinine Clearance: 32.1 mL/min (A) (based on SCr of 3.7 mg/dL (H)).    Diagnostic Results:    CXR 08/18/23  Postoperative changes and supporting devices as before.  Mild diffuse edema more marked at the lung bases similar to the previous study.  No significant pleural effusion.  Heart size upper limit of normal.    CT chest 08/18/23  1. No evidence for hematoma as clinically questioned, noting noncontrast technique.  2. Small bilateral pleural effusions.  Bilateral pulmonary edema.  3. Intraluminal fluid in the esophagus to the level of the aortic arch.  Consider correlation for aspiration risk.  4. Bilateral pulmonary nodules, largest measuring 9 mm.  For multiple solid nodules with any 6 mm or greater, Fleischner Society guidelines recommend follow up with non-contrast chest CT at 3-6 months and 18-24 months after discovery.  5. Gallbladder sludge and mild gallbladder distension, nonspecific.  6. Additional findings as above.    US renal 08/17/23  Limited study due to inability to evaluate segmental renal arterial resistive indices.  Additional evaluation, as clinically warranted.     Elevated main renal arterial resistive indices, nonspecific, but can be seen in the setting of medical renal disease.     Renal arteries and veins are patent.     No hydronephrosis     Bilateral pleural effusions.    TTE 08/17/23    Left Ventricle: The left ventricle is severely dilated. Ventricular mass is normal. Normal wall thickness.  regional wall motion abnormalities present. See diagram for wall motion findings. There is severely reduced systolic function with a visually estimated ejection fraction of 15 - 20%. Grade III diastolic dysfunction.    Left Atrium: Left atrium is moderately dilated.    Right Ventricle: Right ventricle was not well visualized due to poor acoustic window.    Mitral Valve: There is mild regurgitation.    Tricuspid Valve: There is mild regurgitation.    Pulmonary Artery: The estimated pulmonary artery systolic pressure is 41 mmHg.    IVC/SVC: Normal venous pressure at 3 mmHg.    [facility] 08/12/23  Coronary angiogram:  Left main: Patent  Left anterior descending artery:   just distal to the 1st septal   Left circumflex artery:  90-95% proximal stenosis, distal 70% calcified stenosis.  Diffusely diseased OM1,  of OM2  Right coronary artery:  Ostial      Grafts:  SVG-RCA:  Diffuse disease with narrowing of up to 40-50% in the proximal portion  The native PLB and PDA both diffusely diseased.  SVG-OM: Occluded  LIMA-LAD:  Widely patent.  Diffusely diseased native LAD with narrowing of up to 90% in the very apical portion.     Assessment:  Cardiogenic shock with extremely low cardiac outputs and elevated left and right filling pressures  NSTEMI - suspect acute graft closure of the SVG to OM2.  Severe native CAD status post PCI of the proximal circumflex with a 33 x 24 mm Synergy XD stent (post-dilated up to 4.25 mm)      [facility] 08/11/23  RA 20, RV 81/21, PA 81/47 (mean 61), PWCP 45, Ao sat 96%, PA sat 49% (on 6L NC)  CO/CI:   3.69/1.52 (VIKKI)  3.91/1.6 (TD)

## 2023-08-19 NOTE — CARE UPDATE
HTS Care Update Note    8PM     Hemodynamics    CVP:  10  SVO2:  49  CO 7.9  CI 3.1  SVR:  597    RAP 6  sPAP 68.00  dPAP 31  mPAP 40  PCWP 19  PVR 2.6  Lizett 6.2   1.2    I/Os    In 0.648  Out 1.3  Net -0.651  UOP 1.3  Last h       Intake/Output Summary (Last 24 hours) at 8/18/2023 2131  Last data filed at 8/18/2023 1800  Gross per 24 hour   Intake 764.11 ml   Output 1930 ml   Net -1165.89 ml       Net IO Since Admission: -8,594.2 mL [08/18/23 2131]    Diuretics: Furosemide 80mg 1045h and Furosemide 15mg/h    Continuous Infusions:      sodium chloride 0.9% Stopped (08/15/23 1533)    DOBUTamine IV infusion (non-titrating) 5 mcg/kg/min (08/18/23 1800)    furosemide (LASIX) 500 mg in 50 mL infusion (conc: 10 mg/mL) 15 mg/hr (08/18/23 1800)    insulin regular 1 units/mL infusion orderable (TRANSFER) 2.5 Units/hr (08/18/23 1828)    nitric oxide gas      sodium chloride 0.9%         Mechanical support: None    Plan:  - No changes made, continue current plan of care  - Plan discussed with attending     Paul Roach MD  Cardiovascular Disease PGY-IV  [facility]

## 2023-08-19 NOTE — PLAN OF CARE
Cardiac ICU Care Plan    POC reviewed with Itz Daniel and family. Questions and concerns addressed. No acute events today. Pt progressing toward goals. Will continue to monitor. See below and flowsheets for full assessment and VS info.       Neuro:  Jacob Coma Scale  Best Eye Response: 4-->(E4) spontaneous  Best Motor Response: 6-->(M6) obeys commands  Best Verbal Response: 5-->(V5) oriented  Jacob Coma Scale Score: 15  Assessment Qualifiers: patient not sedated/intubated  Pupil PERRLA: yes    24 hr Temp:  [97.6 °F (36.4 °C)-98.1 °F (36.7 °C)]      CV:  Rhythm: sinus tachycardia   DVT prophylaxis: VTE Required Core Measure: Pharmacological prophylaxis initiated/maintained    CVP (mean): 12 mmHg (08/19/23 1501)    Pulmonary Artery Catheter Assessment  08/15/23 1500 Internal Jugular Left-Current Insertion Depth (cm): 56.5 cm (08/19/23 0701)  [REMOVED] Pulmonary Artery Catheter Assessment  08/11/23 1500 Femoral Vein Right-Current Insertion Depth (cm): 78.5 cm (08/15/23 1101)  PAP: 65/41 (08/19/23 1601)  SVO2 (%): 51 % (08/19/23 0901)  CO (L/min): 5.3 L/min (Joe) (08/13/23 1601)       Type: Impella       Pulses  Right Radial Pulse: 1+ (weak)  Left Radial Pulse: 1+ (weak)  Right Dorsalis Pedis Pulse: 0 (absent)  Left Dorsalis Pedis Pulse: 1+ (weak)  Right Posterior Tibial Pulse: 1+ (weak)  Left Posterior Tibial Pulse: 1+ (weak)    Resp:  Flow (L/min): 4  Oxygen Concentration (%): 28    GI/:  GI prophylaxis: yes  Diet/Nutrition Received: consistent carb/diabetic diet, low saturated fat/low cholesterol  Last Bowel Movement: 08/17/23  Voiding Characteristics: urethral catheter (bladder)       Urethral Catheter 08/12/23 0130 16 Fr.-Reason for Continuing Urinary Catheterization: Critically ill in ICU and requiring hourly monitoring of intake/output   Intake/Output Summary (Last 24 hours) at 8/19/2023 1619  Last data filed at 8/19/2023 1601  Gross per 24 hour   Intake 1597.92 ml   Output 2580 ml   Net -982.08 ml  "       Nutritional Supplement Intake: Quantity 0, Type:  n/a    Labs/Accuchecks:  Recent Labs   Lab 08/17/23  0639 08/17/23 2006 08/18/23 0309 08/19/23 0427 08/19/23  0914 08/19/23  1516   WBC 10.83  --  9.71 7.38  --   --    RBC 3.07*  --  2.81* 2.69*  --   --    HGB 8.9*  --  8.2* 7.8*  --   --    HCT 27.2*   < > 25.0* 23.8* 23* 30*     --  236 273  --   --     < > = values in this interval not displayed.      Recent Labs   Lab 08/14/23  1251 08/15/23  0425 08/15/23  0428 08/15/23  1211 08/16/23 0415 08/17/23 0417 08/18/23 0309 08/19/23 0427   INR  --   --    < >  --    < > 1.1 1.0 1.0   APTT 52.0* 58.8*  --  47.5*  --   --   --   --     < > = values in this interval not displayed.      Recent Labs     08/19/23 0427 08/19/23  0804   * 133*   K 4.5 4.0   CO2 23 27   CL 97 96   BUN 49* 50*   CREATININE 3.4* 3.7*   ALKPHOS 84  --    ALT 19  --    AST 35  --    BILITOT 0.7  --      No results for input(s): "CPK", "CPKMB", "MB", "TROPONINI" in the last 168 hours.   Recent Labs     08/18/23 2006 08/19/23 0914 08/19/23  1516   PH 7.337*  7.337* 7.323* 7.354   PCO2 49.4*  49.4* 53.3* 49.8*   PO2 29*  29* 30* 22*   HCO3 26.4  26.4 27.6 27.7   POCSATURATED 49*  49* 51* 35*   BE 1  1 2 2       Electrolytes: N/A - electrolytes WDL  Accuchecks: ACHS    Gtts/LDAs:   sodium chloride 0.9% 10 mL/hr at 08/19/23 1535    DOBUTamine IV infusion (non-titrating) 5 mcg/kg/min (08/19/23 1601)    insulin regular 1 units/mL infusion orderable (TRANSFER) 3 Units/hr (08/19/23 1601)    nitric oxide gas      sodium chloride 0.9%         Lines/Drains/Airways       Central Venous Catheter Line  Duration             Pulmonary Artery Catheter Assessment  08/15/23 1500 Internal Jugular Left 4 days    Introducer 08/15/23 1630 Internal Jugular Left 3 days              Drain  Duration                  Urethral Catheter 08/12/23 0130 16 Fr. 7 days              Peripheral Intravenous Line  Duration                  Peripheral " IV - Single Lumen 08/18/23 1702 20 G Anterior;Right Upper Arm <1 day                    Skin/Wounds  Bathing/Skin Care: bath, complete;back care;dressed/undressed;foot care;incontinence care;linen changed (08/18/23 1800)  Wounds: No  Wound care consulted: No

## 2023-08-19 NOTE — CARE UPDATE
Hemodynamics     8PM    MAP 79  CVP 11  SvO2 37  CO 6.5  CI 2.6        UOP (ml/ hr) 75  PA 51 /20   PAWP 18    Continuous Infusions:   sodium chloride 0.9% Stopped (08/15/23 1533)    DOBUTamine IV infusion (non-titrating) 5 mcg/kg/min (08/19/23 0901)    furosemide (LASIX) 500 mg in 50 mL infusion (conc: 10 mg/mL) 15 mg/hr (08/19/23 0901)    insulin regular 1 units/mL infusion orderable (TRANSFER) 2.5 Units/hr (08/19/23 0901)    nitric oxide gas      sodium chloride 0.9%         Intake/Output Summary (Last 24 hours) at 8/19/2023 0948  Last data filed at 8/19/2023 0901  Gross per 24 hour   Intake 1544.44 ml   Output 2480 ml   Net -935.56 ml         Recommendation:   -- Continue current management. Will continue monitoring closely.     Case discussed with on call attending.    Nell Garnett MD  Cardiology Fellow, PGY4

## 2023-08-19 NOTE — PROGRESS NOTES
"[facility] - [facility]  Endocrinology  Progress Note    Admit Date: 2023     Reason for Consult: Management of T2DM, Hyperglycemia     Diabetes diagnosis year: >20 years ago    Home Diabetes Medications:  Farxiga 10 mg QD; Lantus 50 units; Novolog 20 units    How often checking glucose at home?  Dexcom    BG readings on regimen: mid to upper 100s  Hypoglycemia on the regimen?  No  Missed doses on regimen?  No    Diabetes Complications include:     Hyperglycemia    Complicating diabetes co morbidities:   Cardiogenic shock; HTN; HLD      HPI: 52-year-old male with a past medical history of ICM, CABG in 2017 (LIMA to LAD, SVG to OM1, SVG to PDA), DM type II, CKD stage IV (baseline 1.8), and ICD who presented [facility] ED on 8/10//23 due to n/v and SOB. Work up concerning for NSTEMI with peak trop of 38. Started on ACS protocol with asa, ticagrelor, and heparin gtt. Also noted to have YVES with Cr 2.5, volume overloaded with BNP of 796, LA 2.8>>1.0. TTE with EF drop from 30 to 15%, LVEDD 6.15, moderately reduced RV function. LHC/RHC on : s/p MIRELLA to prox Cx. RHC showed RA 20, RV 81/21, PA 81/47 (mean 61), PWCP 45, CO/CI: 3.69/1.52 (VIKKI)  3.91/1.6 (TD), therefore, impella CP was placed in in right femoral artery and leave in swan in right femoral vein. He was then transferred to [facility] for higher level of care. Endocrine consulted to manage hyperglycemia and type 2 diabetes.     Lab Results   Component Value Date    HGBA1C 10.2 (H) 08/10/2023                 Interval HPI:   Overnight events: Remains in CICU. BG trending down and IV insulin infusion titrated down to 2.5 u/hr overnight. POD 4. Diet Cardiac Consistent Carbohydrate; Standard Tray    Eatin%  Nausea: No  Hypoglycemia and intervention: No  Fever: No  TPN and/or TF: No  If yes, type of TF/TPN and rate: n/a    /64   Pulse 105   Temp 97.7 °F (36.5 °C) (Oral)   Resp 18   Ht 6' 2" (1.88 m)   Wt 122.1 kg (269 lb 3.2 oz)   SpO2 100% " "  BMI 34.56 kg/m²     Labs Reviewed and Include    Recent Labs   Lab 08/19/23  0427 08/19/23  0804   * 190*   CALCIUM 8.2* 8.5*   ALBUMIN 2.4*  --    PROT 6.2  --    * 133*   K 4.5 4.0   CO2 23 27   CL 97 96   BUN 49* 50*   CREATININE 3.4* 3.7*   ALKPHOS 84  --    ALT 19  --    AST 35  --    BILITOT 0.7  --      Lab Results   Component Value Date    WBC 7.38 08/19/2023    HGB 7.8 (L) 08/19/2023    HCT 23 (L) 08/19/2023    MCV 89 08/19/2023     08/19/2023     No results for input(s): "TSH", "FREET4" in the last 168 hours.  Lab Results   Component Value Date    HGBA1C 10.2 (H) 08/10/2023       Nutritional status:   Body mass index is 34.56 kg/m².  Lab Results   Component Value Date    ALBUMIN 2.4 (L) 08/19/2023    ALBUMIN 2.4 (L) 08/18/2023    ALBUMIN 2.4 (L) 08/17/2023     No results found for: "PREALBUMIN"    Estimated Creatinine Clearance: 32.1 mL/min (A) (based on SCr of 3.7 mg/dL (H)).    Accu-Checks  Recent Labs     08/18/23  0840 08/18/23  1234 08/18/23  1328 08/18/23  1622 08/18/23  1817 08/18/23 2007 08/19/23  0006 08/19/23  0424 08/19/23  0751 08/19/23  0917   POCTGLUCOSE 151* 124* 118* 103 103 151* 214* 239* 191* 193*       Current Medications and/or Treatments Impacting Glycemic Control  Immunotherapy:    Immunosuppressants       None          Steroids:   Hormones (From admission, onward)      None          Pressors:    Autonomic Drugs (From admission, onward)      None          Hyperglycemia/Diabetes Medications:   Antihyperglycemics (From admission, onward)      Start     Stop Route Frequency Ordered    08/16/23 1045  insulin regular in 0.9 % NaCl 100 unit/100 mL (1 unit/mL) infusion        Question:  Enter initial dose (Units/hr):  Answer:  4    -- IV Continuous 08/16/23 1041    08/16/23 0815  insulin aspart U-100 pen 6-12 Units         -- SubQ 3 times daily with meals 08/16/23 0813    08/15/23 1003  insulin aspart U-100 pen 0-10 Units         -- SubQ As needed (PRN) 08/15/23 0904 "            ASSESSMENT and PLAN    Cardiac/Vascular  * Ischemic cardiomyopathy  Managed per primary team  Avoid hypoglycemia        Renal/  CKD (chronic kidney disease), stage IV  Titrate insulin slowly to avoid hypoglycemia as the risk of hypoglycemia increases with decreased creatinine clearance.    Estimated Creatinine Clearance: 32.1 mL/min (A) (based on SCr of 3.7 mg/dL (H)).        Endocrine  Hypothyroidism  Lab Results   Component Value Date    TSH 7.103 (H) 08/10/2023     On Synthroid 125 mcg      DM (diabetes mellitus)  Endocrinology consulted for BG management.   BG goal 140-180     Insulin infusion titrated down overnight but BG now trending up to higher end of goal ranges.     - Rewrite Transition drip at 3 units/hr with step-down parameters.   - Novolog 6-12 units with meals (Administer    6   units if patient eats 25-50% of meal, administer   12    units if patient eats > 50% of meal.)  - Novolog (aspart) insulin prn for BG excursions [facility] SSI (180/25).  - BG checks /HS/0200  - Hypoglycemia protocol in place      ** Please notify Endocrine for any change and/or advance in diet**  ** Please call Endocrine for any BG related issues **    Discharge Planning:   TBD. Please notify endocrinology prior to discharge.              Kori Paez, NP  Endocrinology  [facility]

## 2023-08-20 PROBLEM — Z51.5 ENCOUNTER FOR PALLIATIVE CARE: Status: ACTIVE | Noted: 2023-01-01

## 2023-08-20 NOTE — PROGRESS NOTES
[facility] - Cardiac Int[facility] Care  Endocrinology  Progress Note    Admit Date: 2023     Reason for Consult: Management of T2DM, Hyperglycemia     Diabetes diagnosis year: >20 years ago    Home Diabetes Medications:  Farxiga 10 mg QD; Lantus 50 units; Novolog 20 units    How often checking glucose at home?  Dexcom    BG readings on regimen: mid to upper 100s  Hypoglycemia on the regimen?  No  Missed doses on regimen?  No    Diabetes Complications include:     Hyperglycemia    Complicating diabetes co morbidities:   Cardiogenic shock; HTN; HLD      HPI: 52-year-old male with a past medical history of ICM, CABG in 2017 (LIMA to LAD, SVG to OM1, SVG to PDA), DM type II, CKD stage IV (baseline 1.8), and ICD who presented [facility] on 8/10//23 due to n/v and SOB. Work up concerning for NSTEMI with peak trop of 38. Started on ACS protocol with asa, ticagrelor, and heparin gtt. Also noted to have YVES with Cr 2.5, volume overloaded with BNP of 796, LA 2.8>>1.0. TTE with EF drop from 30 to 15%, LVEDD 6.15, moderately reduced RV function. LHC/RHC on : s/p MIRELLA to prox Cx. RHC showed RA 20, RV 81/21, PA 81/47 (mean 61), PWCP 45, CO/CI: 3.69/1.52 (VIKKI)  3.91/1.6 (TD), therefore, impella CP was placed in in right femoral artery and leave in swan in right femoral vein. He was then transferred to [facility] for higher level of care. Endocrine consulted to manage hyperglycemia and type 2 diabetes.     Lab Results   Component Value Date    HGBA1C 10.2 (H) 08/10/2023                 Interval HPI:   Overnight events: Remains in CICU. BG trending down and IV insulin infusion decreased to 2.5 u/hr per protocol. Creatinine 3.6. Diet Cardiac Consistent Carbohydrate; Standard Tray    Eatin%  Nausea: No  Hypoglycemia and intervention: No  Fever: No  TPN and/or TF: No  If yes, type of TF/TPN and rate: n/a    BP (!) 93/53 (BP Location: Left arm, Patient Position: Lying)   Pulse 107   Temp 98 °F (36.7 °C) (Oral)   Resp (!) 25    "Ht 6' 2" (1.88 m)   Wt 123.4 kg (272 lb)   SpO2 99%   BMI 34.92 kg/m²     Labs Reviewed and Include    Recent Labs   Lab 08/20/23  0232 08/20/23  0805   * 144*   CALCIUM 8.2* 8.2*   ALBUMIN 2.5*  --    PROT 6.0  --    * 132*   K 4.2 4.0   CO2 23 25   CL 95 96   BUN 55* 55*   CREATININE 3.8* 3.8*   ALKPHOS 84  --    ALT 17  --    AST 24  --    BILITOT 0.7  --      Lab Results   Component Value Date    WBC 7.50 08/20/2023    HGB 7.5 (L) 08/20/2023    HCT 22 (L) 08/20/2023    MCV 90 08/20/2023     08/20/2023     No results for input(s): "TSH", "FREET4" in the last 168 hours.  Lab Results   Component Value Date    HGBA1C 10.2 (H) 08/10/2023       Nutritional status:   Body mass index is 34.92 kg/m².  Lab Results   Component Value Date    ALBUMIN 2.5 (L) 08/20/2023    ALBUMIN 2.4 (L) 08/19/2023    ALBUMIN 2.4 (L) 08/18/2023     No results found for: "PREALBUMIN"    Estimated Creatinine Clearance: 31.4 mL/min (A) (based on SCr of 3.8 mg/dL (H)).    Accu-Checks  Recent Labs     08/18/23  2007 08/19/23  0006 08/19/23  0424 08/19/23  0751 08/19/23  0917 08/19/23  1109 08/19/23  1642 08/19/23  2114 08/20/23  0240 08/20/23  0847   POCTGLUCOSE 151* 214* 239* 191* 193* 180* 188* 154* 168* 131*       Current Medications and/or Treatments Impacting Glycemic Control  Immunotherapy:    Immunosuppressants       None          Steroids:   Hormones (From admission, onward)      None          Pressors:    Autonomic Drugs (From admission, onward)      None          Hyperglycemia/Diabetes Medications:   Antihyperglycemics (From admission, onward)      Start     Stop Route Frequency Ordered    08/20/23 0915  insulin regular in 0.9 % NaCl 100 unit/100 mL (1 unit/mL) infusion        Question:  Enter initial dose (Units/hr):  Answer:  2.5    -- IV Continuous 08/20/23 0910    08/16/23 0815  insulin aspart U-100 pen 6-12 Units         -- SubQ 3 times daily with meals 08/16/23 0813    08/15/23 1003  insulin aspart U-100 pen " 0-10 Units         -- SubQ As needed (PRN) 08/15/23 0904            ASSESSMENT and PLAN    Cardiac/Vascular  * Ischemic cardiomyopathy  Managed per primary team  Avoid hypoglycemia        Renal/  CKD (chronic kidney disease), stage IV  Titrate insulin slowly to avoid hypoglycemia as the risk of hypoglycemia increases with decreased creatinine clearance.    Estimated Creatinine Clearance: 31.4 mL/min (A) (based on SCr of 3.8 mg/dL (H)).        Endocrine  Hypothyroidism  Lab Results   Component Value Date    TSH 7.103 (H) 08/10/2023     On Synthroid 125 mcg      DM (diabetes mellitus)  Endocrinology consulted for BG management.   BG goal 140-180     - Rewrite Transition drip at 2.5 units/hr with step-down parameters. (Decreased per protocol)   - Novolog 6-12 units with meals (Administer    6   units if patient eats 25-50% of meal, administer   12    units if patient eats > 50% of meal.)  - Novolog (aspart) insulin prn for BG excursions [facility] SSI (180/25).  - BG checks //0200  - Hypoglycemia protocol in place      ** Please notify Endocrine for any change and/or advance in diet**  ** Please call Endocrine for any BG related issues **    Discharge Planning:   TBD. Please notify endocrinology prior to discharge.              Kori Paez, NP  Endocrinology  [facility]

## 2023-08-20 NOTE — PT/OT/SLP PROGRESS
"Occupational Therapy   Treatment    Name: Itz Daniel  MRN: [id number]  Admitting Diagnosis:  Ischemic cardiomyopathy  5 Days Post-Op    Recommendations:     Discharge Recommendations: home  Discharge Equipment Recommendations:  none  Barriers to discharge:  None    Assessment:     Itz Daniel is a 53 y.o. male with a medical diagnosis of Ischemic cardiomyopathy.  He presents with very pleasant attitude and willingness to get OOB. Performance deficits affecting function are weakness, impaired functional mobility, impaired endurance, gait instability, impaired self care skills, impaired cardiopulmonary response to activity, impaired balance, decreased lower extremity function, impaired sensation. 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 Pt had not yet been OOB and wanted to do some standing.     Rehab Prognosis:  Good; patient would benefit from acute skilled OT services to address these deficits and reach maximum level of function.       Plan:     Patient to be seen 3 x/week to address the above listed problems via self-care/home management, therapeutic activities, therapeutic exercises  Plan of Care Expires:    Plan of Care Reviewed with: patient    Subjective   " My diabetes is the reason everything happened to me"  Chief Complaint: SOB and poor balance standing  Patient/Family Comments/goals: D/C  Pain/Comfort:  Pain Rating 1: 0/10  Pain Rating Post-Intervention 2: 0/10    Objective:     Communicated with: nurse prior to session.  Patient found HOB elevated with telemetry, pulse ox (continuous), bhardwaj catheter, Other (comments) (Leslie haley) upon OT entry to room.    General Precautions: Standard, fall    Orthopedic Precautions:N/A  Braces: N/A  Respiratory Status:      Occupational Performance:     Bed Mobility:    Patient completed Rolling/Turning to Right with stand by assistance  Patient completed Scooting/Bridging with stand by assistance  Patient " completed Supine to Sit with stand by assistance  Patient completed Sit to Supine with stand by assistance     Functional Mobility/Transfers:  Patient completed Sit <> Stand Transfer with contact guard assistance  with  rolling walker   Functional Mobility: Pt able to take two steps forward from standing EOB after static standing for 2 minutes with RW. Pt able to correct posture and was stable. After first trial, pt sat down unexpectedly but stood right back up for second trial of static stand for 6 minutes while RN changed bedding. Pt was SOB after sitting, but not dizzy.  Pt able to take four lateral side steps at EOB with RW to resume getting back in bed.    Activities of Daily Living:  Upper Body Dressing: minimum assistance to tie gown in back while standing.      [facility] 6 Click ADL: 12    Treatment & Education:  Role of OT and POC  Safety  ADL retraining  Functional mobility training  Discharge planning  Importance of EOB/OOB activity  Pt instructed on balance exercises and verbalized understanding.    Patient left HOB elevated with all lines intact, call button in reach, RN notified, and RN present    GOALS:   Multidisciplinary Problems       Occupational Therapy Goals          Problem: Occupational Therapy    Goal Priority Disciplines Outcome Interventions   Occupational Therapy Goal     OT, PT/OT Ongoing, Progressing    Description: Goals to be met by: 9/18/23 (1 month)     Patient will increase functional independence with ADLs by performing:    UE Dressing with [place].  LE Dressing with [place].  Grooming while standing at sink with [place].  Rolling to Bilateral with [place].   Supine to sit with [place].  Step transfer with [place]  Toilet transfer to toilet with [place].                         Time Tracking:     OT Date of Treatment: 08/20/23  OT Start Time: 1345  OT Stop Time: 1439  OT Total Time (min): 54 min    Billable Minutes:Self Care/Home Management  10  Therapeutic Activity 34  Therapeutic Exercise 10    OT/INGRIS: OT          8/20/2023

## 2023-08-20 NOTE — SUBJECTIVE & OBJECTIVE
Interval History: CVP maintained and PCWP mildly improved. Continue holding diuresis and assess renal response. UOP variable during night 30-60cc/h.    HD    8:00 AM  CVP 11  SvO2 50  CO 9.1  CI 3.6      RAP 11  PAP 62/30(43)  PCWP 22    Prior 3:00 AM  CVP:  10  SVO2:  57  CO 10.7  CI 4.2  SVR:  499    I/Os    In 0.262  Out 0.365  Net -0.103  UOP 0.365  Last h UOP 40cc  Mean UOP 30-60cc  Since Adm -8.96      Intake/Output Summary (Last 24 hours) at 8/20/2023 0723  Last data filed at 8/20/2023 0705  Gross per 24 hour   Intake 1304.85 ml   Output 1390 ml   Net -85.15 ml       Continuous Infusions:   sodium chloride 0.9% 10 mL/hr at 08/20/23 0705    DOBUTamine IV infusion (non-titrating) 5 mcg/kg/min (08/20/23 0705)    insulin regular 1 units/mL infusion orderable (TRANSFER) 3 Units/hr (08/20/23 0705)    nitric oxide gas      sodium chloride 0.9%       Scheduled Meds:   aspirin  81 mg Oral Daily    atorvastatin  80 mg Oral Daily    enoxparin  40 mg Subcutaneous Q24H (prophylaxis, 1700)    hydrALAZINE  25 mg Oral TID    insulin aspart U-100  6-12 Units Subcutaneous TIDWM    isosorbide dinitrate  20 mg Oral TID    levothyroxine  125 mcg Oral Before breakfast    LIDOcaine (PF) 10 mg/ml (1%)  10 mL Other Once    LIDOcaine  1 patch Transdermal Q24H    oxymetazoline  2 spray Each Nostril BID    pantoprazole  40 mg Oral Daily    polyethylene glycol  17 g Oral TID    senna  8.6 mg Oral BID    ticagrelor  90 mg Oral BID     PRN Meds:acetaminophen, dextrose 10%, dextrose 10%, glucagon (human recombinant), glucose, glucose, insulin aspart U-100, methocarbamoL, ondansetron, oxyCODONE, sodium chloride 0.9%, sodium chloride 0.9%    Review of patient's allergies indicates:  No Known Allergies  Objective:     Vital Signs (Most Recent):  Temp: 97.4 °F (36.3 °C) (08/20/23 0401)  Pulse: 105 (08/20/23 0705)  Resp: 20 (08/20/23 0705)  BP: (!) 105/58 (08/20/23 0705)  SpO2: 99 % (08/20/23 0705) Vital Signs (24h Range):  Temp:  [97.4  °F (36.3 °C)-97.8 °F (36.6 °C)] 97.4 °F (36.3 °C)  Pulse:  [101-109] 105  Resp:  [13-33] 20  SpO2:  [86 %-100 %] 99 %  BP: ()/(52-66) 105/58     Patient Vitals for the past 72 hrs (Last 3 readings):   Weight   08/20/23 0610 123.4 kg (272 lb)   08/19/23 0626 122.1 kg (269 lb 3.2 oz)   08/17/23 1525 121.6 kg (268 lb)       Body mass index is 34.92 kg/m².      Intake/Output Summary (Last 24 hours) at 8/20/2023 0721  Last data filed at 8/20/2023 0705  Gross per 24 hour   Intake 1304.85 ml   Output 1390 ml   Net -85.15 ml         Hemodynamic Parameters:  PAP: (62-76)/(29-51) 66/34  PAP (Mean):  [42 mmHg-63 mmHg] 46 mmHg    Telemetry: NSR       Physical Exam  Vitals and nursing note reviewed.   Constitutional:       General: He is not in acute distress.     Appearance: Normal appearance. He is normal weight. He is not ill-appearing.   HENT:      Head: Normocephalic and atraumatic.   Eyes:      Pupils: Pupils are equal, round, and reactive to light.   Neck:      Comments: Left IJ PA catheter  Cardiovascular:      Rate and Rhythm: Normal rate and regular rhythm.      Pulses: Normal pulses.      Heart sounds: Normal heart sounds. No murmur heard.     No friction rub. No gallop.   Pulmonary:      Effort: Pulmonary effort is normal. No respiratory distress.      Breath sounds: No wheezing or rhonchi.      Comments: Bibasilar crackles  Abdominal:      General: Abdomen is flat. Bowel sounds are normal.      Palpations: Abdomen is soft.   Musculoskeletal:         General: Normal range of motion.      Cervical back: Normal range of motion and neck supple.      Right lower leg: No edema.      Left lower leg: No edema.      Comments: Warm BL LEs   Skin:     General: Skin is warm and dry.      Capillary Refill: Capillary refill takes less than 2 seconds.   Neurological:      General: No focal deficit present.      Mental Status: He is alert.          Significant Labs:  CBC:  Recent Labs   Lab 08/18/23  0309 08/19/23  7684  "08/19/23  0914 08/19/23  2106 08/20/23  0232 08/20/23 0237   WBC 9.71 7.38  --   --  7.50  --    RBC 2.81* 2.69*  --   --  2.54*  --    HGB 8.2* 7.8*  --   --  7.5*  --    HCT 25.0* 23.8*   < > 23* 22.9* 22*    273  --   --  308  --    MCV 89 89  --   --  90  --    MCH 29.2 29.0  --   --  29.5  --    MCHC 32.8 32.8  --   --  32.8  --     < > = values in this interval not displayed.       BNP:  No results for input(s): "BNP" in the last 168 hours.    Invalid input(s): "BNPTRIAGELBLO"  CMP:  Recent Labs   Lab 08/18/23  0309 08/18/23  0841 08/19/23  0427 08/19/23  0804 08/19/23  1523 08/19/23  2358 08/20/23 0232   *   < > 226*   < > 210* 156* 155*   CALCIUM 8.5*   < > 8.2*   < > 8.4* 8.4* 8.2*   ALBUMIN 2.4*  --  2.4*  --   --   --  2.5*   PROT 6.2  --  6.2  --   --   --  6.0   *   < > 131*   < > 132* 130* 130*   K 4.0   < > 4.5   < > 4.4 4.0 4.2   CO2 23   < > 23   < > 24 23 23   CL 98   < > 97   < > 94* 95 95   BUN 43*   < > 49*   < > 51* 53* 55*   CREATININE 3.2*   < > 3.4*   < > 3.6* 3.7* 3.8*   ALKPHOS 81  --  84  --   --   --  84   ALT 23  --  19  --   --   --  17   AST 38  --  35  --   --   --  24   BILITOT 0.8  --  0.7  --   --   --  0.7    < > = values in this interval not displayed.        Coagulation:   Recent Labs   Lab 08/14/23  1251 08/15/23  0425 08/15/23  0428 08/15/23  1211 08/16/23  0415 08/18/23  0309 08/19/23  0427 08/20/23  0232   INR  --   --    < >  --    < > 1.0 1.0 1.0   APTT 52.0* 58.8*  --  47.5*  --   --   --   --     < > = values in this interval not displayed.       LDH:  Recent Labs   Lab 08/18/23  0309   *       Microbiology:  Microbiology Results (last 7 days)       Procedure Component Value Units Date/Time    Blood culture [[id number]] Collected: 08/13/23 0920    Order Status: Completed Specimen: Blood from Peripheral, Antecubital, Left Updated: 08/18/23 1012     Blood Culture, Routine No growth after 5 days.    Blood culture [[id number]] Collected: 08/12/23 " "0103    Order Status: Completed Specimen: Blood from Peripheral, Hand, Left Updated: 08/17/23 0612     Blood Culture, Routine No growth after 5 days.    Blood culture [[id number]]  (Abnormal) Collected: 08/12/23 0042    Order Status: Completed Specimen: Blood from Line, Jugular, Internal Right Updated: 08/15/23 0744     Blood Culture, Routine Gram stain aer bottle: Gram positive cocci in clusters resembling Staph      Results called to and read back by:Jojo Valdes Rn 08/13/2023  02:56      COAGULASE-NEGATIVE STAPHYLOCOCCUS SPECIES  Organism is a probable contaminant              ABGs:   Recent Labs   Lab 08/20/23 0237   PH 7.361   PCO2 46.4*   HCO3 26.3   POCSATURATED 57*   BE 1       BMP:   Recent Labs   Lab 08/20/23 0232   *   *   K 4.2   CL 95   CO2 23   BUN 55*   CREATININE 3.8*   CALCIUM 8.2*   MG 2.4       Cardiac Markers: No results for input(s): "CKMB", "TROPONINT", "MYOGLOBIN" in the last 72 hours.  Coagulation:   Recent Labs   Lab 08/20/23 0232   INR 1.0       Prealbumin: No results for input(s): "PREALBUMIN" in the last 72 hours.  Microbiology Results (last 7 days)       Procedure Component Value Units Date/Time    Blood culture [[id number]] Collected: 08/13/23 0920    Order Status: Completed Specimen: Blood from Peripheral, Antecubital, Left Updated: 08/18/23 1012     Blood Culture, Routine No growth after 5 days.    Blood culture [[id number]] Collected: 08/12/23 0103    Order Status: Completed Specimen: Blood from Peripheral, Hand, Left Updated: 08/17/23 0612     Blood Culture, Routine No growth after 5 days.    Blood culture [[id number]]  (Abnormal) Collected: 08/12/23 0042    Order Status: Completed Specimen: Blood from Line, Jugular, Internal Right Updated: 08/15/23 0744     Blood Culture, Routine Gram stain aer bottle: Gram positive cocci in clusters resembling Staph      Results called to and read back by:Jojo Valdes Rn 08/13/2023  02:56      COAGULASE-NEGATIVE STAPHYLOCOCCUS " SPECIES  Organism is a probable contaminant            Specimen (24h ago, onward)      None          I have reviewed all pertinent labs within the past 24 hours.    Estimated Creatinine Clearance: 31.4 mL/min (A) (based on SCr of 3.8 mg/dL (H)).    Diagnostic Results:    CXR 08/18/23  Postoperative changes and supporting devices as before.  Mild diffuse edema more marked at the lung bases similar to the previous study.  No significant pleural effusion.  Heart size upper limit of normal.    CT chest 08/18/23  1. No evidence for hematoma as clinically questioned, noting noncontrast technique.  2. Small bilateral pleural effusions.  Bilateral pulmonary edema.  3. Intraluminal fluid in the esophagus to the level of the aortic arch.  Consider correlation for aspiration risk.  4. Bilateral pulmonary nodules, largest measuring 9 mm.  For multiple solid nodules with any 6 mm or greater, Fleischner Society guidelines recommend follow up with non-contrast chest CT at 3-6 months and 18-24 months after discovery.  5. Gallbladder sludge and mild gallbladder distension, nonspecific.  6. Additional findings as above.    US renal 08/17/23  Limited study due to inability to evaluate segmental renal arterial resistive indices.  Additional evaluation, as clinically warranted.     Elevated main renal arterial resistive indices, nonspecific, but can be seen in the setting of medical renal disease.     Renal arteries and veins are patent.     No hydronephrosis     Bilateral pleural effusions.    TTE 08/17/23    Left Ventricle: The left ventricle is severely dilated. Ventricular mass is normal. Normal wall thickness. regional wall motion abnormalities present. See diagram for wall motion findings. There is severely reduced systolic function with a visually estimated ejection fraction of 15 - 20%. Grade III diastolic dysfunction.    Left Atrium: Left atrium is moderately dilated.    Right Ventricle: Right ventricle was not well visualized  due to poor acoustic window.    Mitral Valve: There is mild regurgitation.    Tricuspid Valve: There is mild regurgitation.    Pulmonary Artery: The estimated pulmonary artery systolic pressure is 41 mmHg.    IVC/SVC: Normal venous pressure at 3 mmHg.    [facility] 08/12/23  Coronary angiogram:  Left main: Patent  Left anterior descending artery:   just distal to the 1st septal   Left circumflex artery:  90-95% proximal stenosis, distal 70% calcified stenosis.  Diffusely diseased OM1,  of OM2  Right coronary artery:  Ostial      Grafts:  SVG-RCA:  Diffuse disease with narrowing of up to 40-50% in the proximal portion  The native PLB and PDA both diffusely diseased.  SVG-OM: Occluded  LIMA-LAD:  Widely patent.  Diffusely diseased native LAD with narrowing of up to 90% in the very apical portion.     Assessment:  Cardiogenic shock with extremely low cardiac outputs and elevated left and right filling pressures  NSTEMI - suspect acute graft closure of the SVG to OM2.  Severe native CAD status post PCI of the proximal circumflex with a 33 x 24 mm Synergy XD stent (post-dilated up to 4.25 mm)      [facility] 08/11/23  RA 20, RV 81/21, PA 81/47 (mean 61), PWCP 45, Ao sat 96%, PA sat 49% (on 6L NC)  CO/CI:   3.69/1.52 (VIKKI)  3.91/1.6 (TD)

## 2023-08-20 NOTE — HPI
Mr. Daniel is a 52 y/o M with ischemic cardiomyopathy s/p CABG transferred from outside hospital with NSTEMI s/p revascularization to proximal circumflex. He had associated cardiogenic shock noted from right heart catheterization. CP Impella placed and now removed. Cardiothoracic surgery and interventional cardiology consulted. Nephrology also consulted for YVES on CKD. Palliative medicine consulted for GOC.

## 2023-08-20 NOTE — ASSESSMENT & PLAN NOTE
S/p CABG w/ PCI of the proximal circumflex with a 33 x 24 mm Synergy XD stent (post-dilated up to 4.25 mm) on 8/12  On dobutamine  -Plan per primary team

## 2023-08-20 NOTE — ASSESSMENT & PLAN NOTE
Last echo 8/17/2023      Left Ventricle: The left ventricle is severely dilated. Ventricular mass is normal. Normal wall thickness. regional wall motion abnormalities present. See diagram for wall motion findings. There is severely reduced systolic function with a visually estimated ejection fraction of 15 - 20%. Grade III diastolic dysfunction.    Left Atrium: Left atrium is moderately dilated.    Right Ventricle: Right ventricle was not well visualized due to poor acoustic window.    Mitral Valve: There is mild regurgitation.    Tricuspid Valve: There is mild regurgitation.    Pulmonary Artery: The estimated pulmonary artery systolic pressure is 41 mmHg.    IVC/SVC: Normal venous pressure at 3 mmHg.    -Plan per primary team

## 2023-08-20 NOTE — NURSING
Cardiac ICU Care Plan    POC reviewed with Itz Daniel . Questions and concerns addressed. No acute events overnight. Pt progressing toward goals. See below and flowsheets for full assessment and VS info.     CVPs 11, 8, and 10  SVO2 37 and 57  UOP this shift 365mls    Neuro:  Ben Coma Scale  Best Eye Response: 4-->(E4) spontaneous  Best Motor Response: 6-->(M6) obeys commands  Best Verbal Response: 5-->(V5) oriented  Ben Coma Scale Score: 15  Assessment Qualifiers: patient not sedated/intubated  Pupil PERRLA: yes    24 hr Temp:  [97.4 °F (36.3 °C)-97.8 °F (36.6 °C)]      CV:  Rhythm: sinus tachycardia   DVT prophylaxis: VTE Required Core Measure: Pharmacological prophylaxis initiated/maintained    CVP (mean): (S) 10 mmHg (08/20/23 0301)    Pulmonary Artery Catheter Assessment  08/15/23 1500 Internal Jugular Left-Current Insertion Depth (cm): 56.5 cm (08/19/23 1901)  [  PAP: 62/30 (08/20/23 0605)  SVO2 (%): (S) 57 % (08/20/23 0244)           Pulses  Right Radial Pulse: 1+ (weak)  Left Radial Pulse: 1+ (weak)  Right Dorsalis Pedis Pulse: 0 (absent) (MD aware)  Left Dorsalis Pedis Pulse: 1+ (weak)  Right Posterior Tibial Pulse: 1+ (weak)  Left Posterior Tibial Pulse: 1+ (weak)    Resp:  Flow (L/min): 4  Oxygen Concentration (%): 36    GI/:  GI prophylaxis: yes  Diet/Nutrition Received: consistent carb/diabetic diet, low saturated fat/low cholesterol, 2 gram sodium  Last Bowel Movement: 08/17/23  Voiding Characteristics: urethral catheter (bladder)       Urethral Catheter 08/12/23 0130 16 Fr.-Reason for Continuing Urinary Catheterization: Critically ill in ICU and requiring hourly monitoring of intake/output   Intake/Output Summary (Last 24 hours) at 8/20/2023 0620  Last data filed at 8/20/2023 0601  Gross per 24 hour   Intake 1303.53 ml   Output 1460 ml   Net -156.47 ml        Nutritional Supplement Intake: Quantity 0, Type:  n/a    Labs/Accuchecks:  Recent Labs   Lab 08/18/23  0309 08/19/23  0428  08/19/23  0914 08/19/23 2106 08/20/23 0232 08/20/23 0237   WBC 9.71 7.38  --   --  7.50  --    RBC 2.81* 2.69*  --   --  2.54*  --    HGB 8.2* 7.8*  --   --  7.5*  --    HCT 25.0* 23.8*   < > 23* 22.9* 22*    273  --   --  308  --     < > = values in this interval not displayed.      Recent Labs   Lab 08/14/23  1251 08/15/23  0425 08/15/23  0428 08/15/23  1211 08/16/23  0415 08/18/23  0309 08/19/23 0427 08/20/23 0232   INR  --   --    < >  --    < > 1.0 1.0 1.0   APTT 52.0* 58.8*  --  47.5*  --   --   --   --     < > = values in this interval not displayed.      Recent Labs     08/20/23 0232   *   K 4.2   CO2 23   CL 95   BUN 55*   CREATININE 3.8*   ALKPHOS 84   ALT 17   AST 24   BILITOT 0.7       Recent Labs   Lab 08/20/23 0232   *      Recent Labs     08/19/23  1711 08/19/23 2106 08/20/23 0237   PH 7.365 7.374 7.361   PCO2 48.2* 46.9* 46.4*   PO2 24* 23* 31*   HCO3 27.5 27.3 26.3   POCSATURATED 41* 37* 57*   BE 2 2 1       Electrolytes: N/A - electrolytes WDL  Accuchecks: ACHS    Gtts/LDAs:   sodium chloride 0.9% 10 mL/hr at 08/20/23 0601    DOBUTamine IV infusion (non-titrating) 5 mcg/kg/min (08/20/23 0601)    insulin regular 1 units/mL infusion orderable (TRANSFER) 3 Units/hr (08/20/23 0601)    nitric oxide gas      sodium chloride 0.9%         Lines/Drains/Airways       Central Venous Catheter Line  Duration             Introducer 08/15/23 1630 Internal Jugular Left 4 days    Pulmonary Artery Catheter Assessment  08/15/23 1500 Internal Jugular Left 4 days              Drain  Duration                  Urethral Catheter 08/12/23 0130 16 Fr. 8 days              Peripheral Intravenous Line  Duration                  Peripheral IV - Single Lumen 08/18/23 1702 20 G Anterior;Right Upper Arm 1 day                    Skin/Wounds  Bathing/Skin Care: bath, complete;back care;dressed/undressed;linen changed;shaved face (08/19/23 2301)  Wounds: No  Wound care consulted: No

## 2023-08-20 NOTE — PLAN OF CARE
CICU DAILY GOALS       A: Awake    RASS: Goal -    Actual - RASS (Anton Agitation-Sedation Scale): alert and calm   Restraint necessity:    B: Breath   SBT: Not intubated   C: Coordinate A & B, analgesics/sedatives   Pain: managed    SAT: Not intubated  D: Delirium   CAM-ICU: Overall CAM-ICU: Negative  E: Early(intubated/ Progressive (non-intubated) Mobility   MOVE Screen: Pass   Activity: Activity Management: Rolling - L1  FAS: Feeding/Nutrition   Diet order: Diet/Nutrition Received: consistent carb/diabetic diet, 2 gram sodium, low saturated fat/low cholesterol,   Fluid restriction: Fluid Requirement: 1800 mL FR   Nutritional Supplement Intake: Quantity 0, Type:  n/a  T: Thrombus   DVT prophylaxis: VTE Required Core Measure: Pharmacological prophylaxis initiated/maintained  H: HOB Elevation   Head of Bed (HOB) Positioning: HOB elevated  U: Ulcer Prophylaxis   GI: yes  G: Glucose control   managed Glycemic Management: blood glucose monitored  S: Skin   Bundle compliance: yes   Bathing/Skin Care: bath, complete, back care, dressed/undressed, linen changed, shaved face Date: 8/19/2023  B: Bowel Function   no issues   I: Indwelling Catheters   [place]gu[place] necessity: [REMOVED]      Urethral Catheter 08/11/23 0425 Double-lumen 16 Fr.-Reason for Continuing Urinary Catheterization: Critically ill in ICU and requiring hourly monitoring of intake/output       Urethral Catheter 08/12/23 0130 16 Fr.-Reason for Continuing Urinary Catheterization: Critically ill in ICU and requiring hourly monitoring of intake/output   CVC necessity: Yes   IPAD offered: Not appropriate  D: De-escalation Antibx   No  Plan for the day   Pt remains with R IJ swan in place, measuring at 56.5cm.  and insulin infusing per order. Lasix IVP and gtt added this evening for increasing CVPs and decreasing svo2s. CVPs today 11, 12, 13. Svo2 50, 40. Pt with ~500ccs UO today and creatinine up to 4.0. MD hopeful that will improve w addition of lasix. Paul  Yvonne Roach MD updated on pt's status throughout shift.    Family/Goals of care/Code Status   Code Status: Full Code     No acute events throughout day, VS and assessment per flow sheet, patient progressing towards goals as tolerated, plan of care reviewed with Itz Daniel and family, all concerns addressed, will continue to monitor.

## 2023-08-20 NOTE — NURSING
Nurses Note -- 4 Eyes      8/19/23  2300      Skin assessed during: Daily Assessment      [x] No Altered Skin Integrity Present    [x]Prevention Measures Documented      [] Yes- Altered Skin Integrity Present or Discovered   [] LDA Added if Not in Epic (Describe Wound)   [] New Altered Skin Integrity was Present on Admit and Documented in LDA   [] Wound Image Taken    Wound Care Consulted? No    Attending Nurse:  Mila Vallejo RN/Staff Member:  Temi Ferguson RN

## 2023-08-20 NOTE — NURSING
0805 Morning CVP 11, svo2 50. Discussed with Paul Roach MD and MD Avril during rounds. Nitric to be weaned today.      1130 CVP 12. Paul notified. No new orders.      1515 Afternoon CVP 13. 465ccs of UO throughout shift. Paul notified. Orders to obtain svo2.      1600 Svo2 40. Paul updated. Per Paul, wait for results of next BMP. BMP sent through tube station.      1740 BMP still not resulted. Lab called and per lab, BMP never received. Another BMP drawn and sent STAT. Awaiting results.      1800 Discussed with MD Paul whether to continue weaning nitric off given patient's hemodynamics. Paul to discuss with Dr. Mackenzie. Awaiting orders.      1815 Orders placed for lasix bolus and gtt. Will administer when arrives from pharmacy.      1830 Creatinine back at 4.0. Discussed with HTS fellow Mariajose Samson MD and still okay to start lasix at this time.

## 2023-08-20 NOTE — CONSULTS
"[facility] - [facility]  Nephrology  Consult Note    Patient Name: Itz Daniel  MRN: [id number]  Admission Date: 8/11/2023  Hospital Length of Stay: 8 days  Attending Provider: Margoth Mackenzie MD   Primary Care Physician: Sunday Boo MD  Principal Problem:Ischemic cardiomyopathy    Inpatient consult to Nephrology  Consult performed by: Kimberly Garcia MD  Consult ordered by: Paul Chen MD  Reason for consult: "Worsening kidney function"  Assessment/Recommendations: Please see consult note and staff attestation for full recommendations. Thank you!         Subjective:     HPI:   Itz Daniel is a 53 y.o. male w/ known CKD III/V 2/2 DM and HTN, CABG x3 (2017) with known occluded VG-OM, AICD placement, CAD, HFrEF, hypothyroidism, COVID-19 (2022), Diabetic foot infection s/p amputation (2022), and obesity presented to [facility] on  8/10/2023 with nausea, vomiting, weakness and shortness of breath. Pt's workup revealed pt in cardiogenic shock in setting of NSTEMI. Initial labs at [facility] showed troponin of 24.8, lactic acid of 2.7, , Na 132, glucose 487, Cr 2.51, and WBC 11.57, pt was loaded with heparin. Pt underwent a LHC (LCX had 90-95% proximal stenosis, distal 70% calcified stenosis, diffusely diseased OM1,  of OM2, RHC revealed: RA 20, RV 81/21, PA 81/47 (mean 61), PWCP 45, Ao sat 96%, PA sat 49% (on 6L NC)  CO/CI:   3.69/1.52 (VIKKI)  3.91/1.6 (TD), and impella placement, CABG w/ PCI of the proximal circumflex with a 33 x 24 mm Synergy XD stent (post-dilated up to 4.25 mm)    On arrival to [facility] pt presented with the following VS:   Initial Vitals   BP Pulse Resp Temp SpO2   08/12/23 0600 08/11/23 2109 08/11/23 2115 08/11/23 2100 08/11/23 2115   125/72 (!) 114 20 98.3 °F (36.8 °C) 95 %     Nephrology was consulted for: "Worsening kidney function"  Baseline sCr: appears to be around ~2.  Admission sCr: 2.5 at [facility]  Pt follows with " nephrology for CKD  Since arrival to [facility] pt has had significant YVES, highest sCr during hospitalization is 3.7 (today), his urine output has also decreased.   Pt has been aggressively diuresed with ~9L of urine output during hospitalization   Primary team ordered a renal ultrasound w/ doppler with for evaluation of renal artery stenosis, this was negative  Pt has remained on 4L of O2 since 8/17, he does not use oxygen at home.   Pt does not endorse a history of kidney stones, chronic NSAID use, trauma to the kidneys or bladder.   As for a family history, pt denies family history of kidney diease including family members on dialysis, autoimmune diseases, kidney stones or cancer.     Creatinine   Date Value Ref Range Status   08/19/2023 3.6 (H) 0.5 - 1.4 mg/dL Final   08/19/2023 3.7 (H) 0.5 - 1.4 mg/dL Final   08/19/2023 3.4 (H) 0.5 - 1.4 mg/dL Final     BUN   Date Value Ref Range Status   08/19/2023 51 (H) 6 - 20 mg/dL Final   08/19/2023 50 (H) 6 - 20 mg/dL Final   08/19/2023 49 (H) 6 - 20 mg/dL Final       Past Medical History:   Diagnosis Date    CKD stage 4, GFR 15-29 ml/min     HLD (hyperlipidemia)     Hypertension     Ischemic cardiomyopathy/Chronic HFrEF s/p CABG and AICD 2017    T2DM (type 2 diabetes mellitus)        Past Surgical History:   Procedure Laterality Date    CORONARY ARTERY BYPASS GRAFT  2017    3 vessel    CORONARY BYPASS GRAFT ANGIOGRAPHY  8/11/2023    Procedure: Bypass graft study;  Surgeon: Michele Hirsch MD;  Location: [facility] CATH LAB;  Service: Cardiology;;    IMPELLA, REMOVAL Right 8/15/2023    Procedure: Impella, Removal;  Surgeon: Colin Sibley MD;  Location: [facility] CATH LAB;  Service: Cardiology;  Laterality: Right;    INSERTION OF INTRAVASCULAR MICROAXIAL BLOOD PUMP N/A 8/11/2023    Procedure: INSERTION, IMPELLA;  Surgeon: Michele Hirsch MD;  Location: [facility] CATH LAB;  Service: Cardiology;  Laterality: N/A;    IVUS, CORONARY  8/11/2023    Procedure: IVUS, Coronary;   Surgeon: Michele Hirsch MD;  Location: [facility] CATH LAB;  Service: Cardiology;;    LEFT HEART CATHETERIZATION N/A 8/11/2023    Procedure: Left heart cath;  Surgeon: Michele Hirsch MD;  Location: [facility] CATH LAB;  Service: Cardiology;  Laterality: N/A;    PERCUTANEOUS CORONARY INTERVENTION, ARTERY N/A 8/11/2023    Procedure: Percutaneous coronary intervention;  Surgeon: Michele Hirsch MD;  Location: [facility] CATH LAB;  Service: Cardiology;  Laterality: N/A;    PLACEMENT OF SWAN MARLENI CATHETER WITH IMAGING GUIDANCE Left 8/15/2023    Procedure: INSERTION, CATHETER, SWAN-MARLENI, WITH IMAGING GUIDANCE;  Surgeon: Colin Sibley MD;  Location: [facility] CATH LAB;  Service: Cardiology;  Laterality: Left;    REMOVAL OF TUNNELED CENTRAL VENOUS CATHETER (CVC) N/A 8/15/2023    Procedure: REMOVAL, CATHETER, CENTRAL VENOUS, TUNNELED;  Surgeon: Colin Sibley MD;  Location: [facility] CATH LAB;  Service: Cardiology;  Laterality: N/A;    RIGHT HEART CATHETERIZATION Right 8/11/2023    Procedure: INSERTION, CATHETER, RIGHT HEART;  Surgeon: Michele Hirsch MD;  Location: [facility] CATH LAB;  Service: Cardiology;  Laterality: Right;       Review of patient's allergies indicates:  No Known Allergies  Current Facility-Administered Medications   Medication Frequency    0.9%  NaCl infusion Continuous    acetaminophen tablet 650 mg Q6H PRN    aspirin EC tablet 81 mg Daily    atorvastatin tablet 80 mg Daily    dextrose 10% bolus 125 mL 125 mL PRN    dextrose 10% bolus 250 mL 250 mL PRN    DOBUtamine 1000 mg in D5W 250 mL infusion Continuous    enoxaparin injection 40 mg Q24H (prophylaxis, 1700)    glucagon (human recombinant) injection 1 mg PRN    glucose chewable tablet 16 g PRN    glucose chewable tablet 24 g PRN    hydrALAZINE tablet 25 mg TID    insulin aspart U-100 pen 0-10 Units PRN    insulin aspart U-100 pen 6-12 Units TIDWM    insulin regular in 0.9 % NaCl 100 unit/100 mL (1 unit/mL) infusion Continuous    isosorbide  dinitrate tablet 20 mg TID    levothyroxine tablet 125 mcg Before breakfast    LIDOcaine (PF) 10 mg/ml (1%) injection 100 mg Once    LIDOcaine 5 % patch 1 patch Q24H    methocarbamoL tablet 500 mg Q6H PRN    nitric oxide gas Gas 10 ppm Continuous    ondansetron disintegrating tablet 8 mg Q8H PRN    oxyCODONE immediate release tablet 5 mg Q8H PRN    oxymetazoline 0.05 % nasal spray 2 spray BID    pantoprazole EC tablet 40 mg Daily    polyethylene glycol packet 17 g TID    senna tablet 8.6 mg BID    sodium chloride 0.9% flush 10 mL PRN    sodium chloride 0.9% flush 10 mL Continuous    sodium chloride 0.9% flush 3 mL Q6H PRN    ticagrelor tablet 90 mg BID     Family History       Problem Relation (Age of Onset)    Hypertension Mother          Tobacco Use    Smoking status: Former     Current packs/day: 0.00     Types: Cigarettes    Smokeless tobacco: Never   Substance and Sexual Activity    Alcohol use: Yes    Drug use: No    Sexual activity: Not on file     Review of Systems  Objective:     Vital Signs (Most Recent):  Temp: 97.6 °F (36.4 °C) (08/19/23 1901)  Pulse: 105 (08/19/23 2001)  Resp: 18 (08/19/23 2001)  BP: 105/63 (08/19/23 2001)  SpO2: 100 % (08/19/23 2001) Vital Signs (24h Range):  Temp:  [97.6 °F (36.4 °C)-98 °F (36.7 °C)] 97.6 °F (36.4 °C)  Pulse:  [101-113] 105  Resp:  [10-33] 18  SpO2:  [91 %-100 %] 100 %  BP: ()/(50-67) 105/63     Weight: 122.1 kg (269 lb 3.2 oz) (08/19/23 0626)  Body mass index is 34.56 kg/m².  Body surface area is 2.52 meters squared.    I/O last 3 completed shifts:  In: 2566 [P.O.:1980; I.V.:586]  Out: 3480 [Urine:3480]     Physical Exam  Vitals and nursing note reviewed.   Constitutional:       General: He is not in acute distress.     Appearance: Normal appearance. He is ill-appearing. He is not toxic-appearing or diaphoretic.   HENT:      Mouth/Throat:      Mouth: Mucous membranes are moist.   Cardiovascular:      Rate and Rhythm: Normal rate and regular  rhythm.      Pulses: Normal pulses.      Heart sounds: Murmur heard.   Pulmonary:      Effort: Pulmonary effort is normal. No respiratory distress.      Breath sounds: Normal breath sounds. No stridor. No wheezing, rhonchi or rales.   Abdominal:      General: Abdomen is flat. Bowel sounds are normal. There is no distension.      Palpations: Abdomen is soft. There is no mass.      Tenderness: There is no abdominal tenderness. There is no guarding or rebound.      Hernia: No hernia is present.   Genitourinary:     Comments: Argueta catheter with yellow urine  Musculoskeletal:         General: Swelling present. No tenderness, deformity or signs of injury.      Right lower leg: Edema present.      Left lower leg: Edema present.      Comments: Trace LE edema    Skin:     General: Skin is warm.      Capillary Refill: Capillary refill takes 2 to 3 seconds.      Coloration: Skin is not jaundiced or pale.      Findings: No bruising, erythema, lesion or rash.   Neurological:      Mental Status: He is alert and oriented to person, place, and time.   Psychiatric:         Mood and Affect: Mood normal.         Behavior: Behavior normal.         Thought Content: Thought content normal.         Judgment: Judgment normal.          Significant Labs:  CBC:   Recent Labs   Lab 08/19/23  0427 08/19/23  0914 08/19/23  1711   WBC 7.38  --   --    RBC 2.69*  --   --    HGB 7.8*  --   --    HCT 23.8*   < > 23*     --   --    MCV 89  --   --    MCH 29.0  --   --    MCHC 32.8  --   --     < > = values in this interval not displayed.     CMP:   Recent Labs   Lab 08/19/23  0427 08/19/23  0804 08/19/23  1523   *   < > 210*   CALCIUM 8.2*   < > 8.4*   ALBUMIN 2.4*  --   --    PROT 6.2  --   --    *   < > 132*   K 4.5   < > 4.4   CO2 23   < > 24   CL 97   < > 94*   BUN 49*   < > 51*   CREATININE 3.4*   < > 3.6*   ALKPHOS 84  --   --    ALT 19  --   --    AST 35  --   --    BILITOT 0.7  --   --     < > = values in this interval  "not displayed.     All labs within the past 24 hours have been reviewed.    Significant Imaging:  Labs: Reviewed  X-Ray: Reviewed  US: Reviewed    Assessment/Plan:     Cardiac/Vascular  * Ischemic cardiomyopathy  Admitted for NSTEMI s/p CABG w/ PCI of the proximal circumflex with a 33 x 24 mm Synergy XD stent (post-dilated up to 4.25 mm) on 8/12/2023     -Plan per primary team       Cardiogenic shock  S/p CABG w/ PCI of the proximal circumflex with a 33 x 24 mm Synergy XD stent (post-dilated up to 4.25 mm) on 8/12  On dobutamine  -Plan per primary team       Acute on chronic combined systolic and diastolic heart failure  Last echo 8/17/2023      Left Ventricle: The left ventricle is severely dilated. Ventricular mass is normal. Normal wall thickness. regional wall motion abnormalities present. See diagram for wall motion findings. There is severely reduced systolic function with a visually estimated ejection fraction of 15 - 20%. Grade III diastolic dysfunction.    Left Atrium: Left atrium is moderately dilated.    Right Ventricle: Right ventricle was not well visualized due to poor acoustic window.    Mitral Valve: There is mild regurgitation.    Tricuspid Valve: There is mild regurgitation.    Pulmonary Artery: The estimated pulmonary artery systolic pressure is 41 mmHg.    IVC/SVC: Normal venous pressure at 3 mmHg.    -Plan per primary team           Renal/  Acute renal failure superimposed on stage 3b chronic kidney disease  Ischemic ATN 2/2 decrease perfusion (severe hypotension d/t cardiogenic shock), contrast exposure, combined with aggressive diuresis in a patient with known advanced CKD  Baseline sCr: appears to be around ~2.  Admission sCr: 2.5 at [facility]  Lab Results   Component Value Date    CREATININE 3.6 (H) 08/19/2023     Last UPCR:   No results found for: "UTPCR"    Recommendations:   -hold diuretics x24 hours, urine microscopy in AM, repeat labs Q6H to Q8H  -request nephrology " records, it appears pt is CKD 3b progressing to stage 4  -renal imaging unrevealing and reassuring as this may be responsive to medical management   -Electrolytes: hypoNa 132, cont to monitor avoid thiazides   K, goal >4, page nephrology if K >5.5, Mg, goal >2, Phos, goal >3 and <5, start sevelamer 800mg TID with meals   -Acid/base: balanced   -Fluid balance: mildly fluid overloaded   -Anemia: Hgb 7.8, send anemia panel labs as pt may benefit from iron if no contraindications, transfuse for Hgb <7.0  -Strict I/O's and daily weights  -Renal diet/tube feedings if not NPO, fluid restrict to 1.5L/day   -Check uric acid, CK level, urine labs (ordered)   -Maintain MAP >70 for renal perfusion    There is no immediate indication for KRT at this time         CKD (chronic kidney disease), stage IV  See YVES    Endocrine  DM (diabetes mellitus)  Lab Results   Component Value Date    HGBA1C 10.2 (H) 08/10/2023     -Plan per primary team           Thank you for your consult. I will follow-up with patient. Please contact us if you have any additional questions.    Kimberly Garcia MD  Nephrology  [facility] - [facility]

## 2023-08-20 NOTE — ASSESSMENT & PLAN NOTE
Mr. Daniel is a 54 y/o M with ischemic cardiomyopathy s/p CABG transferred from outside hospital with NSTEMI s/p revascularization to proximal circumflex. He had associated cardiogenic shock noted from right heart catheterization. CP Impella placed and now removed. Cardiothoracic surgery and interventional cardiology consulted. Nephrology also consulted for YVES on CKD. Palliative medicine consulted for GOC.     Ischemic cardiomyopathy/cardiogenic shock/YVES/CKD/other medical problems  - plan per primary team and other specialty consultants    ACP/GOC  Advance Care Planning   - patient decisional  - patient's son and son's friend at bedside  - introduced palliative medicine team to patient today  - goals: life prolonging  - when engaging patient today about his current condition, patient states that they are unable to get the pressures in his heart down due to kidneys not working. He states that they are trying to get the kidneys to work so that the pressures come down. He further stated he has not heard from all the teams yet about what options he has available. He wants to hear from all the teams prior to further discussions. He states it is important to him to live so he can be with his three children: ages 21, 19, and 17. He reports enjoying fishing and watching sports (especially baseball and football).   - when discussing decision makers, he verbally stated he would want his father, Ozzie Daniel, to be his surrogate decision maker. ACP booklet brought to bedside so that patient may fill out HCPOA.   - code status: full  - will follow up tomorrow for further GOC discussions.         Above plan of care discussed with patient's primary team including attending of record, Dr. Mackenzie

## 2023-08-20 NOTE — SUBJECTIVE & OBJECTIVE
"Interval HPI:   Overnight events: Remains in CICU. BG trending down and IV insulin infusion decreased to 2.5 u/hr per protocol. Creatinine 3.6. Diet Cardiac Consistent Carbohydrate; Standard Tray    Eatin%  Nausea: No  Hypoglycemia and intervention: No  Fever: No  TPN and/or TF: No  If yes, type of TF/TPN and rate: n/a    BP (!) 93/53 (BP Location: Left arm, Patient Position: Lying)   Pulse 107   Temp 98 °F (36.7 °C) (Oral)   Resp (!) 25   Ht 6' 2" (1.88 m)   Wt 123.4 kg (272 lb)   SpO2 99%   BMI 34.92 kg/m²     Labs Reviewed and Include    Recent Labs   Lab 23  0232 23  0805   * 144*   CALCIUM 8.2* 8.2*   ALBUMIN 2.5*  --    PROT 6.0  --    * 132*   K 4.2 4.0   CO2 23 25   CL 95 96   BUN 55* 55*   CREATININE 3.8* 3.8*   ALKPHOS 84  --    ALT 17  --    AST 24  --    BILITOT 0.7  --      Lab Results   Component Value Date    WBC 7.50 2023    HGB 7.5 (L) 2023    HCT 22 (L) 2023    MCV 90 2023     2023     No results for input(s): "TSH", "FREET4" in the last 168 hours.  Lab Results   Component Value Date    HGBA1C 10.2 (H) 08/10/2023       Nutritional status:   Body mass index is 34.92 kg/m².  Lab Results   Component Value Date    ALBUMIN 2.5 (L) 2023    ALBUMIN 2.4 (L) 2023    ALBUMIN 2.4 (L) 2023     No results found for: "PREALBUMIN"    Estimated Creatinine Clearance: 31.4 mL/min (A) (based on SCr of 3.8 mg/dL (H)).    Accu-Checks  Recent Labs     23  0006 23  0424 23  0751 23  0917 23  1109 23  1642 23  2114 23  0240 23  0847   POCTGLUCOSE 151* 214* 239* 191* 193* 180* 188* 154* 168* 131*       Current Medications and/or Treatments Impacting Glycemic Control  Immunotherapy:    Immunosuppressants       None          Steroids:   Hormones (From admission, onward)      None          Pressors:    Autonomic Drugs (From admission, onward)      None      "     Hyperglycemia/Diabetes Medications:   Antihyperglycemics (From admission, onward)      Start     Stop Route Frequency Ordered    08/20/23 0915  insulin regular in 0.9 % NaCl 100 unit/100 mL (1 unit/mL) infusion        Question:  Enter initial dose (Units/hr):  Answer:  2.5    -- IV Continuous 08/20/23 0910    08/16/23 0815  insulin aspart U-100 pen 6-12 Units         -- SubQ 3 times daily with meals 08/16/23 0813    08/15/23 1003  insulin aspart U-100 pen 0-10 Units         -- SubQ As needed (PRN) 08/15/23 0904

## 2023-08-20 NOTE — CARE UPDATE
HTS Care Update Note    8PM     Hemodynamics    CVP:  13  SVO2:  40  CO 7.5  CI 3.0  SVR:  647    PA 63/28(41) PCWP 21    I/Os    UOP 60-55    Last hours <50    I/O this shift:  In: 717.9 [P.O.:480; I.V.:237.9]  Out: 531 [Urine:531]      Intake/Output Summary (Last 24 hours) at 8/20/2023 1728  Last data filed at 8/20/2023 1705  Gross per 24 hour   Intake 1001.72 ml   Output 921 ml   Net 80.72 ml       Net IO Since Admission: -8,773.13 mL [08/20/23 1728]    Diuretics: None    Continuous Infusions:      sodium chloride 0.9% 10 mL/hr at 08/20/23 1705    DOBUTamine IV infusion (non-titrating) 5 mcg/kg/min (08/20/23 1712)    insulin regular 1 units/mL infusion orderable (TRANSFER) 2.5 Units/hr (08/20/23 1705)    nitric oxide gas      sodium chloride 0.9%         Mechanical support:     Plan:  - Furosmide 100mg bolus and 20mg/h  - Plan discussed with attending     Paul Roach MD  Cardiovascular Disease PGY-IV  [facility]

## 2023-08-20 NOTE — PROGRESS NOTES
"[facility]  Nephrology  Progress Note    Patient Name: Itz Daniel  MRN: [id number]  Admission Date: 8/11/2023  Hospital Length of Stay: 9 days  Attending Provider: Marogth Mackenzie MD   Primary Care Physician: Sunday Boo MD  Principal Problem:Ischemic cardiomyopathy    Subjective:     HPI:   Itz Daniel is a 53 y.o. male w/ known CKD III/V 2/2 DM and HTN, CABG x3 (2017) with known occluded VG-OM, AICD placement, CAD, HFrEF, hypothyroidism, COVID-19 (2022), Diabetic foot infection s/p amputation (2022), and obesity presented to [facility] on  8/10/2023 with nausea, vomiting, weakness and shortness of breath. Pt's workup revealed pt in cardiogenic shock in setting of NSTEMI. Initial labs at [facility] showed troponin of 24.8, lactic acid of 2.7, , Na 132, glucose 487, Cr 2.51, and WBC 11.57, pt was loaded with heparin. Pt underwent a LHC (LCX had 90-95% proximal stenosis, distal 70% calcified stenosis, diffusely diseased OM1,  of OM2, RHC revealed: RA 20, RV 81/21, PA 81/47 (mean 61), PWCP 45, Ao sat 96%, PA sat 49% (on 6L NC)  CO/CI:   3.69/1.52 (VIKKI)  3.91/1.6 (TD), and impella placement, CABG w/ PCI of the proximal circumflex with a 33 x 24 mm Synergy XD stent (post-dilated up to 4.25 mm)    On arrival to [facility] pt presented with the following VS:   Initial Vitals   BP Pulse Resp Temp SpO2   08/12/23 0600 08/11/23 2109 08/11/23 2115 08/11/23 2100 08/11/23 2115   125/72 (!) 114 20 98.3 °F (36.8 °C) 95 %     Nephrology was consulted for: "Worsening kidney function"  Baseline sCr: appears to be around ~2.  Admission sCr: 2.5 at [facility]  Pt follows with nephrology for CKD  Since arrival to [facility] pt has had significant YVES, highest sCr during hospitalization is 3.7 (today), his urine output has also decreased.   Pt has been aggressively diuresed with ~9L of urine output during hospitalization   Primary team ordered a renal ultrasound w/ doppler " with for evaluation of renal artery stenosis, this was negative  Pt has remained on 4L of O2 since 8/17, he does not use oxygen at home.   Pt does not endorse a history of kidney stones, chronic NSAID use, trauma to the kidneys or bladder.   As for a family history, pt denies family history of kidney diease including family members on dialysis, autoimmune diseases, kidney stones or cancer.     Creatinine   Date Value Ref Range Status   08/19/2023 3.6 (H) 0.5 - 1.4 mg/dL Final   08/19/2023 3.7 (H) 0.5 - 1.4 mg/dL Final   08/19/2023 3.4 (H) 0.5 - 1.4 mg/dL Final     BUN   Date Value Ref Range Status   08/19/2023 51 (H) 6 - 20 mg/dL Final   08/19/2023 50 (H) 6 - 20 mg/dL Final   08/19/2023 49 (H) 6 - 20 mg/dL Final       Interval History:   No acute events overnight, this afternoon pt was restarted on lasix gtt 10mg/hr d/t increasing CVP oaf 13 and Svo2 40 and pt was started on NO as well  Pt states that he is feeling the same as he was yesterday, no worsening of shortness of breath.    24 hours I&Os are: 1.3L/1.4L of urine output, net -157mls      Review of patient's allergies indicates:  No Known Allergies  Current Facility-Administered Medications   Medication Frequency    0.9%  NaCl infusion Continuous    acetaminophen tablet 650 mg Q6H PRN    aspirin EC tablet 81 mg Daily    atorvastatin tablet 80 mg Daily    dextrose 10% bolus 125 mL 125 mL PRN    dextrose 10% bolus 250 mL 250 mL PRN    DOBUtamine 1000 mg in D5W 250 mL infusion Continuous    enoxaparin injection 40 mg Q24H (prophylaxis, 1700)    glucagon (human recombinant) injection 1 mg PRN    glucose chewable tablet 16 g PRN    glucose chewable tablet 24 g PRN    hydrALAZINE tablet 25 mg TID    insulin aspart U-100 pen 0-10 Units PRN    insulin aspart U-100 pen 6-12 Units TIDWM    insulin regular in 0.9 % NaCl 100 unit/100 mL (1 unit/mL) infusion Continuous    isosorbide dinitrate tablet 20 mg TID    levothyroxine tablet 125 mcg Before  breakfast    LIDOcaine (PF) 10 mg/ml (1%) injection 100 mg Once    LIDOcaine 5 % patch 1 patch Q24H    methocarbamoL tablet 500 mg Q6H PRN    nitric oxide gas Gas 2.5 ppm Continuous    ondansetron disintegrating tablet 8 mg Q8H PRN    oxyCODONE immediate release tablet 5 mg Q8H PRN    pantoprazole EC tablet 40 mg Daily    polyethylene glycol packet 17 g TID    senna tablet 8.6 mg BID    sodium chloride 0.9% flush 10 mL PRN    sodium chloride 0.9% flush 10 mL Continuous    sodium chloride 0.9% flush 3 mL Q6H PRN    ticagrelor tablet 90 mg BID       Objective:     Vital Signs (Most Recent):  Temp: 98.4 °F (36.9 °C) (08/20/23 1505)  Pulse: 105 (08/20/23 1603)  Resp: (!) 21 (08/20/23 1603)  BP: (!) 93/50 (08/20/23 1603)  SpO2: 99 % (08/20/23 1603) Vital Signs (24h Range):  Temp:  [97.4 °F (36.3 °C)-98.4 °F (36.9 °C)] 98.4 °F (36.9 °C)  Pulse:  [101-109] 105  Resp:  [13-25] 21  SpO2:  [86 %-100 %] 99 %  BP: ()/(50-67) 93/50     Weight: 123.4 kg (272 lb) (08/20/23 0610)  Body mass index is 34.92 kg/m².  Body surface area is 2.54 meters squared.    I/O last 3 completed shifts:  In: 2179.2 [P.O.:1500; I.V.:679.2]  Out: 2545 [Urine:2545]     Physical Exam  Vitals and nursing note reviewed.   Constitutional:       General: He is not in acute distress.     Appearance: Normal appearance. He is ill-appearing. He is not toxic-appearing or diaphoretic.   HENT:      Mouth/Throat:      Mouth: Mucous membranes are moist.   Cardiovascular:      Rate and Rhythm: Normal rate and regular rhythm.      Pulses: Normal pulses.      Heart sounds: Murmur heard.   Pulmonary:      Effort: Pulmonary effort is normal. No respiratory distress.      Breath sounds: Normal breath sounds. No stridor. No wheezing, rhonchi or rales.   Abdominal:      General: Abdomen is flat. Bowel sounds are normal. There is no distension.      Palpations: Abdomen is soft. There is no mass.      Tenderness: There is no abdominal tenderness. There is  no guarding or rebound.      Hernia: No hernia is present.   Genitourinary:     Comments: Argueta catheter with yellow urine  Musculoskeletal:         General: Swelling present. No tenderness, deformity or signs of injury.      Right lower leg: Edema present.      Left lower leg: Edema present.      Comments: Trace LE edema    Skin:     General: Skin is warm.      Capillary Refill: Capillary refill takes 2 to 3 seconds.      Coloration: Skin is not jaundiced or pale.      Findings: No bruising, erythema, lesion or rash.   Neurological:      Mental Status: He is alert and oriented to person, place, and time.   Psychiatric:         Mood and Affect: Mood normal.         Behavior: Behavior normal.         Thought Content: Thought content normal.         Judgment: Judgment normal.          Significant Labs:  CBC:   Recent Labs   Lab 08/20/23 0232 08/20/23 0237 08/20/23  1604   WBC 7.50  --   --    RBC 2.54*  --   --    HGB 7.5*  --   --    HCT 22.9*   < > 22*     --   --    MCV 90  --   --    MCH 29.5  --   --    MCHC 32.8  --   --     < > = values in this interval not displayed.     CMP:   Recent Labs   Lab 08/20/23  0232 08/20/23  0805 08/20/23  1751   *   < > 172*   CALCIUM 8.2*   < > 8.6*   ALBUMIN 2.5*  --   --    PROT 6.0  --   --    *   < > 130*   K 4.2   < > 4.4   CO2 23   < > 24   CL 95   < > 94*   BUN 55*   < > 60*   CREATININE 3.8*   < > 4.0*   ALKPHOS 84  --   --    ALT 17  --   --    AST 24  --   --    BILITOT 0.7  --   --     < > = values in this interval not displayed.     All labs within the past 24 hours have been reviewed.     Significant Imaging:  Labs: Reviewed  X-Ray: Reviewed    Assessment/Plan:     Cardiac/Vascular  * Ischemic cardiomyopathy  Admitted for NSTEMI s/p CABG w/ PCI of the proximal circumflex with a 33 x 24 mm Synergy XD stent (post-dilated up to 4.25 mm) on 8/12/2023     -Plan per primary team       Cardiogenic shock  S/p CABG w/ PCI of the proximal circumflex with  a 33 x 24 mm Synergy XD stent (post-dilated up to 4.25 mm) on 8/12  On dobutamine  -Plan per primary team       Acute on chronic combined systolic and diastolic heart failure  Last echo 8/17/2023      Left Ventricle: The left ventricle is severely dilated. Ventricular mass is normal. Normal wall thickness. regional wall motion abnormalities present. See diagram for wall motion findings. There is severely reduced systolic function with a visually estimated ejection fraction of 15 - 20%. Grade III diastolic dysfunction.    Left Atrium: Left atrium is moderately dilated.    Right Ventricle: Right ventricle was not well visualized due to poor acoustic window.    Mitral Valve: There is mild regurgitation.    Tricuspid Valve: There is mild regurgitation.    Pulmonary Artery: The estimated pulmonary artery systolic pressure is 41 mmHg.    IVC/SVC: Normal venous pressure at 3 mmHg.    -Plan per primary team           Renal/  Acute renal failure superimposed on stage 3b chronic kidney disease  Ischemic ATN 2/2 decrease perfusion (severe hypotension d/t cardiogenic shock), contrast exposure, combined with aggressive diuresis in a patient with known advanced CKD  Baseline sCr: appears to be around ~2.  Admission sCr: 2.5 at [facility]  Lab Results   Component Value Date    CREATININE 4.0 (H) 08/20/2023    CREATININE 3.9 (H) 08/20/2023    CREATININE 3.8 (H) 08/20/2023     Last UPCR:   Prot/Creat Ratio, Urine   Date Value Ref Range Status   08/19/2023 1.35 (H) 0.00 - 0.20 Final       Recommendations:   -after holding diuretics x24 hours pt's sCr this morning was about the same as it was yesterday at time of consult, 3.7-->3.8, later this afternoon sCr increased to 4.0. His labs show uric acid of 11.5, urine Na, Cl, and osm 369 with intravascular depletion. His microscopy showed 4-5 granular casts /lpf, 10-15 isomorphic RBCs/lpf, few WBCs and few bacteria, findings consistent with ATI. Discussed with pt the  difficult balance of diuresis and worsening YVES. Agree with lasix 10mg/hr for symptomatic hypervolemia    -request nephrology records, it appears pt is CKD 3b progressing to stage 4  -renal imaging unrevealing and reassuring as this may be responsive to medical management   -Electrolytes: hypoNa 130, cont to monitor avoid thiazides   K, goal >4, page nephrology if K >5.5, Mg, goal >2, Phos, goal >3 and <5, start sevelamer 800mg TID with meals   -Acid/base: balanced   -Fluid balance: mod fluid overloaded   -Anemia: Hgb 7.5, consider PRBCs if pt gets hypotensive as this can also improve renal function  -Strict I/O's and daily weights  -Renal diet/tube feedings if not NPO, fluid restrict to 1.5L/day   -Maintain MAP >70 for renal perfusion    There is no immediate indication for KRT at this time         CKD (chronic kidney disease), stage IV  See YVES    Endocrine  DM (diabetes mellitus)  Lab Results   Component Value Date    HGBA1C 10.2 (H) 08/10/2023     -Plan per primary team           Thank you for your consult. I will follow-up with patient. Please contact us if you have any additional questions.    Kimberly Garcia MD  Nephrology  [facility]

## 2023-08-20 NOTE — ASSESSMENT & PLAN NOTE
"Ischemic ATN 2/2 decrease perfusion (severe hypotension d/t cardiogenic shock), contrast exposure, combined with aggressive diuresis in a patient with known advanced CKD  Baseline sCr: appears to be around ~2.  Admission sCr: 2.5 at [facility]  Lab Results   Component Value Date    CREATININE 3.6 (H) 08/19/2023     Last UPCR:   No results found for: "UTPCR"    Recommendations:   -hold diuretics x24 hours, urine microscopy in AM, repeat labs Q6H to Q8H  -request nephrology records, it appears pt is CKD 3b progressing to stage 4  -renal imaging unrevealing and reassuring as this may be responsive to medical management   -Electrolytes: hypoNa 132, cont to monitor avoid thiazides   K, goal >4, page nephrology if K >5.5, Mg, goal >2, Phos, goal >3 and <5, start sevelamer 800mg TID with meals   -Acid/base: balanced   -Fluid balance: mildly fluid overloaded   -Anemia: Hgb 7.8, send anemia panel labs as pt may benefit from iron if no contraindications, transfuse for Hgb <7.0  -Strict I/O's and daily weights  -Renal diet/tube feedings if not NPO, fluid restrict to 1.5L/day   -Check uric acid, CK level, urine labs (ordered)   -Maintain MAP >70 for renal perfusion    There is no immediate indication for KRT at this time       "

## 2023-08-20 NOTE — ASSESSMENT & PLAN NOTE
Admitted for NSTEMI s/p CABG w/ PCI of the proximal circumflex with a 33 x 24 mm Synergy XD stent (post-dilated up to 4.25 mm) on 8/12/2023     -Plan per primary team

## 2023-08-20 NOTE — SUBJECTIVE & OBJECTIVE
Past Medical History:   Diagnosis Date    CKD stage 4, GFR 15-29 ml/min     HLD (hyperlipidemia)     Hypertension     Ischemic cardiomyopathy/Chronic HFrEF s/p CABG and AICD 2017    T2DM (type 2 diabetes mellitus)        Past Surgical History:   Procedure Laterality Date    CORONARY ARTERY BYPASS GRAFT  2017    3 vessel    CORONARY BYPASS GRAFT ANGIOGRAPHY  8/11/2023    Procedure: Bypass graft study;  Surgeon: Michele Hirsch MD;  Location: [facility] CATH LAB;  Service: Cardiology;;    IMPELLA, REMOVAL Right 8/15/2023    Procedure: Impella, Removal;  Surgeon: Colin Sibley MD;  Location: [facility] CATH LAB;  Service: Cardiology;  Laterality: Right;    INSERTION OF INTRAVASCULAR MICROAXIAL BLOOD PUMP N/A 8/11/2023    Procedure: INSERTION, IMPELLA;  Surgeon: Michele Hirsch MD;  Location: [facility] CATH LAB;  Service: Cardiology;  Laterality: N/A;    IVUS, CORONARY  8/11/2023    Procedure: IVUS, Coronary;  Surgeon: Michele Hirsch MD;  Location: [facility] CATH LAB;  Service: Cardiology;;    LEFT HEART CATHETERIZATION N/A 8/11/2023    Procedure: Left heart cath;  Surgeon: Michele Hirsch MD;  Location: [facility] CATH LAB;  Service: Cardiology;  Laterality: N/A;    PERCUTANEOUS CORONARY INTERVENTION, ARTERY N/A 8/11/2023    Procedure: Percutaneous coronary intervention;  Surgeon: Michele Hirsch MD;  Location: [facility] CATH LAB;  Service: Cardiology;  Laterality: N/A;    PLACEMENT OF SWAN MARLENI CATHETER WITH IMAGING GUIDANCE Left 8/15/2023    Procedure: INSERTION, CATHETER, SWAN-MARLENI, WITH IMAGING GUIDANCE;  Surgeon: Colin Sibley MD;  Location: [facility] CATH LAB;  Service: Cardiology;  Laterality: Left;    REMOVAL OF TUNNELED CENTRAL VENOUS CATHETER (CVC) N/A 8/15/2023    Procedure: REMOVAL, CATHETER, CENTRAL VENOUS, TUNNELED;  Surgeon: Colin Sibley MD;  Location: [facility] CATH LAB;  Service: Cardiology;  Laterality: N/A;    RIGHT HEART CATHETERIZATION Right 8/11/2023    Procedure: INSERTION, CATHETER, RIGHT HEART;  Surgeon:  Michele Hirsch MD;  Location: [facility] CATH LAB;  Service: Cardiology;  Laterality: Right;       Review of patient's allergies indicates:  No Known Allergies  Current Facility-Administered Medications   Medication Frequency    0.9%  NaCl infusion Continuous    acetaminophen tablet 650 mg Q6H PRN    aspirin EC tablet 81 mg Daily    atorvastatin tablet 80 mg Daily    dextrose 10% bolus 125 mL 125 mL PRN    dextrose 10% bolus 250 mL 250 mL PRN    DOBUtamine 1000 mg in D5W 250 mL infusion Continuous    enoxaparin injection 40 mg Q24H (prophylaxis, 1700)    glucagon (human recombinant) injection 1 mg PRN    glucose chewable tablet 16 g PRN    glucose chewable tablet 24 g PRN    hydrALAZINE tablet 25 mg TID    insulin aspart U-100 pen 0-10 Units PRN    insulin aspart U-100 pen 6-12 Units TIDWM    insulin regular in 0.9 % NaCl 100 unit/100 mL (1 unit/mL) infusion Continuous    isosorbide dinitrate tablet 20 mg TID    levothyroxine tablet 125 mcg Before breakfast    LIDOcaine (PF) 10 mg/ml (1%) injection 100 mg Once    LIDOcaine 5 % patch 1 patch Q24H    methocarbamoL tablet 500 mg Q6H PRN    nitric oxide gas Gas 10 ppm Continuous    ondansetron disintegrating tablet 8 mg Q8H PRN    oxyCODONE immediate release tablet 5 mg Q8H PRN    oxymetazoline 0.05 % nasal spray 2 spray BID    pantoprazole EC tablet 40 mg Daily    polyethylene glycol packet 17 g TID    senna tablet 8.6 mg BID    sodium chloride 0.9% flush 10 mL PRN    sodium chloride 0.9% flush 10 mL Continuous    sodium chloride 0.9% flush 3 mL Q6H PRN    ticagrelor tablet 90 mg BID     Family History       Problem Relation (Age of Onset)    Hypertension Mother          Tobacco Use    Smoking status: Former     Current packs/day: 0.00     Types: Cigarettes    Smokeless tobacco: Never   Substance and Sexual Activity    Alcohol use: Yes    Drug use: No    Sexual activity: Not on file     Review of Systems  Objective:     Vital Signs (Most Recent):  Temp: 97.6 °F (36.4 °C)  (08/19/23 1901)  Pulse: 105 (08/19/23 2001)  Resp: 18 (08/19/23 2001)  BP: 105/63 (08/19/23 2001)  SpO2: 100 % (08/19/23 2001) Vital Signs (24h Range):  Temp:  [97.6 °F (36.4 °C)-98 °F (36.7 °C)] 97.6 °F (36.4 °C)  Pulse:  [101-113] 105  Resp:  [10-33] 18  SpO2:  [91 %-100 %] 100 %  BP: ()/(50-67) 105/63     Weight: 122.1 kg (269 lb 3.2 oz) (08/19/23 0626)  Body mass index is 34.56 kg/m².  Body surface area is 2.52 meters squared.    I/O last 3 completed shifts:  In: 2566 [P.O.:1980; I.V.:586]  Out: 3480 [Urine:3480]     Physical Exam  Vitals and nursing note reviewed.   Constitutional:       General: He is not in acute distress.     Appearance: Normal appearance. He is ill-appearing. He is not toxic-appearing or diaphoretic.   HENT:      Mouth/Throat:      Mouth: Mucous membranes are moist.   Cardiovascular:      Rate and Rhythm: Normal rate and regular rhythm.      Pulses: Normal pulses.      Heart sounds: Murmur heard.   Pulmonary:      Effort: Pulmonary effort is normal. No respiratory distress.      Breath sounds: Normal breath sounds. No stridor. No wheezing, rhonchi or rales.   Abdominal:      General: Abdomen is flat. Bowel sounds are normal. There is no distension.      Palpations: Abdomen is soft. There is no mass.      Tenderness: There is no abdominal tenderness. There is no guarding or rebound.      Hernia: No hernia is present.   Genitourinary:     Comments: Argueta catheter with yellow urine  Musculoskeletal:         General: Swelling present. No tenderness, deformity or signs of injury.      Right lower leg: Edema present.      Left lower leg: Edema present.      Comments: Trace LE edema    Skin:     General: Skin is warm.      Capillary Refill: Capillary refill takes 2 to 3 seconds.      Coloration: Skin is not jaundiced or pale.      Findings: No bruising, erythema, lesion or rash.   Neurological:      Mental Status: He is alert and oriented to person, place, and time.   Psychiatric:          Mood and Affect: Mood normal.         Behavior: Behavior normal.         Thought Content: Thought content normal.         Judgment: Judgment normal.          Significant Labs:  CBC:   Recent Labs   Lab 08/19/23  0427 08/19/23  0914 08/19/23  1711   WBC 7.38  --   --    RBC 2.69*  --   --    HGB 7.8*  --   --    HCT 23.8*   < > 23*     --   --    MCV 89  --   --    MCH 29.0  --   --    MCHC 32.8  --   --     < > = values in this interval not displayed.     CMP:   Recent Labs   Lab 08/19/23  0427 08/19/23  0804 08/19/23  1523   *   < > 210*   CALCIUM 8.2*   < > 8.4*   ALBUMIN 2.4*  --   --    PROT 6.2  --   --    *   < > 132*   K 4.5   < > 4.4   CO2 23   < > 24   CL 97   < > 94*   BUN 49*   < > 51*   CREATININE 3.4*   < > 3.6*   ALKPHOS 84  --   --    ALT 19  --   --    AST 35  --   --    BILITOT 0.7  --   --     < > = values in this interval not displayed.     All labs within the past 24 hours have been reviewed.    Significant Imaging:  Labs: Reviewed  X-Ray: Reviewed  US: Reviewed

## 2023-08-20 NOTE — ASSESSMENT & PLAN NOTE
Titrate insulin slowly to avoid hypoglycemia as the risk of hypoglycemia increases with decreased creatinine clearance.    Estimated Creatinine Clearance: 31.4 mL/min (A) (based on SCr of 3.8 mg/dL (H)).

## 2023-08-20 NOTE — ASSESSMENT & PLAN NOTE
Ischemic ATN 2/2 decrease perfusion (severe hypotension d/t cardiogenic shock), contrast exposure, combined with aggressive diuresis in a patient with known advanced CKD  Baseline sCr: appears to be around ~2.  Admission sCr: 2.5 at [facility]  Lab Results   Component Value Date    CREATININE 4.0 (H) 08/20/2023    CREATININE 3.9 (H) 08/20/2023    CREATININE 3.8 (H) 08/20/2023     Last UPCR:   Prot/Creat Ratio, Urine   Date Value Ref Range Status   08/19/2023 1.35 (H) 0.00 - 0.20 Final       Recommendations:   -after holding diuretics x24 hours pt's sCr this morning was about the same as it was yesterday at time of consult, 3.7-->3.8, later this afternoon sCr increased to 4.0. His labs show uric acid of 11.5, urine Na, Cl, and osm 369 with intravascular depletion. His microscopy showed 4-5 granular casts /lpf, 10-15 isomorphic RBCs/lpf, few WBCs and few bacteria, findings consistent with ATI. Discussed with pt the difficult balance of diuresis and worsening YVES. Agree with lasix 10mg/hr for symptomatic hypervolemia    -request nephrology records, it appears pt is CKD 3b progressing to stage 4  -renal imaging unrevealing and reassuring as this may be responsive to medical management   -Electrolytes: hypoNa 130, cont to monitor avoid thiazides   K, goal >4, page nephrology if K >5.5, Mg, goal >2, Phos, goal >3 and <5, start sevelamer 800mg TID with meals   -Acid/base: balanced   -Fluid balance: mod fluid overloaded   -Anemia: Hgb 7.5, consider PRBCs if pt gets hypotensive as this can also improve renal function  -Strict I/O's and daily weights  -Renal diet/tube feedings if not NPO, fluid restrict to 1.5L/day   -Maintain MAP >70 for renal perfusion    There is no immediate indication for KRT at this time

## 2023-08-20 NOTE — PROGRESS NOTES
[facility] - Cardiac Intensive Care  Heart Transplant  Progress Note    Patient Name: Itz Daniel  MRN: [id number]  Admission Date: 8/11/2023  Hospital Length of Stay: 9 days  Attending Physician: Margoth Mackenzie MD  Primary Care Provider: Sunday Boo MD  Principal Problem:Ischemic cardiomyopathy    Subjective:     Interval History: CVP maintained and PCWP mildly improved. Continue holding diuresis and assess renal response. UOP variable during night 30-60cc/h.    HD    8:00 AM  CVP 11  SvO2 50  CO 9.1  CI 3.6      RAP 11  PAP 62/30(43)  PCWP 22    Prior 3:00 AM  CVP:  10  SVO2:  57  CO 10.7  CI 4.2  SVR:  499    I/Os    In 0.262  Out 0.365  Net -0.103  UOP 0.365  Last h UOP 40cc  Mean UOP 30-60cc  Since Adm -8.96      Intake/Output Summary (Last 24 hours) at 8/20/2023 0723  Last data filed at 8/20/2023 0705  Gross per 24 hour   Intake 1304.85 ml   Output 1390 ml   Net -85.15 ml       Continuous Infusions:   sodium chloride 0.9% 10 mL/hr at 08/20/23 0705    DOBUTamine IV infusion (non-titrating) 5 mcg/kg/min (08/20/23 0705)    insulin regular 1 units/mL infusion orderable (TRANSFER) 3 Units/hr (08/20/23 0705)    nitric oxide gas      sodium chloride 0.9%       Scheduled Meds:   aspirin  81 mg Oral Daily    atorvastatin  80 mg Oral Daily    enoxparin  40 mg Subcutaneous Q24H (prophylaxis, 1700)    hydrALAZINE  25 mg Oral TID    insulin aspart U-100  6-12 Units Subcutaneous TIDWM    isosorbide dinitrate  20 mg Oral TID    levothyroxine  125 mcg Oral Before breakfast    LIDOcaine (PF) 10 mg/ml (1%)  10 mL Other Once    LIDOcaine  1 patch Transdermal Q24H    oxymetazoline  2 spray Each Nostril BID    pantoprazole  40 mg Oral Daily    polyethylene glycol  17 g Oral TID    senna  8.6 mg Oral BID    ticagrelor  90 mg Oral BID     PRN Meds:acetaminophen, dextrose 10%, dextrose 10%, glucagon (human recombinant), glucose, glucose, insulin aspart U-100, methocarbamoL, ondansetron, oxyCODONE,  sodium chloride 0.9%, sodium chloride 0.9%    Review of patient's allergies indicates:  No Known Allergies  Objective:     Vital Signs (Most Recent):  Temp: 97.4 °F (36.3 °C) (08/20/23 0401)  Pulse: 105 (08/20/23 0705)  Resp: 20 (08/20/23 0705)  BP: (!) 105/58 (08/20/23 0705)  SpO2: 99 % (08/20/23 0705) Vital Signs (24h Range):  Temp:  [97.4 °F (36.3 °C)-97.8 °F (36.6 °C)] 97.4 °F (36.3 °C)  Pulse:  [101-109] 105  Resp:  [13-33] 20  SpO2:  [86 %-100 %] 99 %  BP: ()/(52-66) 105/58     Patient Vitals for the past 72 hrs (Last 3 readings):   Weight   08/20/23 0610 123.4 kg (272 lb)   08/19/23 0626 122.1 kg (269 lb 3.2 oz)   08/17/23 1525 121.6 kg (268 lb)       Body mass index is 34.92 kg/m².      Intake/Output Summary (Last 24 hours) at 8/20/2023 0721  Last data filed at 8/20/2023 0705  Gross per 24 hour   Intake 1304.85 ml   Output 1390 ml   Net -85.15 ml         Hemodynamic Parameters:  PAP: (62-76)/(29-51) 66/34  PAP (Mean):  [42 mmHg-63 mmHg] 46 mmHg    Telemetry: NSR       Physical Exam  Vitals and nursing note reviewed.   Constitutional:       General: He is not in acute distress.     Appearance: Normal appearance. He is normal weight. He is not ill-appearing.   HENT:      Head: Normocephalic and atraumatic.   Eyes:      Pupils: Pupils are equal, round, and reactive to light.   Neck:      Comments: Left IJ PA catheter  Cardiovascular:      Rate and Rhythm: Normal rate and regular rhythm.      Pulses: Normal pulses.      Heart sounds: Normal heart sounds. No murmur heard.     No friction rub. No gallop.   Pulmonary:      Effort: Pulmonary effort is normal. No respiratory distress.      Breath sounds: No wheezing or rhonchi.      Comments: Bibasilar crackles  Abdominal:      General: Abdomen is flat. Bowel sounds are normal.      Palpations: Abdomen is soft.   Musculoskeletal:         General: Normal range of motion.      Cervical back: Normal range of motion and neck supple.      Right lower leg: No edema.  "     Left lower leg: No edema.      Comments: Warm BL LEs   Skin:     General: Skin is warm and dry.      Capillary Refill: Capillary refill takes less than 2 seconds.   Neurological:      General: No focal deficit present.      Mental Status: He is alert.          Significant Labs:  CBC:  Recent Labs   Lab 08/18/23  0309 08/19/23  0427 08/19/23  0914 08/19/23  2106 08/20/23  0232 08/20/23  0237   WBC 9.71 7.38  --   --  7.50  --    RBC 2.81* 2.69*  --   --  2.54*  --    HGB 8.2* 7.8*  --   --  7.5*  --    HCT 25.0* 23.8*   < > 23* 22.9* 22*    273  --   --  308  --    MCV 89 89  --   --  90  --    MCH 29.2 29.0  --   --  29.5  --    MCHC 32.8 32.8  --   --  32.8  --     < > = values in this interval not displayed.       BNP:  No results for input(s): "BNP" in the last 168 hours.    Invalid input(s): "BNPTRIAGELBLO"  CMP:  Recent Labs   Lab 08/18/23  0309 08/18/23  0841 08/19/23  0427 08/19/23  0804 08/19/23  1523 08/19/23  2358 08/20/23  0232   *   < > 226*   < > 210* 156* 155*   CALCIUM 8.5*   < > 8.2*   < > 8.4* 8.4* 8.2*   ALBUMIN 2.4*  --  2.4*  --   --   --  2.5*   PROT 6.2  --  6.2  --   --   --  6.0   *   < > 131*   < > 132* 130* 130*   K 4.0   < > 4.5   < > 4.4 4.0 4.2   CO2 23   < > 23   < > 24 23 23   CL 98   < > 97   < > 94* 95 95   BUN 43*   < > 49*   < > 51* 53* 55*   CREATININE 3.2*   < > 3.4*   < > 3.6* 3.7* 3.8*   ALKPHOS 81  --  84  --   --   --  84   ALT 23  --  19  --   --   --  17   AST 38  --  35  --   --   --  24   BILITOT 0.8  --  0.7  --   --   --  0.7    < > = values in this interval not displayed.        Coagulation:   Recent Labs   Lab 08/14/23  1251 08/15/23  0425 08/15/23  0428 08/15/23  1211 08/16/23  0415 08/18/23  0309 08/19/23  0427 08/20/23  0232   INR  --   --    < >  --    < > 1.0 1.0 1.0   APTT 52.0* 58.8*  --  47.5*  --   --   --   --     < > = values in this interval not displayed.       LDH:  Recent Labs   Lab 08/18/23  0309   * " "      Microbiology:  Microbiology Results (last 7 days)       Procedure Component Value Units Date/Time    Blood culture [[id number]] Collected: 08/13/23 0920    Order Status: Completed Specimen: Blood from Peripheral, Antecubital, Left Updated: 08/18/23 1012     Blood Culture, Routine No growth after 5 days.    Blood culture [[id number]] Collected: 08/12/23 0103    Order Status: Completed Specimen: Blood from Peripheral, Hand, Left Updated: 08/17/23 0612     Blood Culture, Routine No growth after 5 days.    Blood culture [[id number]]  (Abnormal) Collected: 08/12/23 0042    Order Status: Completed Specimen: Blood from Line, Jugular, Internal Right Updated: 08/15/23 0744     Blood Culture, Routine Gram stain aer bottle: Gram positive cocci in clusters resembling Staph      Results called to and read back by:Jojo Valdes Rn 08/13/2023  02:56      COAGULASE-NEGATIVE STAPHYLOCOCCUS SPECIES  Organism is a probable contaminant              ABGs:   Recent Labs   Lab 08/20/23 0237   PH 7.361   PCO2 46.4*   HCO3 26.3   POCSATURATED 57*   BE 1       BMP:   Recent Labs   Lab 08/20/23 0232   *   *   K 4.2   CL 95   CO2 23   BUN 55*   CREATININE 3.8*   CALCIUM 8.2*   MG 2.4       Cardiac Markers: No results for input(s): "CKMB", "TROPONINT", "MYOGLOBIN" in the last 72 hours.  Coagulation:   Recent Labs   Lab 08/20/23 0232   INR 1.0       Prealbumin: No results for input(s): "PREALBUMIN" in the last 72 hours.  Microbiology Results (last 7 days)       Procedure Component Value Units Date/Time    Blood culture [[id number]] Collected: 08/13/23 0920    Order Status: Completed Specimen: Blood from Peripheral, Antecubital, Left Updated: 08/18/23 1012     Blood Culture, Routine No growth after 5 days.    Blood culture [[id number]] Collected: 08/12/23 0103    Order Status: Completed Specimen: Blood from Peripheral, Hand, Left Updated: 08/17/23 0612     Blood Culture, Routine No growth after 5 days.    Blood culture " [[id number]]  (Abnormal) Collected: 08/12/23 0042    Order Status: Completed Specimen: Blood from Line, Jugular, Internal Right Updated: 08/15/23 0744     Blood Culture, Routine Gram stain aer bottle: Gram positive cocci in clusters resembling Staph      Results called to and read back by:Jojo Valdes Rn 08/13/2023  02:56      COAGULASE-NEGATIVE STAPHYLOCOCCUS SPECIES  Organism is a probable contaminant            Specimen (24h ago, onward)      None          I have reviewed all pertinent labs within the past 24 hours.    Estimated Creatinine Clearance: 31.4 mL/min (A) (based on SCr of 3.8 mg/dL (H)).    Diagnostic Results:    CXR 08/18/23  Postoperative changes and supporting devices as before.  Mild diffuse edema more marked at the lung bases similar to the previous study.  No significant pleural effusion.  Heart size upper limit of normal.    CT chest 08/18/23  1. No evidence for hematoma as clinically questioned, noting noncontrast technique.  2. Small bilateral pleural effusions.  Bilateral pulmonary edema.  3. Intraluminal fluid in the esophagus to the level of the aortic arch.  Consider correlation for aspiration risk.  4. Bilateral pulmonary nodules, largest measuring 9 mm.  For multiple solid nodules with any 6 mm or greater, Fleischner Society guidelines recommend follow up with non-contrast chest CT at 3-6 months and 18-24 months after discovery.  5. Gallbladder sludge and mild gallbladder distension, nonspecific.  6. Additional findings as above.    US renal 08/17/23  Limited study due to inability to evaluate segmental renal arterial resistive indices.  Additional evaluation, as clinically warranted.     Elevated main renal arterial resistive indices, nonspecific, but can be seen in the setting of medical renal disease.     Renal arteries and veins are patent.     No hydronephrosis     Bilateral pleural effusions.    TTE 08/17/23    Left Ventricle: The left ventricle is severely dilated. Ventricular  mass is normal. Normal wall thickness. regional wall motion abnormalities present. See diagram for wall motion findings. There is severely reduced systolic function with a visually estimated ejection fraction of 15 - 20%. Grade III diastolic dysfunction.    Left Atrium: Left atrium is moderately dilated.    Right Ventricle: Right ventricle was not well visualized due to poor acoustic window.    Mitral Valve: There is mild regurgitation.    Tricuspid Valve: There is mild regurgitation.    Pulmonary Artery: The estimated pulmonary artery systolic pressure is 41 mmHg.    IVC/SVC: Normal venous pressure at 3 mmHg.    [facility] 08/12/23  Coronary angiogram:  Left main: Patent  Left anterior descending artery:   just distal to the 1st septal   Left circumflex artery:  90-95% proximal stenosis, distal 70% calcified stenosis.  Diffusely diseased OM1,  of OM2  Right coronary artery:  Ostial      Grafts:  SVG-RCA:  Diffuse disease with narrowing of up to 40-50% in the proximal portion  The native PLB and PDA both diffusely diseased.  SVG-OM: Occluded  LIMA-LAD:  Widely patent.  Diffusely diseased native LAD with narrowing of up to 90% in the very apical portion.     Assessment:  Cardiogenic shock with extremely low cardiac outputs and elevated left and right filling pressures  NSTEMI - suspect acute graft closure of the SVG to OM2.  Severe native CAD status post PCI of the proximal circumflex with a 33 x 24 mm Synergy XD stent (post-dilated up to 4.25 mm)      [facility] 08/11/23  RA 20, RV 81/21, PA 81/47 (mean 61), PWCP 45, Ao sat 96%, PA sat 49% (on 6L NC)  CO/CI:   3.69/1.52 (VIKKI)  3.91/1.6 (TD)         Assessment and Plan:     52-year-old male with a past medical history of ICM, CABG in 2017 (LIMA to LAD, SVG to OM1, SVG to PDA), DM type II, CKD stage IV (baseline 1.8), and ICD who presented [facility] on 8/10//23 due to n/v and SOB. Work up concerning for NSTEMI with peak trop of 38. Started on ACS  protocol with asa, ticagrelor, and heparin gtt. Also noted to have YVES with Cr 2.5, volume overloaded with BNP of 796, LA 2.8>>1.0. TTE with EF drop from 30 to 15%, LVEDD 6.15, moderately reduced RV function. LHC/RHC on 8/12: s/p MIRELLA to prox Cx. RHC showed RA 20, RV 81/21, PA 81/47 (mean 61), PWCP 45, CO/CI: 3.69/1.52 (VIKKI)  3.91/1.6 (TD), therefore, impella CP was placed in in right femoral artery and leave in swan in right femoral vein. He was then transferred [place] [facility] [place] level of care. Of note, never started on inotropes or pressors. Was receiving metoprolol.     On arrival patient was hemodynamically stable.  Not on any inotropes or pressors.  Impella at P7  Initial hemodynamics  CVP: 9  SVO2:  44  Cardiac Output: 4.9  Cardiac Index: 2.0  SVR: 1250     C on 8/12  Grafts:  SVG-RCA:  Diffuse disease with narrowing of up to 40-50% in the proximal portion  The native PLB and PDA both diffusely diseased.  SVG-OM: Occluded  LIMA-LAD:  Widely patent.  Diffusely diseased native LAD with narrowing of up to 90% in the very apical portion.  Severe native CAD status post PCI of the proximal circumflex with a 33 x 24 mm Synergy XD stent (post-dilated up to 4.25 mm)      Previous Cardiac Diagnostics:   TTE 7.1.22  The study quality is average.   The left ventricle is mildly enlarged. Global left ventricular systolic function is severely decreased. The left ventricular ejection fraction is 30%.   Mild (1+) mitral regurgitation.  The pulmonary artery systolic pressure is 10 mmHg. The pulmonary artery appears to be normal.     [facility] 5.19.2017  Severe MVCAD as a cause to severe newly diagnosed ischemic congestive heart failure with EF of 20%  Mild MR  Patent L subclavian, & LIMA suitable for bypass graft conduit     BLE Arterial US 10.18.21  The study quality is average.   The arteries of the left lower extremity appear patent with no significant stenosis noted.   Tri-phasic waveforms are obtained throughout the left lower  extremity arteries.      Carotid US 10.18.21  The study quality is average.   1-39% stenosis in the proximal right internal carotid artery based on Bluth Criteria.   1-39% stenosis in the proximal left internal carotid artery based on Bluth Criteria.   Less than 50% stenosis throughout the bilateral common carotid arteries.   Antegrade bilateral vertebral artery flow.         * Ischemic cardiomyopathy  Cardiogenic shock    52-year-old male with past medical history of ischemic cardiomyopathy status post CABG in 2017 who presents as a transfer from outside hospital after he presented with an NSTEMI status post revascularization to proximal circumflex.  Associated cardiogenic shock noted from right heart catheterization, CP Impella placed on a 12. 3.4cm from AV to inlet on bedside echo. Since removal of Impella, patient has had elevated PAP and PCWP, and CVP 10. Diuresis improved mildly CVP, but kidney function worsened.    - Hold Furosemide  - Continue Caleb 10ppm  - Continue  5  - Continue Hydralazine/Isosorbide and hydralazine 20/25mg TID  - Trend BMP q8 hour   - Consult Interventional cardiology for possible recommendations regarding ischemic cardiomyopathy  - Consult CT surgery for possible advance therapies evaluation  - Consult palliative  - TTE below    Results for orders placed during the hospital encounter of 08/11/23    Echo    Interpretation Summary    Left Ventricle: The left ventricle is severely dilated. Ventricular mass is normal. Normal wall thickness. regional wall motion abnormalities present. See diagram for wall motion findings. There is severely reduced systolic function with a visually estimated ejection fraction of 15 - 20%. Grade III diastolic dysfunction.    Left Atrium: Left atrium is moderately dilated.    Right Ventricle: Right ventricle was not well visualized due to poor acoustic window.    Mitral Valve: There is mild regurgitation.    Tricuspid Valve: There is mild  regurgitation.    Pulmonary Artery: The estimated pulmonary artery systolic pressure is 41 mmHg.    IVC/SVC: Normal venous pressure at 3 mmHg.          Hypothyroidism  - Continue home Synthroid    CKD (chronic kidney disease), stage IV  Acute kidney injury    Likely secondary to cardiogenic shock  Baseline creatinine of 1.8-2.   Worsening creatinine, furosemide held and monitoring response  Cr has worsened overnight  Recent Labs   Lab 08/19/23  1523 08/19/23  2358 08/20/23  0232   CREATININE 3.6* 3.7* 3.8*     - Monitor Cr  - Daily weights, strict I/O and chart, renally dose all medications   - Avoid nephrotoxic medications, NSAIDs, ACE/ARB, and IV contrast.  - Appreciate nephrology recs      NSTEMI (non-ST elevated myocardial infarction)  - Continue Aspirin  - Continue ticagrelor  - Continue Atorvastatin 80    DM (diabetes mellitus)  Lab Results   Component Value Date    HGBA1C 10.2 (H) 08/10/2023     - Endocrine following  - Now on insulin drip  - Diabetic/cardiac diet          Paul Roach MD  Heart Transplant  [facility] - Cardiac Int[facility]

## 2023-08-20 NOTE — SUBJECTIVE & OBJECTIVE
Interval History: Patient seen at bedside. One of patient's sons and son's friend at bedside. Patient just reporting feeling very tired at this time.     Past Medical History:   Diagnosis Date    CKD stage 4, GFR 15-29 ml/min     HLD (hyperlipidemia)     Hypertension     Ischemic cardiomyopathy/Chronic HFrEF s/p CABG and AICD 2017    T2DM (type 2 diabetes mellitus)        Past Surgical History:   Procedure Laterality Date    CORONARY ARTERY BYPASS GRAFT  2017    3 vessel    CORONARY BYPASS GRAFT ANGIOGRAPHY  8/11/2023    Procedure: Bypass graft study;  Surgeon: Michele Hirsch MD;  Location: [facility] CATH LAB;  Service: Cardiology;;    IMPELLA, REMOVAL Right 8/15/2023    Procedure: Impella, Removal;  Surgeon: Colin Sibley MD;  Location: [facility] CATH LAB;  Service: Cardiology;  Laterality: Right;    INSERTION OF INTRAVASCULAR MICROAXIAL BLOOD PUMP N/A 8/11/2023    Procedure: INSERTION, IMPELLA;  Surgeon: Michele Hirsch MD;  Location: [facility] CATH LAB;  Service: Cardiology;  Laterality: N/A;    IVUS, CORONARY  8/11/2023    Procedure: IVUS, Coronary;  Surgeon: Michele Hirsch MD;  Location: [facility] CATH LAB;  Service: Cardiology;;    LEFT HEART CATHETERIZATION N/A 8/11/2023    Procedure: Left heart cath;  Surgeon: Michele Hirsch MD;  Location: [facility] CATH LAB;  Service: Cardiology;  Laterality: N/A;    PERCUTANEOUS CORONARY INTERVENTION, ARTERY N/A 8/11/2023    Procedure: Percutaneous coronary intervention;  Surgeon: Michele Hirsch MD;  Location: [facility] CATH LAB;  Service: Cardiology;  Laterality: N/A;    PLACEMENT OF SWAN MARLENI CATHETER WITH IMAGING GUIDANCE Left 8/15/2023    Procedure: INSERTION, CATHETER, SWAN-MARLENI, WITH IMAGING GUIDANCE;  Surgeon: Colin Sibley MD;  Location: [facility] CATH LAB;  Service: Cardiology;  Laterality: Left;    REMOVAL OF TUNNELED CENTRAL VENOUS CATHETER (CVC) N/A 8/15/2023    Procedure: REMOVAL, CATHETER, CENTRAL VENOUS, TUNNELED;  Surgeon: Colin Sibley MD;  Location: [facility]  CATH LAB;  Service: Cardiology;  Laterality: N/A;    RIGHT HEART CATHETERIZATION Right 8/11/2023    Procedure: INSERTION, CATHETER, RIGHT HEART;  Surgeon: Michele Hirsch MD;  Location: [facility] CATH LAB;  Service: Cardiology;  Laterality: Right;       Review of patient's allergies indicates:  No Known Allergies    Medications:  Continuous Infusions:   sodium chloride 0.9% 10 mL/hr at 08/20/23 1105    DOBUTamine IV infusion (non-titrating) 5 mcg/kg/min (08/20/23 1105)    insulin regular 1 units/mL infusion orderable (TRANSFER) 2.5 Units/hr (08/20/23 1105)    nitric oxide gas      sodium chloride 0.9%       Scheduled Meds:   aspirin  81 mg Oral Daily    atorvastatin  80 mg Oral Daily    enoxparin  40 mg Subcutaneous Q24H (prophylaxis, 1700)    hydrALAZINE  25 mg Oral TID    insulin aspart U-100  6-12 Units Subcutaneous TIDWM    isosorbide dinitrate  20 mg Oral TID    levothyroxine  125 mcg Oral Before breakfast    LIDOcaine (PF) 10 mg/ml (1%)  10 mL Other Once    LIDOcaine  1 patch Transdermal Q24H    pantoprazole  40 mg Oral Daily    polyethylene glycol  17 g Oral TID    senna  8.6 mg Oral BID    ticagrelor  90 mg Oral BID     PRN Meds:acetaminophen, dextrose 10%, dextrose 10%, glucagon (human recombinant), glucose, glucose, insulin aspart U-100, methocarbamoL, ondansetron, oxyCODONE, sodium chloride 0.9%, sodium chloride 0.9%    Family History       Problem Relation (Age of Onset)    Hypertension Mother          Tobacco Use    Smoking status: Former     Current packs/day: 0.00     Types: Cigarettes    Smokeless tobacco: Never   Substance and Sexual Activity    Alcohol use: Yes    Drug use: No    Sexual activity: Not on file       Review of Systems   Constitutional:  Positive for activity change and fatigue. Negative for appetite change.   HENT: Negative.     Eyes: Negative.    Respiratory: Negative.  Negative for shortness of breath.    Cardiovascular: Negative.    Gastrointestinal: Negative.    Genitourinary:  Negative.    Musculoskeletal: Negative.    Skin: Negative.    Neurological: Negative.    Psychiatric/Behavioral:  Positive for dysphoric mood and sleep disturbance.    All other systems reviewed and are negative.    Objective:     Vital Signs (Most Recent):  Temp: 97.7 °F (36.5 °C) (08/20/23 1105)  Pulse: 105 (08/20/23 1105)  Resp: 16 (08/20/23 1105)  BP: 111/60 (08/20/23 1105)  SpO2: 99 % (08/20/23 1105) Vital Signs (24h Range):  Temp:  [97.4 °F (36.3 °C)-98 °F (36.7 °C)] 97.7 °F (36.5 °C)  Pulse:  [101-109] 105  Resp:  [13-25] 16  SpO2:  [86 %-100 %] 99 %  BP: ()/(52-66) 111/60     Weight: 123.4 kg (272 lb)  Body mass index is 34.92 kg/m².       Physical Exam  Vitals and nursing note reviewed.   Constitutional:       General: He is not in acute distress.     Appearance: He is not toxic-appearing or diaphoretic.   HENT:      Head: Normocephalic and atraumatic.      Right Ear: External ear normal.      Left Ear: External ear normal.      Nose: Nose normal.      Mouth/Throat:      Mouth: Mucous membranes are moist.   Eyes:      General: No scleral icterus.        Right eye: No discharge.         Left eye: No discharge.      Extraocular Movements: Extraocular movements intact.   Neck:      Comments: Line noted in left side neck today  Cardiovascular:      Rate and Rhythm: Tachycardia present.   Pulmonary:      Effort: Pulmonary effort is normal. No respiratory distress.   Abdominal:      General: There is no distension.      Tenderness: There is no abdominal tenderness.   Musculoskeletal:         General: No deformity or signs of injury.      Cervical back: Normal range of motion.   Skin:     Coloration: Skin is not jaundiced.      Findings: No bruising or rash.   Neurological:      General: No focal deficit present.      Mental Status: He is alert and oriented to person, place, and time.      Cranial Nerves: No cranial nerve deficit.   Psychiatric:         Mood and Affect: Mood is depressed. Affect is flat.          Thought Content: Thought content normal.         Judgment: Judgment normal.            Review of Symptoms      Symptom Assessment (ESAS 0-10 Scale)  Pain:  0  Dyspnea:  0  Anxiety:  0  Nausea:  0  Depression:  0  Anorexia:  0  Fatigue:  6  Insomnia:  0  Restlessness:  0  Agitation:  0     CAM / Delirium:  Negative  Constipation:  Negative  Diarrhea:  Negative      Bowel Management Plan (BMP):  No      Pain Assessment:  OME in 24 hours:  0  Location(s): none      Modified Kandi Scale:  0 (On NC)    Performance Status:  70    Living Arrangements:  Lives with family    Psychosocial/Cultural:   See Palliative Psychosocial Note: No  Social Issues Identified: Coping deficit pt/family, New Diagnosis/Trauma, and Minor Children in home  Bereavement Risk: No  Caregiver Needs Discussed. Caregiver Distress: No:   Cultural: Patient enjoys fishing and watching sports (baseball and football). Patient has three children - 2 boys and 1 girl. The children ages are 21, 19, and 17.   **Primary  to Follow**  Palliative Care  Consult: No        Advance Care Planning   Advance Directives:   Living Will: No    LaPOST: No    Do Not Resuscitate Status: No    Medical Power of : No        Oral Declaration: Yes  Agent's Name:  Ozzie Daniel   Agent's Contact Number:  [phone]    Decision Making:  Patient answered questions  Goals of Care: The patient endorses that what is most important right now is to focus on remaining as independent as possible and extending life as long as possible, even it it means sacrificing quality    Accordingly, we have decided that the best plan to meet the patient's goals includes continuing with treatment         Significant Labs: All pertinent labs within the past 24 hours have been reviewed.  CBC:   Recent Labs   Lab 08/20/23  0232 08/20/23  0237 08/20/23  0806   WBC 7.50  --   --    HGB 7.5*  --   --    HCT 22.9*   < > 22*   MCV 90  --   --      --   --     < > =  "values in this interval not displayed.     BMP:  Recent Labs   Lab 08/20/23  0232 08/20/23  0805   * 144*   * 132*   K 4.2 4.0   CL 95 96   CO2 23 25   BUN 55* 55*   CREATININE 3.8* 3.8*   CALCIUM 8.2* 8.2*   MG 2.4  --      LFT:  Lab Results   Component Value Date    AST 24 08/20/2023    ALKPHOS 84 08/20/2023    BILITOT 0.7 08/20/2023     Albumin:   Albumin   Date Value Ref Range Status   08/20/2023 2.5 (L) 3.5 - 5.2 g/dL Final     Protein:   Total Protein   Date Value Ref Range Status   08/20/2023 6.0 6.0 - 8.4 g/dL Final     Lactic acid:   Lab Results   Component Value Date    LACTATE 1.0 08/14/2023    LACTATE 0.9 08/13/2023       Significant Imaging: I have reviewed all pertinent imaging results/findings within the past 24 hours.    08/18/2023 CT C/A/P: "1. No evidence for hematoma as clinically questioned, noting noncontrast technique. 2. Small bilateral pleural effusions.  Bilateral pulmonary edema. 3. Intraluminal fluid in the esophagus to the level of the aortic arch.  Consider correlation for aspiration risk. 4. Bilateral pulmonary nodules, largest measuring 9 mm.  For multiple solid nodules with any 6 mm or greater, Fleischner Society guidelines recommend follow up with non-contrast chest CT at 3-6 months and 18-24 months after discovery. 5. Gallbladder sludge and mild gallbladder distension, nonspecific. 6. Additional findings as above."    "

## 2023-08-20 NOTE — ASSESSMENT & PLAN NOTE
Endocrinology consulted for BG management.   BG goal 140-180     - Rewrite Transition drip at 2.5 units/hr with step-down parameters. (Decreased per protocol)   - Novolog 6-12 units with meals (Administer    6   units if patient eats 25-50% of meal, administer   12    units if patient eats > 50% of meal.)  - Novolog (aspart) insulin prn for BG excursions [facility] [place] (180/25).  - BG checks /HS/0200  - Hypoglycemia protocol in place      ** Please notify Endocrine for any change and/or advance in diet**  ** Please call Endocrine for any BG related issues **    Discharge Planning:   TBD. Please notify endocrinology prior to discharge.

## 2023-08-20 NOTE — ASSESSMENT & PLAN NOTE
Cardiogenic shock    52-year-old male with past medical history of ischemic cardiomyopathy status post CABG in 2017 who presents as a transfer from outside hospital after he presented with an NSTEMI status post revascularization to proximal circumflex.  Associated cardiogenic shock noted from right heart catheterization, CP Impella placed on a 12. 3.4cm from AV to inlet on bedside echo. Since removal of Impella, patient has had elevated PAP and PCWP, and CVP 10. Diuresis improved mildly CVP, but kidney function worsened.    - Hold Furosemide  - Continue Caleb 10ppm  - Continue  5  - Continue Hydralazine/Isosorbide and hydralazine 20/25mg TID  - Trend BMP q8 hour   - Consult Interventional cardiology for possible recommendations regarding ischemic cardiomyopathy  - Consult CT surgery for possible advance therapies evaluation  - Consult palliative  - TTE below    Results for orders placed during the hospital encounter of 08/11/23    Echo    Interpretation Summary    Left Ventricle: The left ventricle is severely dilated. Ventricular mass is normal. Normal wall thickness. regional wall motion abnormalities present. See diagram for wall motion findings. There is severely reduced systolic function with a visually estimated ejection fraction of 15 - 20%. Grade III diastolic dysfunction.    Left Atrium: Left atrium is moderately dilated.    Right Ventricle: Right ventricle was not well visualized due to poor acoustic window.    Mitral Valve: There is mild regurgitation.    Tricuspid Valve: There is mild regurgitation.    Pulmonary Artery: The estimated pulmonary artery systolic pressure is 41 mmHg.    IVC/SVC: Normal venous pressure at 3 mmHg.         LMVM to call me. (See echo results, heart can't pump as efficiently as it should. Needs to schedule appt after stress completed to discuss results).

## 2023-08-20 NOTE — ASSESSMENT & PLAN NOTE
Acute kidney injury    Likely secondary to cardiogenic shock  Baseline creatinine of 1.8-2.   Worsening creatinine, furosemide held and monitoring response  Cr has worsened overnight  Recent Labs   Lab 08/19/23  1523 08/19/23  2358 08/20/23  0232   CREATININE 3.6* 3.7* 3.8*     - Monitor Cr  - Daily weights, strict I/O and chart, renally dose all medications   - Avoid nephrotoxic medications, NSAIDs, ACE/ARB, and IV contrast.  - Appreciate nephrology recs

## 2023-08-20 NOTE — SUBJECTIVE & OBJECTIVE
Interval History:   No acute events overnight, this afternoon pt was restarted on lasix gtt 10mg/hr d/t increasing CVP oaf 13 and Svo2 40 and pt was started on NO as well  Pt states that he is feeling the same as he was yesterday, no worsening of shortness of breath.    24 hours I&Os are: 1.3L/1.4L of urine output, net -157mls      Review of patient's allergies indicates:  No Known Allergies  Current Facility-Administered Medications   Medication Frequency    0.9%  NaCl infusion Continuous    acetaminophen tablet 650 mg Q6H PRN    aspirin EC tablet 81 mg Daily    atorvastatin tablet 80 mg Daily    dextrose 10% bolus 125 mL 125 mL PRN    dextrose 10% bolus 250 mL 250 mL PRN    DOBUtamine 1000 mg in D5W 250 mL infusion Continuous    enoxaparin injection 40 mg Q24H (prophylaxis, 1700)    glucagon (human recombinant) injection 1 mg PRN    glucose chewable tablet 16 g PRN    glucose chewable tablet 24 g PRN    hydrALAZINE tablet 25 mg TID    insulin aspart U-100 pen 0-10 Units PRN    insulin aspart U-100 pen 6-12 Units TIDWM    insulin regular in 0.9 % NaCl 100 unit/100 mL (1 unit/mL) infusion Continuous    isosorbide dinitrate tablet 20 mg TID    levothyroxine tablet 125 mcg Before breakfast    LIDOcaine (PF) 10 mg/ml (1%) injection 100 mg Once    LIDOcaine 5 % patch 1 patch Q24H    methocarbamoL tablet 500 mg Q6H PRN    nitric oxide gas Gas 2.5 ppm Continuous    ondansetron disintegrating tablet 8 mg Q8H PRN    oxyCODONE immediate release tablet 5 mg Q8H PRN    pantoprazole EC tablet 40 mg Daily    polyethylene glycol packet 17 g TID    senna tablet 8.6 mg BID    sodium chloride 0.9% flush 10 mL PRN    sodium chloride 0.9% flush 10 mL Continuous    sodium chloride 0.9% flush 3 mL Q6H PRN    ticagrelor tablet 90 mg BID       Objective:     Vital Signs (Most Recent):  Temp: 98.4 °F (36.9 °C) (08/20/23 1505)  Pulse: 105 (08/20/23 1603)  Resp: (!) 21 (08/20/23 1603)  BP: (!) 93/50 (08/20/23 1603)  SpO2: 99 % (08/20/23  1603) Vital Signs (24h Range):  Temp:  [97.4 °F (36.3 °C)-98.4 °F (36.9 °C)] 98.4 °F (36.9 °C)  Pulse:  [101-109] 105  Resp:  [13-25] 21  SpO2:  [86 %-100 %] 99 %  BP: ()/(50-67) 93/50     Weight: 123.4 kg (272 lb) (08/20/23 0610)  Body mass index is 34.92 kg/m².  Body surface area is 2.54 meters squared.    I/O last 3 completed shifts:  In: 2179.2 [P.O.:1500; I.V.:679.2]  Out: 2545 [Urine:2545]     Physical Exam  Vitals and nursing note reviewed.   Constitutional:       General: He is not in acute distress.     Appearance: Normal appearance. He is ill-appearing. He is not toxic-appearing or diaphoretic.   HENT:      Mouth/Throat:      Mouth: Mucous membranes are moist.   Cardiovascular:      Rate and Rhythm: Normal rate and regular rhythm.      Pulses: Normal pulses.      Heart sounds: Murmur heard.   Pulmonary:      Effort: Pulmonary effort is normal. No respiratory distress.      Breath sounds: Normal breath sounds. No stridor. No wheezing, rhonchi or rales.   Abdominal:      General: Abdomen is flat. Bowel sounds are normal. There is no distension.      Palpations: Abdomen is soft. There is no mass.      Tenderness: There is no abdominal tenderness. There is no guarding or rebound.      Hernia: No hernia is present.   Genitourinary:     Comments: Argueta catheter with yellow urine  Musculoskeletal:         General: Swelling present. No tenderness, deformity or signs of injury.      Right lower leg: Edema present.      Left lower leg: Edema present.      Comments: Trace LE edema    Skin:     General: Skin is warm.      Capillary Refill: Capillary refill takes 2 to 3 seconds.      Coloration: Skin is not jaundiced or pale.      Findings: No bruising, erythema, lesion or rash.   Neurological:      Mental Status: He is alert and oriented to person, place, and time.   Psychiatric:         Mood and Affect: Mood normal.         Behavior: Behavior normal.         Thought Content: Thought content normal.          Judgment: Judgment normal.          Significant Labs:  CBC:   Recent Labs   Lab 08/20/23  0232 08/20/23  0237 08/20/23  1604   WBC 7.50  --   --    RBC 2.54*  --   --    HGB 7.5*  --   --    HCT 22.9*   < > 22*     --   --    MCV 90  --   --    MCH 29.5  --   --    MCHC 32.8  --   --     < > = values in this interval not displayed.     CMP:   Recent Labs   Lab 08/20/23  0232 08/20/23  0805 08/20/23  1751   *   < > 172*   CALCIUM 8.2*   < > 8.6*   ALBUMIN 2.5*  --   --    PROT 6.0  --   --    *   < > 130*   K 4.2   < > 4.4   CO2 23   < > 24   CL 95   < > 94*   BUN 55*   < > 60*   CREATININE 3.8*   < > 4.0*   ALKPHOS 84  --   --    ALT 17  --   --    AST 24  --   --    BILITOT 0.7  --   --     < > = values in this interval not displayed.     All labs within the past 24 hours have been reviewed.     Significant Imaging:  Labs: Reviewed  X-Ray: Reviewed

## 2023-08-20 NOTE — CONSULTS
[facility] - Cardiac Intensive Care  Palliative Medicine  Consult Note    Patient Name: Itz Daniel  MRN: [id number]  Admission Date: 8/11/2023  Hospital Length of Stay: 9 days  Code Status: Full Code   Attending Provider: Mragoth Mackenzie MD  Consulting Provider: Lucia Neri MD  Primary Care Physician: Sunday Boo MD  Principal Problem:Ischemic cardiomyopathy    Patient information was obtained from patient, primary team and current medical records.      Inpatient consult to Palliative Care  Consult performed by: Lucia Neri MD  Consult ordered by: Paul Chen MD        Assessment/Plan:     Palliative Care  Encounter for palliative care  Mr. Daniel is a 52 y/o M with ischemic cardiomyopathy s/p CABG transferred from outside hospital with NSTEMI s/p revascularization to proximal circumflex. He had associated cardiogenic shock noted from right heart catheterization. CP Impella placed and now removed. Cardiothoracic surgery and interventional cardiology consulted. Nephrology also consulted for YVES on CKD. Palliative medicine consulted for GOC.     Ischemic cardiomyopathy/cardiogenic shock/YVES/CKD/other medical problems  - plan per primary team and other specialty consultants    ACP/GOC  Advance Care Planning   - patient decisional  - patient's son and son's friend at bedside  - introduced palliative medicine team to patient today  - goals: life prolonging  - when engaging patient today about his current condition, patient states that they are unable to get the pressures in his heart down due to kidneys not working. He states that they are trying to get the kidneys to work so that the pressures come down. He further stated he has not heard from all the teams yet about what options he has available. He wants to hear from all the teams prior to further discussions. He states it is important to him to live so he can be with his three children: ages 21, 19, and 17. He reports  enjoying fishing and watching sports (especially baseball and football).   - when discussing decision makers, he verbally stated he would want his father, Ozzie Daniel, to be his surrogate decision maker. ACP booklet brought to bedside so that patient may fill out HCPOA.   - code status: full  - will follow up tomorrow for further GOC discussions.        Above plan of care discussed with patient's primary team including attending of record, Dr. Mackenzie        Thank you for your consult. I will follow-up with patient. Please contact us if you have any additional questions.    Subjective:     HPI:   Mr. Daniel is a 54 y/o M with ischemic cardiomyopathy s/p CABG transferred from outside hospital with NSTEMI s/p revascularization to proximal circumflex. He had associated cardiogenic shock noted from right heart catheterization. CP Impella placed and now removed. Cardiothoracic surgery and interventional cardiology consulted. Nephrology also consulted for YVES on CKD. Palliative medicine consulted for GOC.       Hospital Course:  No notes on file    Interval History: Patient seen at bedside. One of patient's sons and son's friend at bedside. Patient just reporting feeling very tired at this time.     Past Medical History:   Diagnosis Date    CKD stage 4, GFR 15-29 ml/min     HLD (hyperlipidemia)     Hypertension     Ischemic cardiomyopathy/Chronic HFrEF s/p CABG and AICD 2017    T2DM (type 2 diabetes mellitus)        Past Surgical History:   Procedure Laterality Date    CORONARY ARTERY BYPASS GRAFT  2017    3 vessel    CORONARY BYPASS GRAFT ANGIOGRAPHY  8/11/2023    Procedure: Bypass graft study;  Surgeon: Michele Hirsch MD;  Location: [facility] CATH LAB;  Service: Cardiology;;    IMPELLA, REMOVAL Right 8/15/2023    Procedure: Impella, Removal;  Surgeon: Colin Sibley MD;  Location: [facility] CATH LAB;  Service: Cardiology;  Laterality: Right;    INSERTION OF INTRAVASCULAR MICROAXIAL BLOOD PUMP N/A 8/11/2023     Procedure: INSERTION, IMPELLA;  Surgeon: Michele Hirsch MD;  Location: [facility] CATH LAB;  Service: Cardiology;  Laterality: N/A;    IVUS, CORONARY  8/11/2023    Procedure: IVUS, Coronary;  Surgeon: Michele Hirsch MD;  Location: [facility] CATH LAB;  Service: Cardiology;;    LEFT HEART CATHETERIZATION N/A 8/11/2023    Procedure: Left heart cath;  Surgeon: Michele Hirsch MD;  Location: [facility] CATH LAB;  Service: Cardiology;  Laterality: N/A;    PERCUTANEOUS CORONARY INTERVENTION, ARTERY N/A 8/11/2023    Procedure: Percutaneous coronary intervention;  Surgeon: Michele Hirsch MD;  Location: [facility] CATH LAB;  Service: Cardiology;  Laterality: N/A;    PLACEMENT OF SWAN MARLENI CATHETER WITH IMAGING GUIDANCE Left 8/15/2023    Procedure: INSERTION, CATHETER, SWAN-MARLENI, WITH IMAGING GUIDANCE;  Surgeon: Colin Sibley MD;  Location: [facility] CATH LAB;  Service: Cardiology;  Laterality: Left;    REMOVAL OF TUNNELED CENTRAL VENOUS CATHETER (CVC) N/A 8/15/2023    Procedure: REMOVAL, CATHETER, CENTRAL VENOUS, TUNNELED;  Surgeon: Colin Sibley MD;  Location: [facility] CATH LAB;  Service: Cardiology;  Laterality: N/A;    RIGHT HEART CATHETERIZATION Right 8/11/2023    Procedure: INSERTION, CATHETER, RIGHT HEART;  Surgeon: Michele Hirsch MD;  Location: [facility] CATH LAB;  Service: Cardiology;  Laterality: Right;       Review of patient's allergies indicates:  No Known Allergies    Medications:  Continuous Infusions:   sodium chloride 0.9% 10 mL/hr at 08/20/23 1105    DOBUTamine IV infusion (non-titrating) 5 mcg/kg/min (08/20/23 1105)    insulin regular 1 units/mL infusion orderable (TRANSFER) 2.5 Units/hr (08/20/23 1105)    nitric oxide gas      sodium chloride 0.9%       Scheduled Meds:   aspirin  81 mg Oral Daily    atorvastatin  80 mg Oral Daily    enoxparin  40 mg Subcutaneous Q24H (prophylaxis, 1700)    hydrALAZINE  25 mg Oral TID    insulin aspart U-100  6-12 Units Subcutaneous TIDWM    isosorbide dinitrate  20 mg  Oral TID    levothyroxine  125 mcg Oral Before breakfast    LIDOcaine (PF) 10 mg/ml (1%)  10 mL Other Once    LIDOcaine  1 patch Transdermal Q24H    pantoprazole  40 mg Oral Daily    polyethylene glycol  17 g Oral TID    senna  8.6 mg Oral BID    ticagrelor  90 mg Oral BID     PRN Meds:acetaminophen, dextrose 10%, dextrose 10%, glucagon (human recombinant), glucose, glucose, insulin aspart U-100, methocarbamoL, ondansetron, oxyCODONE, sodium chloride 0.9%, sodium chloride 0.9%    Family History       Problem Relation (Age of Onset)    Hypertension Mother          Tobacco Use    Smoking status: Former     Current packs/day: 0.00     Types: Cigarettes    Smokeless tobacco: Never   Substance and Sexual Activity    Alcohol use: Yes    Drug use: No    Sexual activity: Not on file       Review of Systems   Constitutional:  Positive for activity change and fatigue. Negative for appetite change.   HENT: Negative.     Eyes: Negative.    Respiratory: Negative.  Negative for shortness of breath.    Cardiovascular: Negative.    Gastrointestinal: Negative.    Genitourinary: Negative.    Musculoskeletal: Negative.    Skin: Negative.    Neurological: Negative.    Psychiatric/Behavioral:  Positive for dysphoric mood and sleep disturbance.    All other systems reviewed and are negative.    Objective:     Vital Signs (Most Recent):  Temp: 97.7 °F (36.5 °C) (08/20/23 1105)  Pulse: 105 (08/20/23 1105)  Resp: 16 (08/20/23 1105)  BP: 111/60 (08/20/23 1105)  SpO2: 99 % (08/20/23 1105) Vital Signs (24h Range):  Temp:  [97.4 °F (36.3 °C)-98 °F (36.7 °C)] 97.7 °F (36.5 °C)  Pulse:  [101-109] 105  Resp:  [13-25] 16  SpO2:  [86 %-100 %] 99 %  BP: ()/(52-66) 111/60     Weight: 123.4 kg (272 lb)  Body mass index is 34.92 kg/m².       Physical Exam  Vitals and nursing note reviewed.   Constitutional:       General: He is not in acute distress.     Appearance: He is not toxic-appearing or diaphoretic.   HENT:      Head:  Normocephalic and atraumatic.      Right Ear: External ear normal.      Left Ear: External ear normal.      Nose: Nose normal.      Mouth/Throat:      Mouth: Mucous membranes are moist.   Eyes:      General: No scleral icterus.        Right eye: No discharge.         Left eye: No discharge.      Extraocular Movements: Extraocular movements intact.   Neck:      Comments: Line noted in left side neck today  Cardiovascular:      Rate and Rhythm: Tachycardia present.   Pulmonary:      Effort: Pulmonary effort is normal. No respiratory distress.   Abdominal:      General: There is no distension.      Tenderness: There is no abdominal tenderness.   Musculoskeletal:         General: No deformity or signs of injury.      Cervical back: Normal range of motion.   Skin:     Coloration: Skin is not jaundiced.      Findings: No bruising or rash.   Neurological:      General: No focal deficit present.      Mental Status: He is alert and oriented to person, place, and time.      Cranial Nerves: No cranial nerve deficit.   Psychiatric:         Mood and Affect: Mood is depressed. Affect is flat.         Thought Content: Thought content normal.         Judgment: Judgment normal.            Review of Symptoms      Symptom Assessment (ESAS 0-10 Scale)  Pain:  0  Dyspnea:  0  Anxiety:  0  Nausea:  0  Depression:  0  Anorexia:  0  Fatigue:  6  Insomnia:  0  Restlessness:  0  Agitation:  0     CAM / Delirium:  Negative  Constipation:  Negative  Diarrhea:  Negative      Bowel Management Plan (BMP):  No      Pain Assessment:  OME in 24 hours:  0  Location(s): none      Modified Kandi Scale:  0 (On NC)    Performance Status:  70    Living Arrangements:  Lives with family    Psychosocial/Cultural:   See Palliative Psychosocial Note: No  Social Issues Identified: Coping deficit pt/family, New Diagnosis/Trauma, and Minor Children in home  Bereavement Risk: No  Caregiver Needs Discussed. Caregiver Distress: No:   Cultural: Patient enjoys fishing  "and watching sports (baseball and football). Patient has three children - 2 boys and 1 girl. The children ages are 21, 19, and 17.   **Primary  to Follow**  Palliative Care  Consult: No        Advance Care Planning  Advance Directives:   Living Will: No    LaPOST: No    Do Not Resuscitate Status: No    Medical Power of : No        Oral Declaration: Yes  Agent's Name:  Ozzie Daniel   Agent's Contact Number:  [phone]    Decision Making:  Patient answered questions  Goals of Care: The patient endorses that what is most important right now is to focus on remaining as independent as possible and extending life as long as possible, even it it means sacrificing quality    Accordingly, we have decided that the best plan to meet the patient's goals includes continuing with treatment         Significant Labs: All pertinent labs within the past 24 hours have been reviewed.  CBC:   Recent Labs   Lab 08/20/23  0232 08/20/23  0237 08/20/23  0806   WBC 7.50  --   --    HGB 7.5*  --   --    HCT 22.9*   < > 22*   MCV 90  --   --      --   --     < > = values in this interval not displayed.     BMP:  Recent Labs   Lab 08/20/23  0232 08/20/23  0805   * 144*   * 132*   K 4.2 4.0   CL 95 96   CO2 23 25   BUN 55* 55*   CREATININE 3.8* 3.8*   CALCIUM 8.2* 8.2*   MG 2.4  --      LFT:  Lab Results   Component Value Date    AST 24 08/20/2023    ALKPHOS 84 08/20/2023    BILITOT 0.7 08/20/2023     Albumin:   Albumin   Date Value Ref Range Status   08/20/2023 2.5 (L) 3.5 - 5.2 g/dL Final     Protein:   Total Protein   Date Value Ref Range Status   08/20/2023 6.0 6.0 - 8.4 g/dL Final     Lactic acid:   Lab Results   Component Value Date    LACTATE 1.0 08/14/2023    LACTATE 0.9 08/13/2023       Significant Imaging: I have reviewed all pertinent imaging results/findings within the past 24 hours.    08/18/2023 CT C/A/P: "1. No evidence for hematoma as clinically questioned, noting " "noncontrast technique. 2. Small bilateral pleural effusions.  Bilateral pulmonary edema. 3. Intraluminal fluid in the esophagus to the level of the aortic arch.  Consider correlation for aspiration risk. 4. Bilateral pulmonary nodules, largest measuring 9 mm.  For multiple solid nodules with any 6 mm or greater, Fleischner Society guidelines recommend follow up with non-contrast chest CT at 3-6 months and 18-24 months after discovery. 5. Gallbladder sludge and mild gallbladder distension, nonspecific. 6. Additional findings as above."        A total of 20 min was spent on advance care planning, goals of care discussion, emotional support, formulating and communicating prognosis and goals of care, exploring burden/benefit of various approaches of treatment. This discussion occurred on a fully voluntary basis with the verbal consent of the patient and/or family    Lucia Neri MD  Palliative Medicine  [facility] - Cardiac Intensive Care            "

## 2023-08-21 PROBLEM — R56.9 OBSERVED SEIZURE-LIKE ACTIVITY: Status: ACTIVE | Noted: 2023-01-01

## 2023-08-21 PROBLEM — N17.0 ACUTE RENAL FAILURE WITH ACUTE TUBULAR NECROSIS SUPERIMPOSED ON STAGE 3B CHRONIC KIDNEY DISEASE: Status: ACTIVE | Noted: 2023-01-01

## 2023-08-21 PROBLEM — G40.409: Status: ACTIVE | Noted: 2023-01-01

## 2023-08-21 NOTE — NURSING
MD Perales notified of pt having no urine output after 2 hours. Argueta flushed. MD orders to inc lasix gtt to 20mg/hr and give 40mg lasix push now.

## 2023-08-21 NOTE — PT/OT/SLP PROGRESS
Physical Therapy      Patient Name:  Itz Daniel   MRN:  [id number]    Patient not seen today secondary to  (pt not seen but can meet plan of care with subsequent PT visits.). Will follow-up at a later date.    8/21/2023  .

## 2023-08-21 NOTE — NURSING
Nurses Note -- 4 Eyes      8/21/2023   11:25 AM      Skin assessed during: Q Shift Change      [x] No Altered Skin Integrity Present    []Prevention Measures Documented      [] Yes- Altered Skin Integrity Present or Discovered   [] LDA Added if Not in Epic (Describe Wound)   [] New Altered Skin Integrity was Present on Admit and Documented in LDA   [] Wound Image Taken    Wound Care Consulted? No    Attending Nurse:  Meka Vallejo RN/Staff Member:  Mila

## 2023-08-21 NOTE — SUBJECTIVE & OBJECTIVE
Past Medical History:   Diagnosis Date    CKD stage 4, GFR 15-29 ml/min     HLD (hyperlipidemia)     Hypertension     Ischemic cardiomyopathy/Chronic HFrEF s/p CABG and AICD 2017    T2DM (type 2 diabetes mellitus)        Past Surgical History:   Procedure Laterality Date    CORONARY ARTERY BYPASS GRAFT  2017    3 vessel    CORONARY BYPASS GRAFT ANGIOGRAPHY  8/11/2023    Procedure: Bypass graft study;  Surgeon: Michele Hirsch MD;  Location: [facility] CATH LAB;  Service: Cardiology;;    IMPELLA, REMOVAL Right 8/15/2023    Procedure: Impella, Removal;  Surgeon: Colin Sibley MD;  Location: [facility] CATH LAB;  Service: Cardiology;  Laterality: Right;    INSERTION OF INTRAVASCULAR MICROAXIAL BLOOD PUMP N/A 8/11/2023    Procedure: INSERTION, IMPELLA;  Surgeon: Michele Hirsch MD;  Location: [facility] CATH LAB;  Service: Cardiology;  Laterality: N/A;    IVUS, CORONARY  8/11/2023    Procedure: IVUS, Coronary;  Surgeon: Michele Hirsch MD;  Location: [facility] CATH LAB;  Service: Cardiology;;    LEFT HEART CATHETERIZATION N/A 8/11/2023    Procedure: Left heart cath;  Surgeon: Michele Hirsch MD;  Location: [facility] CATH LAB;  Service: Cardiology;  Laterality: N/A;    PERCUTANEOUS CORONARY INTERVENTION, ARTERY N/A 8/11/2023    Procedure: Percutaneous coronary intervention;  Surgeon: Michele Hirsch MD;  Location: [facility] CATH LAB;  Service: Cardiology;  Laterality: N/A;    PLACEMENT OF SWAN MARLENI CATHETER WITH IMAGING GUIDANCE Left 8/15/2023    Procedure: INSERTION, CATHETER, SWAN-MARLENI, WITH IMAGING GUIDANCE;  Surgeon: Colin Sibley MD;  Location: [facility] CATH LAB;  Service: Cardiology;  Laterality: Left;    REMOVAL OF TUNNELED CENTRAL VENOUS CATHETER (CVC) N/A 8/15/2023    Procedure: REMOVAL, CATHETER, CENTRAL VENOUS, TUNNELED;  Surgeon: Colin Sibley MD;  Location: [facility] CATH LAB;  Service: Cardiology;  Laterality: N/A;    RIGHT HEART CATHETERIZATION Right 8/11/2023    Procedure: INSERTION, CATHETER, RIGHT HEART;  Surgeon:  Michele Hirsch MD;  Location: [facility] CATH LAB;  Service: Cardiology;  Laterality: Right;       Review of patient's allergies indicates:  No Known Allergies    Current Neurological Medications:   Not currently.     No current facility-administered medications on file prior to encounter.     Current Outpatient Medications on File Prior to Encounter   Medication Sig    aspirin (ECOTRIN) 81 MG EC tablet Take 81 mg by mouth.    FARXIGA 10 mg tablet Take 10 mg by mouth once daily.    LANTUS SOLOSTAR U-100 INSULIN glargine 100 units/mL SubQ pen Inject 50 Units into the skin once daily.    levothyroxine (SYNTHROID) 125 MCG tablet Take 125 mcg by mouth before breakfast.    metoprolol succinate (TOPROL-XL) 25 MG 24 hr tablet Take 12.5 mg by mouth once daily.    NOVOLOG FLEXPEN U-100 INSULIN 100 unit/mL (3 mL) InPn pen Inject 20 Units into the skin 3 (three) times daily with meals.    rosuvastatin (CRESTOR) 40 MG Tab Take 40 mg by mouth once daily.    torsemide (DEMADEX) 100 MG Tab Take 50 mg by mouth once daily.     Family History       Problem Relation (Age of Onset)    Hypertension Mother          Tobacco Use    Smoking status: Former     Current packs/day: 0.00     Types: Cigarettes    Smokeless tobacco: Never   Substance and Sexual Activity    Alcohol use: Yes    Drug use: No    Sexual activity: Not on file     Review of Systems   Constitutional:  Positive for activity change and fatigue. Negative for appetite change.   HENT: Negative.     Eyes: Negative.  Negative for photophobia.   Respiratory:  Positive for cough and shortness of breath.    Cardiovascular: Negative.  Negative for palpitations.   Gastrointestinal: Negative.    Genitourinary: Negative.    Musculoskeletal: Negative.    Skin: Negative.    Neurological:  Positive for seizures. Negative for light-headedness.   Psychiatric/Behavioral:  Negative for dysphoric mood and sleep disturbance.      Objective:     Vital Signs (Most Recent):  Temp: 97.9 °F (36.6 °C)  (08/21/23 1501)  Pulse: 102 (08/21/23 1701)  Resp: (!) 23 (08/21/23 1701)  BP: 92/69 (08/21/23 1701)  SpO2: 100 % (08/21/23 1701) Vital Signs (24h Range):  Temp:  [97.2 °F (36.2 °C)-98 °F (36.7 °C)] 97.9 °F (36.6 °C)  Pulse:  [] 102  Resp:  [11-24] 23  SpO2:  [98 %-100 %] 100 %  BP: ()/(51-69) 92/69     Weight: 124.7 kg (274 lb 14.4 oz)  Body mass index is 35.3 kg/m².     Physical Exam  Vitals and nursing note reviewed.   HENT:      Head: Normocephalic.   Eyes:      Pupils: Pupils are equal, round, and reactive to light.   Cardiovascular:      Rate and Rhythm: Normal rate and regular rhythm.   Pulmonary:      Effort: Pulmonary effort is normal.   Abdominal:      Palpations: Abdomen is soft.   Musculoskeletal:      Cervical back: Normal range of motion.   Skin:     General: Skin is warm.      Capillary Refill: Capillary refill takes 2 to 3 seconds.   Neurological:      Mental Status: He is alert and oriented to person, place, and time.      Cranial Nerves: Cranial nerves 2-12 are intact.      Coordination: Finger-Nose-Finger Test and Heel to Shin Test normal.      Deep Tendon Reflexes:      Reflex Scores:       Bicep reflexes are 2+ on the right side and 2+ on the left side.       Patellar reflexes are 2+ on the right side and 2+ on the left side.       Achilles reflexes are 2+ on the right side and 2+ on the left side.     Comments: See Neurological Exam.           NEUROLOGICAL EXAMINATION:     MENTAL STATUS   Oriented to person, place, and time.     CRANIAL NERVES   Cranial nerves II through XII intact.     CN III, IV, VI   Pupils are equal, round, and reactive to light.    MOTOR EXAM   Muscle bulk: normal  Overall muscle tone: normal    Strength   Right deltoid: 5/5  Left deltoid: 5/5  Right biceps: 5/5  Left biceps: 5/5  Right triceps: 5/5  Left triceps: 5/5  Right wrist flexion: 5/5  Left wrist flexion: 5/5  Right wrist extension: 5/5  Left wrist extension: 5/5  Right iliopsoas: 5/5  Left iliopsoas:  5/5  Right quadriceps: 5/5  Left quadriceps: 5/5  Right hamstrin/5  Left hamstrin/5  Right gastroc: 5/5  Left gastroc: 5/5    REFLEXES     Reflexes   Right biceps: 2+  Left biceps: 2+  Right patellar: 2+  Left patellar: 2+  Right achilles: 2+  Left achilles: 2+    SENSORY EXAM   Light touch normal.   Vibration normal.   Pinprick normal.     GAIT AND COORDINATION      Coordination   Finger to nose coordination: normal  Heel to shin coordination: normal    Tremor   Resting tremor: absent      Significant Labs: All pertinent lab results from the past 24 hours have been reviewed.    Significant Imaging: CT head, no acute intracranial process.

## 2023-08-21 NOTE — NURSING
Cardiac ICU Care Plan    POC reviewed with Itz Daniel . Questions and concerns addressed. No acute events overnight. Pt progressing toward goals. See below and flowsheets for full assessment and VS info.     CVPs 12, 13, and 12  Svo2 52  UOP this shift 860mls  Inc lasix gtt to 20mg/hr  Inc Caleb to 10ppm      Neuro:  Ben Coma Scale  Best Eye Response: 4-->(E4) spontaneous  Best Motor Response: 6-->(M6) obeys commands  Best Verbal Response: 5-->(V5) oriented  Ben Coma Scale Score: 15  Assessment Qualifiers: patient not sedated/intubated  Pupil PERRLA: yes    24 hr Temp:  [97.6 °F (36.4 °C)-98.4 °F (36.9 °C)]      CV:  Rhythm: sinus tachycardia   DVT prophylaxis: VTE Required Core Measure: Pharmacological prophylaxis initiated/maintained    CVP (mean): (S) 12 mmHg (08/21/23 0301)    Pulmonary Artery Catheter Assessment  08/15/23 1500 Internal Jugular Left-Current Insertion Depth (cm): 56.5 cm (08/20/23 1901)  PAP: 67/31 (08/21/23 0601)    Pulses  Right Radial Pulse: 1+ (weak)  Left Radial Pulse: 1+ (weak)  Right Dorsalis Pedis Pulse: 0 (absent) (MD aware)  Left Dorsalis Pedis Pulse: 1+ (weak)  Right Posterior Tibial Pulse: 1+ (weak)  Left Posterior Tibial Pulse: 1+ (weak)    Resp:  Flow (L/min): 4  Oxygen Concentration (%): 36    GI/:  GI prophylaxis: yes  Diet/Nutrition Received: consistent carb/diabetic diet, low saturated fat/low cholesterol, 2 gram sodium  Last Bowel Movement: 08/17/23  Voiding Characteristics: urethral catheter (bladder)       Urethral Catheter 08/12/23 0130 16 Fr.-Reason for Continuing Urinary Catheterization: Critically ill in ICU and requiring hourly monitoring of intake/output   Intake/Output Summary (Last 24 hours) at 8/21/2023 0630  Last data filed at 8/21/2023 0601  Gross per 24 hour   Intake 1491.82 ml   Output 1391 ml   Net 100.82 ml        Nutritional Supplement Intake: Quantity 0, Type:  n/a    Labs/Accuchecks:  Recent Labs   Lab 08/19/23  0427 08/19/23  0914  08/20/23  0232 08/20/23  0237 08/20/23  1604 08/20/23 2021 08/21/23  0249   WBC 7.38  --  7.50  --   --   --  6.63   RBC 2.69*  --  2.54*  --   --   --  2.43*   HGB 7.8*  --  7.5*  --   --   --  7.0*   HCT 23.8*   < > 22.9*   < > 22* 22* 21.9*     --  308  --   --   --  369    < > = values in this interval not displayed.      Recent Labs   Lab 08/14/23  1251 08/15/23  0425 08/15/23  0428 08/15/23  1211 08/16/23  0415 08/19/23  0427 08/20/23  0232 08/21/23  0249   INR  --   --    < >  --    < > 1.0 1.0 1.0   APTT 52.0* 58.8*  --  47.5*  --   --   --   --     < > = values in this interval not displayed.      Recent Labs     08/21/23 0249   *   K 4.3   CO2 23   CL 94*   BUN 61*   CREATININE 4.0*   ALKPHOS 83   ALT 15   AST 20   BILITOT 0.6       Recent Labs   Lab 08/20/23 0232   *      Recent Labs     08/20/23  0806 08/20/23  1604 08/20/23 2021   PH 7.348* 7.375 7.370   PCO2 49.5* 44.6 47.3*   PO2 29* 24* 29*   HCO3 27.2 26.1 27.3   POCSATURATED 50* 40* 52*   BE 2 1 2       Electrolytes: N/A - electrolytes WDL  Accuchecks: ACHS    Gtts/LDAs:   sodium chloride 0.9% 10 mL/hr at 08/21/23 0601    DOBUTamine IV infusion (non-titrating) 5 mcg/kg/min (08/21/23 0601)    furosemide (LASIX) 500 mg in 50 mL infusion (conc: 10 mg/mL) 20 mg/hr (08/21/23 0601)    insulin regular 1 units/mL infusion orderable (TRANSFER) 2.5 Units/hr (08/21/23 0601)    nitric oxide gas      sodium chloride 0.9%         Lines/Drains/Airways       Central Venous Catheter Line  Duration             Introducer 08/15/23 1630 Internal Jugular Left 5 days    Pulmonary Artery Catheter Assessment  08/15/23 1500 Internal Jugular Left 5 days              Drain  Duration                  Urethral Catheter 08/12/23 0130 16 Fr. 9 days              Peripheral Intravenous Line  Duration                  Peripheral IV - Single Lumen 08/18/23 1702 20 G Anterior;Right Upper Arm 2 days                    Skin/Wounds  Bathing/Skin Care: bath,  complete;back care;dressed/undressed;foot care;incontinence care (08/20/23 2101)  Wounds: No  Wound care consulted: No

## 2023-08-21 NOTE — ASSESSMENT & PLAN NOTE
Titrate insulin slowly to avoid hypoglycemia as the risk of hypoglycemia increases with decreased creatinine clearance.    Estimated Creatinine Clearance: 27.2 mL/min (A) (based on SCr of 4.4 mg/dL (H)).

## 2023-08-21 NOTE — SUBJECTIVE & OBJECTIVE
"Interval HPI:   Overnight events: Remains in CICU. BG well controlled on current IV/SQ insulin regimen. Creatinine 4.0. Diet Cardiac Consistent Carbohydrate; Standard Tray    Eatin%  Nausea: No  Hypoglycemia and intervention: No  Fever: No  TPN and/or TF: No  If yes, type of TF/TPN and rate: n/a    BP (!) 102/57   Pulse 103   Temp 97.7 °F (36.5 °C) (Oral)   Resp (!) 24   Ht 6' 2" (1.88 m)   Wt 124.7 kg (274 lb 14.4 oz)   SpO2 100%   BMI 35.30 kg/m²     Labs Reviewed and Include    Recent Labs   Lab 23  0249 23  0826   * 192*   CALCIUM 8.2* 8.7   ALBUMIN 2.5*  --    PROT 6.2  --    * 131*   K 4.3 4.1   CO2 23 22*   CL 94* 94*   BUN 61* 60*   CREATININE 4.0* 4.4*   ALKPHOS 83  --    ALT 15  --    AST 20  --    BILITOT 0.6  --      Lab Results   Component Value Date    WBC 7.81 2023    HGB 7.5 (L) 2023    HCT 23.5 (L) 2023    MCV 92 2023     2023     No results for input(s): "TSH", "FREET4" in the last 168 hours.  Lab Results   Component Value Date    HGBA1C 10.2 (H) 08/10/2023       Nutritional status:   Body mass index is 35.3 kg/m².  Lab Results   Component Value Date    ALBUMIN 2.5 (L) 2023    ALBUMIN 2.5 (L) 2023    ALBUMIN 2.4 (L) 2023     No results found for: "PREALBUMIN"    Estimated Creatinine Clearance: 27.2 mL/min (A) (based on SCr of 4.4 mg/dL (H)).    Accu-Checks  Recent Labs     23  1642 23  2114 23  0240 23  0847 23  1222 23  1710 23  2021 23  0252 23  0814 23  1241   POCTGLUCOSE 188* 154* 168* 131* 142* 158* 209* 168* 142* 151*       Current Medications and/or Treatments Impacting Glycemic Control  Immunotherapy:    Immunosuppressants       None          Steroids:   Hormones (From admission, onward)      None          Pressors:    Autonomic Drugs (From admission, onward)      Start     Stop Route Frequency Ordered    23 0930  NORepinephrine 4 " mg in dextrose 5% 250 mL infusion (premix) (titrating)        Question Answer Comment   Begin at (in mcg/kg/min): 0.02    Titrate by: (in mcg/kg/min) 0.02    Titrate interval: (in minutes) 5    Titrate to maintain: (MAP or SBP) MAP    Greater than: (in mmHg) 65    Maximum dose: (in mcg/kg/min) 3        -- IV Continuous 08/21/23 0818          Hyperglycemia/Diabetes Medications:   Antihyperglycemics (From admission, onward)      Start     Stop Route Frequency Ordered    08/20/23 0915  insulin regular in 0.9 % NaCl 100 unit/100 mL (1 unit/mL) infusion        Question:  Enter initial dose (Units/hr):  Answer:  2.5    -- IV Continuous 08/20/23 0910    08/16/23 0815  insulin aspart U-100 pen 6-12 Units         -- SubQ 3 times daily with meals 08/16/23 0813    08/15/23 1003  insulin aspart U-100 pen 0-10 Units         -- SubQ As needed (PRN) 08/15/23 0904

## 2023-08-21 NOTE — CONSULTS
[facility] - Cardiac Intensive Care  Neurology  Consult Note    Patient Name: Itz Daniel  MRN: [id number]  Admission Date: 8/11/2023  Hospital Length of Stay: 10 days  Code Status: Full Code   Attending Provider: Eric Moncada MD   Consulting Provider: Itz Vallejo MD  Primary Care Physician: Sunday Boo MD  Principal Problem:Ischemic cardiomyopathy    Inpatient consult to Neurology  Consult performed by: Itz Vallejo MD  Consult ordered by: Paul Chen MD         Subjective:       HPI:   54 yo. PMHx CKD III/IV, DM, HTN, CABG (2017), AICD placement, CAD. HFrEF, s/p right foot amputation (diabetic foot infection 2022), obesity. Presented to [facility] on 8/10 with nausea, vomiting, weakness and SOB. Treated for cardiogenic shock in the setting of NSTEMI (trops 24.8, lactic acid 2.7, , glucose 487, cr 2.5. On 8/21 Neurology was consulted for seizure-like activity. Patient was lying in bed in the morning of 8/21 and tried to sit down following and episode of cough and SOB. Suddenly had a course of vasovagal response. Nurse at the bedside witnessed provoked tonic-clonic movements on bilateral upper extremities (first episode for 5 minutes at 8am; second episode for 10 seconds at 9.30am). Associated loss of consciousness with lateral tongue biting. Patient does not recall event and refers this is the first episode in his life. No bowel or bladder movement (on [place]gu[place]). CT head at the time of episode without acute process.            Past Medical History:   Diagnosis Date    CKD stage 4, GFR 15-29 ml/min     HLD (hyperlipidemia)     Hypertension     Ischemic cardiomyopathy/Chronic HFrEF s/p CABG and AICD 2017    T2DM (type 2 diabetes mellitus)        Past Surgical History:   Procedure Laterality Date    CORONARY ARTERY BYPASS GRAFT  2017    3 vessel    CORONARY BYPASS GRAFT ANGIOGRAPHY  8/11/2023    Procedure: Bypass graft study;  Surgeon: Michele Hirsch MD;  Location:  [facility] CATH LAB;  Service: Cardiology;;    IMPELLA, REMOVAL Right 8/15/2023    Procedure: Impella, Removal;  Surgeon: Colin Sibley MD;  Location: [facility] CATH LAB;  Service: Cardiology;  Laterality: Right;    INSERTION OF INTRAVASCULAR MICROAXIAL BLOOD PUMP N/A 8/11/2023    Procedure: INSERTION, IMPELLA;  Surgeon: Michele Hirsch MD;  Location: [facility] CATH LAB;  Service: Cardiology;  Laterality: N/A;    IVUS, CORONARY  8/11/2023    Procedure: IVUS, Coronary;  Surgeon: Michele Hirsch MD;  Location: [facility] CATH LAB;  Service: Cardiology;;    LEFT HEART CATHETERIZATION N/A 8/11/2023    Procedure: Left heart cath;  Surgeon: Michele Hirsch MD;  Location: [facility] CATH LAB;  Service: Cardiology;  Laterality: N/A;    PERCUTANEOUS CORONARY INTERVENTION, ARTERY N/A 8/11/2023    Procedure: Percutaneous coronary intervention;  Surgeon: Michele Hirsch MD;  Location: [facility] CATH LAB;  Service: Cardiology;  Laterality: N/A;    PLACEMENT OF SWAN MARLENI CATHETER WITH IMAGING GUIDANCE Left 8/15/2023    Procedure: INSERTION, CATHETER, SWAN-MARLENI, WITH IMAGING GUIDANCE;  Surgeon: Colin Sibley MD;  Location: [facility] CATH LAB;  Service: Cardiology;  Laterality: Left;    REMOVAL OF TUNNELED CENTRAL VENOUS CATHETER (CVC) N/A 8/15/2023    Procedure: REMOVAL, CATHETER, CENTRAL VENOUS, TUNNELED;  Surgeon: Colin Sibley MD;  Location: [facility] CATH LAB;  Service: Cardiology;  Laterality: N/A;    RIGHT HEART CATHETERIZATION Right 8/11/2023    Procedure: INSERTION, CATHETER, RIGHT HEART;  Surgeon: Michele Hirsch MD;  Location: [facility] CATH LAB;  Service: Cardiology;  Laterality: Right;       Review of patient's allergies indicates:  No Known Allergies    Current Neurological Medications:   Not currently.     No current facility-administered medications on file prior to encounter.     Current Outpatient Medications on File Prior to Encounter   Medication Sig    aspirin (ECOTRIN) 81 MG EC tablet Take 81 mg by mouth.    FARXIGA 10 mg tablet  Take 10 mg by mouth once daily.    LANTUS SOLOSTAR U-100 INSULIN glargine 100 units/mL SubQ pen Inject 50 Units into the skin once daily.    levothyroxine (SYNTHROID) 125 MCG tablet Take 125 mcg by mouth before breakfast.    metoprolol succinate (TOPROL-XL) 25 MG 24 hr tablet Take 12.5 mg by mouth once daily.    NOVOLOG FLEXPEN U-100 INSULIN 100 unit/mL (3 mL) InPn pen Inject 20 Units into the skin 3 (three) times daily with meals.    rosuvastatin (CRESTOR) 40 MG Tab Take 40 mg by mouth once daily.    torsemide (DEMADEX) 100 MG Tab Take 50 mg by mouth once daily.     Family History       Problem Relation (Age of Onset)    Hypertension Mother          Tobacco Use    Smoking status: Former     Current packs/day: 0.00     Types: Cigarettes    Smokeless tobacco: Never   Substance and Sexual Activity    Alcohol use: Yes    Drug use: No    Sexual activity: Not on file     Review of Systems   Constitutional:  Positive for activity change and fatigue. Negative for appetite change.   HENT: Negative.     Eyes: Negative.  Negative for photophobia.   Respiratory:  Positive for cough and shortness of breath.    Cardiovascular: Negative.  Negative for palpitations.   Gastrointestinal: Negative.    Genitourinary: Negative.    Musculoskeletal: Negative.    Skin: Negative.    Neurological:  Positive for seizures. Negative for light-headedness.   Psychiatric/Behavioral:  Negative for dysphoric mood and sleep disturbance.      Objective:     Vital Signs (Most Recent):  Temp: 97.9 °F (36.6 °C) (08/21/23 1501)  Pulse: 102 (08/21/23 1701)  Resp: (!) 23 (08/21/23 1701)  BP: 92/69 (08/21/23 1701)  SpO2: 100 % (08/21/23 1701) Vital Signs (24h Range):  Temp:  [97.2 °F (36.2 °C)-98 °F (36.7 °C)] 97.9 °F (36.6 °C)  Pulse:  [] 102  Resp:  [11-24] 23  SpO2:  [98 %-100 %] 100 %  BP: ()/(51-69) 92/69     Weight: 124.7 kg (274 lb 14.4 oz)  Body mass index is 35.3 kg/m².     Physical Exam  Vitals and nursing note reviewed.   HENT:       Head: Normocephalic.   Eyes:      Pupils: Pupils are equal, round, and reactive to light.   Cardiovascular:      Rate and Rhythm: Normal rate and regular rhythm.   Pulmonary:      Effort: Pulmonary effort is normal.   Abdominal:      Palpations: Abdomen is soft.   Musculoskeletal:      Cervical back: Normal range of motion.   Skin:     General: Skin is warm.      Capillary Refill: Capillary refill takes 2 to 3 seconds.   Neurological:      Mental Status: He is alert and oriented to person, place, and time.      Cranial Nerves: Cranial nerves 2-12 are intact.      Coordination: Finger-Nose-Finger Test and Heel to Shin Test normal.      Deep Tendon Reflexes:      Reflex Scores:       Bicep reflexes are 2+ on the right side and 2+ on the left side.       Patellar reflexes are 2+ on the right side and 2+ on the left side.       Achilles reflexes are 2+ on the right side and 2+ on the left side.     Comments: See Neurological Exam.           NEUROLOGICAL EXAMINATION:     MENTAL STATUS   Oriented to person, place, and time.     CRANIAL NERVES   Cranial nerves II through XII intact.     CN III, IV, VI   Pupils are equal, round, and reactive to light.    MOTOR EXAM   Muscle bulk: normal  Overall muscle tone: normal    Strength   Right deltoid: 5/5  Left deltoid: 5/5  Right biceps: 5/5  Left biceps: 5/5  Right triceps: 5/5  Left triceps: 5/5  Right wrist flexion: 5/5  Left wrist flexion: 5/5  Right wrist extension: 5/5  Left wrist extension: 5/5  Right iliopsoas: 5/5  Left iliopsoas: 5/5  Right quadriceps: 5/5  Left quadriceps: 5/5  Right hamstrin/5  Left hamstrin/5  Right gastroc: 5/5  Left gastroc: 5/5    REFLEXES     Reflexes   Right biceps: 2+  Left biceps: 2+  Right patellar: 2+  Left patellar: 2+  Right achilles: 2+  Left achilles: 2+    SENSORY EXAM   Light touch normal.   Vibration normal.   Pinprick normal.     GAIT AND COORDINATION      Coordination   Finger to nose coordination: normal  Heel to shin  coordination: normal    Tremor   Resting tremor: absent      Significant Labs: All pertinent lab results from the past 24 hours have been reviewed.    Significant Imaging: CT head, no acute intracranial process.     Assessment and Plan:     Provoked seizures  De-rosario provoked seizures on bilateral upper extremities with loss of consciousness. No acute processes on brain imaging. Currently on pressors. At the moment without medications related to decrease seizure threshold. Possibly related to acute electrolyte imbalance and YVES in the setting of CKD (hypophosphatemia, hypermagnesemia, hyponatremia). If this were seizure, it would be considered provoked in setting of electrolyte abnormalities; no indication for AED.     Plan:   - Continue EEG. Pending results  - Optimize medications   - Optimize electrolytes (Mg, PO)  - Correct YVES in the setting of CKD for seizure threshold  - Monitor glucose levels   - Will signoff and follow EEG results peripherally.  - Please contact Neurology for any changes        VTE Risk Mitigation (From admission, onward)           Ordered     enoxaparin injection 40 mg  Every 24 hours         08/16/23 0736     IP VTE HIGH RISK PATIENT  Once         08/11/23 2117     Place sequential compression device  Until discontinued         08/11/23 2117                    Thank you for your consult. I will sign off. Please contact us if you have any additional questions.    Itz Vallejo MD  Neurology  [facility] - Cardiac Intensive Care

## 2023-08-21 NOTE — ASSESSMENT & PLAN NOTE
Lab Results   Component Value Date    HGBA1C 10.2 (H) 08/10/2023     - management per primary team

## 2023-08-21 NOTE — ASSESSMENT & PLAN NOTE
Acute kidney injury    Likely secondary to cardiogenic shock  Baseline creatinine of 1.8-2.   Worsening creatinine, furosemide held and monitoring response, however creatinine started worsening, with increased cardiac filling pressures and oliguria  Patient was restarted on furosemide gtt    Recent Labs   Lab 08/20/23  1751 08/21/23  0005 08/21/23  0249   CREATININE 4.0* 4.2* 4.0*     - Continue furosemide for now  - Monitor Cr  - Daily weights, strict I/O and chart, renally dose all medications   - Avoid nephrotoxic medications, NSAIDs, ACE/ARB, and IV contrast.  - Appreciate nephrology recs

## 2023-08-21 NOTE — SUBJECTIVE & OBJECTIVE
Current Outpatient Medications   Medication Instructions    aspirin (ECOTRIN) 81 mg, Oral    FARXIGA 10 mg, Oral, Daily    LANTUS SOLOSTAR U-100 INSULIN 50 Units, Subcutaneous, Daily    levothyroxine (SYNTHROID) 125 mcg, Oral, Before breakfast    metoprolol succinate (TOPROL-XL) 12.5 mg, Oral, Daily    NovoLOG FlexPen U-100 Insulin 20 Units, Subcutaneous, 3 times daily with meals    rosuvastatin (CRESTOR) 40 mg, Oral, Daily    torsemide (DEMADEX) 50 mg, Oral, Daily         Current Outpatient Medications on File Prior to Encounter   Medication Sig    aspirin (ECOTRIN) 81 MG EC tablet Take 81 mg by mouth.    FARXIGA 10 mg tablet Take 10 mg by mouth once daily.    LANTUS SOLOSTAR U-100 INSULIN glargine 100 units/mL SubQ pen Inject 50 Units into the skin once daily.    levothyroxine (SYNTHROID) 125 MCG tablet Take 125 mcg by mouth before breakfast.    metoprolol succinate (TOPROL-XL) 25 MG 24 hr tablet Take 12.5 mg by mouth once daily.    NOVOLOG FLEXPEN U-100 INSULIN 100 unit/mL (3 mL) InPn pen Inject 20 Units into the skin 3 (three) times daily with meals.    rosuvastatin (CRESTOR) 40 MG Tab Take 40 mg by mouth once daily.    torsemide (DEMADEX) 100 MG Tab Take 50 mg by mouth once daily.       Review of patient's allergies indicates:  No Known Allergies    Past Medical History:   Diagnosis Date    CKD stage 4, GFR 15-29 ml/min     HLD (hyperlipidemia)     Hypertension     Ischemic cardiomyopathy/Chronic HFrEF s/p CABG and AICD 2017    T2DM (type 2 diabetes mellitus)      Past Surgical History:   Procedure Laterality Date    CORONARY ARTERY BYPASS GRAFT  2017    3 vessel    CORONARY BYPASS GRAFT ANGIOGRAPHY  8/11/2023    Procedure: Bypass graft study;  Surgeon: Michele Hirsch MD;  Location: [facility] CATH LAB;  Service: Cardiology;;    IMPELLA, REMOVAL Right 8/15/2023    Procedure: Impella, Removal;  Surgeon: Colin Sibley MD;  Location: [facility] CATH LAB;  Service: Cardiology;  Laterality: Right;    INSERTION OF  INTRAVASCULAR MICROAXIAL BLOOD PUMP N/A 8/11/2023    Procedure: INSERTION, IMPELLA;  Surgeon: Michele Hirsch MD;  Location: [facility] CATH LAB;  Service: Cardiology;  Laterality: N/A;    IVUS, CORONARY  8/11/2023    Procedure: IVUS, Coronary;  Surgeon: Michele Hirsch MD;  Location: [facility] CATH LAB;  Service: Cardiology;;    LEFT HEART CATHETERIZATION N/A 8/11/2023    Procedure: Left heart cath;  Surgeon: Michele Hirsch MD;  Location: [facility] CATH LAB;  Service: Cardiology;  Laterality: N/A;    PERCUTANEOUS CORONARY INTERVENTION, ARTERY N/A 8/11/2023    Procedure: Percutaneous coronary intervention;  Surgeon: Michele Hirsch MD;  Location: [facility] CATH LAB;  Service: Cardiology;  Laterality: N/A;    PLACEMENT OF SWAN MARLENI CATHETER WITH IMAGING GUIDANCE Left 8/15/2023    Procedure: INSERTION, CATHETER, SWAN-MARLENI, WITH IMAGING GUIDANCE;  Surgeon: Colin Sibley MD;  Location: [facility] CATH LAB;  Service: Cardiology;  Laterality: Left;    REMOVAL OF TUNNELED CENTRAL VENOUS CATHETER (CVC) N/A 8/15/2023    Procedure: REMOVAL, CATHETER, CENTRAL VENOUS, TUNNELED;  Surgeon: Colin Sibley MD;  Location: [facility] CATH LAB;  Service: Cardiology;  Laterality: N/A;    RIGHT HEART CATHETERIZATION Right 8/11/2023    Procedure: INSERTION, CATHETER, RIGHT HEART;  Surgeon: Michele Hirsch MD;  Location: [facility] CATH LAB;  Service: Cardiology;  Laterality: Right;     Family History       Problem Relation (Age of Onset)    Hypertension Mother          Tobacco Use    Smoking status: Former     Current packs/day: 0.00     Types: Cigarettes    Smokeless tobacco: Never   Substance and Sexual Activity    Alcohol use: Yes    Drug use: No    Sexual activity: Not on file     Review of Systems   Unable to perform ROS: Other     Objective:     Vital Signs (Most Recent):  Temp: 97.7 °F (36.5 °C) (08/21/23 1101)  Pulse: 99 (08/21/23 1101)  Resp: (!) 24 (08/21/23 1101)  BP: (!) 111/59 (08/21/23 1101)  SpO2: 98 % (08/21/23 1101) Vital Signs (24h  Range):  Temp:  [97.2 °F (36.2 °C)-98.4 °F (36.9 °C)] 97.7 °F (36.5 °C)  Pulse:  [] 99  Resp:  [11-24] 24  SpO2:  [98 %-100 %] 98 %  BP: ()/(50-68) 111/59     Weight: 124.7 kg (274 lb 14.4 oz)  Body mass index is 35.3 kg/m².    SpO2: 98 %        Intake/Output - Last 3 Shifts         08/19 0700  08/20 0659 08/20 0700 08/21 0659 08/21 0700 08/22 0659    P.O. 1380 960 360    I.V. (mL/kg) 523.5 (4.2) 531.8 (4.3) 116.6 (0.9)    IV Piggyback   1    Total Intake(mL/kg) 1903.5 (15.4) 1491.8 (12) 477.6 (3.8)    Urine (mL/kg/hr) 1460 (0.5) 1391 (0.5) 510 (0.8)    Emesis/NG output   0    Total Output 1460 1391 510    Net +443.5 +100.8 -32.5           Emesis Occurrence   1 x             Lines/Drains/Airways       Central Venous Catheter Line  Duration             Introducer 08/15/23 1630 Internal Jugular Left 5 days    Pulmonary Artery Catheter Assessment  08/15/23 1500 Internal Jugular Left 5 days              Drain  Duration                  Urethral Catheter 08/12/23 0130 16 Fr. 9 days              Peripheral Intravenous Line  Duration                  Peripheral IV - Single Lumen 08/18/23 1702 20 G Anterior;Right Upper Arm 2 days         Peripheral IV - Single Lumen 08/21/23 0852 20 G Anterior;Proximal;Right Forearm <1 day                     STS Risk Score: n/a     Physical Exam  Vitals reviewed.   Constitutional:       General: He is not in acute distress.     Appearance: He is well-developed.   HENT:      Head: Normocephalic and atraumatic.   Eyes:      Pupils: Pupils are equal, round, and reactive to light.   Neck:      Vascular: No JVD.   Cardiovascular:      Rate and Rhythm: Normal rate and regular rhythm.   Pulmonary:      Effort: Pulmonary effort is normal. No respiratory distress.   Abdominal:      General: There is no distension.   Musculoskeletal:         General: Normal range of motion.      Cervical back: Normal range of motion.   Skin:     Coloration: Skin is not pale.   Neurological:       "General: No focal deficit present.      Mental Status: He is alert.   Psychiatric:         Speech: Speech normal.         Thought Content: Thought content normal.            Significant Labs:  ABGs:   Recent Labs   Lab 08/21/23  0833   PH 7.338*   PCO2 40.5   PO2 36*   HCO3 21.7*   POCSATURATED 65*   BE -4     Amylase: No results for input(s): "AMYLASE" in the last 48 hours.  BMP:   Recent Labs   Lab 08/21/23  0826   *   *   K 4.1   CL 94*   CO2 22*   BUN 60*   CREATININE 4.4*   CALCIUM 8.7   MG 2.8*     Cardiac markers:   Recent Labs   Lab 08/21/23  0826   TROPONINI 14.957*     CBC:   Recent Labs   Lab 08/21/23  0826   WBC 7.81   RBC 2.56*   HGB 7.5*   HCT 23.5*      MCV 92   MCH 29.3   MCHC 31.9*     CMP:   Recent Labs   Lab 08/21/23  0249 08/21/23  0826   * 192*   CALCIUM 8.2* 8.7   ALBUMIN 2.5*  --    PROT 6.2  --    * 131*   K 4.3 4.1   CO2 23 22*   CL 94* 94*   BUN 61* 60*   CREATININE 4.0* 4.4*   ALKPHOS 83  --    ALT 15  --    AST 20  --    BILITOT 0.6  --      Coagulation:   Recent Labs   Lab 08/21/23  0249   INR 1.0     Lactic Acid: No results for input(s): "LACTATE" in the last 48 hours.  LFTs:   Recent Labs   Lab 08/21/23  0249   ALT 15   AST 20   ALKPHOS 83   BILITOT 0.6   PROT 6.2   ALBUMIN 2.5*     Lipase: No results for input(s): "LIPASE" in the last 48 hours.    Significant Diagnostics: reviewed   CT 8/18/23  1. No evidence for hematoma as clinically questioned, noting noncontrast technique.  2. Small bilateral pleural effusions.  Bilateral pulmonary edema.  3. Intraluminal fluid in the esophagus to the level of the aortic arch.  Consider correlation for aspiration risk.  4. Bilateral pulmonary nodules, largest measuring 9 mm.  For multiple solid nodules with any 6 mm or greater, Fleischner Society guidelines recommend follow up with non-contrast chest CT at 3-6 months and 18-24 months after discovery.  5. Gallbladder sludge and mild gallbladder distension, " nonspecific.  6. Additional findings as above.    TTE 8/17/23    Left Ventricle: The left ventricle is severely dilated. Ventricular mass is normal. Normal wall thickness. regional wall motion abnormalities present. See diagram for wall motion findings. There is severely reduced systolic function with a visually estimated ejection fraction of 15 - 20%. Grade III diastolic dysfunction.    Left Atrium: Left atrium is moderately dilated.    Right Ventricle: Right ventricle was not well visualized due to poor acoustic window.    Mitral Valve: There is mild regurgitation.    Tricuspid Valve: There is mild regurgitation.    Pulmonary Artery: The estimated pulmonary artery systolic pressure is 41 mmHg.    IVC/SVC: Normal venous pressure at 3 mmHg.  LV 6.87  TAPSE 1.3    C 8/11/23  Coronary angiogram:  Left main: Patent  Left anterior descending artery:   just distal to the 1st septal   Left circumflex artery:  90-95% proximal stenosis, distal 70% calcified stenosis.  Diffusely diseased OM1,  of OM2  Right coronary artery:  Ostial      Grafts:  SVG-RCA:  Diffuse disease with narrowing of up to 40-50% in the proximal portion  The native PLB and PDA both diffusely diseased.  SVG-OM: Occluded  LIMA-LAD:  Widely patent.  Diffusely diseased native LAD with narrowing of up to 90% in the very apical portion.     Assessment:  Cardiogenic shock with extremely low cardiac outputs and elevated left and right filling pressures  NSTEMI - suspect acute graft closure of the SVG to OM2.  Severe native CAD status post PCI of the proximal circumflex with a 33 x 24 mm Synergy XD stent (post-dilated up to 4.25 mm)

## 2023-08-21 NOTE — PLAN OF CARE
Cardiac ICU Care Plan    POC reviewed with Itz Daniel and family. Questions and concerns addressed. No acute events today. Pt progressing toward goals. Will continue to monitor. See below and flowsheets for full assessment and VS info.       Neuro:  [place] Coma Scale  Best Eye Response: 4-->(E4) spontaneous  Best Motor Response: 6-->(M6) obeys commands  Best Verbal Response: 5-->(V5) oriented  [place] Coma Scale Score: 15  Assessment Qualifiers: patient not sedated/intubated  Pupil PERRLA: yes    24 hr Temp:  [97.2 °F (36.2 °C)-98 °F (36.7 °C)]      CV:  Rhythm: sinus tachycardia   DVT prophylaxis: VTE Required Core Measure: Pharmacological prophylaxis initiated/maintained    CVP (mean): 13 mmHg (08/21/23 1501)    Pulmonary Artery Catheter Assessment  08/15/23 1500 Internal Jugular Left-Current Insertion Depth (cm): 56.5 cm (08/21/23 1501)  [REMOVED] Pulmonary Artery Catheter Assessment  08/11/23 1500 Femoral Vein Right-Current Insertion Depth (cm): 78.5 cm (08/15/23 1101)  PAP: 73/37 (08/21/23 1501)  SVO2 (%): 47 % (08/21/23 0801)  CO (L/min): 5.3 L/min (Joe) (08/13/23 1601)       Type: Impella       Pulses  Right Radial Pulse: 1+ (weak)  Left Radial Pulse: 1+ (weak)  Right Dorsalis Pedis Pulse: Doppler  Left Dorsalis Pedis Pulse: Doppler  Right Posterior Tibial Pulse: Doppler  Left Posterior Tibial Pulse: Doppler    Resp:  Flow (L/min): 4  Oxygen Concentration (%): 36    GI/:  GI prophylaxis: yes  Diet/Nutrition Received: consistent carb/diabetic diet, low saturated fat/low cholesterol  Last Bowel Movement: 08/17/23  Voiding Characteristics: urethral catheter (bladder)       Urethral Catheter 08/12/23 0130 16 Fr.-Reason for Continuing Urinary Catheterization: Critically ill in ICU and requiring hourly monitoring of intake/output   Intake/Output Summary (Last 24 hours) at 8/21/2023 1539  Last data filed at 8/21/2023 1501  Gross per 24 hour   Intake 1687.24 ml   Output 1886 ml   Net -198.76 ml         Nutritional Supplement Intake: Quantity 0, Type:  n/a    Labs/Accuchecks:  Recent Labs   Lab 08/20/23 0232 08/20/23 0237 08/20/23 2021 08/21/23 0249 08/21/23  0826   WBC 7.50  --   --  6.63 7.81   RBC 2.54*  --   --  2.43* 2.56*   HGB 7.5*  --   --  7.0* 7.5*   HCT 22.9*   < > 22* 21.9* 23.5*     --   --  369 408    < > = values in this interval not displayed.      Recent Labs   Lab 08/15/23  0425 08/15/23  0428 08/15/23  1211 08/16/23 0415 08/19/23 0427 08/20/23 0232 08/21/23 0249   INR  --    < >  --    < > 1.0 1.0 1.0   APTT 58.8*  --  47.5*  --   --   --   --     < > = values in this interval not displayed.      Recent Labs     08/21/23 0249 08/21/23 0826   * 131*   K 4.3 4.1   CO2 23 22*   CL 94* 94*   BUN 61* 60*   CREATININE 4.0* 4.4*   ALKPHOS 83  --    ALT 15  --    AST 20  --    BILITOT 0.6  --        Recent Labs   Lab 08/20/23 0232 08/21/23  0826   *  --    TROPONINI  --  14.957*      Recent Labs     08/20/23 2021 08/21/23  0824 08/21/23  0833   PH 7.370 7.260* 7.338*   PCO2 47.3* 53.2* 40.5   PO2 29* 30* 36*   HCO3 27.3 23.8* 21.7*   POCSATURATED 52* 47* 65*   BE 2 -3 -4       Electrolytes: N/A - electrolytes WDL  Accuchecks: ACHS    Gtts/LDAs:   sodium chloride 0.9% 10 mL/hr at 08/21/23 1501    DOBUTamine IV infusion (non-titrating) 5 mcg/kg/min (08/21/23 1501)    furosemide (LASIX) 500 mg in 50 mL infusion (conc: 10 mg/mL) 20 mg/hr (08/21/23 1501)    insulin regular 1 units/mL infusion orderable (TRANSFER) 2.5 Units/hr (08/21/23 1501)    nitric oxide gas      NORepinephrine bitartrate-D5W Stopped (08/21/23 0825)    sodium chloride 0.9%         Lines/Drains/Airways       Central Venous Catheter Line  Duration             Pulmonary Artery Catheter Assessment  08/15/23 1500 Internal Jugular Left 6 days    Introducer 08/15/23 1630 Internal Jugular Left 5 days              Drain  Duration                  Urethral Catheter 08/12/23 0130 16 Fr. 9 days              Peripheral  Intravenous Line  Duration                  Peripheral IV - Single Lumen 08/18/23 1702 20 G Anterior;Right Upper Arm 2 days         Peripheral IV - Single Lumen 08/21/23 0852 20 G Anterior;Proximal;Right Forearm <1 day                    Skin/Wounds  Bathing/Skin Care: bath, complete;back care;dressed/undressed;foot care;incontinence care (08/20/23 2109)  Wounds: No  Wound care consulted: No

## 2023-08-21 NOTE — HPI
52 yo. PMHx CKD III/IV, DM, HTN, CABG (2017), AICD placement, CAD. HFrEF, s/p right foot amputation (diabetic foot infection 2022), obesity. Presented to [facility] on 8/10 with nausea, vomiting, weakness and SOB. Treated for cardiogenic shock in the setting of NSTEMI (trops 24.8, lactic acid 2.7, , glucose 487, cr 2.5. On 8/21 Neurology was consulted for seizure-like activity. Patient was lying in bed in the morning of 8/21 and tried to sit down following and episode of cough and SOB. Suddenly had a course of vasovagal response. Nurse at the bedside witnessed generalized tonic-clonic movements on bilateral upper extremities (first episode for 5 minutes at 8am; second episode for 10 seconds at 9.30am). Associated loss of consciousness with lateral tongue biting. Patient does not recall event and refers this is the first episode in his life. No bowel or bladder movement (on [place]). CT head at the time of episode without acute process.

## 2023-08-21 NOTE — ASSESSMENT & PLAN NOTE
- admitted for NSTEMI s/p CABG w/ PCI of the proximal circumflex with a 33 x 24 mm Synergy XD stent (post-dilated up to 4.25 mm) on 8/12/2023   - management per primary team

## 2023-08-21 NOTE — ASSESSMENT & PLAN NOTE
Ischemic ATN 2/2 decrease perfusion (severe hypotension d/t cardiogenic shock), contrast exposure, combined with aggressive diuresis in a patient with known advanced CKD  Baseline sCr: appears to be around ~2.  Admission sCr: 2.5 at [facility]  Lab Results   Component Value Date    CREATININE 4.4 (H) 08/21/2023    CREATININE 4.0 (H) 08/21/2023    CREATININE 4.2 (H) 08/21/2023     Last UPCR:   Prot/Creat Ratio, Urine   Date Value Ref Range Status   08/19/2023 1.35 (H) 0.00 - 0.20 Final       Plan/Recommendations:   - YVES secondary to acute tubular necrosis in the setting of low effective circulatory volume with known heart failure/shock  - appears to be making good urine with Lasix infusion although kidney function declining   - renal diet when not NPO  - keep MAP > 65 mmHg  - serial RFPs and magnesium levels in light of active diuresis   - daily weights and strict I/Os  - renally dose medications to eGFR  - avoid nephrotoxins as much as possible (i.e. supra-therapeutic vancomycin troughs, NSAIDs, intra-arterial contrast, etc.)  - no acute indications for RRT at this time however will continue to monitor closely

## 2023-08-21 NOTE — SUBJECTIVE & OBJECTIVE
Interval History: Patient seen and examined. Questionable vagal/syncopal event this morning. Overnight with diminishing UOP despite Argueta adjustments for which Lasix infusion increased from 10 mg to 10 mg/hr. He is afebrile with pulse ranging from 100-90s bpm. Systolic blood pressure ranging from 100-80s mmHg. He is saturating +98% on 4 liters via nasal cannula (FiO2 36%). Renal function with decline.    Review of patient's allergies indicates:  No Known Allergies  Current Facility-Administered Medications   Medication Frequency    0.9%  NaCl infusion Continuous    acetaminophen tablet 650 mg Q6H PRN    aspirin EC tablet 81 mg Daily    atorvastatin tablet 80 mg Daily    dextrose 10% bolus 125 mL 125 mL PRN    dextrose 10% bolus 250 mL 250 mL PRN    DOBUtamine 1000 mg in D5W 250 mL infusion Continuous    enoxaparin injection 40 mg Q24H (prophylaxis, 1700)    furosemide (LASIX) 500 mg in 50 mL infusion (conc: 10 mg/mL) Continuous    glucagon (human recombinant) injection 1 mg PRN    glucose chewable tablet 16 g PRN    glucose chewable tablet 24 g PRN    hydrALAZINE tablet 25 mg TID    insulin aspart U-100 pen 0-10 Units PRN    insulin aspart U-100 pen 6-12 Units TIDWM    insulin regular in 0.9 % NaCl 100 unit/100 mL (1 unit/mL) infusion Continuous    isosorbide dinitrate tablet 20 mg TID    levothyroxine tablet 125 mcg Before breakfast    LIDOcaine (PF) 10 mg/ml (1%) injection 100 mg Once    LIDOcaine 5 % patch 1 patch Q24H    LORazepam (ATIVAN) 2 mg/mL injection     methocarbamoL tablet 500 mg Q6H PRN    nitric oxide gas Gas 10 ppm Continuous    NORepinephrine 4 mg in dextrose 5% 250 mL infusion (premix) (titrating) Continuous    ondansetron disintegrating tablet 8 mg Q8H PRN    oxyCODONE immediate release tablet 5 mg Q8H PRN    pantoprazole EC tablet 40 mg Daily    polyethylene glycol packet 17 g TID    senna tablet 8.6 mg BID    sodium chloride 0.9% flush 10 mL PRN    sodium chloride 0.9% flush 10 mL Continuous     sodium chloride 0.9% flush 3 mL Q6H PRN    ticagrelor tablet 90 mg BID       Objective:     Vital Signs (Most Recent):  Temp: 98 °F (36.7 °C) (08/21/23 0301)  Pulse: 101 (08/21/23 0801)  Resp: 18 (08/21/23 0801)  BP: 103/64 (08/21/23 0801)  SpO2: 100 % (08/21/23 0801) Vital Signs (24h Range):  Temp:  [97.6 °F (36.4 °C)-98.4 °F (36.9 °C)] 98 °F (36.7 °C)  Pulse:  [100-107] 101  Resp:  [13-25] 18  SpO2:  [98 %-100 %] 100 %  BP: ()/(50-67) 103/64     Weight: 124.7 kg (274 lb 14.4 oz) (08/21/23 0300)  Body mass index is 35.3 kg/m².  Body surface area is 2.55 meters squared.    I/O last 3 completed shifts:  In: 2353.8 [P.O.:1560; I.V.:793.8]  Out: 1756 [Urine:1756]     Physical Exam  Vitals and nursing note reviewed.   Constitutional:       General: He is awake. He is not in acute distress.     Appearance: He is obese. He is ill-appearing. He is not diaphoretic.      Interventions: Nasal cannula in place.   HENT:      Head: Normocephalic and atraumatic.      Right Ear: External ear normal.      Left Ear: External ear normal.      Nose:      Comments: Nasal cannula in place.     Mouth/Throat:      Mouth: Mucous membranes are moist.      Pharynx: Oropharynx is clear. No oropharyngeal exudate or posterior oropharyngeal erythema.   Eyes:      General: No scleral icterus.        Right eye: No discharge.         Left eye: No discharge.      Extraocular Movements: Extraocular movements intact.      Conjunctiva/sclera: Conjunctivae normal.   Neck:      Comments: CVC & Peralta Tariq catheter to left internal jugular vein.  Cardiovascular:      Rate and Rhythm: Tachycardia present.      Heart sounds: Murmur heard.      Systolic murmur is present with a grade of 1/6.      No friction rub. No gallop.      Comments: Bilateral trace lower extremity edema.  Pulmonary:      Effort: Pulmonary effort is normal. No respiratory distress.      Breath sounds: Rales present. No wheezing or rhonchi.      Comments: Faint bibasilar crackles  appreciated.   Abdominal:      General: Bowel sounds are normal. There is no distension.      Palpations: Abdomen is soft. There is no mass.      Tenderness: There is no abdominal tenderness.   Genitourinary:     Comments: Argueta catheter in place.  Musculoskeletal:      Cervical back: Neck supple.      Right lower leg: Edema present.      Left lower leg: Edema present.   Skin:     General: Skin is warm and dry.      Coloration: Skin is not jaundiced.   Neurological:      General: No focal deficit present.      Mental Status: He is alert. Mental status is at baseline.      Cranial Nerves: No cranial nerve deficit.      Motor: No weakness.   Psychiatric:         Mood and Affect: Mood normal.         Behavior: Behavior normal. Behavior is cooperative.          Significant Labs:  ABGs:   Recent Labs   Lab 08/21/23  0833   PH 7.338*   PCO2 40.5   HCO3 21.7*   POCSATURATED 65*   BE -4     BMP:   Recent Labs   Lab 08/21/23  0249   *   *   K 4.3   CL 94*   CO2 23   BUN 61*   CREATININE 4.0*   CALCIUM 8.2*   MG 2.6     CBC:   Recent Labs   Lab 08/21/23  0826   WBC 7.81   RBC 2.56*   HGB 7.5*   HCT 23.5*      MCV 92   MCH 29.3   MCHC 31.9*     CMP:   Recent Labs   Lab 08/21/23  0249   *   CALCIUM 8.2*   ALBUMIN 2.5*   PROT 6.2   *   K 4.3   CO2 23   CL 94*   BUN 61*   CREATININE 4.0*   ALKPHOS 83   ALT 15   AST 20   BILITOT 0.6     Coagulation:   Recent Labs   Lab 08/15/23  1211 08/16/23  0415 08/21/23  0249   INR  --    < > 1.0   APTT 47.5*  --   --     < > = values in this interval not displayed.     LFTs:   Recent Labs   Lab 08/21/23  0249   ALT 15   AST 20   ALKPHOS 83   BILITOT 0.6   PROT 6.2   ALBUMIN 2.5*     Microbiology Results (last 7 days)       Procedure Component Value Units Date/Time    Blood culture [[id number]] Collected: 08/13/23 0920    Order Status: Completed Specimen: Blood from Peripheral, Antecubital, Left Updated: 08/18/23 1012     Blood Culture, Routine No growth after 5  days.    Blood culture [[id number]] Collected: 08/12/23 0103    Order Status: Completed Specimen: Blood from Peripheral, Hand, Left Updated: 08/17/23 0612     Blood Culture, Routine No growth after 5 days.    Blood culture [[id number]]  (Abnormal) Collected: 08/12/23 0042    Order Status: Completed Specimen: Blood from Line, Jugular, Internal Right Updated: 08/15/23 0744     Blood Culture, Routine Gram stain aer bottle: Gram positive cocci in clusters resembling Staph      Results called to and read back by:Jojo Valdes Rn 08/13/2023  02:56      COAGULASE-NEGATIVE STAPHYLOCOCCUS SPECIES  Organism is a probable contaminant            Specimen (24h ago, onward)      None          Urinary sediment on 08/21/2023:                Significant Imaging:  I have reviewed all imagining in the last 24 hours.

## 2023-08-21 NOTE — CONSULTS
[facility] - Cardiac Intensive Care  Cardiothoracic Surgery  Consult Note    Patient Name: Itz Daniel  MRN: [id number]  Admission Date: 8/11/2023  Attending Physician: Margoth Mackenzie MD  Referring Provider: Rin Luke NP    Patient information was obtained from patient, past medical records and ER records.     Inpatient consult to Cardiothoracic Surgery  Consult performed by: Nina To PA-C  Consult ordered by: Zachary Villanueva NP        Subjective:     Principal Problem: Ischemic cardiomyopathy    History of Present Illness: 52-year-old male with a past medical history of ICM, CABG in 2017 (LIMA to LAD, SVG to OM1, SVG to PDA), DM type II, CKD stage IV (baseline 1.8), and ICD who presented [facility] ED on 8/10//23 due to n/v and SOB. Work up concerning for NSTEMI with peak trop of 38. Started on ACS protocol with asa, ticagrelor, and heparin gtt. Also noted to have YVES with Cr 2.5, volume overloaded with BNP of 796, LA 2.8>>1.0. TTE with EF drop from 30 to 15%, LVEDD 6.15, moderately reduced RV function. LHC/RHC on 8/12: s/p MIRELLA to prox Cx. RHC showed RA 20, RV 81/21, PA 81/47 (mean 61), PWCP 45, CO/CI: 3.69/1.52 (VIKKI)  3.91/1.6 (TD), therefore, impella CP was placed in in right femoral artery and leave in swan in right femoral vein. He was then transferred to [facility] for higher level of care. Of note, never started on inotropes or pressors. Was receiving metoprolol.      On arrival patient was hemodynamically stable.  Not on any inotropes or pressors.  Impella was at P7  Initial hemodynamics  CVP: 9  SVO2:  44  Cardiac Output: 4.9  Cardiac Index: 2.0  SVR: 1250     C on 8/12  Grafts:  SVG-RCA:  Diffuse disease with narrowing of up to 40-50% in the proximal portion  The native PLB and PDA both diffusely diseased.  SVG-OM: Occluded  LIMA-LAD:  Widely patent.  Diffusely diseased native LAD with narrowing of up to 90% in the very apical portion.  Severe native CAD status post PCI of the proximal  circumflex with a 33 x 24 mm Synergy XD stent (post-dilated up to 4.25 mm)         Current Outpatient Medications   Medication Instructions    aspirin (ECOTRIN) 81 mg, Oral    FARXIGA 10 mg, Oral, Daily    LANTUS SOLOSTAR U-100 INSULIN 50 Units, Subcutaneous, Daily    levothyroxine (SYNTHROID) 125 mcg, Oral, Before breakfast    metoprolol succinate (TOPROL-XL) 12.5 mg, Oral, Daily    NovoLOG FlexPen U-100 Insulin 20 Units, Subcutaneous, 3 times daily with meals    rosuvastatin (CRESTOR) 40 mg, Oral, Daily    torsemide (DEMADEX) 50 mg, Oral, Daily         Current Outpatient Medications on File Prior to Encounter   Medication Sig    aspirin (ECOTRIN) 81 MG EC tablet Take 81 mg by mouth.    FARXIGA 10 mg tablet Take 10 mg by mouth once daily.    LANTUS SOLOSTAR U-100 INSULIN glargine 100 units/mL SubQ pen Inject 50 Units into the skin once daily.    levothyroxine (SYNTHROID) 125 MCG tablet Take 125 mcg by mouth before breakfast.    metoprolol succinate (TOPROL-XL) 25 MG 24 hr tablet Take 12.5 mg by mouth once daily.    NOVOLOG FLEXPEN U-100 INSULIN 100 unit/mL (3 mL) InPn pen Inject 20 Units into the skin 3 (three) times daily with meals.    rosuvastatin (CRESTOR) 40 MG Tab Take 40 mg by mouth once daily.    torsemide (DEMADEX) 100 MG Tab Take 50 mg by mouth once daily.       Review of patient's allergies indicates:  No Known Allergies    Past Medical History:   Diagnosis Date    CKD stage 4, GFR 15-29 ml/min     HLD (hyperlipidemia)     Hypertension     Ischemic cardiomyopathy/Chronic HFrEF s/p CABG and AICD 2017    T2DM (type 2 diabetes mellitus)      Past Surgical History:   Procedure Laterality Date    CORONARY ARTERY BYPASS GRAFT  2017    3 vessel    CORONARY BYPASS GRAFT ANGIOGRAPHY  8/11/2023    Procedure: Bypass graft study;  Surgeon: Michele Hirsch MD;  Location: [facility] CATH LAB;  Service: Cardiology;;    IMPELLA, REMOVAL Right 8/15/2023    Procedure: Impella, Removal;  Surgeon: Colin Sibley MD;   Location: [facility] CATH LAB;  Service: Cardiology;  Laterality: Right;    INSERTION OF INTRAVASCULAR MICROAXIAL BLOOD PUMP N/A 8/11/2023    Procedure: INSERTION, IMPELLA;  Surgeon: Michele Hirsch MD;  Location: [facility] CATH LAB;  Service: Cardiology;  Laterality: N/A;    IVUS, CORONARY  8/11/2023    Procedure: IVUS, Coronary;  Surgeon: Michele Hirsch MD;  Location: [facility] CATH LAB;  Service: Cardiology;;    LEFT HEART CATHETERIZATION N/A 8/11/2023    Procedure: Left heart cath;  Surgeon: Michele Hirsch MD;  Location: [facility] CATH LAB;  Service: Cardiology;  Laterality: N/A;    PERCUTANEOUS CORONARY INTERVENTION, ARTERY N/A 8/11/2023    Procedure: Percutaneous coronary intervention;  Surgeon: Michele Hirsch MD;  Location: [facility] CATH LAB;  Service: Cardiology;  Laterality: N/A;    PLACEMENT OF SWAN MARLNEI CATHETER WITH IMAGING GUIDANCE Left 8/15/2023    Procedure: INSERTION, CATHETER, SWAN-MARLENI, WITH IMAGING GUIDANCE;  Surgeon: Colin Sibley MD;  Location: [facility] CATH LAB;  Service: Cardiology;  Laterality: Left;    REMOVAL OF TUNNELED CENTRAL VENOUS CATHETER (CVC) N/A 8/15/2023    Procedure: REMOVAL, CATHETER, CENTRAL VENOUS, TUNNELED;  Surgeon: Colin Sibley MD;  Location: [facility] CATH LAB;  Service: Cardiology;  Laterality: N/A;    RIGHT HEART CATHETERIZATION Right 8/11/2023    Procedure: INSERTION, CATHETER, RIGHT HEART;  Surgeon: Michele Hirsch MD;  Location: [facility] CATH LAB;  Service: Cardiology;  Laterality: Right;     Family History       Problem Relation (Age of Onset)    Hypertension Mother          Tobacco Use    Smoking status: Former     Current packs/day: 0.00     Types: Cigarettes    Smokeless tobacco: Never   Substance and Sexual Activity    Alcohol use: Yes    Drug use: No    Sexual activity: Not on file     Review of Systems   Unable to perform ROS: Other     Objective:     Vital Signs (Most Recent):  Temp: 97.7 °F (36.5 °C) (08/21/23 1101)  Pulse: 99 (08/21/23 1101)  Resp: (!) 24 (08/21/23  1101)  BP: (!) 111/59 (08/21/23 1101)  SpO2: 98 % (08/21/23 1101) Vital Signs (24h Range):  Temp:  [97.2 °F (36.2 °C)-98.4 °F (36.9 °C)] 97.7 °F (36.5 °C)  Pulse:  [] 99  Resp:  [11-24] 24  SpO2:  [98 %-100 %] 98 %  BP: ()/(50-68) 111/59     Weight: 124.7 kg (274 lb 14.4 oz)  Body mass index is 35.3 kg/m².    SpO2: 98 %        Intake/Output - Last 3 Shifts         08/19 0700 08/20 0659 08/20 0700 08/21 0659 08/21 0700 08/22 0659    P.O. 1380 960 360    I.V. (mL/kg) 523.5 (4.2) 531.8 (4.3) 116.6 (0.9)    IV Piggyback   1    Total Intake(mL/kg) 1903.5 (15.4) 1491.8 (12) 477.6 (3.8)    Urine (mL/kg/hr) 1460 (0.5) 1391 (0.5) 510 (0.8)    Emesis/NG output   0    Total Output 1460 1391 510    Net +443.5 +100.8 -32.5           Emesis Occurrence   1 x             Lines/Drains/Airways       Central Venous Catheter Line  Duration             Introducer 08/15/23 1630 Internal Jugular Left 5 days    Pulmonary Artery Catheter Assessment  08/15/23 1500 Internal Jugular Left 5 days              Drain  Duration                  Urethral Catheter 08/12/23 0130 16 Fr. 9 days              Peripheral Intravenous Line  Duration                  Peripheral IV - Single Lumen 08/18/23 1702 20 G Anterior;Right Upper Arm 2 days         Peripheral IV - Single Lumen 08/21/23 0852 20 G Anterior;Proximal;Right Forearm <1 day                     STS Risk Score: n/a     Physical Exam  Vitals reviewed.   Constitutional:       General: He is not in acute distress.     Appearance: He is well-developed.   HENT:      Head: Normocephalic and atraumatic.   Eyes:      Pupils: Pupils are equal, round, and reactive to light.   Neck:      Vascular: No JVD.   Cardiovascular:      Rate and Rhythm: Normal rate and regular rhythm.   Pulmonary:      Effort: Pulmonary effort is normal. No respiratory distress.   Abdominal:      General: There is no distension.   Musculoskeletal:         General: Normal range of motion.      Cervical back: Normal  "range of motion.   Skin:     Coloration: Skin is not pale.   Neurological:      General: No focal deficit present.      Mental Status: He is alert.   Psychiatric:         Speech: Speech normal.         Thought Content: Thought content normal.            Significant Labs:  ABGs:   Recent Labs   Lab 08/21/23  0833   PH 7.338*   PCO2 40.5   PO2 36*   HCO3 21.7*   POCSATURATED 65*   BE -4     Amylase: No results for input(s): "AMYLASE" in the last 48 hours.  BMP:   Recent Labs   Lab 08/21/23  0826   *   *   K 4.1   CL 94*   CO2 22*   BUN 60*   CREATININE 4.4*   CALCIUM 8.7   MG 2.8*     Cardiac markers:   Recent Labs   Lab 08/21/23  0826   TROPONINI 14.957*     CBC:   Recent Labs   Lab 08/21/23  0826   WBC 7.81   RBC 2.56*   HGB 7.5*   HCT 23.5*      MCV 92   MCH 29.3   MCHC 31.9*     CMP:   Recent Labs   Lab 08/21/23  0249 08/21/23  0826   * 192*   CALCIUM 8.2* 8.7   ALBUMIN 2.5*  --    PROT 6.2  --    * 131*   K 4.3 4.1   CO2 23 22*   CL 94* 94*   BUN 61* 60*   CREATININE 4.0* 4.4*   ALKPHOS 83  --    ALT 15  --    AST 20  --    BILITOT 0.6  --      Coagulation:   Recent Labs   Lab 08/21/23  0249   INR 1.0     Lactic Acid: No results for input(s): "LACTATE" in the last 48 hours.  LFTs:   Recent Labs   Lab 08/21/23  0249   ALT 15   AST 20   ALKPHOS 83   BILITOT 0.6   PROT 6.2   ALBUMIN 2.5*     Lipase: No results for input(s): "LIPASE" in the last 48 hours.    Significant Diagnostics: reviewed   CT 8/18/23  1. No evidence for hematoma as clinically questioned, noting noncontrast technique.  2. Small bilateral pleural effusions.  Bilateral pulmonary edema.  3. Intraluminal fluid in the esophagus to the level of the aortic arch.  Consider correlation for aspiration risk.  4. Bilateral pulmonary nodules, largest measuring 9 mm.  For multiple solid nodules with any 6 mm or greater, Fleischner Society guidelines recommend follow up with non-contrast chest CT at 3-6 months and 18-24 months " after discovery.  5. Gallbladder sludge and mild gallbladder distension, nonspecific.  6. Additional findings as above.    TTE 8/17/23    Left Ventricle: The left ventricle is severely dilated. Ventricular mass is normal. Normal wall thickness. regional wall motion abnormalities present. See diagram for wall motion findings. There is severely reduced systolic function with a visually estimated ejection fraction of 15 - 20%. Grade III diastolic dysfunction.    Left Atrium: Left atrium is moderately dilated.    Right Ventricle: Right ventricle was not well visualized due to poor acoustic window.    Mitral Valve: There is mild regurgitation.    Tricuspid Valve: There is mild regurgitation.    Pulmonary Artery: The estimated pulmonary artery systolic pressure is 41 mmHg.    IVC/SVC: Normal venous pressure at 3 mmHg.  LV 6.87  TAPSE 1.3    LHC 8/11/23  Coronary angiogram:  Left main: Patent  Left anterior descending artery:   just distal to the 1st septal   Left circumflex artery:  90-95% proximal stenosis, distal 70% calcified stenosis.  Diffusely diseased OM1,  of OM2  Right coronary artery:  Ostial      Grafts:  SVG-RCA:  Diffuse disease with narrowing of up to 40-50% in the proximal portion  The native PLB and PDA both diffusely diseased.  SVG-OM: Occluded  LIMA-LAD:  Widely patent.  Diffusely diseased native LAD with narrowing of up to 90% in the very apical portion.     Assessment:  Cardiogenic shock with extremely low cardiac outputs and elevated left and right filling pressures  NSTEMI - suspect acute graft closure of the SVG to OM2.  Severe native CAD status post PCI of the proximal circumflex with a 33 x 24 mm Synergy XD stent (post-dilated up to 4.25 mm)           Assessment/Plan:     NYHA Score: NYHA IV: inability to carry on any physical activity without discomfort    * Ischemic cardiomyopathy  CTS consulted for advacned heart failure options. Patient with prior CABG in 2017 by   Mike. Recent stent to circumflex 8/11/23. Transferred in cardiogenic shock with impella which has since been removed. This morning patient with witnessed seizure like activity. Neurology consulted. A1C 10%. BUN/Creatinine 60/4.4. Troponin today 14. Blood group A. Concern with before mentioned labs. Dr. Noriega to review and staff.         Thank you for your consult. I will follow-up with patient. Please contact us if you have any additional questions.    Nina To PA-C  Cardiothoracic Surgery  [facility] - Cardiac [facility]    Patient seen and pertinent studies were reviewed.  At this stage patient's creatinine is 4.4 and troponins are greater than 15.  Patient also had a new seizure light activity for which further investigation is be carried out.  Patient's hemoglobin A1c is greater than 10.  I have discussed this concern with Dr. Moncada, who is in agreement with the plan.  We will discuss further candidacy at the selection meeting.

## 2023-08-21 NOTE — HPI
52-year-old male with a past medical history of ICM, CABG in 2017 (LIMA to LAD, SVG to OM1, SVG to PDA), DM type II, CKD stage IV (baseline 1.8), and ICD who presented [place] ED on 8/10//23 due to n/v and SOB. Work up concerning for NSTEMI with peak trop of 38. Started on ACS protocol with asa, ticagrelor, and heparin gtt. Also noted to have YVES with Cr 2.5, volume overloaded with BNP of 796, LA 2.8>>1.0. TTE with EF drop from 30 to 15%, LVEDD 6.15, moderately reduced RV function. LHC/RHC on 8/12: s/p MIRELLA to prox Cx. RHC showed RA 20, RV 81/21, PA 81/47 (mean 61), PWCP 45, CO/CI: 3.69/1.52 (VIKKI)  3.91/1.6 (TD), therefore, impella CP was placed in in right femoral artery and leave in swan in right femoral vein. He was then transferred to [facility] for higher level of care. Of note, never started on inotropes or pressors. Was receiving metoprolol.      On arrival patient was hemodynamically stable.  Not on any inotropes or pressors.  Impella was at P7  Initial hemodynamics  CVP: 9  SVO2:  44  Cardiac Output: 4.9  Cardiac Index: 2.0  SVR: 1250     [facility] on 8/12  Grafts:  SVG-RCA:  Diffuse disease with narrowing of up to 40-50% in the proximal portion  The native PLB and PDA both diffusely diseased.  SVG-OM: Occluded  LIMA-LAD:  Widely patent.  Diffusely diseased native LAD with narrowing of up to 90% in the very apical portion.  Severe native CAD status post PCI of the proximal circumflex with a 33 x 24 mm Synergy XD stent (post-dilated up to 4.25 mm)

## 2023-08-21 NOTE — ASSESSMENT & PLAN NOTE
Cardiogenic shock    52-year-old male with past medical history of ischemic cardiomyopathy status post CABG in 2017 who presents as a transfer from outside hospital after he presented with an NSTEMI status post revascularization to proximal circumflex.  Associated cardiogenic shock noted from right heart catheterization, CP Impella placed on a 12. 3.4cm from AV to inlet on bedside echo. Since removal of Impella, patient has had elevated PAP and PCWP, and CVP 10. Diuresis improved mildly CVP, but kidney function worsened.    - Restarted on Furosemide  - Monitor need for Caleb  - Continue  5  - Continue Hydralazine/Isosorbide and hydralazine 20/25mg TID  - Trend BMP q8 hour   - Consult Interventional cardiology for possible recommendations regarding ischemic cardiomyopathy  - Consult CT surgery for possible advance therapies evaluation  - Consult palliative  - TTE below    Results for orders placed during the hospital encounter of 08/11/23    Echo    Interpretation Summary    Left Ventricle: The left ventricle is severely dilated. Ventricular mass is normal. Normal wall thickness. regional wall motion abnormalities present. See diagram for wall motion findings. There is severely reduced systolic function with a visually estimated ejection fraction of 15 - 20%. Grade III diastolic dysfunction.    Left Atrium: Left atrium is moderately dilated.    Right Ventricle: Right ventricle was not well visualized due to poor acoustic window.    Mitral Valve: There is mild regurgitation.    Tricuspid Valve: There is mild regurgitation.    Pulmonary Artery: The estimated pulmonary artery systolic pressure is 41 mmHg.    IVC/SVC: Normal venous pressure at 3 mmHg.

## 2023-08-21 NOTE — ASSESSMENT & PLAN NOTE
Most recent echocardiogram on 08/17/2023:    Left Ventricle: The left ventricle is severely dilated. Ventricular mass is normal. Normal wall thickness. regional wall motion abnormalities present. See diagram for wall motion findings. There is severely reduced systolic function with a visually estimated ejection fraction of 15 - 20%. Grade III diastolic dysfunction.    Left Atrium: Left atrium is moderately dilated.    Right Ventricle: Right ventricle was not well visualized due to poor acoustic window.    Mitral Valve: There is mild regurgitation.    Tricuspid Valve: There is mild regurgitation.    Pulmonary Artery: The estimated pulmonary artery systolic pressure is 41 mmHg.    IVC/SVC: Normal venous pressure at 3 mmHg.    - management per primary team  - currently on dobutamine infusion at 5 mg/hr and Lasix infusion at 20 mg/hr

## 2023-08-21 NOTE — PROGRESS NOTES
[facility] - Cardiac Intensive Care  Heart Transplant  Progress Note    Patient Name: Itz Daniel  MRN: [id number]  Admission Date: 8/11/2023  Hospital Length of Stay: 10 days  Attending Physician: Margoth Mackenzie MD  Primary Care Provider: Sunday Boo MD  Principal Problem:Ischemic cardiomyopathy    Subjective:     Interval History: Patient became oliguric last night and with worsening renal function and increasing filling pressures, so furosemide gtt was restarted. He had an episode of tonic clonic seizure activity this am.     HD    CVP 12  SvO2 65  CO 14.5  CI 5.7      Yesterday   CVP:  12  SVO2:  52  CO 9.1  CI 3.6  SVR:  573    I/Os    In 0.512  Out 0.86  Net -0.347  UOP 0.86  Unmeasured Last BM 08/17  Since Adm -8.259      Intake/Output Summary (Last 24 hours) at 8/21/2023 0704  Last data filed at 8/21/2023 0601  Gross per 24 hour   Intake 1491.82 ml   Output 1391 ml   Net 100.82 ml       Continuous Infusions:   sodium chloride 0.9% 10 mL/hr at 08/21/23 0601    DOBUTamine IV infusion (non-titrating) 5 mcg/kg/min (08/21/23 0601)    furosemide (LASIX) 500 mg in 50 mL infusion (conc: 10 mg/mL) 20 mg/hr (08/21/23 0601)    insulin regular 1 units/mL infusion orderable (TRANSFER) 2.5 Units/hr (08/21/23 0601)    nitric oxide gas      sodium chloride 0.9%       Scheduled Meds:   aspirin  81 mg Oral Daily    atorvastatin  80 mg Oral Daily    enoxparin  40 mg Subcutaneous Q24H (prophylaxis, 1700)    hydrALAZINE  25 mg Oral TID    insulin aspart U-100  6-12 Units Subcutaneous TIDWM    isosorbide dinitrate  20 mg Oral TID    levothyroxine  125 mcg Oral Before breakfast    LIDOcaine (PF) 10 mg/ml (1%)  10 mL Other Once    LIDOcaine  1 patch Transdermal Q24H    pantoprazole  40 mg Oral Daily    polyethylene glycol  17 g Oral TID    senna  8.6 mg Oral BID    ticagrelor  90 mg Oral BID     PRN Meds:acetaminophen, dextrose 10%, dextrose 10%, glucagon (human recombinant), glucose, glucose,  insulin aspart U-100, methocarbamoL, ondansetron, oxyCODONE, sodium chloride 0.9%, sodium chloride 0.9%    Review of patient's allergies indicates:  No Known Allergies  Objective:     Vital Signs (Most Recent):  Temp: 98 °F (36.7 °C) (08/21/23 0301)  Pulse: 101 (08/21/23 0601)  Resp: 18 (08/21/23 0601)  BP: 97/61 (08/21/23 0601)  SpO2: 99 % (08/21/23 0601) Vital Signs (24h Range):  Temp:  [97.6 °F (36.4 °C)-98.4 °F (36.9 °C)] 98 °F (36.7 °C)  Pulse:  [100-107] 101  Resp:  [13-25] 18  SpO2:  [98 %-100 %] 99 %  BP: ()/(50-67) 97/61     Patient Vitals for the past 72 hrs (Last 3 readings):   Weight   08/21/23 0300 124.7 kg (274 lb 14.4 oz)   08/20/23 0610 123.4 kg (272 lb)   08/19/23 0626 122.1 kg (269 lb 3.2 oz)     Body mass index is 35.3 kg/m².      Intake/Output Summary (Last 24 hours) at 8/21/2023 0704  Last data filed at 8/21/2023 0601  Gross per 24 hour   Intake 1491.82 ml   Output 1391 ml   Net 100.82 ml       Hemodynamic Parameters:  PAP: ()/(23-74) 67/31  PAP (Mean):  [35 mmHg-87 mmHg] 45 mmHg    Telemetry: NSR with frequent PVCs       Physical Exam  Vitals and nursing note reviewed.   Constitutional:       General: He is not in acute distress.     Appearance: Normal appearance. He is normal weight. He is not ill-appearing.   HENT:      Head: Normocephalic and atraumatic.   Eyes:      Pupils: Pupils are equal, round, and reactive to light.   Neck:      Comments: Left IJ PA catheter  Cardiovascular:      Rate and Rhythm: Normal rate and regular rhythm.      Pulses: Normal pulses.      Heart sounds: Normal heart sounds. No murmur heard.     No friction rub. No gallop.   Pulmonary:      Effort: Pulmonary effort is normal. No respiratory distress.      Breath sounds: No wheezing or rhonchi.      Comments: Bibasilar crackles  Abdominal:      General: Abdomen is flat. Bowel sounds are normal.      Palpations: Abdomen is soft.   Musculoskeletal:         General: Normal range of motion.      Cervical  "back: Normal range of motion and neck supple.      Right lower leg: No edema.      Left lower leg: No edema.      Comments: Warm BL LEs   Skin:     General: Skin is warm and dry.      Capillary Refill: Capillary refill takes less than 2 seconds.   Neurological:      General: No focal deficit present.      Mental Status: He is alert.          Significant Labs:  CBC:  Recent Labs   Lab 08/19/23  0427 08/19/23  0914 08/20/23  0232 08/20/23  0237 08/20/23  1604 08/20/23  2021 08/21/23  0249   WBC 7.38  --  7.50  --   --   --  6.63   RBC 2.69*  --  2.54*  --   --   --  2.43*   HGB 7.8*  --  7.5*  --   --   --  7.0*   HCT 23.8*   < > 22.9*   < > 22* 22* 21.9*     --  308  --   --   --  369   MCV 89  --  90  --   --   --  90   MCH 29.0  --  29.5  --   --   --  28.8   MCHC 32.8  --  32.8  --   --   --  32.0    < > = values in this interval not displayed.     BNP:  No results for input(s): "BNP" in the last 168 hours.    Invalid input(s): "BNPTRIAGELBLO"  CMP:  Recent Labs   Lab 08/19/23  0427 08/19/23  0804 08/20/23  0232 08/20/23  0805 08/20/23  1751 08/21/23  0005 08/21/23  0249   *   < > 155*   < > 172* 170* 157*   CALCIUM 8.2*   < > 8.2*   < > 8.6* 8.4* 8.2*   ALBUMIN 2.4*  --  2.5*  --   --   --  2.5*   PROT 6.2  --  6.0  --   --   --  6.2   *   < > 130*   < > 130* 129* 130*   K 4.5   < > 4.2   < > 4.4 4.5 4.3   CO2 23   < > 23   < > 24 24 23   CL 97   < > 95   < > 94* 93* 94*   BUN 49*   < > 55*   < > 60* 59* 61*   CREATININE 3.4*   < > 3.8*   < > 4.0* 4.2* 4.0*   ALKPHOS 84  --  84  --   --   --  83   ALT 19  --  17  --   --   --  15   AST 35  --  24  --   --   --  20   BILITOT 0.7  --  0.7  --   --   --  0.6    < > = values in this interval not displayed.      Coagulation:   Recent Labs   Lab 08/14/23  1251 08/15/23  0425 08/15/23  0428 08/15/23  1211 08/16/23  0415 08/19/23  0427 08/20/23  0232 08/21/23  0249   INR  --   --    < >  --    < > 1.0 1.0 1.0   APTT 52.0* 58.8*  --  47.5*  --   --   " "--   --     < > = values in this interval not displayed.     LDH:  No results for input(s): "LDH" in the last 72 hours.    Microbiology:  Microbiology Results (last 7 days)       Procedure Component Value Units Date/Time    Blood culture [[id number]] Collected: 08/13/23 0920    Order Status: Completed Specimen: Blood from Peripheral, Antecubital, Left Updated: 08/18/23 1012     Blood Culture, Routine No growth after 5 days.    Blood culture [[id number]] Collected: 08/12/23 0103    Order Status: Completed Specimen: Blood from Peripheral, Hand, Left Updated: 08/17/23 0612     Blood Culture, Routine No growth after 5 days.    Blood culture [[id number]]  (Abnormal) Collected: 08/12/23 0042    Order Status: Completed Specimen: Blood from Line, Jugular, Internal Right Updated: 08/15/23 0744     Blood Culture, Routine Gram stain aer bottle: Gram positive cocci in clusters resembling Staph      Results called to and read back by:Jojo Valdes Rn 08/13/2023  02:56      COAGULASE-NEGATIVE STAPHYLOCOCCUS SPECIES  Organism is a probable contaminant              ABGs:   Recent Labs   Lab 08/20/23 2021   PH 7.370   PCO2 47.3*   HCO3 27.3   POCSATURATED 52*   BE 2     BMP:   Recent Labs   Lab 08/21/23 0249   *   *   K 4.3   CL 94*   CO2 23   BUN 61*   CREATININE 4.0*   CALCIUM 8.2*   MG 2.6     Cardiac Markers: No results for input(s): "CKMB", "TROPONINT", "MYOGLOBIN" in the last 72 hours.  Coagulation:   Recent Labs   Lab 08/21/23 0249   INR 1.0     Prealbumin: No results for input(s): "PREALBUMIN" in the last 72 hours.  Microbiology Results (last 7 days)       Procedure Component Value Units Date/Time    Blood culture [[id number]] Collected: 08/13/23 0920    Order Status: Completed Specimen: Blood from Peripheral, Antecubital, Left Updated: 08/18/23 1012     Blood Culture, Routine No growth after 5 days.    Blood culture [[id number]] Collected: 08/12/23 0103    Order Status: Completed Specimen: Blood from " Peripheral, Hand, Left Updated: 08/17/23 0612     Blood Culture, Routine No growth after 5 days.    Blood culture [[id number]]  (Abnormal) Collected: 08/12/23 0042    Order Status: Completed Specimen: Blood from Line, Jugular, Internal Right Updated: 08/15/23 0744     Blood Culture, Routine Gram stain aer bottle: Gram positive cocci in clusters resembling Staph      Results called to and read back by:Jojo Valdes Rn 08/13/2023  02:56      COAGULASE-NEGATIVE STAPHYLOCOCCUS SPECIES  Organism is a probable contaminant            Specimen (24h ago, onward)      None          I have reviewed all pertinent labs within the past 24 hours.    Estimated Creatinine Clearance: 30 mL/min (A) (based on SCr of 4 mg/dL (H)).    Diagnostic Results:    CXR 08/18/23  Postoperative changes and supporting devices as before.  Mild diffuse edema more marked at the lung bases similar to the previous study.  No significant pleural effusion.  Heart size upper limit of normal.    CT chest 08/18/23  1. No evidence for hematoma as clinically questioned, noting noncontrast technique.  2. Small bilateral pleural effusions.  Bilateral pulmonary edema.  3. Intraluminal fluid in the esophagus to the level of the aortic arch.  Consider correlation for aspiration risk.  4. Bilateral pulmonary nodules, largest measuring 9 mm.  For multiple solid nodules with any 6 mm or greater, Fleischner Society guidelines recommend follow up with non-contrast chest CT at 3-6 months and 18-24 months after discovery.  5. Gallbladder sludge and mild gallbladder distension, nonspecific.  6. Additional findings as above.    US renal 08/17/23  Limited study due to inability to evaluate segmental renal arterial resistive indices.  Additional evaluation, as clinically warranted.     Elevated main renal arterial resistive indices, nonspecific, but can be seen in the setting of medical renal disease.     Renal arteries and veins are patent.     No hydronephrosis      Bilateral pleural effusions.    TTE 08/17/23    Left Ventricle: The left ventricle is severely dilated. Ventricular mass is normal. Normal wall thickness. regional wall motion abnormalities present. See diagram for wall motion findings. There is severely reduced systolic function with a visually estimated ejection fraction of 15 - 20%. Grade III diastolic dysfunction.    Left Atrium: Left atrium is moderately dilated.    Right Ventricle: Right ventricle was not well visualized due to poor acoustic window.    Mitral Valve: There is mild regurgitation.    Tricuspid Valve: There is mild regurgitation.    Pulmonary Artery: The estimated pulmonary artery systolic pressure is 41 mmHg.    IVC/SVC: Normal venous pressure at 3 mmHg.    [facility] 08/12/23  Coronary angiogram:  Left main: Patent  Left anterior descending artery:   just distal to the 1st septal   Left circumflex artery:  90-95% proximal stenosis, distal 70% calcified stenosis.  Diffusely diseased OM1,  of OM2  Right coronary artery:  Ostial      Grafts:  SVG-RCA:  Diffuse disease with narrowing of up to 40-50% in the proximal portion  The native PLB and PDA both diffusely diseased.  SVG-OM: Occluded  LIMA-LAD:  Widely patent.  Diffusely diseased native LAD with narrowing of up to 90% in the very apical portion.     Assessment:  Cardiogenic shock with extremely low cardiac outputs and elevated left and right filling pressures  NSTEMI - suspect acute graft closure of the SVG to OM2.  Severe native CAD status post PCI of the proximal circumflex with a 33 x 24 mm Synergy XD stent (post-dilated up to 4.25 mm)      [facility] 08/11/23  RA 20, RV 81/21, PA 81/47 (mean 61), PWCP 45, Ao sat 96%, PA sat 49% (on 6L NC)  CO/CI:   3.69/1.52 (VIKKI)  3.91/1.6 (TD)         Assessment and Plan:     52-year-old male with a past medical history of ICM, CABG in 2017 (LIMA to LAD, SVG to OM1, SVG to PDA), DM type II, CKD stage IV (baseline 1.8), and ICD who presented  [place] ED on 8/10//23 due to n/v and SOB. Work up concerning for NSTEMI with peak trop of 38. Started on ACS protocol with asa, ticagrelor, and heparin gtt. Also noted to have YVES with Cr 2.5, volume overloaded with BNP of 796, LA 2.8>>1.0. TTE with EF drop from 30 to 15%, LVEDD 6.15, moderately reduced RV function. LHC/RHC on 8/12: s/p MIRELLA to prox Cx. RHC showed RA 20, RV 81/21, PA 81/47 (mean 61), PWCP 45, CO/CI: 3.69/1.52 (VIKKI)  3.91/1.6 (TD), therefore, impella CP was placed in in right femoral artery and leave in swan in right femoral vein. He was then transferred to [facility] for higher level of care. Of note, never started on inotropes or pressors. Was receiving metoprolol.     On arrival patient was hemodynamically stable.  Not on any inotropes or pressors.  Impella at P7  Initial hemodynamics  CVP: 9  SVO2:  44  Cardiac Output: 4.9  Cardiac Index: 2.0  SVR: 1250     [facility] on 8/12  Grafts:  SVG-RCA:  Diffuse disease with narrowing of up to 40-50% in the proximal portion  The native PLB and PDA both diffusely diseased.  SVG-OM: Occluded  LIMA-LAD:  Widely patent.  Diffusely diseased native LAD with narrowing of up to 90% in the very apical portion.  Severe native CAD status post PCI of the proximal circumflex with a 33 x 24 mm Synergy XD stent (post-dilated up to 4.25 mm)      Previous Cardiac Diagnostics:   TTE 7.1.22  The study quality is average.   The left ventricle is mildly enlarged. Global left ventricular systolic function is severely decreased. The left ventricular ejection fraction is 30%.   Mild (1+) mitral regurgitation.  The pulmonary artery systolic pressure is 10 mmHg. The pulmonary artery appears to be normal.     [facility] 5.19.2017  Severe MVCAD as a cause to severe newly diagnosed ischemic congestive heart failure with EF of 20%  Mild MR  Patent L subclavian, & LIMA suitable for bypass graft conduit     BLE Arterial US 10.18.21  The study quality is average.   The arteries of the left lower extremity  appear patent with no significant stenosis noted.   Tri-phasic waveforms are obtained throughout the left lower extremity arteries.      Carotid US 10.18.21  The study quality is average.   1-39% stenosis in the proximal right internal carotid artery based on Bluth Criteria.   1-39% stenosis in the proximal left internal carotid artery based on Bluth Criteria.   Less than 50% stenosis throughout the bilateral common carotid arteries.   Antegrade bilateral vertebral artery flow.         * Ischemic cardiomyopathy  Cardiogenic shock    52-year-old male with past medical history of ischemic cardiomyopathy status post CABG in 2017 who presents as a transfer from outside hospital after he presented with an NSTEMI status post revascularization to proximal circumflex.  Associated cardiogenic shock noted from right heart catheterization, CP Impella placed on a 12. 3.4cm from AV to inlet on bedside echo. Since removal of Impella, patient has had elevated PAP and PCWP, and CVP 10. Diuresis improved mildly CVP, but kidney function worsened.    - Restarted on Furosemide  - Monitor need for Caleb  - Continue  5  - Continue Hydralazine/Isosorbide and hydralazine 20/25mg TID  - Trend BMP q8 hour   - Consult Interventional cardiology for possible recommendations regarding ischemic cardiomyopathy  - Consult CT surgery for possible advance therapies evaluation  - Consult palliative  - TTE below    Results for orders placed during the hospital encounter of 08/11/23    Echo    Interpretation Summary    Left Ventricle: The left ventricle is severely dilated. Ventricular mass is normal. Normal wall thickness. regional wall motion abnormalities present. See diagram for wall motion findings. There is severely reduced systolic function with a visually estimated ejection fraction of 15 - 20%. Grade III diastolic dysfunction.    Left Atrium: Left atrium is moderately dilated.    Right Ventricle: Right ventricle was not well visualized due  to poor acoustic window.    Mitral Valve: There is mild regurgitation.    Tricuspid Valve: There is mild regurgitation.    Pulmonary Artery: The estimated pulmonary artery systolic pressure is 41 mmHg.    IVC/SVC: Normal venous pressure at 3 mmHg.          Observed seizure-like activity  - Today at ~8am while attempting to get his CVP and PA pressures, the patient started having nausea and retching. Patient head was elevated and retching got worse. He became pale and loss consciousness, increasing the tonicity of his muscles, biting his tongue and nto answering to verbal or physical stimulation. Seconds after he had started having tonic-clonic movements for approximately 3-5 minutes. Without medication patient improved, regaining consciousness in a possible post-ictal state. Whole episode lasted <10-15minutes. For hypotension the patient required IV pressors momentarily.   - Glucose normal, Stat lactate 4.98, CVP 12 and SvO2 65, worsening Cr (4.4 from 4), chronically mild decreased Na (131), normal K, elevated Mg and P  - He was being monitored without any acute events, but at 0928h HTS team was called by RN due to new event with similar characteristics (nausea, retching, tonic-clonic movements), which lasted <5-7 minutes.  - Currently patient does not have altered mental status or focal deficits.    - CT scan STAT  - Neurology consult  - Monitor closely      Hypothyroidism  - Continue home Synthroid    CKD (chronic kidney disease), stage IV  Acute kidney injury    Likely secondary to cardiogenic shock  Baseline creatinine of 1.8-2.   Worsening creatinine, furosemide held and monitoring response, however creatinine started worsening, with increased cardiac filling pressures and oliguria  Patient was restarted on furosemide gtt    Recent Labs   Lab 08/20/23  1751 08/21/23  0005 08/21/23  0249   CREATININE 4.0* 4.2* 4.0*     - Continue furosemide for now  - Monitor Cr  - Daily weights, strict I/O and chart,  renally dose all medications   - Avoid nephrotoxic medications, NSAIDs, ACE/ARB, and IV contrast.  - Appreciate nephrology recs      NSTEMI (non-ST elevated myocardial infarction)  - Continue Aspirin  - Continue ticagrelor  - Continue Atorvastatin 80    DM (diabetes mellitus)  Lab Results   Component Value Date    HGBA1C 10.2 (H) 08/10/2023     - Endocrine following  - Now on insulin drip  - Diabetic/cardiac diet          Paul Roach MD  Heart Transplant  [facility] - Cardiac Intensive Care

## 2023-08-21 NOTE — ASSESSMENT & PLAN NOTE
- Today at ~8am while attempting to get his CVP and PA pressures, the patient started having nausea and retching. Patient head was elevated and retching got worse. He became pale and loss consciousness, increasing the tonicity of his muscles, biting his tongue and nto answering to verbal or physical stimulation. Seconds after he had started having tonic-clonic movements for approximately 3-5 minutes. Without medication patient improved, regaining consciousness in a possible post-ictal state. Whole episode lasted <10-15minutes. For hypotension the patient required IV pressors momentarily.   - Glucose normal, Stat lactate 4.98, CVP 12 and SvO2 65, worsening Cr (4.4 from 4), chronically mild decreased Na (131), normal K, elevated Mg and P  - He was being monitored without any acute events, but at 0928h HTS team was called by RN due to new event with similar characteristics (nausea, retching, tonic-clonic movements), which lasted <5-7 minutes.  - Currently patient does not have altered mental status or focal deficits.    - CT scan STAT  - Neurology consult  - Monitor closely

## 2023-08-21 NOTE — ASSESSMENT & PLAN NOTE
CTS consulted for advacned heart failure options. Patient with prior CABG in 2017 by Dr. Mckeon. Recent stent to circumflex 8/11/23. Transferred in cardiogenic shock with impella which has since been removed. This morning patient with witnessed seizure like activity. Neurology consulted. A1C 10%. BUN/Creatinine 60/4.4. Troponin today 14. Blood group A. Concern with before mentioned labs. Dr. Noriega to review and staff.

## 2023-08-21 NOTE — PROGRESS NOTES
"[facility] - Cardiac Intensive Care  Endocrinology  Progress Note    Admit Date: 2023     Reason for Consult: Management of T2DM, Hyperglycemia     Diabetes diagnosis year: >20 years ago    Home Diabetes Medications:  Farxiga 10 mg QD; Lantus 50 units; Novolog 20 units    How often checking glucose at home?  Dexcom    BG readings on regimen: mid to upper 100s  Hypoglycemia on the regimen?  No  Missed doses on regimen?  No    Diabetes Complications include:     Hyperglycemia    Complicating diabetes co morbidities:   Cardiogenic shock; HTN; HLD      HPI: 52-year-old male with a past medical history of ICM, CABG in 2017 (LIMA to LAD, SVG to OM1, SVG to PDA), DM type II, CKD stage IV (baseline 1.8), and ICD who presented [place] ED on 8/10//23 due to n/v and SOB. Work up concerning for NSTEMI with peak trop of 38. Started on ACS protocol with asa, ticagrelor, and heparin gtt. Also noted to have YVES with Cr 2.5, volume overloaded with BNP of 796, LA 2.8>>1.0. TTE with EF drop from 30 to 15%, LVEDD 6.15, moderately reduced RV function. LHC/RHC on : s/p MIRELLA to prox Cx. RHC showed RA 20, RV 81/21, PA 81/47 (mean 61), PWCP 45, CO/CI: 3.69/1.52 (VIKKI)  3.91/1.6 (TD), therefore, impella CP was placed in in right femoral artery and leave in swan in right femoral vein. He was then transferred to [facility] for higher level of care. Endocrine consulted to manage hyperglycemia and type 2 diabetes.     Lab Results   Component Value Date    HGBA1C 10.2 (H) 08/10/2023                 Interval HPI:   Overnight events: Remains in CICU. BG well controlled on current IV/SQ insulin regimen. Creatinine 4.0. Diet Cardiac Consistent Carbohydrate; Standard Tray    Eatin%  Nausea: No  Hypoglycemia and intervention: No  Fever: No  TPN and/or TF: No  If yes, type of TF/TPN and rate: n/a    BP (!) 102/57   Pulse 103   Temp 97.7 °F (36.5 °C) (Oral)   Resp (!) 24   Ht 6' 2" (1.88 m)   Wt 124.7 kg (274 lb 14.4 oz)   SpO2 100%   BMI " "35.30 kg/m²     Labs Reviewed and Include    Recent Labs   Lab 08/21/23  0249 08/21/23  0826   * 192*   CALCIUM 8.2* 8.7   ALBUMIN 2.5*  --    PROT 6.2  --    * 131*   K 4.3 4.1   CO2 23 22*   CL 94* 94*   BUN 61* 60*   CREATININE 4.0* 4.4*   ALKPHOS 83  --    ALT 15  --    AST 20  --    BILITOT 0.6  --      Lab Results   Component Value Date    WBC 7.81 08/21/2023    HGB 7.5 (L) 08/21/2023    HCT 23.5 (L) 08/21/2023    MCV 92 08/21/2023     08/21/2023     No results for input(s): "TSH", "FREET4" in the last 168 hours.  Lab Results   Component Value Date    HGBA1C 10.2 (H) 08/10/2023       Nutritional status:   Body mass index is 35.3 kg/m².  Lab Results   Component Value Date    ALBUMIN 2.5 (L) 08/21/2023    ALBUMIN 2.5 (L) 08/20/2023    ALBUMIN 2.4 (L) 08/19/2023     No results found for: "PREALBUMIN"    Estimated Creatinine Clearance: 27.2 mL/min (A) (based on SCr of 4.4 mg/dL (H)).    Accu-Checks  Recent Labs     08/19/23  1642 08/19/23  2114 08/20/23  0240 08/20/23  0847 08/20/23  1222 08/20/23  1710 08/20/23  2021 08/21/23  0252 08/21/23  0814 08/21/23  1241   POCTGLUCOSE 188* 154* 168* 131* 142* 158* 209* 168* 142* 151*       Current Medications and/or Treatments Impacting Glycemic Control  Immunotherapy:    Immunosuppressants       None          Steroids:   Hormones (From admission, onward)      None          Pressors:    Autonomic Drugs (From admission, onward)      Start     Stop Route Frequency Ordered    08/21/23 0930  NORepinephrine 4 mg in dextrose 5% 250 mL infusion (premix) (titrating)        Question Answer Comment   Begin at (in mcg/kg/min): 0.02    Titrate by: (in mcg/kg/min) 0.02    Titrate interval: (in minutes) 5    Titrate to maintain: (MAP or SBP) MAP    Greater than: (in mmHg) 65    Maximum dose: (in mcg/kg/min) 3        -- IV Continuous 08/21/23 0818          Hyperglycemia/Diabetes Medications:   Antihyperglycemics (From admission, onward)      Start     Stop Route " Frequency Ordered    08/20/23 0915  insulin regular in 0.9 % NaCl 100 unit/100 mL (1 unit/mL) infusion        Question:  Enter initial dose (Units/hr):  Answer:  2.5    -- IV Continuous 08/20/23 0910    08/16/23 0815  insulin aspart U-100 pen 6-12 Units         -- SubQ 3 times daily with meals 08/16/23 0813    08/15/23 1003  insulin aspart U-100 pen 0-10 Units         -- SubQ As needed (PRN) 08/15/23 0904            ASSESSMENT and PLAN    Cardiac/Vascular  * Ischemic cardiomyopathy  Managed per primary team  Avoid hypoglycemia        Renal/  CKD (chronic kidney disease), stage IV  Titrate insulin slowly to avoid hypoglycemia as the risk of hypoglycemia increases with decreased creatinine clearance.    Estimated Creatinine Clearance: 27.2 mL/min (A) (based on SCr of 4.4 mg/dL (H)).        Endocrine  Hypothyroidism  Lab Results   Component Value Date    TSH 7.103 (H) 08/10/2023     On Synthroid 125 mcg      DM (diabetes mellitus)  Endocrinology consulted for BG management.   BG goal 140-180     - Continue Transition drip at 2.5 units/hr with step-down parameters.   - Novolog 6-12 units with meals (Administer    6   units if patient eats 25-50% of meal, administer   12    units if patient eats > 50% of meal.)  - Novolog (aspart) insulin prn for BG excursions [facility] (180/25).  - BG checks /HS/0200  - Hypoglycemia protocol in place      ** Please notify Endocrine for any change and/or advance in diet**  ** Please call Endocrine for any BG related issues **    Discharge Planning:   TBD. Please notify endocrinology prior to discharge.              Kori Paez, NP  Endocrinology  [facility] - Cardiac [facility]

## 2023-08-21 NOTE — ASSESSMENT & PLAN NOTE
53/M with ICM, CABG in 2017 (LIMA to LAD, SVG to OM1, SVG to PDA), admitted for NSTEMI and cardiogenic shock. Trop to 38, EF drop 30->15%, renal failure with current Scr 4.   RHC on 8/12 w/ low CO/CI. LHC on 8/12 with 50% narrowing of the SVG-RCA, occluded SVG-OM, patent LIMA-LAD, and severe stenosis of proximal circumflex now post MIRELLA to prox Cx and impella was placed in the R femoral artery.     Weaned off pressors and Impella. Currently on  and Caleb. Morales worked for advanced HF options with LVAD.     He has no active ischemia, ventricular arrhythmias or is in cardiogenic shock. Based on laboratory data suggesting YVES on CKD and review of recent angiogram that shows no target vessel for revascularization with patent LIMA to LAD, SVG to RCA, and recent revascularization of culprit LCX, we would not recommend pursuing further revascularization.     Continue medical management for ICM with evaluation for adv options as per HTS

## 2023-08-21 NOTE — NURSING
Nurses Note -- 4 Eyes      8/20/2023   11:18 PM      Skin assessed during: Daily Assessment      [x] No Altered Skin Integrity Present    [x]Prevention Measures Documented      [] Yes- Altered Skin Integrity Present or Discovered   [] LDA Added if Not in Epic (Describe Wound)   [] New Altered Skin Integrity was Present on Admit and Documented in LDA   [] Wound Image Taken    Wound Care Consulted? No    Attending Nurse:  Mila Vallejo RN/Staff Member:  Poonam Austin RN

## 2023-08-21 NOTE — SUBJECTIVE & OBJECTIVE
Past Medical History:   Diagnosis Date    CKD stage 4, GFR 15-29 ml/min     HLD (hyperlipidemia)     Hypertension     Ischemic cardiomyopathy/Chronic HFrEF s/p CABG and AICD 2017    T2DM (type 2 diabetes mellitus)        Past Surgical History:   Procedure Laterality Date    CORONARY ARTERY BYPASS GRAFT  2017    3 vessel    CORONARY BYPASS GRAFT ANGIOGRAPHY  8/11/2023    Procedure: Bypass graft study;  Surgeon: Michele Hirsch MD;  Location: [facility] CATH LAB;  Service: Cardiology;;    IMPELLA, REMOVAL Right 8/15/2023    Procedure: Impella, Removal;  Surgeon: Colin Sibley MD;  Location: [facility] CATH LAB;  Service: Cardiology;  Laterality: Right;    INSERTION OF INTRAVASCULAR MICROAXIAL BLOOD PUMP N/A 8/11/2023    Procedure: INSERTION, IMPELLA;  Surgeon: Michele Hirsch MD;  Location: [facility] CATH LAB;  Service: Cardiology;  Laterality: N/A;    IVUS, CORONARY  8/11/2023    Procedure: IVUS, Coronary;  Surgeon: Michele Hirsch MD;  Location: [facility] CATH LAB;  Service: Cardiology;;    LEFT HEART CATHETERIZATION N/A 8/11/2023    Procedure: Left heart cath;  Surgeon: Michele Hirsch MD;  Location: [facility] CATH LAB;  Service: Cardiology;  Laterality: N/A;    PERCUTANEOUS CORONARY INTERVENTION, ARTERY N/A 8/11/2023    Procedure: Percutaneous coronary intervention;  Surgeon: Michele Hirsch MD;  Location: [facility] CATH LAB;  Service: Cardiology;  Laterality: N/A;    PLACEMENT OF SWAN MARLENI CATHETER WITH IMAGING GUIDANCE Left 8/15/2023    Procedure: INSERTION, CATHETER, SWAN-MARLENI, WITH IMAGING GUIDANCE;  Surgeon: Colin Sibley MD;  Location: [facility] CATH LAB;  Service: Cardiology;  Laterality: Left;    REMOVAL OF TUNNELED CENTRAL VENOUS CATHETER (CVC) N/A 8/15/2023    Procedure: REMOVAL, CATHETER, CENTRAL VENOUS, TUNNELED;  Surgeon: Colin Sibley MD;  Location: [facility] CATH LAB;  Service: Cardiology;  Laterality: N/A;    RIGHT HEART CATHETERIZATION Right 8/11/2023    Procedure: INSERTION, CATHETER, RIGHT HEART;  Surgeon:  Michele Hirsch MD;  Location: [facility] CATH LAB;  Service: Cardiology;  Laterality: Right;       Review of patient's allergies indicates:  No Known Allergies    PTA Medications   Medication Sig    aspirin (ECOTRIN) 81 MG EC tablet Take 81 mg by mouth.    FARXIGA 10 mg tablet Take 10 mg by mouth once daily.    LANTUS SOLOSTAR U-100 INSULIN glargine 100 units/mL SubQ pen Inject 50 Units into the skin once daily.    levothyroxine (SYNTHROID) 125 MCG tablet Take 125 mcg by mouth before breakfast.    metoprolol succinate (TOPROL-XL) 25 MG 24 hr tablet Take 12.5 mg by mouth once daily.    NOVOLOG FLEXPEN U-100 INSULIN 100 unit/mL (3 mL) InPn pen Inject 20 Units into the skin 3 (three) times daily with meals.    rosuvastatin (CRESTOR) 40 MG Tab Take 40 mg by mouth once daily.    torsemide (DEMADEX) 100 MG Tab Take 50 mg by mouth once daily.     Family History       Problem Relation (Age of Onset)    Hypertension Mother          Tobacco Use    Smoking status: Former     Current packs/day: 0.00     Types: Cigarettes    Smokeless tobacco: Never   Substance and Sexual Activity    Alcohol use: Yes    Drug use: No    Sexual activity: Not on file       Objective:     Vital Signs (Most Recent):  Temp: 97.2 °F (36.2 °C) (08/21/23 0701)  Pulse: 101 (08/21/23 1001)  Resp: (!) 22 (08/21/23 1001)  BP: 103/68 (08/21/23 1001)  SpO2: 100 % (08/21/23 1001) Vital Signs (24h Range):  Temp:  [97.2 °F (36.2 °C)-98.4 °F (36.9 °C)] 97.2 °F (36.2 °C)  Pulse:  [] 101  Resp:  [11-23] 22  SpO2:  [98 %-100 %] 100 %  BP: ()/(50-68) 103/68     Weight: 124.7 kg (274 lb 14.4 oz)  Body mass index is 35.3 kg/m².    SpO2: 100 %         Intake/Output Summary (Last 24 hours) at 8/21/2023 1100  Last data filed at 8/21/2023 1001  Gross per 24 hour   Intake 1257.54 ml   Output 1531 ml   Net -273.46 ml       Lines/Drains/Airways       Central Venous Catheter Line  Duration             Introducer 08/15/23 1630 Internal Jugular Left 5 days    Pulmonary  Artery Catheter Assessment  08/15/23 1500 Internal Jugular Left 5 days              Drain  Duration                  Urethral Catheter 08/12/23 0130 16 Fr. 9 days              Peripheral Intravenous Line  Duration                  Peripheral IV - Single Lumen 08/18/23 1702 20 G Anterior;Right Upper Arm 2 days         Peripheral IV - Single Lumen 08/21/23 0852 20 G Anterior;Proximal;Right Forearm <1 day                           Physical Exam  Vitals and nursing note reviewed.   Constitutional:       General: He is not in acute distress.     Appearance: Normal appearance. He is normal weight. He is not ill-appearing.   HENT:      Head: Normocephalic and atraumatic.   Eyes:      Pupils: Pupils are equal, round, and reactive to light.   Neck:      Comments: Left IJ PA catheter  Cardiovascular:      Rate and Rhythm: Normal rate and regular rhythm.      Pulses: Normal pulses.      Heart sounds: Normal heart sounds. No murmur heard.     No friction rub. No gallop.   Pulmonary:      Effort: Pulmonary effort is normal. No respiratory distress.      Breath sounds: No wheezing or rhonchi.      Comments: Bibasilar crackles  Abdominal:      General: Abdomen is flat. Bowel sounds are normal.      Palpations: Abdomen is soft.   Musculoskeletal:         General: Normal range of motion.      Cervical back: Normal range of motion and neck supple.      Right lower leg: No edema.      Left lower leg: No edema.      Comments: Warm BL LEs   Skin:     General: Skin is warm and dry.      Capillary Refill: Capillary refill takes less than 2 seconds.   Neurological:      General: No focal deficit present.      Mental Status: He is alert.

## 2023-08-21 NOTE — PROGRESS NOTES
"[facility] - [facility]  Nephrology  Progress Note    Patient Name: Itz Daniel  MRN: [id number]  Admission Date: 8/11/2023  Hospital Length of Stay: 10 days  Attending Provider: Eric Moncada MD   Primary Care Physician: Sunday Boo MD  Principal Problem:Ischemic cardiomyopathy    Subjective:     HPI:   Itz Daniel is a 53 y.o. male w/ known CKD III/V 2/2 DM and HTN, CABG x3 (2017) with known occluded VG-OM, AICD placement, CAD, HFrEF, hypothyroidism, COVID-19 (2022), Diabetic foot infection s/p amputation (2022), and obesity presented to [facility] on  8/10/2023 with nausea, vomiting, weakness and shortness of breath. Pt's workup revealed pt in cardiogenic shock in setting of NSTEMI. Initial labs at [facility] showed troponin of 24.8, lactic acid of 2.7, , Na 132, glucose 487, Cr 2.51, and WBC 11.57, pt was loaded with heparin. Pt underwent a LHC (LCX had 90-95% proximal stenosis, distal 70% calcified stenosis, diffusely diseased OM1,  of OM2, RHC revealed: RA 20, RV 81/21, PA 81/47 (mean 61), PWCP 45, Ao sat 96%, PA sat 49% (on 6L NC)  CO/CI:   3.69/1.52 (VIKKI)  3.91/1.6 (TD), and impella placement, CABG w/ PCI of the proximal circumflex with a 33 x 24 mm Synergy XD stent (post-dilated up to 4.25 mm)    On arrival to [facility] pt presented with the following VS:   Initial Vitals   BP Pulse Resp Temp SpO2   08/12/23 0600 08/11/23 2109 08/11/23 2115 08/11/23 2100 08/11/23 2115   125/72 (!) 114 20 98.3 °F (36.8 °C) 95 %     Nephrology was consulted for: "Worsening kidney function"  Baseline sCr: appears to be around ~2.  Admission sCr: 2.5 at [facility]  Pt follows with nephrology for CKD  Since arrival to [facility] pt has had significant YVES, highest sCr during hospitalization is 3.7 (today), his urine output has also decreased.   Pt has been aggressively diuresed with ~9L of urine output during hospitalization   Primary team ordered a renal ultrasound w/ doppler " with for evaluation of renal artery stenosis, this was negative  Pt has remained on 4L of O2 since 8/17, he does not use oxygen at home.   Pt does not endorse a history of kidney stones, chronic NSAID use, trauma to the kidneys or bladder.   As for a family history, pt denies family history of kidney diease including family members on dialysis, autoimmune diseases, kidney stones or cancer.     Creatinine   Date Value Ref Range Status   08/19/2023 3.6 (H) 0.5 - 1.4 mg/dL Final   08/19/2023 3.7 (H) 0.5 - 1.4 mg/dL Final   08/19/2023 3.4 (H) 0.5 - 1.4 mg/dL Final     BUN   Date Value Ref Range Status   08/19/2023 51 (H) 6 - 20 mg/dL Final   08/19/2023 50 (H) 6 - 20 mg/dL Final   08/19/2023 49 (H) 6 - 20 mg/dL Final       Interval History: Patient seen and examined. Questionable vagal/syncopal event this morning. Overnight with diminishing UOP despite Argueta adjustments for which Lasix infusion increased from 10 mg to 10 mg/hr. He is afebrile with pulse ranging from 100-90s bpm. Systolic blood pressure ranging from 100-80s mmHg. He is saturating +98% on 4 liters via nasal cannula (FiO2 36%). Renal function with decline.    Review of patient's allergies indicates:  No Known Allergies  Current Facility-Administered Medications   Medication Frequency    0.9%  NaCl infusion Continuous    acetaminophen tablet 650 mg Q6H PRN    aspirin EC tablet 81 mg Daily    atorvastatin tablet 80 mg Daily    dextrose 10% bolus 125 mL 125 mL PRN    dextrose 10% bolus 250 mL 250 mL PRN    DOBUtamine 1000 mg in D5W 250 mL infusion Continuous    enoxaparin injection 40 mg Q24H (prophylaxis, 1700)    furosemide (LASIX) 500 mg in 50 mL infusion (conc: 10 mg/mL) Continuous    glucagon (human recombinant) injection 1 mg PRN    glucose chewable tablet 16 g PRN    glucose chewable tablet 24 g PRN    hydrALAZINE tablet 25 mg TID    insulin aspart U-100 pen 0-10 Units PRN    insulin aspart U-100 pen 6-12 Units TIDWM    insulin regular  in 0.9 % NaCl 100 unit/100 mL (1 unit/mL) infusion Continuous    isosorbide dinitrate tablet 20 mg TID    levothyroxine tablet 125 mcg Before breakfast    LIDOcaine (PF) 10 mg/ml (1%) injection 100 mg Once    LIDOcaine 5 % patch 1 patch Q24H    LORazepam (ATIVAN) 2 mg/mL injection     methocarbamoL tablet 500 mg Q6H PRN    nitric oxide gas Gas 10 ppm Continuous    NORepinephrine 4 mg in dextrose 5% 250 mL infusion (premix) (titrating) Continuous    ondansetron disintegrating tablet 8 mg Q8H PRN    oxyCODONE immediate release tablet 5 mg Q8H PRN    pantoprazole EC tablet 40 mg Daily    polyethylene glycol packet 17 g TID    senna tablet 8.6 mg BID    sodium chloride 0.9% flush 10 mL PRN    sodium chloride 0.9% flush 10 mL Continuous    sodium chloride 0.9% flush 3 mL Q6H PRN    ticagrelor tablet 90 mg BID       Objective:     Vital Signs (Most Recent):  Temp: 98 °F (36.7 °C) (08/21/23 0301)  Pulse: 101 (08/21/23 0801)  Resp: 18 (08/21/23 0801)  BP: 103/64 (08/21/23 0801)  SpO2: 100 % (08/21/23 0801) Vital Signs (24h Range):  Temp:  [97.6 °F (36.4 °C)-98.4 °F (36.9 °C)] 98 °F (36.7 °C)  Pulse:  [100-107] 101  Resp:  [13-25] 18  SpO2:  [98 %-100 %] 100 %  BP: ()/(50-67) 103/64     Weight: 124.7 kg (274 lb 14.4 oz) (08/21/23 0300)  Body mass index is 35.3 kg/m².  Body surface area is 2.55 meters squared.    I/O last 3 completed shifts:  In: 2353.8 [P.O.:1560; I.V.:793.8]  Out: 1756 [Urine:1756]     Physical Exam  Vitals and nursing note reviewed.   Constitutional:       General: He is awake. He is not in acute distress.     Appearance: He is obese. He is ill-appearing. He is not diaphoretic.      Interventions: Nasal cannula in place.   HENT:      Head: Normocephalic and atraumatic.      Right Ear: External ear normal.      Left Ear: External ear normal.      Nose:      Comments: Nasal cannula in place.     Mouth/Throat:      Mouth: Mucous membranes are moist.      Pharynx: Oropharynx is clear. No  oropharyngeal exudate or posterior oropharyngeal erythema.   Eyes:      General: No scleral icterus.        Right eye: No discharge.         Left eye: No discharge.      Extraocular Movements: Extraocular movements intact.      Conjunctiva/sclera: Conjunctivae normal.   Neck:      Comments: CVC & Standish Tariq catheter to left internal jugular vein.  Cardiovascular:      Rate and Rhythm: Tachycardia present.      Heart sounds: Murmur heard.      Systolic murmur is present with a grade of 1/6.      No friction rub. No gallop.      Comments: Bilateral trace lower extremity edema.  Pulmonary:      Effort: Pulmonary effort is normal. No respiratory distress.      Breath sounds: Rales present. No wheezing or rhonchi.      Comments: Faint bibasilar crackles appreciated.   Abdominal:      General: Bowel sounds are normal. There is no distension.      Palpations: Abdomen is soft. There is no mass.      Tenderness: There is no abdominal tenderness.   Genitourinary:     Comments: Argueta catheter in place.  Musculoskeletal:      Cervical back: Neck supple.      Right lower leg: Edema present.      Left lower leg: Edema present.   Skin:     General: Skin is warm and dry.      Coloration: Skin is not jaundiced.   Neurological:      General: No focal deficit present.      Mental Status: He is alert. Mental status is at baseline.      Cranial Nerves: No cranial nerve deficit.      Motor: No weakness.   Psychiatric:         Mood and Affect: Mood normal.         Behavior: Behavior normal. Behavior is cooperative.          Significant Labs:  ABGs:   Recent Labs   Lab 08/21/23  0833   PH 7.338*   PCO2 40.5   HCO3 21.7*   POCSATURATED 65*   BE -4     BMP:   Recent Labs   Lab 08/21/23  0249   *   *   K 4.3   CL 94*   CO2 23   BUN 61*   CREATININE 4.0*   CALCIUM 8.2*   MG 2.6     CBC:   Recent Labs   Lab 08/21/23  0826   WBC 7.81   RBC 2.56*   HGB 7.5*   HCT 23.5*      MCV 92   MCH 29.3   MCHC 31.9*     CMP:   Recent Labs    Lab 08/21/23  0249   *   CALCIUM 8.2*   ALBUMIN 2.5*   PROT 6.2   *   K 4.3   CO2 23   CL 94*   BUN 61*   CREATININE 4.0*   ALKPHOS 83   ALT 15   AST 20   BILITOT 0.6     Coagulation:   Recent Labs   Lab 08/15/23  1211 08/16/23  0415 08/21/23  0249   INR  --    < > 1.0   APTT 47.5*  --   --     < > = values in this interval not displayed.     LFTs:   Recent Labs   Lab 08/21/23  0249   ALT 15   AST 20   ALKPHOS 83   BILITOT 0.6   PROT 6.2   ALBUMIN 2.5*     Microbiology Results (last 7 days)       Procedure Component Value Units Date/Time    Blood culture [[id number]] Collected: 08/13/23 0920    Order Status: Completed Specimen: Blood from Peripheral, Antecubital, Left Updated: 08/18/23 1012     Blood Culture, Routine No growth after 5 days.    Blood culture [[id number]] Collected: 08/12/23 0103    Order Status: Completed Specimen: Blood from Peripheral, Hand, Left Updated: 08/17/23 0612     Blood Culture, Routine No growth after 5 days.    Blood culture [[id number]]  (Abnormal) Collected: 08/12/23 0042    Order Status: Completed Specimen: Blood from Line, Jugular, Internal Right Updated: 08/15/23 0744     Blood Culture, Routine Gram stain aer bottle: Gram positive cocci in clusters resembling Staph      Results called to and read back by:Jojo Valdes Rn 08/13/2023  02:56      COAGULASE-NEGATIVE STAPHYLOCOCCUS SPECIES  Organism is a probable contaminant            Specimen (24h ago, onward)      None          Urinary sediment on 08/21/2023:                Significant Imaging:  I have reviewed all imagining in the last 24 hours.    Assessment/Plan:     Cardiac/Vascular  * Ischemic cardiomyopathy  - admitted for NSTEMI s/p CABG w/ PCI of the proximal circumflex with a 33 x 24 mm Synergy XD stent (post-dilated up to 4.25 mm) on 8/12/2023   - management per primary team    Cardiogenic shock  Acute on chronic combined systolic and diastolic heart failure  Most recent echocardiogram on 08/17/2023:     Left Ventricle: The left ventricle is severely dilated. Ventricular mass is normal. Normal wall thickness. regional wall motion abnormalities present. See diagram for wall motion findings. There is severely reduced systolic function with a visually estimated ejection fraction of 15 - 20%. Grade III diastolic dysfunction.    Left Atrium: Left atrium is moderately dilated.    Right Ventricle: Right ventricle was not well visualized due to poor acoustic window.    Mitral Valve: There is mild regurgitation.    Tricuspid Valve: There is mild regurgitation.    Pulmonary Artery: The estimated pulmonary artery systolic pressure is 41 mmHg.    IVC/SVC: Normal venous pressure at 3 mmHg.    - management per primary team  - currently on dobutamine infusion at 5 mg/hr and Lasix infusion at 20 mg/hr    Renal/  Acute renal failure with acute tubular necrosis superimposed on stage 3b chronic kidney disease  CKD (chronic kidney disease), stage IV  Ischemic ATN 2/2 decrease perfusion (severe hypotension d/t cardiogenic shock), contrast exposure, combined with aggressive diuresis in a patient with known advanced CKD  Baseline sCr: appears to be around ~2.  Admission sCr: 2.5 at [facility]  Lab Results   Component Value Date    CREATININE 4.4 (H) 08/21/2023    CREATININE 4.0 (H) 08/21/2023    CREATININE 4.2 (H) 08/21/2023     Last UPCR:   Prot/Creat Ratio, Urine   Date Value Ref Range Status   08/19/2023 1.35 (H) 0.00 - 0.20 Final       Plan/Recommendations:   - YVES secondary to acute tubular necrosis in the setting of low effective circulatory volume with known heart failure/shock  - appears to be making good urine with Lasix infusion although kidney function declining   - renal diet when not NPO  - keep MAP > 65 mmHg  - serial RFPs and magnesium levels in light of active diuresis   - daily weights and strict I/Os  - renally dose medications to eGFR  - avoid nephrotoxins as much as possible (i.e. supra-therapeutic  vancomycin troughs, NSAIDs, intra-arterial contrast, etc.)  - no acute indications for RRT at this time however will continue to monitor closely     Endocrine  DM (diabetes mellitus)  Lab Results   Component Value Date    HGBA1C 10.2 (H) 08/10/2023     - management per primary team    Thank you for your consult. I will follow-up with patient. Please contact us if you have any additional questions.    Cal Gaines MD  Nephrology  [facility]

## 2023-08-21 NOTE — SUBJECTIVE & OBJECTIVE
Interval History: Patient became oliguric last night and with worsening renal function and increasing filling pressures, so furosemide gtt was restarted. He had an episode of tonic clonic seizure activity this am.     HD    CVP 12  SvO2 65  CO 14.5  CI 5.7      Yesterday   CVP:  12  SVO2:  52  CO 9.1  CI 3.6  SVR:  573    I/Os    In 0.512  Out 0.86  Net -0.347  UOP 0.86  Unmeasured Last BM 08/17  Since Adm -8.259      Intake/Output Summary (Last 24 hours) at 8/21/2023 0704  Last data filed at 8/21/2023 0601  Gross per 24 hour   Intake 1491.82 ml   Output 1391 ml   Net 100.82 ml       Continuous Infusions:   sodium chloride 0.9% 10 mL/hr at 08/21/23 0601    DOBUTamine IV infusion (non-titrating) 5 mcg/kg/min (08/21/23 0601)    furosemide (LASIX) 500 mg in 50 mL infusion (conc: 10 mg/mL) 20 mg/hr (08/21/23 0601)    insulin regular 1 units/mL infusion orderable (TRANSFER) 2.5 Units/hr (08/21/23 0601)    nitric oxide gas      sodium chloride 0.9%       Scheduled Meds:   aspirin  81 mg Oral Daily    atorvastatin  80 mg Oral Daily    enoxparin  40 mg Subcutaneous Q24H (prophylaxis, 1700)    hydrALAZINE  25 mg Oral TID    insulin aspart U-100  6-12 Units Subcutaneous TIDWM    isosorbide dinitrate  20 mg Oral TID    levothyroxine  125 mcg Oral Before breakfast    LIDOcaine (PF) 10 mg/ml (1%)  10 mL Other Once    LIDOcaine  1 patch Transdermal Q24H    pantoprazole  40 mg Oral Daily    polyethylene glycol  17 g Oral TID    senna  8.6 mg Oral BID    ticagrelor  90 mg Oral BID     PRN Meds:acetaminophen, dextrose 10%, dextrose 10%, glucagon (human recombinant), glucose, glucose, insulin aspart U-100, methocarbamoL, ondansetron, oxyCODONE, sodium chloride 0.9%, sodium chloride 0.9%    Review of patient's allergies indicates:  No Known Allergies  Objective:     Vital Signs (Most Recent):  Temp: 98 °F (36.7 °C) (08/21/23 0301)  Pulse: 101 (08/21/23 0601)  Resp: 18 (08/21/23 0601)  BP: 97/61 (08/21/23 0601)  SpO2: 99 %  (08/21/23 0601) Vital Signs (24h Range):  Temp:  [97.6 °F (36.4 °C)-98.4 °F (36.9 °C)] 98 °F (36.7 °C)  Pulse:  [100-107] 101  Resp:  [13-25] 18  SpO2:  [98 %-100 %] 99 %  BP: ()/(50-67) 97/61     Patient Vitals for the past 72 hrs (Last 3 readings):   Weight   08/21/23 0300 124.7 kg (274 lb 14.4 oz)   08/20/23 0610 123.4 kg (272 lb)   08/19/23 0626 122.1 kg (269 lb 3.2 oz)     Body mass index is 35.3 kg/m².      Intake/Output Summary (Last 24 hours) at 8/21/2023 0704  Last data filed at 8/21/2023 0601  Gross per 24 hour   Intake 1491.82 ml   Output 1391 ml   Net 100.82 ml       Hemodynamic Parameters:  PAP: ()/(23-74) 67/31  PAP (Mean):  [35 mmHg-87 mmHg] 45 mmHg    Telemetry: NSR with frequent PVCs       Physical Exam  Vitals and nursing note reviewed.   Constitutional:       General: He is not in acute distress.     Appearance: Normal appearance. He is normal weight. He is not ill-appearing.   HENT:      Head: Normocephalic and atraumatic.   Eyes:      Pupils: Pupils are equal, round, and reactive to light.   Neck:      Comments: Left IJ PA catheter  Cardiovascular:      Rate and Rhythm: Normal rate and regular rhythm.      Pulses: Normal pulses.      Heart sounds: Normal heart sounds. No murmur heard.     No friction rub. No gallop.   Pulmonary:      Effort: Pulmonary effort is normal. No respiratory distress.      Breath sounds: No wheezing or rhonchi.      Comments: Bibasilar crackles  Abdominal:      General: Abdomen is flat. Bowel sounds are normal.      Palpations: Abdomen is soft.   Musculoskeletal:         General: Normal range of motion.      Cervical back: Normal range of motion and neck supple.      Right lower leg: No edema.      Left lower leg: No edema.      Comments: Warm BL LEs   Skin:     General: Skin is warm and dry.      Capillary Refill: Capillary refill takes less than 2 seconds.   Neurological:      General: No focal deficit present.      Mental Status: He is alert.       "    Significant Labs:  CBC:  Recent Labs   Lab 08/19/23  0427 08/19/23  0914 08/20/23  0232 08/20/23  0237 08/20/23  1604 08/20/23  2021 08/21/23  0249   WBC 7.38  --  7.50  --   --   --  6.63   RBC 2.69*  --  2.54*  --   --   --  2.43*   HGB 7.8*  --  7.5*  --   --   --  7.0*   HCT 23.8*   < > 22.9*   < > 22* 22* 21.9*     --  308  --   --   --  369   MCV 89  --  90  --   --   --  90   MCH 29.0  --  29.5  --   --   --  28.8   MCHC 32.8  --  32.8  --   --   --  32.0    < > = values in this interval not displayed.     BNP:  No results for input(s): "BNP" in the last 168 hours.    Invalid input(s): "BNPTRIAGELBLO"  CMP:  Recent Labs   Lab 08/19/23  0427 08/19/23  0804 08/20/23  0232 08/20/23  0805 08/20/23  1751 08/21/23  0005 08/21/23  0249   *   < > 155*   < > 172* 170* 157*   CALCIUM 8.2*   < > 8.2*   < > 8.6* 8.4* 8.2*   ALBUMIN 2.4*  --  2.5*  --   --   --  2.5*   PROT 6.2  --  6.0  --   --   --  6.2   *   < > 130*   < > 130* 129* 130*   K 4.5   < > 4.2   < > 4.4 4.5 4.3   CO2 23   < > 23   < > 24 24 23   CL 97   < > 95   < > 94* 93* 94*   BUN 49*   < > 55*   < > 60* 59* 61*   CREATININE 3.4*   < > 3.8*   < > 4.0* 4.2* 4.0*   ALKPHOS 84  --  84  --   --   --  83   ALT 19  --  17  --   --   --  15   AST 35  --  24  --   --   --  20   BILITOT 0.7  --  0.7  --   --   --  0.6    < > = values in this interval not displayed.      Coagulation:   Recent Labs   Lab 08/14/23  1251 08/15/23  0425 08/15/23  0428 08/15/23  1211 08/16/23  0415 08/19/23  0427 08/20/23  0232 08/21/23  0249   INR  --   --    < >  --    < > 1.0 1.0 1.0   APTT 52.0* 58.8*  --  47.5*  --   --   --   --     < > = values in this interval not displayed.     LDH:  No results for input(s): "LDH" in the last 72 hours.    Microbiology:  Microbiology Results (last 7 days)       Procedure Component Value Units Date/Time    Blood culture [[id number]] Collected: 08/13/23 0920    Order Status: Completed Specimen: Blood from Peripheral, " "Antecubital, Left Updated: 08/18/23 1012     Blood Culture, Routine No growth after 5 days.    Blood culture [[id number]] Collected: 08/12/23 0103    Order Status: Completed Specimen: Blood from Peripheral, Hand, Left Updated: 08/17/23 0612     Blood Culture, Routine No growth after 5 days.    Blood culture [[id number]]  (Abnormal) Collected: 08/12/23 0042    Order Status: Completed Specimen: Blood from Line, Jugular, Internal Right Updated: 08/15/23 0744     Blood Culture, Routine Gram stain aer bottle: Gram positive cocci in clusters resembling Staph      Results called to and read back by:Jojo Valdes Rn 08/13/2023  02:56      COAGULASE-NEGATIVE STAPHYLOCOCCUS SPECIES  Organism is a probable contaminant              ABGs:   Recent Labs   Lab 08/20/23 2021   PH 7.370   PCO2 47.3*   HCO3 27.3   POCSATURATED 52*   BE 2     BMP:   Recent Labs   Lab 08/21/23 0249   *   *   K 4.3   CL 94*   CO2 23   BUN 61*   CREATININE 4.0*   CALCIUM 8.2*   MG 2.6     Cardiac Markers: No results for input(s): "CKMB", "TROPONINT", "MYOGLOBIN" in the last 72 hours.  Coagulation:   Recent Labs   Lab 08/21/23 0249   INR 1.0     Prealbumin: No results for input(s): "PREALBUMIN" in the last 72 hours.  Microbiology Results (last 7 days)       Procedure Component Value Units Date/Time    Blood culture [[id number]] Collected: 08/13/23 0920    Order Status: Completed Specimen: Blood from Peripheral, Antecubital, Left Updated: 08/18/23 1012     Blood Culture, Routine No growth after 5 days.    Blood culture [[id number]] Collected: 08/12/23 0103    Order Status: Completed Specimen: Blood from Peripheral, Hand, Left Updated: 08/17/23 0612     Blood Culture, Routine No growth after 5 days.    Blood culture [[id number]]  (Abnormal) Collected: 08/12/23 0042    Order Status: Completed Specimen: Blood from Line, Jugular, Internal Right Updated: 08/15/23 0744     Blood Culture, Routine Gram stain aer bottle: Gram positive cocci in " clusters resembling Staph      Results called to and read back by:Jojo Valdes Rn 08/13/2023  02:56      COAGULASE-NEGATIVE STAPHYLOCOCCUS SPECIES  Organism is a probable contaminant            Specimen (24h ago, onward)      None          I have reviewed all pertinent labs within the past 24 hours.    Estimated Creatinine Clearance: 30 mL/min (A) (based on SCr of 4 mg/dL (H)).    Diagnostic Results:    CXR 08/18/23  Postoperative changes and supporting devices as before.  Mild diffuse edema more marked at the lung bases similar to the previous study.  No significant pleural effusion.  Heart size upper limit of normal.    CT chest 08/18/23  1. No evidence for hematoma as clinically questioned, noting noncontrast technique.  2. Small bilateral pleural effusions.  Bilateral pulmonary edema.  3. Intraluminal fluid in the esophagus to the level of the aortic arch.  Consider correlation for aspiration risk.  4. Bilateral pulmonary nodules, largest measuring 9 mm.  For multiple solid nodules with any 6 mm or greater, Fleischner Society guidelines recommend follow up with non-contrast chest CT at 3-6 months and 18-24 months after discovery.  5. Gallbladder sludge and mild gallbladder distension, nonspecific.  6. Additional findings as above.    US renal 08/17/23  Limited study due to inability to evaluate segmental renal arterial resistive indices.  Additional evaluation, as clinically warranted.     Elevated main renal arterial resistive indices, nonspecific, but can be seen in the setting of medical renal disease.     Renal arteries and veins are patent.     No hydronephrosis     Bilateral pleural effusions.    TTE 08/17/23    Left Ventricle: The left ventricle is severely dilated. Ventricular mass is normal. Normal wall thickness. regional wall motion abnormalities present. See diagram for wall motion findings. There is severely reduced systolic function with a visually estimated ejection fraction of 15 - 20%. Grade  III diastolic dysfunction.    Left Atrium: Left atrium is moderately dilated.    Right Ventricle: Right ventricle was not well visualized due to poor acoustic window.    Mitral Valve: There is mild regurgitation.    Tricuspid Valve: There is mild regurgitation.    Pulmonary Artery: The estimated pulmonary artery systolic pressure is 41 mmHg.    IVC/SVC: Normal venous pressure at 3 mmHg.    [facility] 08/12/23  Coronary angiogram:  Left main: Patent  Left anterior descending artery:   just distal to the 1st septal   Left circumflex artery:  90-95% proximal stenosis, distal 70% calcified stenosis.  Diffusely diseased OM1,  of OM2  Right coronary artery:  Ostial      Grafts:  SVG-RCA:  Diffuse disease with narrowing of up to 40-50% in the proximal portion  The native PLB and PDA both diffusely diseased.  SVG-OM: Occluded  LIMA-LAD:  Widely patent.  Diffusely diseased native LAD with narrowing of up to 90% in the very apical portion.     Assessment:  Cardiogenic shock with extremely low cardiac outputs and elevated left and right filling pressures  NSTEMI - suspect acute graft closure of the SVG to OM2.  Severe native CAD status post PCI of the proximal circumflex with a 33 x 24 mm Synergy XD stent (post-dilated up to 4.25 mm)      [facility] 08/11/23  RA 20, RV 81/21, PA 81/47 (mean 61), PWCP 45, Ao sat 96%, PA sat 49% (on 6L NC)  CO/CI:   3.69/1.52 (VIKKI)  3.91/1.6 (TD)

## 2023-08-21 NOTE — PLAN OF CARE
Heart Transplant Care Update Note:    Hemodynamics: ( 5)  CVP: 12  SVO2: 52  CO: 9.06  CI: 3.56  SVR: 573    Wt: 123.4, BSA: 2.54, Hb:   7.5    I/O's (12h-shift):  - Total: 531  - Net:+448    Plan:  - Patient was started around 6 pm on Lasix 10 mg/hr after Lasix 100 IVP x 1  - Will repeat hemodynamics & monitor UOP around midnight and increase Lasix gtt to 20 if CVP remains elevated    Discussed with HTS Attending    Mariajose Perales MD  Cardiovascular Disease PGY IV  [facility]

## 2023-08-21 NOTE — NURSING
This morning at 0805, RN and MD entered room to obtain CVP and PAP. Upon laying pt flat he became nauseated and was immediately sat back up. Pt began retching but did not have any emesis. He then became unresponsive and began to have seizure-like activity with stiff, contracted upper extremities and generalized shaking. Charge RN came to bedside and bedside RN and MD remained present with patient. Seizure precautions were initiated, suction at BS. Pulse dropped to 50s and MAP dropped to 40s. Brendon bump given x1 and Levo gtt started. Ativan brought to BS but not given. Seizure activity resolved within 5 minutes. Pt AAOx4. MAP 70s, Levo off. WCTM.    At 0920 pt became nauseous again after attempting to eat. Had one small episode of emesis and began having seizure like activity. RN was at bedside during this episode. VS remained stable. Seizure activity resolved within 10 seconds. MD notified. WCTM.    Neurology Consult per MD and [facility] STAT.

## 2023-08-21 NOTE — ASSESSMENT & PLAN NOTE
Endocrinology consulted for BG management.   BG goal 140-180     - Continue Transition drip at 2.5 units/hr with step-down parameters.   - Novolog 6-12 units with meals (Administer    6   units if patient eats 25-50% of meal, administer   12    units if patient eats > 50% of meal.)  - Novolog (aspart) insulin prn for BG excursions [facility] SSI (180/25).  - BG checks /HS/0200  - Hypoglycemia protocol in place      ** Please notify Endocrine for any change and/or advance in diet**  ** Please call Endocrine for any BG related issues **    Discharge Planning:   TBD. Please notify endocrinology prior to discharge.

## 2023-08-21 NOTE — CONSULTS
[facility] - Cardiac Intensive Care  Interventional Cardiology  Consult Note    Patient Name: Itz Daniel  MRN: [id number]  Admission Date: 8/11/2023  Hospital Length of Stay: 10 days  Code Status: Full Code   Attending Provider: Margoth Mackenzie MD  Consulting Provider: Lopez Siddiqui MD  Primary Care Physician: Sunday Boo MD  Principal Problem:Ischemic cardiomyopathy    Patient information was obtained from patient and ER records.     Inpatient consult to Interventional Cardiology  Consult performed by: Lopez Siddiqui MD  Consult ordered by: Paul Chen MD        Subjective:     Chief Complaint:  CS     HPI:  Consult Reason: Revascularization options    53/M with ICM, CABGin 2017 (LIMA to LAD, SVG to OM1, SVG to PDA), DM type II, CKD stage IV (baseline 1.8), and ICD who was admitted for NSTEMI and cardiogenic shock. On 8/10 had SOB with trop to 38, EF drop 30->15%, renal failure. Had LHC/RHC on 8/12 w/ MIRELLA to prox Cx and impella was placed in the R femoral artery. LHC on 8/12 with 50% narrowing of the SVG-RCA, occluded SVG-OM, patent LIMA-LAD, and severe stenosis of proximal circumflex which was stented.    Weaned off pressors and Impella. Currently on  and Caleb. Morales worked for advanced HF options with LVAD.     Hemodynamics:  CVP 12  SvO2 65  CO 14.5  CI 5.7           Past Medical History:   Diagnosis Date    CKD stage 4, GFR 15-29 ml/min     HLD (hyperlipidemia)     Hypertension     Ischemic cardiomyopathy/Chronic HFrEF s/p CABG and AICD 2017    T2DM (type 2 diabetes mellitus)        Past Surgical History:   Procedure Laterality Date    CORONARY ARTERY BYPASS GRAFT  2017    3 vessel    CORONARY BYPASS GRAFT ANGIOGRAPHY  8/11/2023    Procedure: Bypass graft study;  Surgeon: Michele Hirsch MD;  Location: [facility] CATH LAB;  Service: Cardiology;;    IMPELLA, REMOVAL Right 8/15/2023    Procedure: Impella, Removal;  Surgeon: Colin Sibley MD;  Location: [facility] CATH LAB;   Service: Cardiology;  Laterality: Right;    INSERTION OF INTRAVASCULAR MICROAXIAL BLOOD PUMP N/A 8/11/2023    Procedure: INSERTION, IMPELLA;  Surgeon: Michele Hirsch MD;  Location: [facility] CATH LAB;  Service: Cardiology;  Laterality: N/A;    IVUS, CORONARY  8/11/2023    Procedure: IVUS, Coronary;  Surgeon: Michele Hirsch MD;  Location: [facility] CATH LAB;  Service: Cardiology;;    LEFT HEART CATHETERIZATION N/A 8/11/2023    Procedure: Left heart cath;  Surgeon: Michele Hirsch MD;  Location: [facility] CATH LAB;  Service: Cardiology;  Laterality: N/A;    PERCUTANEOUS CORONARY INTERVENTION, ARTERY N/A 8/11/2023    Procedure: Percutaneous coronary intervention;  Surgeon: Michele Hirsch MD;  Location: [facility] CATH LAB;  Service: Cardiology;  Laterality: N/A;    PLACEMENT OF SWAN MARLENI CATHETER WITH IMAGING GUIDANCE Left 8/15/2023    Procedure: INSERTION, CATHETER, SWAN-MARLENI, WITH IMAGING GUIDANCE;  Surgeon: Colin Sibley MD;  Location: [facility] CATH LAB;  Service: Cardiology;  Laterality: Left;    REMOVAL OF TUNNELED CENTRAL VENOUS CATHETER (CVC) N/A 8/15/2023    Procedure: REMOVAL, CATHETER, CENTRAL VENOUS, TUNNELED;  Surgeon: Colin Sibley MD;  Location: [facility] CATH LAB;  Service: Cardiology;  Laterality: N/A;    RIGHT HEART CATHETERIZATION Right 8/11/2023    Procedure: INSERTION, CATHETER, RIGHT HEART;  Surgeon: Michele Hirsch MD;  Location: [facility] CATH LAB;  Service: Cardiology;  Laterality: Right;       Review of patient's allergies indicates:  No Known Allergies    PTA Medications   Medication Sig    aspirin (ECOTRIN) 81 MG EC tablet Take 81 mg by mouth.    FARXIGA 10 mg tablet Take 10 mg by mouth once daily.    LANTUS SOLOSTAR U-100 INSULIN glargine 100 units/mL SubQ pen Inject 50 Units into the skin once daily.    levothyroxine (SYNTHROID) 125 MCG tablet Take 125 mcg by mouth before breakfast.    metoprolol succinate (TOPROL-XL) 25 MG 24 hr tablet Take 12.5 mg by mouth once daily.    NOVOLOG FLEXPEN  U-100 INSULIN 100 unit/mL (3 mL) InPn pen Inject 20 Units into the skin 3 (three) times daily with meals.    rosuvastatin (CRESTOR) 40 MG Tab Take 40 mg by mouth once daily.    torsemide (DEMADEX) 100 MG Tab Take 50 mg by mouth once daily.     Family History       Problem Relation (Age of Onset)    Hypertension Mother          Tobacco Use    Smoking status: Former     Current packs/day: 0.00     Types: Cigarettes    Smokeless tobacco: Never   Substance and Sexual Activity    Alcohol use: Yes    Drug use: No    Sexual activity: Not on file       Objective:     Vital Signs (Most Recent):  Temp: 97.2 °F (36.2 °C) (08/21/23 0701)  Pulse: 101 (08/21/23 1001)  Resp: (!) 22 (08/21/23 1001)  BP: 103/68 (08/21/23 1001)  SpO2: 100 % (08/21/23 1001) Vital Signs (24h Range):  Temp:  [97.2 °F (36.2 °C)-98.4 °F (36.9 °C)] 97.2 °F (36.2 °C)  Pulse:  [] 101  Resp:  [11-23] 22  SpO2:  [98 %-100 %] 100 %  BP: ()/(50-68) 103/68     Weight: 124.7 kg (274 lb 14.4 oz)  Body mass index is 35.3 kg/m².    SpO2: 100 %         Intake/Output Summary (Last 24 hours) at 8/21/2023 1100  Last data filed at 8/21/2023 1001  Gross per 24 hour   Intake 1257.54 ml   Output 1531 ml   Net -273.46 ml       Lines/Drains/Airways       Central Venous Catheter Line  Duration             Introducer 08/15/23 1630 Internal Jugular Left 5 days    Pulmonary Artery Catheter Assessment  08/15/23 1500 Internal Jugular Left 5 days              Drain  Duration                  Urethral Catheter 08/12/23 0130 16 Fr. 9 days              Peripheral Intravenous Line  Duration                  Peripheral IV - Single Lumen 08/18/23 1702 20 G Anterior;Right Upper Arm 2 days         Peripheral IV - Single Lumen 08/21/23 0852 20 G Anterior;Proximal;Right Forearm <1 day                           Physical Exam  Vitals and nursing note reviewed.   Constitutional:       General: He is not in acute distress.     Appearance: Normal appearance. He is normal weight.  He is not ill-appearing.   HENT:      Head: Normocephalic and atraumatic.   Eyes:      Pupils: Pupils are equal, round, and reactive to light.   Neck:      Comments: Left IJ PA catheter  Cardiovascular:      Rate and Rhythm: Normal rate and regular rhythm.      Pulses: Normal pulses.      Heart sounds: Normal heart sounds. No murmur heard.     No friction rub. No gallop.   Pulmonary:      Effort: Pulmonary effort is normal. No respiratory distress.      Breath sounds: No wheezing or rhonchi.      Comments: Bibasilar crackles  Abdominal:      General: Abdomen is flat. Bowel sounds are normal.      Palpations: Abdomen is soft.   Musculoskeletal:         General: Normal range of motion.      Cervical back: Normal range of motion and neck supple.      Right lower leg: No edema.      Left lower leg: No edema.      Comments: Warm BL LEs   Skin:     General: Skin is warm and dry.      Capillary Refill: Capillary refill takes less than 2 seconds.   Neurological:      General: No focal deficit present.      Mental Status: He is alert.     Assessment and Plan:     Cardiac/Vascular  Cardiogenic shock  53/M with ICM, CABG in 2017 (LIMA to LAD, SVG to OM1, SVG to PDA), admitted for NSTEMI and cardiogenic shock. Trop to 38, EF drop 30->15%, renal failure with current Scr 4.   RHC on 8/12 w/ low CO/CI. LHC on 8/12 with 50% narrowing of the SVG-RCA, occluded SVG-OM, patent LIMA-LAD, and severe stenosis of proximal circumflex now post MIRELLA to prox Cx and impella was placed in the R femoral artery.     Weaned off pressors and Impella. Currently on  and Caleb. Morales worked for advanced HF options with LVAD.     He has no active ischemia, ventricular arrhythmias or is in cardiogenic shock. Based on laboratory data suggesting YVES on CKD and review of recent angiogram that shows no target vessel for revascularization with patent LIMA to LAD, SVG to RCA, and recent revascularization of culprit LCX, we would not recommend pursuing further  revascularization.     Continue medical management for ICM with evaluation for adv options as per HTS          VTE Risk Mitigation (From admission, onward)         Ordered     enoxaparin injection 40 mg  Every 24 hours         08/16/23 0736     IP VTE HIGH RISK PATIENT  Once         08/11/23 2117     Place sequential compression device  Until discontinued         08/11/23 2117                Thank you for your consult. I will sign off. Please contact us if you have any additional questions.    Lopez Siddiqui MD  Interventional Cardiology   [facility] - Cardiac Intensive Care

## 2023-08-22 PROBLEM — Z51.5 PALLIATIVE CARE ENCOUNTER: Status: ACTIVE | Noted: 2023-01-01

## 2023-08-22 PROBLEM — I47.29 NSVT (NONSUSTAINED VENTRICULAR TACHYCARDIA): Status: ACTIVE | Noted: 2023-01-01

## 2023-08-22 PROBLEM — Z71.89 ADVANCE CARE PLANNING: Status: ACTIVE | Noted: 2023-01-01

## 2023-08-22 NOTE — CONSULTS
NIAS consulted for IV access.  NIAS not needed at this time due to MD placing central line at this time.  Re consult NIAS if needed.

## 2023-08-22 NOTE — HPI
Mr Daniel is a 53 y.o. male with stage D HFrEF s/p Subq ICD, ICMP, CAD s/p CABG x3 (2017) with known occluded VG-OM, who was transferred from OSH with acute cardiogenic shock in setting of NSTEMI. Initially required Impella support (Now weaned off). Hospital course was also complicated with YVES and patient remains critically ill under the care of HTS team. Unfortunately, at this time he is not candidate for advanced HF therapies due to his current renal function and poorly controlled DM .  Patient with several runs of VT and was started on amio drip. EP consulted for recurrent episodes of NSVT

## 2023-08-22 NOTE — SUBJECTIVE & OBJECTIVE
Interval History: Patient became oliguric last night and with worsening renal function and increasing filling pressures, so furosemide gtt was restarted. He had an episode of tonic clonic seizure activity this am.     HD        Continuous Infusions:   sodium chloride 0.9% 5 mL/hr at 08/22/23 0601    amiodarone in dextrose 5% 0.5 mg/min (08/22/23 0601)    DOBUTamine IV infusion (non-titrating) 5 mcg/kg/min (08/22/23 0601)    furosemide (LASIX) 500 mg in 50 mL infusion (conc: 10 mg/mL) 40 mg/hr (08/22/23 0601)    insulin regular 1 units/mL infusion orderable (TRANSFER) 2.5 Units/hr (08/22/23 0601)    nitric oxide gas      NORepinephrine bitartrate-D5W 0.02 mcg/kg/min (08/22/23 0601)    sodium chloride 0.9%       Scheduled Meds:   aspirin  81 mg Oral Daily    atorvastatin  80 mg Oral Daily    enoxparin  40 mg Subcutaneous Q24H (prophylaxis, 1700)    etomidate        hydrALAZINE  25 mg Oral TID    insulin aspart U-100  6-12 Units Subcutaneous TIDWM    isosorbide dinitrate  20 mg Oral TID    levETIRAcetam (Keppra) IV (PEDS and ADULTS)  500 mg Intravenous Q12H    levothyroxine  125 mcg Oral Before breakfast    LIDOcaine (PF) 10 mg/ml (1%)  10 mL Other Once    LIDOcaine  1 patch Transdermal Q24H    pantoprazole  40 mg Oral Daily    phenylephrine HCl in 0.9% NaCl        polyethylene glycol  17 g Oral TID    propofoL        rocuronium        senna  8.6 mg Oral BID    succinylcholine        ticagrelor  90 mg Oral BID     PRN Meds:acetaminophen, dextrose 10%, dextrose 10%, etomidate, glucagon (human recombinant), glucose, glucose, insulin aspart U-100, lorazepam, methocarbamoL, ondansetron, oxyCODONE, phenylephrine HCl in 0.9% NaCl, propofoL, rocuronium, sodium chloride 0.9%, sodium chloride 0.9%, succinylcholine    Review of patient's allergies indicates:  No Known Allergies  Objective:     Vital Signs (Most Recent):  Temp: 97.9 °F (36.6 °C) (08/21/23 1901)  Pulse: 84 (08/22/23 0601)  Resp: (!) 22 (08/22/23 0601)  BP: 92/65  (08/22/23 0601)  SpO2: 99 % (08/22/23 0601) Vital Signs (24h Range):  Temp:  [97.2 °F (36.2 °C)-97.9 °F (36.6 °C)] 97.9 °F (36.6 °C)  Pulse:  [] 84  Resp:  [11-33] 22  SpO2:  [65 %-100 %] 99 %  BP: ()/(55-69) 92/65  Arterial Line BP: ()/(25-61) 100/57     Patient Vitals for the past 72 hrs (Last 3 readings):   Weight   08/22/23 0300 125.2 kg (276 lb)   08/21/23 0300 124.7 kg (274 lb 14.4 oz)   08/20/23 0610 123.4 kg (272 lb)       Body mass index is 35.44 kg/m².      Intake/Output Summary (Last 24 hours) at 8/22/2023 0657  Last data filed at 8/22/2023 0601  Gross per 24 hour   Intake 1768.46 ml   Output 2130 ml   Net -361.54 ml       I/O this shift:  In: 728.1 [I.V.:579.3; IV Piggyback:148.8]  Out: 905 [Urine:905]    Net IO Since Admission: -8,620.76 mL [08/22/23 0658]    Hemodynamic Parameters:  PAP: (63-75)/(29-40) 66/31  PAP (Mean):  [43 mmHg-53 mmHg] 44 mmHg    Telemetry: NSR with frequent PVCs       Physical Exam  Vitals and nursing note reviewed.   Constitutional:       General: He is not in acute distress.     Appearance: Normal appearance. He is normal weight. He is not ill-appearing.   HENT:      Head: Normocephalic and atraumatic.   Eyes:      Pupils: Pupils are equal, round, and reactive to light.   Neck:      Comments: Left IJ PA catheter  Cardiovascular:      Rate and Rhythm: Normal rate and regular rhythm.      Pulses: Normal pulses.      Heart sounds: Normal heart sounds. No murmur heard.     No friction rub. No gallop.   Pulmonary:      Effort: Pulmonary effort is normal. No respiratory distress.      Breath sounds: No wheezing or rhonchi.      Comments: Bibasilar crackles  Abdominal:      General: Abdomen is flat. Bowel sounds are normal.      Palpations: Abdomen is soft.   Musculoskeletal:         General: Normal range of motion.      Cervical back: Normal range of motion and neck supple.      Right lower leg: No edema.      Left lower leg: No edema.      Comments: Warm BL LEs  "  Skin:     General: Skin is warm and dry.      Capillary Refill: Capillary refill takes less than 2 seconds.   Neurological:      General: No focal deficit present.      Mental Status: He is alert.          Significant Labs:  CBC:  Recent Labs   Lab 08/21/23 0826 08/21/23 2232 08/21/23 2239 08/22/23  0320   WBC 7.81 8.11  --  9.03   RBC 2.56* 2.52*  --  2.47*   HGB 7.5* 7.4*  --  7.2*   HCT 23.5* 22.8* 22* 21.9*    420  --  413   MCV 92 91  --  89   MCH 29.3 29.4  --  29.1   MCHC 31.9* 32.5  --  32.9       BNP:  Recent Labs   Lab 08/21/23 0826   BNP 2,674*     CMP:  Recent Labs   Lab 08/20/23  0232 08/20/23  0805 08/21/23  0249 08/21/23 0826 08/21/23  1520 08/21/23 2232 08/22/23  0320   *   < > 157*   < > 166* 227* 198*   CALCIUM 8.2*   < > 8.2*   < > 8.7 8.8 8.8   ALBUMIN 2.5*  --  2.5*  --   --  2.7* 2.7*   PROT 6.0  --  6.2  --   --   --  6.5   *   < > 130*   < > 132* 129* 132*   K 4.2   < > 4.3   < > 4.4 3.8 4.2   CO2 23   < > 23   < > 26 19* 22*   CL 95   < > 94*   < > 94* 94* 94*   BUN 55*   < > 61*   < > 60* 63* 65*   CREATININE 3.8*   < > 4.0*   < > 4.1* 4.2* 4.2*   ALKPHOS 84  --  83  --   --   --  86   ALT 17  --  15  --   --   --  126*   AST 24  --  20  --   --   --  120*   BILITOT 0.7  --  0.6  --   --   --  0.9    < > = values in this interval not displayed.        Coagulation:   Recent Labs   Lab 08/15/23  1211 08/16/23  0415 08/20/23  0232 08/21/23  0249 08/22/23  0320   INR  --    < > 1.0 1.0 1.1   APTT 47.5*  --   --   --   --     < > = values in this interval not displayed.       LDH:  No results for input(s): "LDH" in the last 72 hours.    Microbiology:  Microbiology Results (last 7 days)       Procedure Component Value Units Date/Time    Blood culture [[id number]] Collected: 08/13/23 0920    Order Status: Completed Specimen: Blood from Peripheral, Antecubital, Left Updated: 08/18/23 1012     Blood Culture, Routine No growth after 5 days.    Blood culture [[id number]] " "Collected: 08/12/23 0103    Order Status: Completed Specimen: Blood from Peripheral, Hand, Left Updated: 08/17/23 0612     Blood Culture, Routine No growth after 5 days.    Blood culture [[id number]]  (Abnormal) Collected: 08/12/23 0042    Order Status: Completed Specimen: Blood from Line, Jugular, Internal Right Updated: 08/15/23 0744     Blood Culture, Routine Gram stain aer bottle: Gram positive cocci in clusters resembling Staph      Results called to and read back by:Jojo Valdes Rn 08/13/2023  02:56      COAGULASE-NEGATIVE STAPHYLOCOCCUS SPECIES  Organism is a probable contaminant              ABGs:   Recent Labs   Lab 08/21/23  2349   PH 7.357   PCO2 46.7*   HCO3 26.2   POCSATURATED 37*   BE 1       BMP:   Recent Labs   Lab 08/22/23  0320   *   *   K 4.2   CL 94*   CO2 22*   BUN 65*   CREATININE 4.2*   CALCIUM 8.8   MG 2.7*       Cardiac Markers: No results for input(s): "CKMB", "TROPONINT", "MYOGLOBIN" in the last 72 hours.  Coagulation:   Recent Labs   Lab 08/22/23  0320   INR 1.1       Prealbumin: No results for input(s): "PREALBUMIN" in the last 72 hours.  Microbiology Results (last 7 days)       Procedure Component Value Units Date/Time    Blood culture [[id number]] Collected: 08/13/23 0920    Order Status: Completed Specimen: Blood from Peripheral, Antecubital, Left Updated: 08/18/23 1012     Blood Culture, Routine No growth after 5 days.    Blood culture [[id number]] Collected: 08/12/23 0103    Order Status: Completed Specimen: Blood from Peripheral, Hand, Left Updated: 08/17/23 0612     Blood Culture, Routine No growth after 5 days.    Blood culture [[id number]]  (Abnormal) Collected: 08/12/23 0042    Order Status: Completed Specimen: Blood from Line, Jugular, Internal Right Updated: 08/15/23 0744     Blood Culture, Routine Gram stain aer bottle: Gram positive cocci in clusters resembling Staph      Results called to and read back by:Jojo Valdes Rn 08/13/2023  02:56      " COAGULASE-NEGATIVE STAPHYLOCOCCUS SPECIES  Organism is a probable contaminant            Specimen (24h ago, onward)      None          I have reviewed all pertinent labs within the past 24 hours.    Estimated Creatinine Clearance: 28.6 mL/min (A) (based on SCr of 4.2 mg/dL (H)).    Diagnostic Results:    CXR 08/18/23  Postoperative changes and supporting devices as before.  Mild diffuse edema more marked at the lung bases similar to the previous study.  No significant pleural effusion.  Heart size upper limit of normal.    CT chest 08/18/23  1. No evidence for hematoma as clinically questioned, noting noncontrast technique.  2. Small bilateral pleural effusions.  Bilateral pulmonary edema.  3. Intraluminal fluid in the esophagus to the level of the aortic arch.  Consider correlation for aspiration risk.  4. Bilateral pulmonary nodules, largest measuring 9 mm.  For multiple solid nodules with any 6 mm or greater, Fleischner Society guidelines recommend follow up with non-contrast chest CT at 3-6 months and 18-24 months after discovery.  5. Gallbladder sludge and mild gallbladder distension, nonspecific.  6. Additional findings as above.    US renal 08/17/23  Limited study due to inability to evaluate segmental renal arterial resistive indices.  Additional evaluation, as clinically warranted.     Elevated main renal arterial resistive indices, nonspecific, but can be seen in the setting of medical renal disease.     Renal arteries and veins are patent.     No hydronephrosis     Bilateral pleural effusions.    TTE 08/17/23    Left Ventricle: The left ventricle is severely dilated. Ventricular mass is normal. Normal wall thickness. regional wall motion abnormalities present. See diagram for wall motion findings. There is severely reduced systolic function with a visually estimated ejection fraction of 15 - 20%. Grade III diastolic dysfunction.    Left Atrium: Left atrium is moderately dilated.    Right Ventricle: Right  ventricle was not well visualized due to poor acoustic window.    Mitral Valve: There is mild regurgitation.    Tricuspid Valve: There is mild regurgitation.    Pulmonary Artery: The estimated pulmonary artery systolic pressure is 41 mmHg.    IVC/SVC: Normal venous pressure at 3 mmHg.    [facility] 08/12/23  Coronary angiogram:  Left main: Patent  Left anterior descending artery:   just distal to the 1st septal   Left circumflex artery:  90-95% proximal stenosis, distal 70% calcified stenosis.  Diffusely diseased OM1,  of OM2  Right coronary artery:  Ostial      Grafts:  SVG-RCA:  Diffuse disease with narrowing of up to 40-50% in the proximal portion  The native PLB and PDA both diffusely diseased.  SVG-OM: Occluded  LIMA-LAD:  Widely patent.  Diffusely diseased native LAD with narrowing of up to 90% in the very apical portion.     Assessment:  Cardiogenic shock with extremely low cardiac outputs and elevated left and right filling pressures  NSTEMI - suspect acute graft closure of the SVG to OM2.  Severe native CAD status post PCI of the proximal circumflex with a 33 x 24 mm Synergy XD stent (post-dilated up to 4.25 mm)      [facility] 08/11/23  RA 20, RV 81/21, PA 81/47 (mean 61), PWCP 45, Ao sat 96%, PA sat 49% (on 6L NC)  CO/CI:   3.69/1.52 (VIKKI)  3.91/1.6 (TD)

## 2023-08-22 NOTE — PROGRESS NOTES
"   08/22/23 0935   Art Line   Arterial Line BP 68/43   Arterial Line MAP (mmHg) 52 mmHg     Pt c/o nausea, RN at bedside assisted pt w/ emesis bag. Pt then unresponsive w/ seizure-like activity followed by hypotension and hypoxia w/ O2 sats into the 70s. When pt recovered, pt oriented to person, place and time, pt asked "did it happen again?" BP and O2 saturation returned to previous baseline. MD Yvonne notified, no new orders at this time.   "

## 2023-08-22 NOTE — ASSESSMENT & PLAN NOTE
Acute kidney injury    Likely secondary to cardiogenic shock  Baseline creatinine of 1.8-2.   Worsening creatinine, furosemide held and monitoring response, however creatinine started worsening, with increased cardiac filling pressures and oliguria  Patient was restarted on furosemide gtt, which had to be increased and added thiazide when found volume overload on 08/21    Recent Labs   Lab 08/21/23  1520 08/21/23  2232 08/22/23  0320   CREATININE 4.1* 4.2* 4.2*     - Continue furosemide gtt 40mg/h  - Check need for additional diuresis   - Monitor Cr  - Daily weights, strict I/O and chart, renally dose all medications   - Avoid nephrotoxic medications, NSAIDs, ACE/ARB, and IV contrast.  - Appreciate nephrology recs

## 2023-08-22 NOTE — PT/OT/SLP PROGRESS
Physical Therapy      Patient Name:  Itz Daniel   MRN:  [id number]    Patient not seen today secondary to  (pt not seen due to having several seizures and is on hold. pt on cont EEG.). Will follow-up at a later date.    8/22/2023  .

## 2023-08-22 NOTE — PROGRESS NOTES
"[facility] - [facility]  Nephrology  Progress Note    Patient Name: Itz Daniel  MRN: [id number]  Admission Date: 8/11/2023  Hospital Length of Stay: 11 days  Attending Provider: Eric Moncada MD   Primary Care Physician: Sunday Boo MD  Principal Problem:Ischemic cardiomyopathy    Subjective:     HPI:   Itz Daniel is a 53 y.o. male w/ known CKD III/V 2/2 DM and HTN, CABG x3 (2017) with known occluded VG-OM, AICD placement, CAD, HFrEF, hypothyroidism, COVID-19 (2022), Diabetic foot infection s/p amputation (2022), and obesity presented to [facility] on  8/10/2023 with nausea, vomiting, weakness and shortness of breath. Pt's workup revealed pt in cardiogenic shock in setting of NSTEMI. Initial labs at [facility] showed troponin of 24.8, lactic acid of 2.7, , Na 132, glucose 487, Cr 2.51, and WBC 11.57, pt was loaded with heparin. Pt underwent a LHC (LCX had 90-95% proximal stenosis, distal 70% calcified stenosis, diffusely diseased OM1,  of OM2, RHC revealed: RA 20, RV 81/21, PA 81/47 (mean 61), PWCP 45, Ao sat 96%, PA sat 49% (on 6L NC)  CO/CI:   3.69/1.52 (VIKKI)  3.91/1.6 (TD), and impella placement, CABG w/ PCI of the proximal circumflex with a 33 x 24 mm Synergy XD stent (post-dilated up to 4.25 mm)    [place] [facility] [place] the following VS:   Initial Vitals   BP Pulse Resp Temp SpO2   08/12/23 0600 08/11/23 2109 08/11/23 2115 08/11/23 2100 08/11/23 2115   125/72 (!) 114 20 98.3 °F (36.8 °C) 95 %     Nephrology was consulted for: "Worsening kidney function"  Baseline sCr: appears to be around ~2.  Admission sCr: 2.5 at [facility]  Pt follows with nephrology for CKD  [place] [facility] [place] had significant YVES, highest sCr during hospitalization is 3.7 (today), his urine output has also decreased.   Pt has been aggressively diuresed with ~9L of urine output during hospitalization   Primary team ordered a renal ultrasound w/ doppler " with for evaluation of renal artery stenosis, this was negative  Pt has remained on 4L of O2 since 8/17, he does not use oxygen at home.   Pt does not endorse a history of kidney stones, chronic NSAID use, trauma to the kidneys or bladder.   As for a family history, pt denies family history of kidney diease including family members on dialysis, autoimmune diseases, kidney stones or cancer.     Creatinine   Date Value Ref Range Status   08/19/2023 3.6 (H) 0.5 - 1.4 mg/dL Final   08/19/2023 3.7 (H) 0.5 - 1.4 mg/dL Final   08/19/2023 3.4 (H) 0.5 - 1.4 mg/dL Final     BUN   Date Value Ref Range Status   08/19/2023 51 (H) 6 - 20 mg/dL Final   08/19/2023 50 (H) 6 - 20 mg/dL Final   08/19/2023 49 (H) 6 - 20 mg/dL Final       Interval History: Patient seen and examined this morning and noted to have seizure like activity this morning with aura with nausea and vomiting prior to becoming unresponsive and rigid with clonic appearing jerks. Continuous EEG as concern for seizures yesterday and throughout night. Lasix infusion increased from 20 mg to 40 mg/hr in addition to receiving IV Diuril. He is afebrile with pulse ranging from 100-60s bpm. Systolic blood pressure ranging from 120-60s mmHg via arterial line now on Levophed 0.02. He is saturating +89% on 4 liters via nasal cannula. Renal function largely stable although phosphorous rising. Documented UOP of 2.13 liters with one unmeasured void in the last 24 hours.    Review of patient's allergies indicates:  No Known Allergies  Current Facility-Administered Medications   Medication Frequency    0.9%  NaCl infusion Continuous    acetaminophen tablet 650 mg Q6H PRN    amiodarone 360 mg/200 mL (1.8 mg/mL) infusion Continuous    aspirin EC tablet 81 mg Daily    atorvastatin tablet 80 mg Daily    dextrose 10% bolus 125 mL 125 mL PRN    dextrose 10% bolus 250 mL 250 mL PRN    DOBUtamine 1000 mg in D5W 250 mL infusion Continuous    enoxaparin injection 40 mg Q24H  (prophylaxis, 1700)    etomidate (AMIDATE) 2 mg/mL injection     furosemide (LASIX) 500 mg in 50 mL infusion (conc: 10 mg/mL) Continuous    glucagon (human recombinant) injection 1 mg PRN    glucose chewable tablet 16 g PRN    glucose chewable tablet 24 g PRN    hydrALAZINE tablet 25 mg TID    insulin aspart U-100 pen 0-10 Units PRN    insulin aspart U-100 pen 6-12 Units TIDWM    insulin regular in 0.9 % NaCl 100 unit/100 mL (1 unit/mL) infusion Continuous    isosorbide dinitrate tablet 20 mg TID    levETIRAcetam injection 500 mg Q12H    levothyroxine tablet 125 mcg Before breakfast    LIDOcaine (PF) 10 mg/ml (1%) injection 100 mg Once    LIDOcaine 5 % patch 1 patch Q24H    LORazepam injection 2 mg PRN    methocarbamoL tablet 500 mg Q6H PRN    nitric oxide gas Gas 10 ppm Continuous    NORepinephrine 4 mg in dextrose 5% 250 mL infusion (premix) (titrating) Continuous    ondansetron injection 4 mg Q6H PRN    oxyCODONE immediate release tablet 5 mg Q8H PRN    pantoprazole EC tablet 40 mg Daily    phenylephrine HCl in 0.9% NaCl 1 mg/10 mL (100 mcg/mL) syringe     polyethylene glycol packet 17 g TID    propofoL (DIPRIVAN) 10 mg/mL infusion     rocuronium 10 mg/mL injection     senna tablet 8.6 mg BID    sodium chloride 0.9% flush 10 mL PRN    sodium chloride 0.9% flush 10 mL Continuous    sodium chloride 0.9% flush 3 mL Q6H PRN    succinylcholine (ANECTINE) 20 mg/mL injection     ticagrelor tablet 90 mg BID       Objective:     Vital Signs (Most Recent):  Temp: 97.9 °F (36.6 °C) (08/21/23 1901)  Pulse: 86 (08/22/23 0701)  Resp: 16 (08/22/23 0701)  BP: 91/66 (08/22/23 0701)  SpO2: 99 % (08/22/23 0701) Vital Signs (24h Range):  Temp:  [97.7 °F (36.5 °C)-97.9 °F (36.6 °C)] 97.9 °F (36.6 °C)  Pulse:  [] 86  Resp:  [11-33] 16  SpO2:  [65 %-100 %] 99 %  BP: ()/(55-69) 91/66  Arterial Line BP: ()/(25-61) 103/57     Weight: 125.2 kg (276 lb) (08/22/23 0300)  Body mass index is  35.44 kg/m².  Body surface area is 2.56 meters squared.    I/O last 3 completed shifts:  In: 2281 [P.O.:1000; I.V.:1131.2; IV Piggyback:149.8]  Out: 2990 [Urine:2990]    Physical Exam  Vitals and nursing note reviewed.   Constitutional:       General: He is awake. He is not in acute distress.     Appearance: He is obese. He is ill-appearing. He is not diaphoretic.      Interventions: Nasal cannula in place.   HENT:      Head: Normocephalic and atraumatic.      Right Ear: External ear normal.      Left Ear: External ear normal.      Nose:      Comments: Nasal cannula in place.     Mouth/Throat:      Mouth: Mucous membranes are moist.      Pharynx: Oropharynx is clear. No oropharyngeal exudate or posterior oropharyngeal erythema.   Eyes:      General: No scleral icterus.        Right eye: No discharge.         Left eye: No discharge.      Extraocular Movements: Extraocular movements intact.      Conjunctiva/sclera: Conjunctivae normal.   Neck:      Comments: CVC & [place] Tariq catheter to left internal jugular vein.  Cardiovascular:      Rate and Rhythm: Tachycardia present.      Heart sounds: Murmur heard.      Systolic murmur is present with a grade of 1/6.      No friction rub. No gallop.      Comments: Bilateral trace lower extremity edema.  Pulmonary:      Effort: Pulmonary effort is normal. No respiratory distress.      Breath sounds: Rales present. No wheezing or rhonchi.      Comments: Faint bibasilar crackles appreciated.   Abdominal:      General: Bowel sounds are normal. There is no distension.      Palpations: Abdomen is soft. There is no mass.      Tenderness: There is no abdominal tenderness.   Genitourinary:     Comments: Argueta catheter in place.  Musculoskeletal:      Cervical back: Neck supple.      Right lower leg: Edema present.      Left lower leg: Edema present.   Skin:     General: Skin is warm and dry.      Coloration: Skin is not jaundiced.   Neurological:      General: No focal deficit present.       Mental Status: He is alert. Mental status is at baseline.      Cranial Nerves: No cranial nerve deficit.      Motor: No weakness.   Psychiatric:         Mood and Affect: Mood normal.         Behavior: Behavior normal. Behavior is cooperative.          Significant Labs:  ABGs:   Recent Labs   Lab 08/21/23  2349   PH 7.357   PCO2 46.7*   HCO3 26.2   POCSATURATED 37*   BE 1       BMP:   Recent Labs   Lab 08/22/23  0320   *   *   K 4.2   CL 94*   CO2 22*   BUN 65*   CREATININE 4.2*   CALCIUM 8.8   MG 2.7*       CBC:   Recent Labs   Lab 08/22/23  0320   WBC 9.03   RBC 2.47*   HGB 7.2*   HCT 21.9*      MCV 89   MCH 29.1   MCHC 32.9       CMP:   Recent Labs   Lab 08/22/23  0320   *   CALCIUM 8.8   ALBUMIN 2.7*   PROT 6.5   *   K 4.2   CO2 22*   CL 94*   BUN 65*   CREATININE 4.2*   ALKPHOS 86   *   *   BILITOT 0.9       Coagulation:   Recent Labs   Lab 08/15/23  1211 08/16/23  0415 08/22/23  0320   INR  --    < > 1.1   APTT 47.5*  --   --     < > = values in this interval not displayed.       LFTs:   Recent Labs   Lab 08/22/23  0320   *   *   ALKPHOS 86   BILITOT 0.9   PROT 6.5   ALBUMIN 2.7*       Microbiology Results (last 7 days)       Procedure Component Value Units Date/Time    Blood culture [[id number]] Collected: 08/13/23 0920    Order Status: Completed Specimen: Blood from Peripheral, Antecubital, Left Updated: 08/18/23 1012     Blood Culture, Routine No growth after 5 days.    Blood culture [[id number]] Collected: 08/12/23 0103    Order Status: Completed Specimen: Blood from Peripheral, Hand, Left Updated: 08/17/23 0612     Blood Culture, Routine No growth after 5 days.          Specimen (24h ago, onward)      None          Urinary sediment on 08/21/2023:                  Significant Imaging:  I have reviewed all imagining in the last 24 hours.    Assessment/Plan:     Cardiac/Vascular  * Ischemic cardiomyopathy  Cardiogenic shock  - admitted for NSTEMI  s/p CABG w/ PCI of the proximal circumflex with a 33 x 24 mm Synergy XD stent (post-dilated up to 4.25 mm) on 8/12/2023   - management per primary team    Acute on chronic combined systolic and diastolic heart failure  Most recent echocardiogram on 08/17/2023:    Left Ventricle: The left ventricle is severely dilated. Ventricular mass is normal. Normal wall thickness. regional wall motion abnormalities present. See diagram for wall motion findings. There is severely reduced systolic function with a visually estimated ejection fraction of 15 - 20%. Grade III diastolic dysfunction.    Left Atrium: Left atrium is moderately dilated.    Right Ventricle: Right ventricle was not well visualized due to poor acoustic window.    Mitral Valve: There is mild regurgitation.    Tricuspid Valve: There is mild regurgitation.    Pulmonary Artery: The estimated pulmonary artery systolic pressure is 41 mmHg.    IVC/SVC: Normal venous pressure at 3 mmHg.    - management per primary team  - currently on dobutamine infusion at 5 mg/hr and Lasix infusion at 40 mg/hr      Renal/  Acute renal failure with acute tubular necrosis superimposed on stage 3b chronic kidney disease  CKD (chronic kidney disease), stage IV  Ischemic ATN 2/2 decrease perfusion (severe hypotension d/t cardiogenic shock), contrast exposure, combined with aggressive diuresis in a patient with known advanced CKD  Baseline sCr: appears to be around ~2.  Admission sCr: 2.5 at [facility]  Lab Results   Component Value Date    CREATININE 4.4 (H) 08/22/2023    CREATININE 4.4 (H) 08/22/2023    CREATININE 4.4 (H) 08/22/2023     Last UPCR:   Prot/Creat Ratio, Urine   Date Value Ref Range Status   08/19/2023 1.35 (H) 0.00 - 0.20 Final       Plan/Recommendations:   - YVES secondary to acute tubular necrosis in the setting of low effective circulatory volume with known heart failure/shock  - diuretics currently include Lasix 40 mg/hr and Diuril 250 mg IV x1 and 500  mg IV x1 today  - renal diet when not NPO, volume restriction per primary team  - have started Renvela 800 mg TID if able to tolerate PO  - keep MAP > 65 mmHg  - serial RFPs and magnesium levels in light of active diuresis   - daily weights and strict I/Os  - renally dose medications to eGFR  - avoid nephrotoxins as much as possible (i.e. supra-therapeutic vancomycin troughs, NSAIDs, intra-arterial contrast, etc.)  - no acute indications for RRT at this time however will continue to monitor closely (consent has been obtained and in chart)    Endocrine  DM (diabetes mellitus)  Lab Results   Component Value Date    HGBA1C 10.2 (H) 08/10/2023     - management per primary team    Thank you for your consult. I will follow-up with patient. Please contact us if you have any additional questions.    Cal Gaines MD  Nephrology  [facility] - [facility]

## 2023-08-22 NOTE — PLAN OF CARE
Cardiac ICU Care Plan    POC reviewed with Itz Daniel and family. Questions and concerns addressed. CVP 19, 15, SvO2 38%.  gtt decreased, insulin gtt increased, amio gtt increased, levo and lasix continued, Caleb continued. PA catheter exchanged for TLC. Pt w/ multiple episodes of intermittent nausea and Vtach during shift, MD Yvonne notified and at bedside for occurrences. See below and flowsheets for full assessment and VS info.       Neuro:  Ben Coma Scale  Best Eye Response: 3-->(E3) to speech  Best Motor Response: 6-->(M6) obeys commands  Best Verbal Response: 4-->(V4) confused  Ben Coma Scale Score: 13  Assessment Qualifiers: patient not sedated/intubated  Pupil PERRLA: yes    24 hr Temp:  [97.7 °F (36.5 °C)-97.9 °F (36.6 °C)]      CV:  Rhythm: normal sinus rhythm   DVT prophylaxis: VTE Required Core Measure: Pharmacological prophylaxis initiated/maintained    CVP (mean): 15 mmHg (08/22/23 1701)    [REMOVED] Pulmonary Artery Catheter Assessment  08/15/23 1500 Internal Jugular Left-Current Insertion Depth (cm): 56.5 cm (08/22/23 0701)  [REMOVED] Pulmonary Artery Catheter Assessment  08/11/23 1500 Femoral Vein Right-Current Insertion Depth (cm): 78.5 cm (08/15/23 1101)  PAP: 61/28 (08/22/23 1401)  SVO2 (%): 38 % (08/22/23 0801)  CO (L/min): 5.3 L/min (Joe) (08/13/23 1601)       Type: Impella       Pulses  Right Radial Pulse: 1+ (weak)  Left Radial Pulse: 1+ (weak)  Right Dorsalis Pedis Pulse: Doppler  Left Dorsalis Pedis Pulse: 1+ (weak)  Right Posterior Tibial Pulse: Doppler  Left Posterior Tibial Pulse: Doppler    Resp:  Flow (L/min): 4  Oxygen Concentration (%): 36    GI/:  GI prophylaxis: yes  Diet/Nutrition Received: NPO  Last Bowel Movement: 08/17/23  Voiding Characteristics: urethral catheter (bladder)       Urethral Catheter 08/12/23 0130 16 Fr.-Reason for Continuing Urinary Catheterization: Critically ill in ICU and requiring hourly monitoring of intake/output   Intake/Output  Summary (Last 24 hours) at 8/22/2023 1821  Last data filed at 8/22/2023 1801  Gross per 24 hour   Intake 1615.39 ml   Output 1860 ml   Net -244.61 ml            Labs/Accuchecks:  Recent Labs   Lab 08/21/23  0826 08/21/23 2232 08/21/23 2239 08/22/23  0320   WBC 7.81 8.11  --  9.03   RBC 2.56* 2.52*  --  2.47*   HGB 7.5* 7.4*  --  7.2*   HCT 23.5* 22.8* 22* 21.9*    420  --  413      Recent Labs   Lab 08/20/23  0232 08/21/23  0249 08/22/23  0320   INR 1.0 1.0 1.1      Recent Labs     08/22/23  0320 08/22/23  0835 08/22/23  1156   *   < > 131*  131*  131*   K 4.2   < > 4.5  4.5  4.5   CO2 22*   < > 24  24  24   CL 94*   < > 92*  92*  92*   BUN 65*   < > 67*  67*  67*   CREATININE 4.2*   < > 4.4*  4.4*  4.4*   ALKPHOS 86  --   --    *  --   --    *  --   --    BILITOT 0.9  --   --     < > = values in this interval not displayed.       Recent Labs   Lab 08/20/23 0232 08/21/23 0826   *  --    TROPONINI  --  14.957*      Recent Labs     08/21/23 2239 08/21/23  2349 08/22/23  0825   PH 7.365 7.357 7.377   PCO2 37.7 46.7* 47.2*   PO2 83 23* 23*   HCO3 21.5* 26.2 27.7   POCSATURATED 96 37* 38*   BE -4 1 3       Electrolytes: Electrolytes replaced  Accuchecks: ACHS    Gtts/LDAs:   sodium chloride 0.9% Stopped (08/22/23 1526)    amiodarone in dextrose 5% 1 mg/min (08/22/23 1801)    DOBUTamine IV infusion (non-titrating) 2.5 mcg/kg/min (08/22/23 1801)    furosemide (LASIX) 500 mg in 50 mL infusion (conc: 10 mg/mL) 40 mg/hr (08/22/23 1801)    insulin regular 1 units/mL infusion orderable (TRANSFER) 2.8 Units/hr (08/22/23 1801)    nitric oxide gas      NORepinephrine bitartrate-D5W 0.02 mcg/kg/min (08/22/23 1801)    sodium chloride 0.9%         Lines/Drains/Airways       Central Venous Catheter Line  Duration             Introducer 08/15/23 1630 Internal Jugular Left 7 days    Percutaneous Central Line Insertion/Assessment - Triple Lumen  08/22/23 1501 Internal Jugular Left <1 day               Drain  Duration                  Urethral Catheter 08/12/23 0130 16 Fr. 10 days              Arterial Line  Duration             Arterial Line 08/21/23 2328 Right Radial <1 day              Peripheral Intravenous Line  Duration                  Peripheral IV - Single Lumen 08/21/23 2243 18 G Anterior;Left Wrist <1 day         Peripheral IV - Single Lumen 08/21/23 2258 20 G Left;Posterior Wrist <1 day                    Skin/Wounds  Bathing/Skin Care: bath, complete;dressed/undressed;foot care (08/21/23 2301)  Wounds: No  Wound care consulted: No

## 2023-08-22 NOTE — HOSPITAL COURSE
EEG with highly stereotyped episodes (3 episodes) and added to the patient's history of previous right frontal brain infarct is highly concerning of focal seizure. On YVES in the setting of CKD, which may have decreased the patient's seizure threshold. Current GFR 15.2.  Will continue to monitor neurological status. On close renal function monitoring. Persistent electrolyte imbalance with 2 new witnessed episodes of seizure activity less than 30 seconds. Increased LFTs in the range above 2000s.

## 2023-08-22 NOTE — PT/OT/SLP PROGRESS
Occupational Therapy      Patient Name:  Itz Daniel   MRN:  [id number]    Patient not seen today secondary to Other (Comment) (OT/PT attempted to see pt this morning however pt with recent seizure-like activity ~940am.). Will follow-up per POC.    8/22/2023

## 2023-08-22 NOTE — SUBJECTIVE & OBJECTIVE
Interval History: Patient seen and examined this morning and noted to have seizure like activity this morning with aura with nausea and vomiting prior to becoming unresponsive and rigid with clonic appearing jerks. Continuous EEG as concern for seizures yesterday and throughout night. Lasix infusion increased from 20 mg to 40 mg/hr in addition to receiving IV Diuril. He is afebrile with pulse ranging from 100-60s bpm. Systolic blood pressure ranging from 120-60s mmHg via arterial line now on Levophed 0.02. He is saturating +89% on 4 liters via nasal cannula. Renal function largely stable although phosphorous rising. Documented UOP of 2.13 liters with one unmeasured void in the last 24 hours.    Review of patient's allergies indicates:  No Known Allergies  Current Facility-Administered Medications   Medication Frequency    0.9%  NaCl infusion Continuous    acetaminophen tablet 650 mg Q6H PRN    amiodarone 360 mg/200 mL (1.8 mg/mL) infusion Continuous    aspirin EC tablet 81 mg Daily    atorvastatin tablet 80 mg Daily    dextrose 10% bolus 125 mL 125 mL PRN    dextrose 10% bolus 250 mL 250 mL PRN    DOBUtamine 1000 mg in D5W 250 mL infusion Continuous    enoxaparin injection 40 mg Q24H (prophylaxis, 1700)    etomidate (AMIDATE) 2 mg/mL injection     furosemide (LASIX) 500 mg in 50 mL infusion (conc: 10 mg/mL) Continuous    glucagon (human recombinant) injection 1 mg PRN    glucose chewable tablet 16 g PRN    glucose chewable tablet 24 g PRN    hydrALAZINE tablet 25 mg TID    insulin aspart U-100 pen 0-10 Units PRN    insulin aspart U-100 pen 6-12 Units TIDWM    insulin regular in 0.9 % NaCl 100 unit/100 mL (1 unit/mL) infusion Continuous    isosorbide dinitrate tablet 20 mg TID    levETIRAcetam injection 500 mg Q12H    levothyroxine tablet 125 mcg Before breakfast    LIDOcaine (PF) 10 mg/ml (1%) injection 100 mg Once    LIDOcaine 5 % patch 1 patch Q24H    LORazepam injection 2 mg PRN    methocarbamoL tablet 500 mg Q6H  PRN    nitric oxide gas Gas 10 ppm Continuous    NORepinephrine 4 mg in dextrose 5% 250 mL infusion (premix) (titrating) Continuous    ondansetron injection 4 mg Q6H PRN    oxyCODONE immediate release tablet 5 mg Q8H PRN    pantoprazole EC tablet 40 mg Daily    phenylephrine HCl in 0.9% NaCl 1 mg/10 mL (100 mcg/mL) syringe     polyethylene glycol packet 17 g TID    propofoL (DIPRIVAN) 10 mg/mL infusion     rocuronium 10 mg/mL injection     senna tablet 8.6 mg BID    sodium chloride 0.9% flush 10 mL PRN    sodium chloride 0.9% flush 10 mL Continuous    sodium chloride 0.9% flush 3 mL Q6H PRN    succinylcholine (ANECTINE) 20 mg/mL injection     ticagrelor tablet 90 mg BID       Objective:     Vital Signs (Most Recent):  Temp: 97.9 °F (36.6 °C) (08/21/23 1901)  Pulse: 86 (08/22/23 0701)  Resp: 16 (08/22/23 0701)  BP: 91/66 (08/22/23 0701)  SpO2: 99 % (08/22/23 0701) Vital Signs (24h Range):  Temp:  [97.7 °F (36.5 °C)-97.9 °F (36.6 °C)] 97.9 °F (36.6 °C)  Pulse:  [] 86  Resp:  [11-33] 16  SpO2:  [65 %-100 %] 99 %  BP: ()/(55-69) 91/66  Arterial Line BP: ()/(25-61) 103/57     Weight: 125.2 kg (276 lb) (08/22/23 0300)  Body mass index is 35.44 kg/m².  Body surface area is 2.56 meters squared.    I/O last 3 completed shifts:  In: 2281 [P.O.:1000; I.V.:1131.2; IV Piggyback:149.8]  Out: 2990 [Urine:2990]     Physical Exam  Vitals and nursing note reviewed.   Constitutional:       General: He is awake. He is not in acute distress.     Appearance: He is obese. He is ill-appearing. He is not diaphoretic.      Interventions: Nasal cannula in place.   HENT:      Head: Normocephalic and atraumatic.      Right Ear: External ear normal.      Left Ear: External ear normal.      Nose:      Comments: Nasal cannula in place.     Mouth/Throat:      Mouth: Mucous membranes are moist.      Pharynx: Oropharynx is clear. No oropharyngeal exudate or posterior oropharyngeal erythema.   Eyes:      General: No scleral icterus.         Right eye: No discharge.         Left eye: No discharge.      Extraocular Movements: Extraocular movements intact.      Conjunctiva/sclera: Conjunctivae normal.   Neck:      Comments: CVC & Goodwin Tariq catheter to left internal jugular vein.  Cardiovascular:      Rate and Rhythm: Tachycardia present.      Heart sounds: Murmur heard.      Systolic murmur is present with a grade of 1/6.      No friction rub. No gallop.      Comments: Bilateral trace lower extremity edema.  Pulmonary:      Effort: Pulmonary effort is normal. No respiratory distress.      Breath sounds: Rales present. No wheezing or rhonchi.      Comments: Faint bibasilar crackles appreciated.   Abdominal:      General: Bowel sounds are normal. There is no distension.      Palpations: Abdomen is soft. There is no mass.      Tenderness: There is no abdominal tenderness.   Genitourinary:     Comments: Argueta catheter in place.  Musculoskeletal:      Cervical back: Neck supple.      Right lower leg: Edema present.      Left lower leg: Edema present.   Skin:     General: Skin is warm and dry.      Coloration: Skin is not jaundiced.   Neurological:      General: No focal deficit present.      Mental Status: He is alert. Mental status is at baseline.      Cranial Nerves: No cranial nerve deficit.      Motor: No weakness.   Psychiatric:         Mood and Affect: Mood normal.         Behavior: Behavior normal. Behavior is cooperative.          Significant Labs:  ABGs:   Recent Labs   Lab 08/21/23  2349   PH 7.357   PCO2 46.7*   HCO3 26.2   POCSATURATED 37*   BE 1       BMP:   Recent Labs   Lab 08/22/23  0320   *   *   K 4.2   CL 94*   CO2 22*   BUN 65*   CREATININE 4.2*   CALCIUM 8.8   MG 2.7*       CBC:   Recent Labs   Lab 08/22/23  0320   WBC 9.03   RBC 2.47*   HGB 7.2*   HCT 21.9*      MCV 89   MCH 29.1   MCHC 32.9       CMP:   Recent Labs   Lab 08/22/23  0320   *   CALCIUM 8.8   ALBUMIN 2.7*   PROT 6.5   *   K 4.2   CO2  22*   CL 94*   BUN 65*   CREATININE 4.2*   ALKPHOS 86   *   *   BILITOT 0.9       Coagulation:   Recent Labs   Lab 08/15/23  1211 08/16/23  0415 08/22/23  0320   INR  --    < > 1.1   APTT 47.5*  --   --     < > = values in this interval not displayed.       LFTs:   Recent Labs   Lab 08/22/23  0320   *   *   ALKPHOS 86   BILITOT 0.9   PROT 6.5   ALBUMIN 2.7*       Microbiology Results (last 7 days)       Procedure Component Value Units Date/Time    Blood culture [[id number]] Collected: 08/13/23 0920    Order Status: Completed Specimen: Blood from Peripheral, Antecubital, Left Updated: 08/18/23 1012     Blood Culture, Routine No growth after 5 days.    Blood culture [[id number]] Collected: 08/12/23 0103    Order Status: Completed Specimen: Blood from Peripheral, Hand, Left Updated: 08/17/23 0612     Blood Culture, Routine No growth after 5 days.          Specimen (24h ago, onward)      None          Urinary sediment on 08/21/2023:                  Significant Imaging:  I have reviewed all imagining in the last 24 hours.

## 2023-08-22 NOTE — SUBJECTIVE & OBJECTIVE
"Interval HPI:   Overnight events: Remains in CICU. BG at or slightly above goal ranges on IV insulin infusion at 2.5 u/hr and prn correction scale. Creatinine 4.4. Diet NPO Except for: Medication    Eating:   NPO  Nausea: No  Hypoglycemia and intervention: No  Fever: No  TPN and/or TF: No  If yes, type of TF/TPN and rate: n/a    BP (!) 89/52 (BP Location: Left arm, Patient Position: Lying)   Pulse 86   Temp 97.8 °F (36.6 °C) (Axillary)   Resp 19   Ht 6' 2" (1.88 m)   Wt 125.2 kg (276 lb)   SpO2 100%   BMI 35.44 kg/m²     Labs Reviewed and Include    Recent Labs   Lab 08/22/23  0320 08/22/23  0835 08/22/23  1156   *   < > 192*  192*  192*   CALCIUM 8.8   < > 8.7  8.7  8.7   ALBUMIN 2.7*  --   --    PROT 6.5  --   --    *   < > 131*  131*  131*   K 4.2   < > 4.5  4.5  4.5   CO2 22*   < > 24  24  24   CL 94*   < > 92*  92*  92*   BUN 65*   < > 67*  67*  67*   CREATININE 4.2*   < > 4.4*  4.4*  4.4*   ALKPHOS 86  --   --    *  --   --    *  --   --    BILITOT 0.9  --   --     < > = values in this interval not displayed.     Lab Results   Component Value Date    WBC 9.03 08/22/2023    HGB 7.2 (L) 08/22/2023    HCT 21.9 (L) 08/22/2023    MCV 89 08/22/2023     08/22/2023     No results for input(s): "TSH", "FREET4" in the last 168 hours.  Lab Results   Component Value Date    HGBA1C 10.2 (H) 08/10/2023       Nutritional status:   Body mass index is 35.44 kg/m².  Lab Results   Component Value Date    ALBUMIN 2.7 (L) 08/22/2023    ALBUMIN 2.7 (L) 08/21/2023    ALBUMIN 2.5 (L) 08/21/2023     No results found for: "PREALBUMIN"    Estimated Creatinine Clearance: 27.3 mL/min (A) (based on SCr of 4.4 mg/dL (H)).    Accu-Checks  Recent Labs     08/20/23 2021 08/21/23  0252 08/21/23  0814 08/21/23  1241 08/21/23  1648 08/21/23 2015 08/21/23  2218 08/22/23  0252 08/22/23  0834 08/22/23  1158   POCTGLUCOSE 209* 168* 142* 151* 163* 197* 198* 211* 176* 209*       Current " Medications and/or Treatments Impacting Glycemic Control  Immunotherapy:    Immunosuppressants       None          Steroids:   Hormones (From admission, onward)      None          Pressors:    Autonomic Drugs (From admission, onward)      Start     Stop Route Frequency Ordered    08/21/23 2219  succinylcholine (ANECTINE) 20 mg/mL injection        Note to Pharmacy: Created by cabinet override    08/22/23 1029   08/21/23 2219 08/21/23 2219  rocuronium 10 mg/mL injection        Note to Pharmacy: Created by cabinet override    08/22/23 1029   08/21/23 2219 08/21/23 0930  NORepinephrine 4 mg in dextrose 5% 250 mL infusion (premix) (titrating)        Question Answer Comment   Begin at (in mcg/kg/min): 0.02    Titrate by: (in mcg/kg/min) 0.02    Titrate interval: (in minutes) 5    Titrate to maintain: (MAP or SBP) MAP    Greater than: (in mmHg) 65    Maximum dose: (in mcg/kg/min) 3        -- IV Continuous 08/21/23 0818          Hyperglycemia/Diabetes Medications:   Antihyperglycemics (From admission, onward)      Start     Stop Route Frequency Ordered    08/20/23 0915  insulin regular in 0.9 % NaCl 100 unit/100 mL (1 unit/mL) infusion        Question:  Enter initial dose (Units/hr):  Answer:  2.5    -- IV Continuous 08/20/23 0910    08/16/23 0815  insulin aspart U-100 pen 6-12 Units         -- SubQ 3 times daily with meals 08/16/23 0813    08/15/23 1003  insulin aspart U-100 pen 0-10 Units         -- SubQ As needed (PRN) 08/15/23 0904

## 2023-08-22 NOTE — ASSESSMENT & PLAN NOTE
Endocrinology consulted for BG management.   BG goal 140-180     - IncreaseTransition drip to 2.8 units/hr with step-down parameters. (BG above goal ranges)   - Novolog 6-12 units with meals (Administer    6   units if patient eats 25-50% of meal, administer   12    units if patient eats > 50% of meal.) HOLD while NPO  - Novolog (aspart) insulin prn for BG excursions MDC SSI (180/25).  - BG checks q 4 hrs while NPO  - Hypoglycemia protocol in place      ** Please notify Endocrine for any change and/or advance in diet**  ** Please call Endocrine for any BG related issues **    Discharge Planning:   TBD. Please notify endocrinology prior to discharge.

## 2023-08-22 NOTE — ASSESSMENT & PLAN NOTE
Patient with recurrent and frequent  NSVT in the setting of ischemic cardiomyopathy in cardiogenic shock on inotrope. Patient on amiodarone and rebolus today with continuous episodes refractory to medication. Patient has a sub Q ICD    Recommendation  -ICD interrogation in the am  -Continue Amiodarone at 1mg/min  -Titrate down Dobutamine as able  -If patient continues to have frequent episode can start lidocaine ggt

## 2023-08-22 NOTE — ASSESSMENT & PLAN NOTE
Ischemic ATN 2/2 decrease perfusion (severe hypotension d/t cardiogenic shock), contrast exposure, combined with aggressive diuresis in a patient with known advanced CKD  Baseline sCr: appears to be around ~2.  Admission sCr: 2.5 at [facility]  Lab Results   Component Value Date    CREATININE 4.4 (H) 08/22/2023    CREATININE 4.4 (H) 08/22/2023    CREATININE 4.4 (H) 08/22/2023     Last UPCR:   Prot/Creat Ratio, Urine   Date Value Ref Range Status   08/19/2023 1.35 (H) 0.00 - 0.20 Final       Plan/Recommendations:   - YVES secondary to acute tubular necrosis in the setting of low effective circulatory volume with known heart failure/shock  - diuretics currently include Lasix 40 mg/hr and Diuril 250 mg IV x1 and 500 mg IV x1 today  - renal diet when not NPO, volume restriction per primary team  - have started Renvela 800 mg TID if able to tolerate PO  - keep MAP > 65 mmHg  - serial RFPs and magnesium levels in light of active diuresis   - daily weights and strict I/Os  - renally dose medications to eGFR  - avoid nephrotoxins as much as possible (i.e. supra-therapeutic vancomycin troughs, NSAIDs, intra-arterial contrast, etc.)  - no acute indications for RRT at this time however will continue to monitor closely (consent has been obtained and in chart)

## 2023-08-22 NOTE — PROGRESS NOTES
[facility] - Cardiac Intensive Care  Heart Transplant  Progress Note    Patient Name: Itz Daniel  MRN: [id number]  Admission Date: 8/11/2023  Hospital Length of Stay: 11 days  Attending Physician: Eric Moncada MD  Primary Care Provider: Sunday Boo MD  Principal Problem:Ischemic cardiomyopathy    Subjective:     Interval History: Patient became oliguric last night and with worsening renal function and increasing filling pressures, so furosemide gtt was restarted. He had an episode of tonic clonic seizure activity this am.     HD    CVP:  19  SVO2:  38  CO 7.7  CI 3.0  SVR:  582     RAP 19  PAP 60/25(45)  PCWP 30  PVR 1.9  [place] 1.8   1.28    Continuous Infusions:   sodium chloride 0.9% 5 mL/hr at 08/22/23 0601    amiodarone in dextrose 5% 0.5 mg/min (08/22/23 0601)    DOBUTamine IV infusion (non-titrating) 5 mcg/kg/min (08/22/23 0601)    furosemide (LASIX) 500 mg in 50 mL infusion (conc: 10 mg/mL) 40 mg/hr (08/22/23 0601)    insulin regular 1 units/mL infusion orderable (TRANSFER) 2.5 Units/hr (08/22/23 0601)    nitric oxide gas      NORepinephrine bitartrate-D5W 0.02 mcg/kg/min (08/22/23 0601)    sodium chloride 0.9%       Scheduled Meds:   aspirin  81 mg Oral Daily    atorvastatin  80 mg Oral Daily    enoxparin  40 mg Subcutaneous Q24H (prophylaxis, 1700)    etomidate        hydrALAZINE  25 mg Oral TID    insulin aspart U-100  6-12 Units Subcutaneous TIDWM    isosorbide dinitrate  20 mg Oral TID    levETIRAcetam (Keppra) IV (PEDS and ADULTS)  500 mg Intravenous Q12H    levothyroxine  125 mcg Oral Before breakfast    LIDOcaine (PF) 10 mg/ml (1%)  10 mL Other Once    LIDOcaine  1 patch Transdermal Q24H    pantoprazole  40 mg Oral Daily    phenylephrine HCl in 0.9% NaCl        polyethylene glycol  17 g Oral TID    propofoL        rocuronium        senna  8.6 mg Oral BID    succinylcholine        ticagrelor  90 mg Oral BID     PRN Meds:acetaminophen, dextrose 10%, dextrose 10%, etomidate, glucagon (human  recombinant), glucose, glucose, insulin aspart U-100, lorazepam, methocarbamoL, ondansetron, oxyCODONE, phenylephrine HCl in 0.9% NaCl, propofoL, rocuronium, sodium chloride 0.9%, sodium chloride 0.9%, succinylcholine    Review of patient's allergies indicates:  No Known Allergies  Objective:     Vital Signs (Most Recent):  Temp: 97.9 °F (36.6 °C) (08/21/23 1901)  Pulse: 84 (08/22/23 0601)  Resp: (!) 22 (08/22/23 0601)  BP: 92/65 (08/22/23 0601)  SpO2: 99 % (08/22/23 0601) Vital Signs (24h Range):  Temp:  [97.2 °F (36.2 °C)-97.9 °F (36.6 °C)] 97.9 °F (36.6 °C)  Pulse:  [] 84  Resp:  [11-33] 22  SpO2:  [65 %-100 %] 99 %  BP: ()/(55-69) 92/65  Arterial Line BP: ()/(25-61) 100/57     Patient Vitals for the past 72 hrs (Last 3 readings):   Weight   08/22/23 0300 125.2 kg (276 lb)   08/21/23 0300 124.7 kg (274 lb 14.4 oz)   08/20/23 0610 123.4 kg (272 lb)       Body mass index is 35.44 kg/m².      Intake/Output Summary (Last 24 hours) at 8/22/2023 0657  Last data filed at 8/22/2023 0601  Gross per 24 hour   Intake 1768.46 ml   Output 2130 ml   Net -361.54 ml       I/O this shift:  In: 728.1 [I.V.:579.3; IV Piggyback:148.8]  Out: 905 [Urine:905]    Net IO Since Admission: -8,620.76 mL [08/22/23 0658]    Hemodynamic Parameters:  PAP: (63-75)/(29-40) 66/31  PAP (Mean):  [43 mmHg-53 mmHg] 44 mmHg    Telemetry: NSR with frequent PVCs       Physical Exam  Vitals and nursing note reviewed.   Constitutional:       General: He is not in acute distress.     Appearance: Normal appearance. He is normal weight. He is not ill-appearing.   HENT:      Head: Normocephalic and atraumatic.   Eyes:      Pupils: Pupils are equal, round, and reactive to light.   Neck:      Comments: Left IJ PA catheter  Cardiovascular:      Rate and Rhythm: Normal rate and regular rhythm.      Pulses: Normal pulses.      Heart sounds: Normal heart sounds. No murmur heard.     No friction rub. No gallop.   Pulmonary:      Effort: Pulmonary  effort is normal. No respiratory distress.      Breath sounds: No wheezing or rhonchi.      Comments: Bibasilar crackles  Abdominal:      General: Abdomen is flat. Bowel sounds are normal.      Palpations: Abdomen is soft.   Musculoskeletal:         General: Normal range of motion.      Cervical back: Normal range of motion and neck supple.      Right lower leg: No edema.      Left lower leg: No edema.      Comments: Warm BL LEs   Skin:     General: Skin is warm and dry.      Capillary Refill: Capillary refill takes less than 2 seconds.   Neurological:      General: No focal deficit present.      Mental Status: He is alert.          Significant Labs:  CBC:  Recent Labs   Lab 08/21/23  0826 08/21/23  2232 08/21/23  2239 08/22/23  0320   WBC 7.81 8.11  --  9.03   RBC 2.56* 2.52*  --  2.47*   HGB 7.5* 7.4*  --  7.2*   HCT 23.5* 22.8* 22* 21.9*    420  --  413   MCV 92 91  --  89   MCH 29.3 29.4  --  29.1   MCHC 31.9* 32.5  --  32.9       BNP:  Recent Labs   Lab 08/21/23  0826   BNP 2,674*     CMP:  Recent Labs   Lab 08/20/23  0232 08/20/23  0805 08/21/23  0249 08/21/23  0826 08/21/23  1520 08/21/23  2232 08/22/23  0320   *   < > 157*   < > 166* 227* 198*   CALCIUM 8.2*   < > 8.2*   < > 8.7 8.8 8.8   ALBUMIN 2.5*  --  2.5*  --   --  2.7* 2.7*   PROT 6.0  --  6.2  --   --   --  6.5   *   < > 130*   < > 132* 129* 132*   K 4.2   < > 4.3   < > 4.4 3.8 4.2   CO2 23   < > 23   < > 26 19* 22*   CL 95   < > 94*   < > 94* 94* 94*   BUN 55*   < > 61*   < > 60* 63* 65*   CREATININE 3.8*   < > 4.0*   < > 4.1* 4.2* 4.2*   ALKPHOS 84  --  83  --   --   --  86   ALT 17  --  15  --   --   --  126*   AST 24  --  20  --   --   --  120*   BILITOT 0.7  --  0.6  --   --   --  0.9    < > = values in this interval not displayed.        Coagulation:   Recent Labs   Lab 08/15/23  1211 08/16/23  0415 08/20/23  0232 08/21/23  0249 08/22/23  0320   INR  --    < > 1.0 1.0 1.1   APTT 47.5*  --   --   --   --     < > = values in  "this interval not displayed.       LDH:  No results for input(s): "LDH" in the last 72 hours.    Microbiology:  Microbiology Results (last 7 days)       Procedure Component Value Units Date/Time    Blood culture [[id number]] Collected: 08/13/23 0920    Order Status: Completed Specimen: Blood from Peripheral, Antecubital, Left Updated: 08/18/23 1012     Blood Culture, Routine No growth after 5 days.    Blood culture [[id number]] Collected: 08/12/23 0103    Order Status: Completed Specimen: Blood from Peripheral, Hand, Left Updated: 08/17/23 0612     Blood Culture, Routine No growth after 5 days.    Blood culture [[id number]]  (Abnormal) Collected: 08/12/23 0042    Order Status: Completed Specimen: Blood from Line, Jugular, Internal Right Updated: 08/15/23 0744     Blood Culture, Routine Gram stain aer bottle: Gram positive cocci in clusters resembling Staph      Results called to and read back by:Jojo Valdes Rn 08/13/2023  02:56      COAGULASE-NEGATIVE STAPHYLOCOCCUS SPECIES  Organism is a probable contaminant              ABGs:   Recent Labs   Lab 08/21/23  2349   PH 7.357   PCO2 46.7*   HCO3 26.2   POCSATURATED 37*   BE 1       BMP:   Recent Labs   Lab 08/22/23  0320   *   *   K 4.2   CL 94*   CO2 22*   BUN 65*   CREATININE 4.2*   CALCIUM 8.8   MG 2.7*       Cardiac Markers: No results for input(s): "CKMB", "TROPONINT", "MYOGLOBIN" in the last 72 hours.  Coagulation:   Recent Labs   Lab 08/22/23  0320   INR 1.1       Prealbumin: No results for input(s): "PREALBUMIN" in the last 72 hours.  Microbiology Results (last 7 days)       Procedure Component Value Units Date/Time    Blood culture [[id number]] Collected: 08/13/23 0920    Order Status: Completed Specimen: Blood from Peripheral, Antecubital, Left Updated: 08/18/23 1012     Blood Culture, Routine No growth after 5 days.    Blood culture [[id number]] Collected: 08/12/23 0103    Order Status: Completed Specimen: Blood from Peripheral, Hand, Left " Updated: 08/17/23 0612     Blood Culture, Routine No growth after 5 days.    Blood culture [[id number]]  (Abnormal) Collected: 08/12/23 0042    Order Status: Completed Specimen: Blood from Line, Jugular, Internal Right Updated: 08/15/23 0744     Blood Culture, Routine Gram stain aer bottle: Gram positive cocci in clusters resembling Staph      Results called to and read back by:Jojo Valdes Rn 08/13/2023  02:56      COAGULASE-NEGATIVE STAPHYLOCOCCUS SPECIES  Organism is a probable contaminant            Specimen (24h ago, onward)      None          I have reviewed all pertinent labs within the past 24 hours.    Estimated Creatinine Clearance: 28.6 mL/min (A) (based on SCr of 4.2 mg/dL (H)).    Diagnostic Results:    CXR 08/18/23  Postoperative changes and supporting devices as before.  Mild diffuse edema more marked at the lung bases similar to the previous study.  No significant pleural effusion.  Heart size upper limit of normal.    CT chest 08/18/23  1. No evidence for hematoma as clinically questioned, noting noncontrast technique.  2. Small bilateral pleural effusions.  Bilateral pulmonary edema.  3. Intraluminal fluid in the esophagus to the level of the aortic arch.  Consider correlation for aspiration risk.  4. Bilateral pulmonary nodules, largest measuring 9 mm.  For multiple solid nodules with any 6 mm or greater, Fleischner Society guidelines recommend follow up with non-contrast chest CT at 3-6 months and 18-24 months after discovery.  5. Gallbladder sludge and mild gallbladder distension, nonspecific.  6. Additional findings as above.    US renal 08/17/23  Limited study due to inability to evaluate segmental renal arterial resistive indices.  Additional evaluation, as clinically warranted.     Elevated main renal arterial resistive indices, nonspecific, but can be seen in the setting of medical renal disease.     Renal arteries and veins are patent.     No hydronephrosis     Bilateral pleural  effusions.    TTE 08/17/23    Left Ventricle: The left ventricle is severely dilated. Ventricular mass is normal. Normal wall thickness. regional wall motion abnormalities present. See diagram for wall motion findings. There is severely reduced systolic function with a visually estimated ejection fraction of 15 - 20%. Grade III diastolic dysfunction.    Left Atrium: Left atrium is moderately dilated.    Right Ventricle: Right ventricle was not well visualized due to poor acoustic window.    Mitral Valve: There is mild regurgitation.    Tricuspid Valve: There is mild regurgitation.    Pulmonary Artery: The estimated pulmonary artery systolic pressure is 41 mmHg.    IVC/SVC: Normal venous pressure at 3 mmHg.    [facility] 08/12/23  Coronary angiogram:  Left main: Patent  Left anterior descending artery:   just distal to the 1st septal   Left circumflex artery:  90-95% proximal stenosis, distal 70% calcified stenosis.  Diffusely diseased OM1,  of OM2  Right coronary artery:  Ostial      Grafts:  SVG-RCA:  Diffuse disease with narrowing of up to 40-50% in the proximal portion  The native PLB and PDA both diffusely diseased.  SVG-OM: Occluded  LIMA-LAD:  Widely patent.  Diffusely diseased native LAD with narrowing of up to 90% in the very apical portion.     Assessment:  Cardiogenic shock with extremely low cardiac outputs and elevated left and right filling pressures  NSTEMI - suspect acute graft closure of the SVG to OM2.  Severe native CAD status post PCI of the proximal circumflex with a 33 x 24 mm Synergy XD stent (post-dilated up to 4.25 mm)      [facility] 08/11/23  RA 20, RV 81/21, PA 81/47 (mean 61), PWCP 45, Ao sat 96%, PA sat 49% (on 6L NC)  CO/CI:   3.69/1.52 (VIKKI)  3.91/1.6 (TD)         Assessment and Plan:     52-year-old male with a past medical history of ICM, CABG in 2017 (LIMA to LAD, SVG to OM1, SVG to PDA), DM type II, CKD stage IV (baseline 1.8), and ICD who presented [facility] on 8/10//23  due to n/v and SOB. Work up concerning for NSTEMI with peak trop of 38. Started on ACS protocol with asa, ticagrelor, and heparin gtt. Also noted to have YVES with Cr 2.5, volume overloaded with BNP of 796, LA 2.8>>1.0. TTE with EF drop from 30 to 15%, LVEDD 6.15, moderately reduced RV function. LHC/RHC on 8/12: s/p MIRELLA to prox Cx. RHC showed RA 20, RV 81/21, PA 81/47 (mean 61), PWCP 45, CO/CI: 3.69/1.52 (VIKKI)  3.91/1.6 (TD), therefore, impella CP was placed in in right femoral artery and leave in swan in right femoral vein. He was then transferred to [facility] for higher level of care. Of note, never started on inotropes or pressors. Was receiving metoprolol.     On arrival patient was hemodynamically stable.  Not on any inotropes or pressors.  Impella at P7  Initial hemodynamics  CVP: 9  SVO2:  44  Cardiac Output: 4.9  Cardiac Index: 2.0  SVR: 1250     [facility] on 8/12  Grafts:  SVG-RCA:  Diffuse disease with narrowing of up to 40-50% in the proximal portion  The native PLB and PDA both diffusely diseased.  SVG-OM: Occluded  LIMA-LAD:  Widely patent.  Diffusely diseased native LAD with narrowing of up to 90% in the very apical portion.  Severe native CAD status post PCI of the proximal circumflex with a 33 x 24 mm Synergy XD stent (post-dilated up to 4.25 mm)      Previous Cardiac Diagnostics:   TTE 7.1.22  The study quality is average.   The left ventricle is mildly enlarged. Global left ventricular systolic function is severely decreased. The left ventricular ejection fraction is 30%.   Mild (1+) mitral regurgitation.  The pulmonary artery systolic pressure is 10 mmHg. The pulmonary artery appears to be normal.     [facility] 5.19.2017  Severe MVCAD as a cause to severe newly diagnosed ischemic congestive heart failure with EF of 20%  Mild MR  Patent L subclavian, & LIMA suitable for bypass graft conduit     BLE Arterial US 10.18.21  The study quality is average.   The arteries of the left lower extremity appear patent with no  significant stenosis noted.   Tri-phasic waveforms are obtained throughout the left lower extremity arteries.      Carotid US 10.18.21  The study quality is average.   1-39% stenosis in the proximal right internal carotid artery based on Bluth Criteria.   1-39% stenosis in the proximal left internal carotid artery based on Bluth Criteria.   Less than 50% stenosis throughout the bilateral common carotid arteries.   Antegrade bilateral vertebral artery flow.         * Ischemic cardiomyopathy  Cardiogenic shock    52-year-old male with past medical history of ischemic cardiomyopathy status post CABG in 2017 who presents as a transfer from outside hospital after he presented with an NSTEMI status post revascularization to proximal circumflex.  Associated cardiogenic shock noted from right heart catheterization, CP Impella placed on a 12. 3.4cm from AV to inlet on bedside echo. Since removal of Impella, patient has had elevated PAP and PCWP, and CVP 10. Diuresis improved mildly CVP, but kidney function worsened.  08/22 - Patient had worsening filling pressures with pulmonary edema so diuresis was increased. Kidney dysfunction worsening. Increased Furosemide gtt, given furosemide 80mg bolus ocne, and added chlorothiazide. He also had episode of altered mental status, with sustained VT afterwards for which he was started on amiodarone.     - Furosemide increased to 40mg/h  - Continue amiodarone   - Continue Caleb  - Continue  5  - Continue Hydralazine/Isosorbide and hydralazine 20/25mg TID  - Trend BMP q8 hour   - Consult palliative  - TTE below    Results for orders placed during the hospital encounter of 08/11/23    Echo    Interpretation Summary    Left Ventricle: The left ventricle is severely dilated. Ventricular mass is normal. Normal wall thickness. regional wall motion abnormalities present. See diagram for wall motion findings. There is severely reduced systolic function with a visually estimated ejection  fraction of 15 - 20%. Grade III diastolic dysfunction.    Left Atrium: Left atrium is moderately dilated.    Right Ventricle: Right ventricle was not well visualized due to poor acoustic window.    Mitral Valve: There is mild regurgitation.    Tricuspid Valve: There is mild regurgitation.    Pulmonary Artery: The estimated pulmonary artery systolic pressure is 41 mmHg.    IVC/SVC: Normal venous pressure at 3 mmHg.          Tonic-clonic generalized seizure  - Today at ~8am while attempting to get his CVP and PA pressures, the patient started having nausea and retching. Patient head was elevated and retching got worse. He became pale and loss consciousness, increasing the tonicity of his muscles, biting his tongue and nto answering to verbal or physical stimulation. Seconds after he had started having tonic-clonic movements for approximately 3-5 minutes. Without medication patient improved, regaining consciousness in a possible post-ictal state. Whole episode lasted <10-15minutes. For hypotension the patient required IV pressors momentarily.   - Glucose normal, Stat lactate 4.98, CVP 12 and SvO2 65, worsening Cr (4.4 from 4), chronically mild decreased Na (131), normal K, elevated Mg and P  - He was being monitored without any acute events, but at 0928h HTS team was called by RN due to new event with similar characteristics (nausea, retching, tonic-clonic movements), which lasted <5-7 minutes.  - During the night shift, the patient had another episode, so keppra was started  - EEG non contributory, neurology notified about new event    - Continue on Keppra  - Management of possible secondary causes of seizure including electrolytes  - Neurology evaluated patient, notified about event    Hypothyroidism  - Continue home Synthroid    CKD (chronic kidney disease), stage IV  Acute kidney injury    Likely secondary to cardiogenic shock  Baseline creatinine of 1.8-2.   Worsening creatinine, furosemide held and monitoring  response, however creatinine started worsening, with increased cardiac filling pressures and oliguria  Patient was restarted on furosemide gtt, which had to be increased and added thiazide when found volume overload on 08/21  Worsening kidney function and UOP, likely ATN. Added dose of Chlorothiazide this am.     Recent Labs   Lab 08/21/23  1520 08/21/23  2232 08/22/23  0320   CREATININE 4.1* 4.2* 4.2*     - Continue furosemide gtt 40mg/h  - Chlorothiazide 500mg once  - Check need for additional diuresis   - Monitor Cr  - Daily weights, strict I/O and chart, renally dose all medications   - Avoid nephrotoxic medications, NSAIDs, ACE/ARB, and IV contrast.  - Appreciate nephrology recs      NSTEMI (non-ST elevated myocardial infarction)  - Continue Aspirin  - Continue ticagrelor  - Continue Atorvastatin 80    DM (diabetes mellitus)  Lab Results   Component Value Date    HGBA1C 10.2 (H) 08/10/2023     - Endocrine following  - Now on insulin drip  - Diabetic/cardiac diet once PO ok (held due to possible seizures)          Paul Roach MD  Heart Transplant  [facility] - Cardiac Intensive [facility]

## 2023-08-22 NOTE — SUBJECTIVE & OBJECTIVE
Subjective:     Interval History: EEG with highly stereotyped episodes (3 episodes) and added to the patient's history of previous right frontal brain infarct is highly concerning of focal seizure. On YVES in the setting of CKD, which may have decreased the patient's seizure threshold. Current GFR 15.2. Will continue to monitor neurological status. Closely follow up renal function.     Current Neurological Medications:   Keppra     Current Facility-Administered Medications   Medication Dose Route Frequency Provider Last Rate Last Admin    0.9%  NaCl infusion   Intravenous Continuous Aracelis Cuellar DO   Stopped at 08/22/23 1526    acetaminophen tablet 650 mg  650 mg Oral Q6H PRN Fransisco Ivan MD   650 mg at 08/20/23 1959    amiodarone 360 mg/200 mL (1.8 mg/mL) infusion  1 mg/min Intravenous Continuous Nadia Barragan MD 33.3 mL/hr at 08/22/23 1701 1 mg/min at 08/22/23 1701    amiodarone in dextrose 150 mg/100 mL (1.5 mg/mL) loading dose 150 mg  150 mg Intravenous Once Paul Chen MD   Paused at 08/22/23 1203    aspirin EC tablet 81 mg  81 mg Oral Daily Fransisco Ivan MD   81 mg at 08/22/23 1330    atorvastatin tablet 80 mg  80 mg Oral Daily Fransisco Ivan MD   80 mg at 08/22/23 1331    dextrose 10% bolus 125 mL 125 mL  12.5 g Intravenous PRN Héctor Vines DNP, KATHLEENP        dextrose 10% bolus 250 mL 250 mL  25 g Intravenous PRN Héctor Vines DNP, KATHLEENP        DOBUtamine 1000 mg in D5W 250 mL infusion  5 mcg/kg/min Intravenous Continuous Paul Chen MD 4.4 mL/hr at 08/22/23 1701 2.5 mcg/kg/min at 08/22/23 1701    enoxaparin injection 40 mg  40 mg Subcutaneous Q24H (prophylaxis, 1700) Fransisco Ivan MD   40 mg at 08/22/23 1743    furosemide (LASIX) 500 mg in 50 mL infusion (conc: 10 mg/mL)  40 mg/hr Intravenous Continuous Fransisco Ivan MD 4 mL/hr at 08/22/23 1701 40 mg/hr at 08/22/23 1701    glucagon (human recombinant) injection 1 mg  1 mg  Intramuscular PRN Héctor Vines DNP, FNP        glucose chewable tablet 16 g  16 g Oral PRN Héctor Vines DNP, FNP        glucose chewable tablet 24 g  24 g Oral PRN Héctor Vines DNP, FNP        hydrALAZINE tablet 25 mg  25 mg Oral TID Paul Chen MD   25 mg at 08/21/23 1519    insulin aspart U-100 pen 0-10 Units  0-10 Units Subcutaneous PRN Héctor Vines DNP, FNP   2 Units at 08/22/23 1743    insulin aspart U-100 pen 6-12 Units  6-12 Units Subcutaneous TIDWM Héctor Vines DNP, FNP   6 Units at 08/21/23 1254    insulin regular in 0.9 % NaCl 100 unit/100 mL (1 unit/mL) infusion  2.8 Units/hr Intravenous Continuous Kori Paez NP 2.8 mL/hr at 08/22/23 1753 2.8 Units/hr at 08/22/23 1753    isosorbide dinitrate tablet 20 mg  20 mg Oral TID Paul Chen MD   20 mg at 08/21/23 1520    levETIRAcetam injection 500 mg  500 mg Intravenous Q12H Fransisco MD   500 mg at 08/22/23 0839    levothyroxine tablet 125 mcg  125 mcg Oral Before breakfast Fransisco MD   125 mcg at 08/21/23 0621    LIDOcaine (PF) 10 mg/ml (1%) injection 100 mg  10 mL Other Once Fransisco Ivan MD        LIDOcaine 5 % patch 1 patch  1 patch Transdermal Q24H Fransisco Ivan MD   1 patch at 08/11/23 2226    LORazepam injection 2 mg  2 mg Intravenous PRN Fransisco Ivan MD        methocarbamoL tablet 500 mg  500 mg Oral Q6H PRN Fransisco Ivan MD   500 mg at 08/13/23 1646    nitric oxide gas Gas 10 ppm  10 ppm Inhalation Continuous Mariajose Perales MD   10 ppm at 08/21/23 0432    NORepinephrine 4 mg in dextrose 5% 250 mL infusion (premix) (titrating)  0-3 mcg/kg/min Intravenous Continuous Paul Chen MD 9.4 mL/hr at 08/22/23 1701 0.02 mcg/kg/min at 08/22/23 1701    ondansetron injection 4 mg  4 mg Intravenous Q6H PRN Paul Chen MD   4 mg at 08/22/23 1604    oxyCODONE immediate release tablet 5 mg  5 mg Oral Q8H PRN Fransisco MD   5 mg at  08/21/23 2145    pantoprazole EC tablet 40 mg  40 mg Oral Daily Fransisco Ivan MD   40 mg at 08/22/23 1330    polyethylene glycol packet 17 g  17 g Oral TID Fransisco Ivan MD   17 g at 08/21/23 1520    senna tablet 8.6 mg  8.6 mg Oral BID Fransisco Ivan MD   8.6 mg at 08/21/23 2032    sevelamer carbonate tablet 800 mg  800 mg Oral TID Cal White MD   800 mg at 08/22/23 1557    sodium chloride 0.9% flush 10 mL  10 mL Intravenous PRN Fransisco Ivan MD        sodium chloride 0.9% flush 10 mL  10 mL Intravenous Continuous Aracelis Cuellar DO        sodium chloride 0.9% flush 3 mL  3 mL Intravenous Q6H PRN Star Olmos MD        ticagrelor tablet 90 mg  90 mg Oral BID Fransisco Ivan MD   90 mg at 08/22/23 1330       Review of Systems   Constitutional:  Positive for activity change and fatigue. Negative for appetite change.   HENT: Negative.     Eyes: Negative.  Negative for photophobia.   Respiratory:  Positive for cough and shortness of breath.    Cardiovascular: Negative.  Negative for palpitations.   Gastrointestinal: Negative.    Genitourinary: Negative.    Musculoskeletal: Negative.    Skin: Negative.    Neurological:  Positive for seizures and weakness. Negative for light-headedness.   Psychiatric/Behavioral:  Negative for dysphoric mood and sleep disturbance.      Objective:     Vital Signs (Most Recent):  Temp: 97.7 °F (36.5 °C) (08/22/23 1501)  Pulse: 91 (08/22/23 1701)  Resp: 20 (08/22/23 1701)  BP: 111/75 (08/22/23 1701)  SpO2: 98 % (08/22/23 1701) Vital Signs (24h Range):  Temp:  [97.7 °F (36.5 °C)-97.9 °F (36.6 °C)] 97.7 °F (36.5 °C)  Pulse:  [] 91  Resp:  [13-33] 20  SpO2:  [65 %-100 %] 98 %  BP: ()/(52-77) 111/75  Arterial Line BP: ()/(25-61) 113/61     Weight: 125.2 kg (276 lb)  Body mass index is 35.44 kg/m².     Physical Exam          Significant Labs: All pertinent lab results from the past 24 hours have been reviewed.    Significant Imaging: No neuroimaging in the  past 24 hs.

## 2023-08-22 NOTE — PROGRESS NOTES
[facility] - Cardiac [facility]  Endocrinology  Progress Note    Admit Date: 8/11/2023     Reason for Consult: Management of T2DM, Hyperglycemia     Diabetes diagnosis year: >20 years ago    Home Diabetes Medications:  Farxiga 10 mg QD; Lantus 50 units; Novolog 20 units    How often checking glucose at home?  Dexcom    BG readings on regimen: mid to upper 100s  Hypoglycemia on the regimen?  No  Missed doses on regimen?  No    Diabetes Complications include:     Hyperglycemia    Complicating diabetes co morbidities:   Cardiogenic shock; HTN; HLD      HPI: 52-year-old male with a past medical history of ICM, CABG in 2017 (LIMA to LAD, SVG to OM1, SVG to PDA), DM type II, CKD stage IV (baseline 1.8), and ICD who presented [facility] ED on 8/10//23 due to n/v and SOB. Work up concerning for NSTEMI with peak trop of 38. Started on ACS protocol with asa, ticagrelor, and heparin gtt. Also noted to have YVES with Cr 2.5, volume overloaded with BNP of 796, LA 2.8>>1.0. TTE with EF drop from 30 to 15%, LVEDD 6.15, moderately reduced RV function. LHC/RHC on 8/12: s/p MIRELLA to prox Cx. RHC showed RA 20, RV 81/21, PA 81/47 (mean 61), PWCP 45, CO/CI: 3.69/1.52 (VIKKI)  3.91/1.6 (TD), therefore, impella CP was placed in in right femoral artery and leave in swan in right femoral vein. He was then transferred to [facility] for higher level of care. Endocrine consulted to manage hyperglycemia and type 2 diabetes.     Lab Results   Component Value Date    HGBA1C 10.2 (H) 08/10/2023                 Interval HPI:   Overnight events: Remains in CICU. BG at or slightly above goal ranges on IV insulin infusion at 2.5 u/hr and prn correction scale. Creatinine 4.4. Diet NPO Except for: Medication    Eating:   NPO  Nausea: No  Hypoglycemia and intervention: No  Fever: No  TPN and/or TF: No  If yes, type of TF/TPN and rate: n/a    BP (!) 89/52 (BP Location: Left arm, Patient Position: Lying)   Pulse 86   Temp 97.8 °F (36.6 °C) (Axillary)   Resp 19    "Ht 6' 2" (1.88 m)   Wt 125.2 kg (276 lb)   SpO2 100%   BMI 35.44 kg/m²     Labs Reviewed and Include    Recent Labs   Lab 08/22/23  0320 08/22/23  0835 08/22/23  1156   *   < > 192*  192*  192*   CALCIUM 8.8   < > 8.7  8.7  8.7   ALBUMIN 2.7*  --   --    PROT 6.5  --   --    *   < > 131*  131*  131*   K 4.2   < > 4.5  4.5  4.5   CO2 22*   < > 24  24  24   CL 94*   < > 92*  92*  92*   BUN 65*   < > 67*  67*  67*   CREATININE 4.2*   < > 4.4*  4.4*  4.4*   ALKPHOS 86  --   --    *  --   --    *  --   --    BILITOT 0.9  --   --     < > = values in this interval not displayed.     Lab Results   Component Value Date    WBC 9.03 08/22/2023    HGB 7.2 (L) 08/22/2023    HCT 21.9 (L) 08/22/2023    MCV 89 08/22/2023     08/22/2023     No results for input(s): "TSH", "FREET4" in the last 168 hours.  Lab Results   Component Value Date    HGBA1C 10.2 (H) 08/10/2023       Nutritional status:   Body mass index is 35.44 kg/m².  Lab Results   Component Value Date    ALBUMIN 2.7 (L) 08/22/2023    ALBUMIN 2.7 (L) 08/21/2023    ALBUMIN 2.5 (L) 08/21/2023     No results found for: "PREALBUMIN"    Estimated Creatinine Clearance: 27.3 mL/min (A) (based on SCr of 4.4 mg/dL (H)).    Accu-Checks  Recent Labs     08/20/23 2021 08/21/23  0252 08/21/23  0814 08/21/23  1241 08/21/23  1648 08/21/23 2015 08/21/23  2218 08/22/23  0252 08/22/23  0834 08/22/23  1158   POCTGLUCOSE 209* 168* 142* 151* 163* 197* 198* 211* 176* 209*       Current Medications and/or Treatments Impacting Glycemic Control  Immunotherapy:    Immunosuppressants       None          Steroids:   Hormones (From admission, onward)      None          Pressors:    Autonomic Drugs (From admission, onward)      Start     Stop Route Frequency Ordered    08/21/23 2219  succinylcholine (ANECTINE) 20 mg/mL injection        Note to Pharmacy: Created by cabinet override    08/22/23 1029   08/21/23 2219 08/21/23 2219  rocuronium 10 " mg/mL injection        Note to Pharmacy: Created by cabinet override    08/22/23 1029   08/21/23 2219    08/21/23 0930  NORepinephrine 4 mg in dextrose 5% 250 mL infusion (premix) (titrating)        Question Answer Comment   Begin at (in mcg/kg/min): 0.02    Titrate by: (in mcg/kg/min) 0.02    Titrate interval: (in minutes) 5    Titrate to maintain: (MAP or SBP) MAP    Greater than: (in mmHg) 65    Maximum dose: (in mcg/kg/min) 3        -- IV Continuous 08/21/23 0818          Hyperglycemia/Diabetes Medications:   Antihyperglycemics (From admission, onward)      Start     Stop Route Frequency Ordered    08/20/23 0915  insulin regular in 0.9 % NaCl 100 unit/100 mL (1 unit/mL) infusion        Question:  Enter initial dose (Units/hr):  Answer:  2.5    -- IV Continuous 08/20/23 0910    08/16/23 0815  insulin aspart U-100 pen 6-12 Units         -- SubQ 3 times daily with meals 08/16/23 0813    08/15/23 1003  insulin aspart U-100 pen 0-10 Units         -- SubQ As needed (PRN) 08/15/23 0904            ASSESSMENT and PLAN    Cardiac/Vascular  * Ischemic cardiomyopathy  Managed per primary team  Avoid hypoglycemia        Renal/  CKD (chronic kidney disease), stage IV  Titrate insulin slowly to avoid hypoglycemia as the risk of hypoglycemia increases with decreased creatinine clearance.    Estimated Creatinine Clearance: 27.3 mL/min (A) (based on SCr of 4.4 mg/dL (H)).        Endocrine  Hypothyroidism  Lab Results   Component Value Date    TSH 7.103 (H) 08/10/2023     On Synthroid 125 mcg      DM (diabetes mellitus)  Endocrinology consulted for BG management.   BG goal 140-180     - IncreaseTransition drip to 2.8 units/hr with step-down parameters. (BG above goal ranges)   - Novolog 6-12 units with meals (Administer    6   units if patient eats 25-50% of meal, administer   12    units if patient eats > 50% of meal.) HOLD while NPO  - Novolog (aspart) insulin prn for BG excursions MDC SSI (180/25).  - BG checks q 4 hrs while  NPO  - Hypoglycemia protocol in place      ** Please notify Endocrine for any change and/or advance in diet**  ** Please call Endocrine for any BG related issues **    Discharge Planning:   TBD. Please notify endocrinology prior to discharge.              Kori Paez, NP  Endocrinology  [facility]

## 2023-08-22 NOTE — CONSULTS
[facility] - Cardiac Intensive Care  Cardiac Electrophysiology  Consult Note    Admission Date: 8/11/2023  Code Status: Full Code   Attending Provider: Eric Moncada MD  Consulting Provider: Nadia Thompson MD  Principal Problem:Ischemic cardiomyopathy    Inpatient consult to Electrophysiology  Consult performed by: Nadia Barragan MD  Consult ordered by: Nadia Barragan MD        Subjective:     Chief Complaint:  NSVT    HPI:   Mr Daniel is a 53 y.o. male with stage D HFrEF s/p Subq ICD, ICMP, CAD s/p CABG x3 (2017) with known occluded VG-OM, who was transferred from [facility] with acute cardiogenic shock in setting of NSTEMI. Initially required Impella support (Now weaned off). Hospital course was also complicated with YVES and patient remains critically ill under the care of HTS team. Unfortunately, at this time he is not candidate for advanced HF therapies due to his current renal function and poorly controlled DM .  Patient with several runs of VT and was started on amio drip. EP consulted for recurrent episodes of NSVT       Past Medical History:   Diagnosis Date    CKD stage 4, GFR 15-29 ml/min     HLD (hyperlipidemia)     Hypertension     Ischemic cardiomyopathy/Chronic HFrEF s/p CABG and AICD 2017    T2DM (type 2 diabetes mellitus)        Past Surgical History:   Procedure Laterality Date    CORONARY ARTERY BYPASS GRAFT  2017    3 vessel    CORONARY BYPASS GRAFT ANGIOGRAPHY  8/11/2023    Procedure: Bypass graft study;  Surgeon: Michele Hirsch MD;  Location: [facility] CATH LAB;  Service: Cardiology;;    IMPELLA, REMOVAL Right 8/15/2023    Procedure: Impella, Removal;  Surgeon: Colin Sibley MD;  Location: [facility] CATH LAB;  Service: Cardiology;  Laterality: Right;    INSERTION OF INTRAVASCULAR MICROAXIAL BLOOD PUMP N/A 8/11/2023    Procedure: INSERTION, IMPELLA;  Surgeon: Michele Hirsch MD;  Location: [facility] CATH LAB;  Service: Cardiology;  Laterality: N/A;    IVUS,  CORONARY  8/11/2023    Procedure: IVUS, Coronary;  Surgeon: Michele Hirsch MD;  Location: [facility] CATH LAB;  Service: Cardiology;;    LEFT HEART CATHETERIZATION N/A 8/11/2023    Procedure: Left heart cath;  Surgeon: Michele Hirsch MD;  Location: [facility] CATH LAB;  Service: Cardiology;  Laterality: N/A;    PERCUTANEOUS CORONARY INTERVENTION, ARTERY N/A 8/11/2023    Procedure: Percutaneous coronary intervention;  Surgeon: Michele Hirsch MD;  Location: [facility] CATH LAB;  Service: Cardiology;  Laterality: N/A;    PLACEMENT OF SWAN MARLENI CATHETER WITH IMAGING GUIDANCE Left 8/15/2023    Procedure: INSERTION, CATHETER, SWAN-MARLENI, WITH IMAGING GUIDANCE;  Surgeon: Colin Sibley MD;  Location: [facility] CATH LAB;  Service: Cardiology;  Laterality: Left;    REMOVAL OF TUNNELED CENTRAL VENOUS CATHETER (CVC) N/A 8/15/2023    Procedure: REMOVAL, CATHETER, CENTRAL VENOUS, TUNNELED;  Surgeon: Colin Sibley MD;  Location: [facility] CATH LAB;  Service: Cardiology;  Laterality: N/A;    RIGHT HEART CATHETERIZATION Right 8/11/2023    Procedure: INSERTION, CATHETER, RIGHT HEART;  Surgeon: Michele Hirsch MD;  Location: [facility] CATH LAB;  Service: Cardiology;  Laterality: Right;       Review of patient's allergies indicates:  No Known Allergies    No current facility-administered medications on file prior to encounter.     Current Outpatient Medications on File Prior to Encounter   Medication Sig    aspirin (ECOTRIN) 81 MG EC tablet Take 81 mg by mouth.    FARXIGA 10 mg tablet Take 10 mg by mouth once daily.    LANTUS SOLOSTAR U-100 INSULIN glargine 100 units/mL SubQ pen Inject 50 Units into the skin once daily.    levothyroxine (SYNTHROID) 125 MCG tablet Take 125 mcg by mouth before breakfast.    metoprolol succinate (TOPROL-XL) 25 MG 24 hr tablet Take 12.5 mg by mouth once daily.    NOVOLOG FLEXPEN U-100 INSULIN 100 unit/mL (3 mL) InPn pen Inject 20 Units into the skin 3 (three) times daily with meals.    rosuvastatin (CRESTOR) 40 MG  Tab Take 40 mg by mouth once daily.    torsemide (DEMADEX) 100 MG Tab Take 50 mg by mouth once daily.     Family History       Problem Relation (Age of Onset)    Hypertension Mother          Tobacco Use    Smoking status: Former     Current packs/day: 0.00     Types: Cigarettes    Smokeless tobacco: Never   Substance and Sexual Activity    Alcohol use: Yes    Drug use: No    Sexual activity: Not on file     Review of Systems   Constitutional: Positive for malaise/fatigue.   Cardiovascular:  Negative for chest pain and irregular heartbeat.   Respiratory:  Positive for shortness of breath.    All other systems reviewed and are negative.    Objective:     Vital Signs (Most Recent):  Temp: 97.7 °F (36.5 °C) (08/22/23 1501)  Pulse: 91 (08/22/23 1701)  Resp: 20 (08/22/23 1701)  BP: 111/75 (08/22/23 1701)  SpO2: 98 % (08/22/23 1701) Vital Signs (24h Range):  Temp:  [97.7 °F (36.5 °C)-97.9 °F (36.6 °C)] 97.7 °F (36.5 °C)  Pulse:  [] 91  Resp:  [13-33] 20  SpO2:  [65 %-100 %] 98 %  BP: ()/(52-77) 111/75  Arterial Line BP: ()/(25-61) 113/61       Weight: 125.2 kg (276 lb)  Body mass index is 35.44 kg/m².    SpO2: 98 %      Physical Therapy   Vitals and nursing note reviewed.   Constitutional:       General: He is not in acute distress.     Appearance: Normal appearance. He is normal weight. He is not ill-appearing.   HENT:      Head: Normocephalic and atraumatic.   Eyes:      Pupils: Pupils are equal, round, and reactive to light.   Neck:      Comments: Left IJ PA catheter  Cardiovascular:      Rate and Rhythm: Normal rate and regular rhythm.      Pulses: Normal pulses.      Heart sounds: Normal heart sounds. No murmur heard.     No friction rub. No gallop.   Pulmonary:      Effort: Pulmonary effort is normal. No respiratory distress.      Breath sounds: No wheezing or rhonchi.      Comments: Bibasilar crackles  Abdominal:      General: Abdomen is flat. Bowel sounds are normal.      Palpations: Abdomen is  soft.   Musculoskeletal:         General: Normal range of motion.      Cervical back: Normal range of motion and neck supple.      Right lower leg: No edema.      Left lower leg: No edema.      Comments: Warm BL LEs   Skin:     General: Skin is warm and dry.      Capillary Refill: Capillary refill takes less than 2 seconds.   Neurological:      General: No focal deficit present.      Mental Status: He is alert.           Significant Labs: BMP:   Recent Labs   Lab 08/21/23 2232 08/22/23  0320 08/22/23  0835 08/22/23  1156   * 198* 190* 192*  192*  192*   * 132* 128* 131*  131*  131*   K 3.8 4.2 4.2 4.5  4.5  4.5   CL 94* 94* 92* 92*  92*  92*   CO2 19* 22* 23 24 24  24   BUN 63* 65* 65* 67*  67*  67*   CREATININE 4.2* 4.2* 4.4* 4.4*  4.4*  4.4*   CALCIUM 8.8 8.8 8.7 8.7  8.7  8.7   MG 2.8* 2.7*  --  2.8*   , CMP:   Recent Labs   Lab 08/21/23  0249 08/21/23  0826 08/21/23 2232 08/22/23  0320 08/22/23  0835 08/22/23  1156   *   < > 129* 132* 128* 131*  131*  131*   K 4.3   < > 3.8 4.2 4.2 4.5  4.5  4.5   CL 94*   < > 94* 94* 92* 92*  92*  92*   CO2 23   < > 19* 22* 23 24  24  24   *   < > 227* 198* 190* 192*  192*  192*   BUN 61*   < > 63* 65* 65* 67*  67*  67*   CREATININE 4.0*   < > 4.2* 4.2* 4.4* 4.4*  4.4*  4.4*   CALCIUM 8.2*   < > 8.8 8.8 8.7 8.7  8.7  8.7   PROT 6.2  --   --  6.5  --   --    ALBUMIN 2.5*  --  2.7* 2.7*  --   --    BILITOT 0.6  --   --  0.9  --   --    ALKPHOS 83  --   --  86  --   --    AST 20  --   --  120*  --   --    ALT 15  --   --  126*  --   --    ANIONGAP 13   < > 16 16 13 15  15  15    < > = values in this interval not displayed.   , and All pertinent lab results from the last 24 hours have been reviewed.                  Assessment and Plan:     NSVT (nonsustained ventricular tachycardia)  Patient with recurrent and frequent  NSVT in the setting of ischemic cardiomyopathy in cardiogenic shock on inotrope. Patient on  amiodarone and rebolus today with continuous episodes refractory to medication. Patient has a sub Q ICD    Recommendation  -ICD interrogation in the am  -Continue Amiodarone at 1mg/min  -Titrate down Dobutamine as able  -If patient continues to have frequent episode can start lidocaine ggt          Thank you for your consult. I will follow-up with patient. Please contact us if you have any additional questions.    Nadia Thompson MD  Cardiac Electrophysiology  [facility]

## 2023-08-22 NOTE — ASSESSMENT & PLAN NOTE
- Today at ~8am while attempting to get his CVP and PA pressures, the patient started having nausea and retching. Patient head was elevated and retching got worse. He became pale and loss consciousness, increasing the tonicity of his muscles, biting his tongue and nto answering to verbal or physical stimulation. Seconds after he had started having tonic-clonic movements for approximately 3-5 minutes. Without medication patient improved, regaining consciousness in a possible post-ictal state. Whole episode lasted <10-15minutes. For hypotension the patient required IV pressors momentarily.   - Glucose normal, Stat lactate 4.98, CVP 12 and SvO2 65, worsening Cr (4.4 from 4), chronically mild decreased Na (131), normal K, elevated Mg and P  - He was being monitored without any acute events, but at 0928h HTS team was called by RN due to new event with similar characteristics (nausea, retching, tonic-clonic movements), which lasted <5-7 minutes.  - During the night shift, the patient had another episode, so keppra was started  - EEG non contributory, neurology notified about new event    - Continue on Keppra  - Management of possible secondary causes of seizure including electrolytes  - Neurology evaluated patient, notified about event

## 2023-08-22 NOTE — NURSING
"   08/22/23 0536   Vital Signs   Pulse 68   Resp (!) 33   SpO2 (!) 65 %   Art Line   Arterial Line BP (S)  68/25   Arterial Line MAP (mmHg) (S)  47 mmHg       3rd episode of seizure-like activity this shift. Lasted around 2 mins. Pt talking w/ RN and said stated, "I feel weird" then went rigid and unresponsive. Once recovered, pt able to follow commands and open eyes, but is disoriented to situation. VS returned to normal limits. MD  notified. No new orders.  "

## 2023-08-22 NOTE — ASSESSMENT & PLAN NOTE
Titrate insulin slowly to avoid hypoglycemia as the risk of hypoglycemia increases with decreased creatinine clearance.    Estimated Creatinine Clearance: 27.3 mL/min (A) (based on SCr of 4.4 mg/dL (H)).

## 2023-08-22 NOTE — NURSING
Cardiac ICU Care Plan    POC reviewed with Itz Daniel and family. Questions and concerns addressed. See below and flowsheets for full assessment and VS info.     See previous notes for shift events  CVPs 14, 18, 17  Svo2 48 and 37  UOP this shift 905mls  Inc lasix gtt to 40mg/hr  R radial arterial line placed  Restarted levo gtt  Started amio gtt    Neuro:  [place] Coma Scale  Best Eye Response: 3-->(E3) to speech  Best Motor Response: 6-->(M6) obeys commands  Best Verbal Response: 4-->(V4) confused  Ben Coma Scale Score: 13  Assessment Qualifiers: patient not sedated/intubated  Pupil PERRLA: yes    24 hr Temp:  [97.2 °F (36.2 °C)-97.9 °F (36.6 °C)]      CV:  Rhythm: normal sinus rhythm   DVT prophylaxis: VTE Required Core Measure: Pharmacological prophylaxis initiated/maintained    CVP (mean): (S) 17 mmHg (08/22/23 0301)    Pulmonary Artery Catheter Assessment  08/15/23 1500 Internal Jugular Left-Current Insertion Depth (cm): 56.5 cm (08/21/23 2301)    Pulses  Right Radial Pulse: 1+ (weak)  Left Radial Pulse: 1+ (weak)  Right Dorsalis Pedis Pulse: Doppler  Left Dorsalis Pedis Pulse: 1+ (weak)  Right Posterior Tibial Pulse: 1+ (weak)  Left Posterior Tibial Pulse: 1+ (weak)    Resp:  Flow (L/min): 4  Oxygen Concentration (%): 36    GI/:  GI prophylaxis: yes  Diet/Nutrition Received: NPO  Last Bowel Movement: 08/17/23  Voiding Characteristics: urethral catheter (bladder)       Urethral Catheter 08/12/23 0130 16 Fr.-Reason for Continuing Urinary Catheterization: Critically ill in ICU and requiring hourly monitoring of intake/output   Intake/Output Summary (Last 24 hours) at 8/22/2023 0614  Last data filed at 8/22/2023 0601  Gross per 24 hour   Intake 1768.46 ml   Output 2130 ml   Net -361.54 ml        Nutritional Supplement Intake: Quantity 0, Type:  NPO    Labs/Accuchecks:  Recent Labs   Lab 08/21/23  0826 08/21/23  2232 08/21/23 2239 08/22/23  0320   WBC 7.81 8.11  --  9.03   RBC 2.56* 2.52*  --  2.47*    HGB 7.5* 7.4*  --  7.2*   HCT 23.5* 22.8* 22* 21.9*    420  --  413      Recent Labs   Lab 08/15/23  1211 08/16/23  0415 08/20/23  0232 08/21/23  0249 08/22/23  0320   INR  --    < > 1.0 1.0 1.1   APTT 47.5*  --   --   --   --     < > = values in this interval not displayed.      Recent Labs     08/22/23  0320   *   K 4.2   CO2 22*   CL 94*   BUN 65*   CREATININE 4.2*   ALKPHOS 86   *   *   BILITOT 0.9       Recent Labs   Lab 08/20/23  0232 08/21/23  0826   *  --    TROPONINI  --  14.957*      Recent Labs     08/21/23 2014 08/21/23  2239 08/21/23  2349   PH 7.365 7.365 7.357   PCO2 48.1* 37.7 46.7*   PO2 27* 83 23*   HCO3 27.5 21.5* 26.2   POCSATURATED 48* 96 37*   BE 2 -4 1       Electrolytes: Electrolytes replaced  Accuchecks: ACHS    Gtts/LDAs:   sodium chloride 0.9% 5 mL/hr at 08/22/23 0601    amiodarone in dextrose 5% 0.5 mg/min (08/22/23 0601)    DOBUTamine IV infusion (non-titrating) 5 mcg/kg/min (08/22/23 0601)    furosemide (LASIX) 500 mg in 50 mL infusion (conc: 10 mg/mL) 40 mg/hr (08/22/23 0601)    insulin regular 1 units/mL infusion orderable (TRANSFER) 2.5 Units/hr (08/22/23 0601)    nitric oxide gas      NORepinephrine bitartrate-D5W 0.02 mcg/kg/min (08/22/23 0601)    sodium chloride 0.9%         Lines/Drains/Airways       Central Venous Catheter Line  Duration             Introducer 08/15/23 1630 Internal Jugular Left 6 days    Pulmonary Artery Catheter Assessment  08/15/23 1500 Internal Jugular Left 6 days              Drain  Duration                  Urethral Catheter 08/12/23 0130 16 Fr. 10 days              Arterial Line  Duration             Arterial Line 08/21/23 2328 Right Radial <1 day              Peripheral Intravenous Line  Duration                  Peripheral IV - Single Lumen 08/21/23 2243 18 G Anterior;Left Wrist <1 day         Peripheral IV - Single Lumen 08/21/23 2258 20 G Left;Posterior Wrist <1 day                    Skin/Wounds  Bathing/Skin  Care: bath, complete;dressed/undressed;foot care (08/21/23 2831)  Wounds: No  Wound care consulted: No

## 2023-08-22 NOTE — PLAN OF CARE
"Pal Med LCSW met with pt at bedside today to discuss GOC/HCPOA. Upon arrival, pt A&Ox3. Pt has been having repeat seizure activity and had runs of SVT while LCSW was at the bedside. Prior to the SVT, LCSW was able to discuss and sign HCPOA. Pt named his father, Tom Daniel [phone] as his HCPOA and signed appropriate documentation, which was added to pts chart by GIO Barker, NINA. Pt reports that he is not sure what is going on, in relation to his current state of health and treatment plan. He cannot recall what the doctors have told him, and states that he "feels weird." Pt requests that his father be called and updated on his current condition. LCSW coordinated care with RASHID Sherman and Dr. Russell at bedside. LCSW informed that pt is currently not a candidate for heart tx nor LVAD. Pt clinically unstable and unable to have appropriate GOC conversation at that time. LCSW will continue to follow for ongoing needs.     Michelle Horta LCSW  Palliative Medicine   "

## 2023-08-22 NOTE — ASSESSMENT & PLAN NOTE
Mr. Daniel is a 52 y/o M with ischemic cardiomyopathy s/p CABG transferred from outside hospital with NSTEMI s/p revascularization to proximal circumflex. He had associated cardiogenic shock noted from right heart catheterization. CP Impella placed and now removed. Cardiothoracic surgery and interventional cardiology consulted. Nephrology also consulted for YVES on CKD. Palliative medicine consulted for GOC.     Ischemic cardiomyopathy/cardiogenic shock/YVES/CKD/other medical problems  - plan per primary team and other specialty consultants    ACP/GOC    Today: Dr. Ledesma and this JEFERSON met with pt. MD updated pt on plan of care. Pt's goal is to continue medical management.     Code status discussed with pt. At this time, pt wants to be full code.  Per pt, if he is on life support and not getting better he would NOT want to be kept alive on life support.     MPOA paperwork completed with pt per BEATRIZ Horta today.  Pt's father made MPOA. Dr. Ledesma to update pt's father on pt's status per pt's wishes.     Pal care will continue to follow.     8/20/23  Advance Care Planning   - patient decisional  - patient's son and son's friend at bedside  - introduced palliative medicine team to patient today  - goals: life prolonging  - when engaging patient today about his current condition, patient states that they are unable to get the pressures in his heart down due to kidneys not working. He states that they are trying to get the kidneys to work so that the pressures come down. He further stated he has not heard from all the teams yet about what options he has available. He wants to hear from all the teams prior to further discussions. He states it is important to him to live so he can be with his three children: ages 21, 19, and 17. He reports enjoying fishing and watching sports (especially baseball and football).   - when discussing decision makers, he verbally stated he would want his father, Ozzie Daniel, to be his  surrogate decision maker. ACP booklet brought to bedside so that patient may fill out HCPOA.   - code status: full  - will follow up tomorrow for further GOC discussions.         Above plan of care discussed with patient's primary team including attending of record, Dr. Mackenzie

## 2023-08-22 NOTE — ASSESSMENT & PLAN NOTE
Most recent echocardiogram on 08/17/2023:    Left Ventricle: The left ventricle is severely dilated. Ventricular mass is normal. Normal wall thickness. regional wall motion abnormalities present. See diagram for wall motion findings. There is severely reduced systolic function with a visually estimated ejection fraction of 15 - 20%. Grade III diastolic dysfunction.    Left Atrium: Left atrium is moderately dilated.    Right Ventricle: Right ventricle was not well visualized due to poor acoustic window.    Mitral Valve: There is mild regurgitation.    Tricuspid Valve: There is mild regurgitation.    Pulmonary Artery: The estimated pulmonary artery systolic pressure is 41 mmHg.    IVC/SVC: Normal venous pressure at 3 mmHg.    - management per primary team  - currently on dobutamine infusion at 5 mg/hr and Lasix infusion at 40 mg/hr

## 2023-08-22 NOTE — NURSING
2200: RN at bedside administering bedbath and witnessed pt have episode of emesis not premeditated w/ nausea followed by a period of unresponsiveness and seizure-like activity. Pt had forward gaze and bit down on tongue, bilateral arms shaking. During episode pt HR dropped to the 50s and MAPs dropped to the 40s. O2 sat dropped to 70s, bagging initiated. Levo gtt started. MD  called to bedside. 2mg IV ativan given. Pt became responsive, moved all extremities and oriented x4 but drowsy and did not remember what happened. O2 sat >95% on 4L w/ 10ppm Caleb.    Episode was then followed by slow VT with HR in 120s, pt still responsive. Pads placed on pt and given 150 amio bolus and gtt initiated. POC lactic 4.3, CVP 18, SvO2 37.         0230: Pt had another episode similar to previous, unresponsive with seizure-like activity. HR, BP, and O2 sat dropped. Pt recovered after ~5mins.

## 2023-08-22 NOTE — ASSESSMENT & PLAN NOTE
Lab Results   Component Value Date    HGBA1C 10.2 (H) 08/10/2023     - Endocrine following  - Now on insulin drip  - Diabetic/cardiac diet once PO ok (held due to possible seizures)

## 2023-08-22 NOTE — PROGRESS NOTES
[facility] - Cardiac Intensive Care  Palliative Medicine  Progress Note    Patient Name: Itz Daniel  MRN: [id number]  Admission Date: 8/11/2023  Hospital Length of Stay: 11 days  Code Status: Full Code   Attending Provider: Eric Moncada MD  Consulting Provider: NINA Lopez  Primary Care Physician: Sunday Boo MD  Principal Problem:Ischemic cardiomyopathy    Patient information was obtained from patient and primary team.      Assessment/Plan:     Palliative Care  Encounter for palliative care  Mr. Daniel is a 52 y/o M with ischemic cardiomyopathy s/p CABG transferred from outside hospital with NSTEMI s/p revascularization to proximal circumflex. He had associated cardiogenic shock noted from right heart catheterization. CP Impella placed and now removed. Cardiothoracic surgery and interventional cardiology consulted. Nephrology also consulted for YVES on CKD. Palliative medicine consulted for GOC.     Ischemic cardiomyopathy/cardiogenic shock/YVES/CKD/other medical problems  - plan per primary team and other specialty consultants    ACP/GOC    Today: Dr. Ledesma and this JEFERSON met with pt. MD updated pt on plan of care. Pt's goal is to continue medical management.     Code status discussed with pt. At this time, pt wants to be full code.  Per pt, if he is on life support and not getting better he would NOT want to be kept alive on life support.     MPOA paperwork completed with pt per BEATRIZ Horta today.  Pt's father made MPOA. Dr. Ledesma to update pt's father on pt's status per pt's wishes.     Pal care will continue to follow.     8/20/23  Advance Care Planning   - patient decisional  - patient's son and son's friend at bedside  - introduced palliative medicine team to patient today  - goals: life prolonging  - when engaging patient today about his current condition, patient states that they are unable to get the pressures in his heart down due to kidneys not working. He states that they are  trying to get the kidneys to work so that the pressures come down. He further stated he has not heard from all the teams yet about what options he has available. He wants to hear from all the teams prior to further discussions. He states it is important to him to live so he can be with his three children: ages 21, 19, and 17. He reports enjoying fishing and watching sports (especially baseball and football).   - when discussing decision makers, he verbally stated he would want his father, Ozzie Daniel, to be his surrogate decision maker. ACP booklet brought to bedside so that patient may fill out HCPOA.   - code status: full  - will follow up tomorrow for further GOC discussions.        Above plan of care discussed with patient's primary team including attending of record, Dr. Mackenzie        I will follow-up with patient. Please contact us if you have any additional questions.    Subjective:     Chief Complaint: No chief complaint on file.      HPI:   Mr. Daniel is a 52 y/o M with ischemic cardiomyopathy s/p CABG transferred from outside hospital with NSTEMI s/p revascularization to proximal circumflex. He had associated cardiogenic shock noted from right heart catheterization. CP Impella placed and now removed. Cardiothoracic surgery and interventional cardiology consulted. Nephrology also consulted for YVES on CKD. Palliative medicine consulted for GOC.       Hospital Course:  No notes on file    Interval History:   Past Medical History:   Diagnosis Date    CKD stage 4, GFR 15-29 ml/min     HLD (hyperlipidemia)     Hypertension     Ischemic cardiomyopathy/Chronic HFrEF s/p CABG and AICD 2017    T2DM (type 2 diabetes mellitus)        Past Surgical History:   Procedure Laterality Date    CORONARY ARTERY BYPASS GRAFT  2017    3 vessel    CORONARY BYPASS GRAFT ANGIOGRAPHY  8/11/2023    Procedure: Bypass graft study;  Surgeon: Michele Hirsch MD;  Location: [facility] CATH LAB;  Service: Cardiology;;    IMPELLA,  REMOVAL Right 8/15/2023    Procedure: Impella, Removal;  Surgeon: Colin Sibley MD;  Location: [facility] CATH LAB;  Service: Cardiology;  Laterality: Right;    INSERTION OF INTRAVASCULAR MICROAXIAL BLOOD PUMP N/A 8/11/2023    Procedure: INSERTION, IMPELLA;  Surgeon: Michele Hirsch MD;  Location: [facility] CATH LAB;  Service: Cardiology;  Laterality: N/A;    IVUS, CORONARY  8/11/2023    Procedure: IVUS, Coronary;  Surgeon: Michele Hirsch MD;  Location: [facility] CATH LAB;  Service: Cardiology;;    LEFT HEART CATHETERIZATION N/A 8/11/2023    Procedure: Left heart cath;  Surgeon: Michele Hirsch MD;  Location: [facility] CATH LAB;  Service: Cardiology;  Laterality: N/A;    PERCUTANEOUS CORONARY INTERVENTION, ARTERY N/A 8/11/2023    Procedure: Percutaneous coronary intervention;  Surgeon: Michele Hirsch MD;  Location: [facility] CATH LAB;  Service: Cardiology;  Laterality: N/A;    PLACEMENT OF SWAN MARLENI CATHETER WITH IMAGING GUIDANCE Left 8/15/2023    Procedure: INSERTION, CATHETER, SWAN-MARLENI, WITH IMAGING GUIDANCE;  Surgeon: Colin Sibley MD;  Location: [facility] CATH LAB;  Service: Cardiology;  Laterality: Left;    REMOVAL OF TUNNELED CENTRAL VENOUS CATHETER (CVC) N/A 8/15/2023    Procedure: REMOVAL, CATHETER, CENTRAL VENOUS, TUNNELED;  Surgeon: Colin Sibley MD;  Location: [facility] CATH LAB;  Service: Cardiology;  Laterality: N/A;    RIGHT HEART CATHETERIZATION Right 8/11/2023    Procedure: INSERTION, CATHETER, RIGHT HEART;  Surgeon: Michele Hirsch MD;  Location: [facility] CATH LAB;  Service: Cardiology;  Laterality: Right;       Review of patient's allergies indicates:  No Known Allergies    Medications:  Continuous Infusions:   sodium chloride 0.9% 5 mL/hr at 08/22/23 1101    amiodarone in dextrose 5% 1 mg/min (08/22/23 1204)    DOBUTamine IV infusion (non-titrating) 5 mcg/kg/min (08/22/23 1101)    furosemide (LASIX) 500 mg in 50 mL infusion (conc: 10 mg/mL) 40 mg/hr (08/22/23 1101)    insulin regular 1  units/mL infusion orderable (TRANSFER) 2.5 Units/hr (08/22/23 1101)    nitric oxide gas      NORepinephrine bitartrate-D5W 0.02 mcg/kg/min (08/22/23 1101)    sodium chloride 0.9%       Scheduled Meds:   amiodarone in dextrose  150 mg Intravenous Once    aspirin  81 mg Oral Daily    atorvastatin  80 mg Oral Daily    chlorothiazide (DIURIL) 500 mg in dextrose 5 % (D5W) 50 mL IVPB  500 mg Intravenous Once    enoxparin  40 mg Subcutaneous Q24H (prophylaxis, 1700)    hydrALAZINE  25 mg Oral TID    insulin aspart U-100  6-12 Units Subcutaneous TIDWM    isosorbide dinitrate  20 mg Oral TID    levETIRAcetam (Keppra) IV (PEDS and ADULTS)  500 mg Intravenous Q12H    levothyroxine  125 mcg Oral Before breakfast    LIDOcaine (PF) 10 mg/ml (1%)  10 mL Other Once    LIDOcaine  1 patch Transdermal Q24H    pantoprazole  40 mg Oral Daily    polyethylene glycol  17 g Oral TID    senna  8.6 mg Oral BID    ticagrelor  90 mg Oral BID     PRN Meds:acetaminophen, dextrose 10%, dextrose 10%, glucagon (human recombinant), glucose, glucose, insulin aspart U-100, lorazepam, methocarbamoL, ondansetron, oxyCODONE, sodium chloride 0.9%, sodium chloride 0.9%    Family History       Problem Relation (Age of Onset)    Hypertension Mother          Tobacco Use    Smoking status: Former     Current packs/day: 0.00     Types: Cigarettes    Smokeless tobacco: Never   Substance and Sexual Activity    Alcohol use: Yes    Drug use: No    Sexual activity: Not on file       Review of Systems   Constitutional:  Positive for activity change and fatigue. Negative for appetite change.   HENT: Negative.     Eyes: Negative.    Respiratory: Negative.  Negative for shortness of breath.    Cardiovascular: Negative.    Gastrointestinal: Negative.    Genitourinary: Negative.    Musculoskeletal: Negative.    Skin: Negative.    Neurological: Negative.    Psychiatric/Behavioral:  Positive for dysphoric mood and sleep disturbance.    All other  systems reviewed and are negative.    Objective:     Vital Signs (Most Recent):  Temp: 97.8 °F (36.6 °C) (08/22/23 0701)  Pulse: 89 (08/22/23 1101)  Resp: 20 (08/22/23 1101)  BP: 103/77 (08/22/23 1101)  SpO2: 96 % (08/22/23 1101) Vital Signs (24h Range):  Temp:  [97.8 °F (36.6 °C)-97.9 °F (36.6 °C)] 97.8 °F (36.6 °C)  Pulse:  [] 89  Resp:  [13-33] 20  SpO2:  [65 %-100 %] 96 %  BP: ()/(55-77) 103/77  Arterial Line BP: ()/(25-61) 98/57     Weight: 125.2 kg (276 lb)  Body mass index is 35.44 kg/m².       Physical Exam  Vitals and nursing note reviewed.   Constitutional:       General: He is not in acute distress.     Appearance: He is not toxic-appearing or diaphoretic.   HENT:      Head: Normocephalic and atraumatic.      Right Ear: External ear normal.      Left Ear: External ear normal.      Nose: Nose normal.      Mouth/Throat:      Mouth: Mucous membranes are moist.   Eyes:      General: No scleral icterus.        Right eye: No discharge.         Left eye: No discharge.      Extraocular Movements: Extraocular movements intact.   Neck:      Comments: Line noted in left side neck today  Cardiovascular:      Rate and Rhythm: Tachycardia present.   Pulmonary:      Effort: Pulmonary effort is normal. No respiratory distress.   Abdominal:      General: There is no distension.      Tenderness: There is no abdominal tenderness.   Musculoskeletal:         General: No deformity or signs of injury.      Cervical back: Normal range of motion.   Skin:     Coloration: Skin is not jaundiced.      Findings: No bruising or rash.   Neurological:      General: No focal deficit present.      Mental Status: He is alert and oriented to person, place, and time.      Cranial Nerves: No cranial nerve deficit.   Psychiatric:         Mood and Affect: Mood is depressed. Affect is flat.         Thought Content: Thought content normal.         Judgment: Judgment normal.            Review of Symptoms      Symptom Assessment  (ESAS 0-10 Scale)  Pain:  0  Dyspnea:  0  Anxiety:  0  Nausea:  0  Depression:  0  Anorexia:  0  Fatigue:  0  Insomnia:  0  Restlessness:  0  Agitation:  0     CAM / Delirium:  Negative  Constipation:  Negative  Diarrhea:  Negative      Bowel Management Plan (BMP):  No      Pain Assessment:  OME in 24 hours:  0  Location(s): none      Modified Kandi Scale:  0 (On NC)    Performance Status:  70    Living Arrangements:  Lives with family    Psychosocial/Cultural:   See Palliative Psychosocial Note: No  Social Issues Identified: Coping deficit pt/family, New Diagnosis/Trauma, and Minor Children in home  Bereavement Risk: No  Caregiver Needs Discussed. Caregiver Distress: No:   Cultural: Patient enjoys fishing and watching sports (baseball and football). Patient has three children - 2 boys and 1 girl. The children ages are 21, 19, and 17.   **Primary  to Follow**  Palliative Care  Consult: No        Advance Care Planning  Advance Directives:   Living Will: No    LaPOST: No    Do Not Resuscitate Status: No    Medical Power of : No        Oral Declaration: Yes  Agent's Name:  Ozzie Daniel   Agent's Contact Number:  [phone]    Decision Making:  Patient answered questions  Goals of Care: What is most important right now is to focus on remaining as independent as possible, extending life as long as possible, even it it means sacrificing quality. Accordingly, we have decided that the best plan to meet the patient's goals includes continuing with treatment.         Significant Labs: All pertinent labs within the past 24 hours have been reviewed.  CBC:   Recent Labs   Lab 08/22/23  0320   WBC 9.03   HGB 7.2*   HCT 21.9*   MCV 89          BMP:  Recent Labs   Lab 08/22/23  1156   *  192*  192*   *  131*  131*   K 4.5  4.5  4.5   CL 92*  92*  92*   CO2 24  24  24   BUN 67*  67*  67*   CREATININE 4.4*  4.4*  4.4*   CALCIUM 8.7  8.7  8.7   MG 2.8*  "      LFT:  Lab Results   Component Value Date     (H) 08/22/2023    ALKPHOS 86 08/22/2023    BILITOT 0.9 08/22/2023     Albumin:   Albumin   Date Value Ref Range Status   08/22/2023 2.7 (L) 3.5 - 5.2 g/dL Final     Protein:   Total Protein   Date Value Ref Range Status   08/22/2023 6.5 6.0 - 8.4 g/dL Final     Lactic acid:   Lab Results   Component Value Date    LACTATE 1.2 08/22/2023    LACTATE 1.0 08/14/2023       Significant Imaging: I have reviewed all pertinent imaging results/findings within the past 24 hours.    08/18/2023 CT C/A/P: "1. No evidence for hematoma as clinically questioned, noting noncontrast technique. 2. Small bilateral pleural effusions.  Bilateral pulmonary edema. 3. Intraluminal fluid in the esophagus to the level of the aortic arch.  Consider correlation for aspiration risk. 4. Bilateral pulmonary nodules, largest measuring 9 mm.  For multiple solid nodules with any 6 mm or greater, Fleischner Society guidelines recommend follow up with non-contrast chest CT at 3-6 months and 18-24 months after discovery. 5. Gallbladder sludge and mild gallbladder distension, nonspecific. 6. Additional findings as above."      > 50% of  55 min visit spent in chart review, face to face discussion of goals of care, charting, discussion with primary team,   symptom assessment, coordination of care and emotional support    Alessia Barker, CNS  Palliative Medicine  [facility] - Cardiac Intensive Care              "

## 2023-08-22 NOTE — CARE UPDATE
Hemodynamics     8PM  CVP: 18  SCVO2: 37  Cardiac Output: 6.9  Cardiac Index: 2.7  SVR: 665  PA 50/21   WP 21  Patient had another unresponsive episode with associated hypotension and desaturation. Spoke with epilepsy who reports no significant evidence of seizure activity on EEG or video. Recommends keppra 500 BID for now and further assessment. Episode was preceded by vomiting, possibly vagal mediated. A line placed. Also, he developed sustained VT afterwards, given 150 amiodarone followed by gtt. LA 4.3>>1.3. Minimal UOP after increasing lasix to 30mg/hr, CVP now 18. Giving 250 diuril and increase lasix to 40    Continuous Infusions:   sodium chloride 0.9% Stopped (08/21/23 2249)    amiodarone in dextrose 5% 1 mg/min (08/22/23 0001)    DOBUTamine IV infusion (non-titrating) 5 mcg/kg/min (08/22/23 0001)    furosemide (LASIX) 500 mg in 50 mL infusion (conc: 10 mg/mL) 40 mg/hr (08/22/23 0001)    insulin regular 1 units/mL infusion orderable (TRANSFER) 2.5 Units/hr (08/22/23 0001)    nitric oxide gas      NORepinephrine bitartrate-D5W 0.02 mcg/kg/min (08/22/23 0020)    sodium chloride 0.9%         Intake/Output Summary (Last 24 hours) at 8/22/2023 0052  Last data filed at 8/22/2023 0001  Gross per 24 hour   Intake 1408.49 ml   Output 2140 ml   Net -731.51 ml       Case discussed with on call attending.    Fransisco Ivan MD  Cardiology Fellow, PGY5

## 2023-08-22 NOTE — ASSESSMENT & PLAN NOTE
No acute processes on brain imaging. Currently on pressors. Persistent tonic clonic movements on bilateral upper extremities with loss of consciousness. Primary Team administered keppra. At the moment without medications related to decrease seizure threshold. Current course possibly related to acute electrolyte imbalance and YVES in the setting of CKD (GFR 15.2). EEG with highly stereotyped episodes (3 episodes) and added to the patient's history of previous right frontal brain infarct is highly concerning of focal seizure.    Plan:    - Continue keppra 500mg Q/12hs  - Will monitor SD interval to possibly add another anti-seizure agent; could do Vimpat depending on SD interval monitoring  - Correct YVES in the setting of CKD for seizure threshold  - Follow up renal function   - Optimize medications   - Optimize electrolytes (Mg, PO)  - Monitor glucose levels

## 2023-08-22 NOTE — ASSESSMENT & PLAN NOTE
Cardiogenic shock    52-year-old male with past medical history of ischemic cardiomyopathy status post CABG in 2017 who presents as a transfer from outside hospital after he presented with an NSTEMI status post revascularization to proximal circumflex.  Associated cardiogenic shock noted from right heart catheterization, CP Impella placed on a 12. 3.4cm from AV to inlet on bedside echo. Since removal of Impella, patient has had elevated PAP and PCWP, and CVP 10. Diuresis improved mildly CVP, but kidney function worsened.  08/22 - Patient had worsening filling pressures with pulmonary edema so diuresis was increased. Kidney dysfunction worsening. Increased Furosemide gtt, given furosemide 80mg bolus ocne, and added chlorothiazide. He also had episode of altered mental status, with sustained VT afterwards for which he was started on amiodarone.     - Furosemide increased to 40mg/h  - Continue amiodarone   - Continue Caleb  - Continue  5  - Continue Hydralazine/Isosorbide and hydralazine 20/25mg TID  - Trend BMP q8 hour   - Consult palliative  - TTE below    Results for orders placed during the hospital encounter of 08/11/23    Echo    Interpretation Summary    Left Ventricle: The left ventricle is severely dilated. Ventricular mass is normal. Normal wall thickness. regional wall motion abnormalities present. See diagram for wall motion findings. There is severely reduced systolic function with a visually estimated ejection fraction of 15 - 20%. Grade III diastolic dysfunction.    Left Atrium: Left atrium is moderately dilated.    Right Ventricle: Right ventricle was not well visualized due to poor acoustic window.    Mitral Valve: There is mild regurgitation.    Tricuspid Valve: There is mild regurgitation.    Pulmonary Artery: The estimated pulmonary artery systolic pressure is 41 mmHg.    IVC/SVC: Normal venous pressure at 3 mmHg.

## 2023-08-22 NOTE — SUBJECTIVE & OBJECTIVE
Past Medical History:   Diagnosis Date    CKD stage 4, GFR 15-29 ml/min     HLD (hyperlipidemia)     Hypertension     Ischemic cardiomyopathy/Chronic HFrEF s/p CABG and AICD 2017    T2DM (type 2 diabetes mellitus)        Past Surgical History:   Procedure Laterality Date    CORONARY ARTERY BYPASS GRAFT  2017    3 vessel    CORONARY BYPASS GRAFT ANGIOGRAPHY  8/11/2023    Procedure: Bypass graft study;  Surgeon: Michele Hirsch MD;  Location: [facility] CATH LAB;  Service: Cardiology;;    IMPELLA, REMOVAL Right 8/15/2023    Procedure: Impella, Removal;  Surgeon: Colin Sibley MD;  Location: [facility] CATH LAB;  Service: Cardiology;  Laterality: Right;    INSERTION OF INTRAVASCULAR MICROAXIAL BLOOD PUMP N/A 8/11/2023    Procedure: INSERTION, IMPELLA;  Surgeon: Michele Hirsch MD;  Location: [facility] CATH LAB;  Service: Cardiology;  Laterality: N/A;    IVUS, CORONARY  8/11/2023    Procedure: IVUS, Coronary;  Surgeon: Michele Hirsch MD;  Location: [facility] CATH LAB;  Service: Cardiology;;    LEFT HEART CATHETERIZATION N/A 8/11/2023    Procedure: Left heart cath;  Surgeon: Michele Hirsch MD;  Location: [facility] CATH LAB;  Service: Cardiology;  Laterality: N/A;    PERCUTANEOUS CORONARY INTERVENTION, ARTERY N/A 8/11/2023    Procedure: Percutaneous coronary intervention;  Surgeon: Michele Hirsch MD;  Location: [facility] CATH LAB;  Service: Cardiology;  Laterality: N/A;    PLACEMENT OF SWAN MARLENI CATHETER WITH IMAGING GUIDANCE Left 8/15/2023    Procedure: INSERTION, CATHETER, SWAN-MARLENI, WITH IMAGING GUIDANCE;  Surgeon: Colin Sibley MD;  Location: [facility] CATH LAB;  Service: Cardiology;  Laterality: Left;    REMOVAL OF TUNNELED CENTRAL VENOUS CATHETER (CVC) N/A 8/15/2023    Procedure: REMOVAL, CATHETER, CENTRAL VENOUS, TUNNELED;  Surgeon: Cloin Sibley MD;  Location: [facility] CATH LAB;  Service: Cardiology;  Laterality: N/A;    RIGHT HEART CATHETERIZATION Right 8/11/2023    Procedure: INSERTION, CATHETER, RIGHT HEART;  Surgeon:  Michele Hirsch MD;  Location: [facility] CATH LAB;  Service: Cardiology;  Laterality: Right;       Review of patient's allergies indicates:  No Known Allergies    No current facility-administered medications on file prior to encounter.     Current Outpatient Medications on File Prior to Encounter   Medication Sig    aspirin (ECOTRIN) 81 MG EC tablet Take 81 mg by mouth.    FARXIGA 10 mg tablet Take 10 mg by mouth once daily.    LANTUS SOLOSTAR U-100 INSULIN glargine 100 units/mL SubQ pen Inject 50 Units into the skin once daily.    levothyroxine (SYNTHROID) 125 MCG tablet Take 125 mcg by mouth before breakfast.    metoprolol succinate (TOPROL-XL) 25 MG 24 hr tablet Take 12.5 mg by mouth once daily.    NOVOLOG FLEXPEN U-100 INSULIN 100 unit/mL (3 mL) InPn pen Inject 20 Units into the skin 3 (three) times daily with meals.    rosuvastatin (CRESTOR) 40 MG Tab Take 40 mg by mouth once daily.    torsemide (DEMADEX) 100 MG Tab Take 50 mg by mouth once daily.     Family History       Problem Relation (Age of Onset)    Hypertension Mother          Tobacco Use    Smoking status: Former     Current packs/day: 0.00     Types: Cigarettes    Smokeless tobacco: Never   Substance and Sexual Activity    Alcohol use: Yes    Drug use: No    Sexual activity: Not on file     Review of Systems   Constitutional: Positive for malaise/fatigue.   Cardiovascular:  Negative for chest pain and irregular heartbeat.   Respiratory:  Positive for shortness of breath.    All other systems reviewed and are negative.    Objective:     Vital Signs (Most Recent):  Temp: 97.7 °F (36.5 °C) (08/22/23 1501)  Pulse: 91 (08/22/23 1701)  Resp: 20 (08/22/23 1701)  BP: 111/75 (08/22/23 1701)  SpO2: 98 % (08/22/23 1701) Vital Signs (24h Range):  Temp:  [97.7 °F (36.5 °C)-97.9 °F (36.6 °C)] 97.7 °F (36.5 °C)  Pulse:  [] 91  Resp:  [13-33] 20  SpO2:  [65 %-100 %] 98 %  BP: ()/(52-77) 111/75  Arterial Line BP: ()/(25-61) 113/61       Weight: 125.2 kg  (276 lb)  Body mass index is 35.44 kg/m².    SpO2: 98 %      Physical Therapy   Vitals and nursing note reviewed.   Constitutional:       General: He is not in acute distress.     Appearance: Normal appearance. He is normal weight. He is not ill-appearing.   HENT:      Head: Normocephalic and atraumatic.   Eyes:      Pupils: Pupils are equal, round, and reactive to light.   Neck:      Comments: Left IJ PA catheter  Cardiovascular:      Rate and Rhythm: Normal rate and regular rhythm.      Pulses: Normal pulses.      Heart sounds: Normal heart sounds. No murmur heard.     No friction rub. No gallop.   Pulmonary:      Effort: Pulmonary effort is normal. No respiratory distress.      Breath sounds: No wheezing or rhonchi.      Comments: Bibasilar crackles  Abdominal:      General: Abdomen is flat. Bowel sounds are normal.      Palpations: Abdomen is soft.   Musculoskeletal:         General: Normal range of motion.      Cervical back: Normal range of motion and neck supple.      Right lower leg: No edema.      Left lower leg: No edema.      Comments: Warm BL LEs   Skin:     General: Skin is warm and dry.      Capillary Refill: Capillary refill takes less than 2 seconds.   Neurological:      General: No focal deficit present.      Mental Status: He is alert.           Significant Labs: BMP:   Recent Labs   Lab 08/21/23 2232 08/22/23 0320 08/22/23  0835 08/22/23  1156   * 198* 190* 192*  192*  192*   * 132* 128* 131*  131*  131*   K 3.8 4.2 4.2 4.5  4.5  4.5   CL 94* 94* 92* 92*  92*  92*   CO2 19* 22* 23 24  24  24   BUN 63* 65* 65* 67*  67*  67*   CREATININE 4.2* 4.2* 4.4* 4.4*  4.4*  4.4*   CALCIUM 8.8 8.8 8.7 8.7  8.7  8.7   MG 2.8* 2.7*  --  2.8*   , CMP:   Recent Labs   Lab 08/21/23  0249 08/21/23  0826 08/21/23 2232 08/22/23 0320 08/22/23  0835 08/22/23  1156   *   < > 129* 132* 128* 131*  131*  131*   K 4.3   < > 3.8 4.2 4.2 4.5  4.5  4.5   CL 94*   < > 94* 94* 92* 92*   92*  92*   CO2 23   < > 19* 22* 23 24  24  24   *   < > 227* 198* 190* 192*  192*  192*   BUN 61*   < > 63* 65* 65* 67*  67*  67*   CREATININE 4.0*   < > 4.2* 4.2* 4.4* 4.4*  4.4*  4.4*   CALCIUM 8.2*   < > 8.8 8.8 8.7 8.7  8.7  8.7   PROT 6.2  --   --  6.5  --   --    ALBUMIN 2.5*  --  2.7* 2.7*  --   --    BILITOT 0.6  --   --  0.9  --   --    ALKPHOS 83  --   --  86  --   --    AST 20  --   --  120*  --   --    ALT 15  --   --  126*  --   --    ANIONGAP 13   < > 16 16 13 15  15  15    < > = values in this interval not displayed.   , and All pertinent lab results from the last 24 hours have been reviewed.

## 2023-08-22 NOTE — PROCEDURES
EEG Extended Monitoring up to one hour    Date/Time: 8/11/2023 9:12 PM    Performed by: Liam Floyd MD  Authorized by: Mariajose Crawford MD      ICU EEG/VIDEO MONITORING REPORT    DATE OF SERVICE: 8/21/23-8/22/23  EEG NUMBER: [id number]   REQUESTED BY: Jelly Landa  LOCATION OF SERVICE: [facility] [place]    METHODOLOGY   Electroencephalographic (EEG) recording is with electrodes placed according to the International 10-20 placement system.  Thirty two (32) channels of digital signal are simultaneously recorded from the scalp and may include EKG, EMG, and/or eye monitors.   Recording band pass was 0.1 to 512 hz.  Digital video recording of the patient is simultaneously recorded with the EEG.  The nursing staff report clinical symptoms and may press an event button when the patient has symptoms of clinical interest to the treating physicians.  EEG and video recording is stored and archived in digital format.  The entire recording is visually reviewed and the times identified by computer analysis as being spikes or seizures are reviewed again.  Activation procedures which include photic stimulation, hyperventilation and instructing patients to perform simple task are done in selected patients.   Compresses spectral analysis (CSA) is also performed on the activity recorded from each individual channel.  This is displayed as a power display of frequencies from 0 to 30 Hz over time.   The CSA analysis is done and displayed continuously.  This is reviewed for asymmetries in power between homologous areas of the scalp and for presence of changes in power which canbe seen when seizures occur.  Sections of suspected abnormalities on the CSA is then compared with the original EEG recording.     Janalakshmi software was also utilized in the review of this study.  This software suite analyzes the EEG recording in multiple domains.  Coherence and rhythmicity is computed to identify EEG sections which may contain organized seizures.   Each channel undergoes analysis to detect presence of spike and sharp waves which have special and morphological characteristic of epileptic activity.  The routine EEG recording is converted from spacial into frequency domain.  This is then displayed comparing homologous areas to identify areas of significant asymmetry.  Algorithm to identify non-cortically generated artifact is used to separate eye movement, EMG and other artifact from the EEG.      Recording Times  Start on 8/21/23 at 14:48:38  Stop on 8/22/23 at 07:00:08  Start on 8/22/23 at 07:00:41  Stop on 8/22/23 at 14:12:19  A total of 23 hours of EEG was recorded.    EEG FINDINGS  The record shows a good  organization at rest, consisting of a 9 posterior dominant rhythm with fair  reactivity. There is mild bilateral beta activity. There is frequent diffuse theta range background slowing.     Drowsiness is characterized by attenuation of the background, vertex waves, and bilateral theta slowing. Stage II sleep is characterized by slowing, vertex waves, and symmetric sleep spindles.     Provocative maneuvers including hyperventilation and photic stimulation were not performed.     EKG recording shows a ir regular rhythm.    There are four push button events at 22:12, 02:27, 5:33 and 09:32. During all three events, the patient complains of nausea, vomits, stares with slight eye deviation to the left, exhibits brief torso jerking before tonically posturing his upper extremities and breathing sonorously. During all three events, no abnormal discharges are seen but diffuse background attenuation is noted.     IMPRESSION:  Abnormal study due to mild  diffuse background slowing consistent with diffuse cerebral dysfunction and encephalopathy which may be on the basis of toxic, metabolic, or primary neuronal disorder. Four patient events are captured with no definitive electrographic correlate, however events are highly stereotyped and taken with patient's known  history of frontal infarct are highly concerning for focal seizure.

## 2023-08-22 NOTE — EICU
Intervention Initiated From:  COR / EICU    Callie intervened regarding:  Time-Out    Called into room for timeout for right radial a-line placement per sterile per Dr. Moody , blood return noted, guidewire removed and intact, waveform noted,sutured in placed, dressing applied, LDA charted, pt tolerated well

## 2023-08-22 NOTE — PROGRESS NOTES
[facility] - Cardiac Intensive Care  Neurology  Progress Note    Patient Name: Itz Daniel  MRN: [id number]  Admission Date: 8/11/2023  Hospital Length of Stay: 11 days  Code Status: Full Code   Attending Provider: Eric Moncada MD  Primary Care Physician: Sunday Boo MD   Principal Problem:Ischemic cardiomyopathy    HPI:   54 yo. PMHx CKD III/IV, DM, HTN, CABG (2017), AICD placement, CAD. HFrEF, s/p right foot amputation (diabetic foot infection 2022), obesity. Presented to [facility] on 8/10 with nausea, vomiting, weakness and SOB. Treated for cardiogenic shock in the setting of NSTEMI (trops 24.8, lactic acid 2.7, , glucose 487, cr 2.5. On 8/21 Neurology was consulted for seizure-like activity. Patient was lying in bed in the morning of 8/21 and tried to sit down following and episode of cough and SOB. Suddenly had a course of vasovagal response. Nurse at the bedside witnessed generalized tonic-clonic movements on bilateral upper extremities (first episode for 5 minutes at 8am; second episode for 10 seconds at 9.30am). Associated loss of consciousness with lateral tongue biting. Patient does not recall event and refers this is the first episode in his life. No bowel or bladder movement (on [place]gu[place]). CT head at the time of episode without acute process.           Overview/Hospital Course:  EEG with highly stereotyped episodes (3 episodes) and added to the patient's history of previous right frontal brain infarct is highly concerning of focal seizure. On YVES in the setting of CKD, which may have decreased the patient's seizure threshold. Current GFR 15.2.  Will continue to monitor neurological status. Closely follow up renal function.           Subjective:     Interval History: EEG with highly stereotyped episodes (3 episodes) and added to the patient's history of previous right frontal brain infarct is highly concerning of focal seizure. On YVES in the setting of CKD, which may have decreased the  patient's seizure threshold. Current GFR 15.2. Will continue to monitor neurological status. Closely follow up renal function.     Current Neurological Medications:   Keppra     Current Facility-Administered Medications   Medication Dose Route Frequency Provider Last Rate Last Admin    0.9%  NaCl infusion   Intravenous Continuous Aracelis Cuellar DO   Stopped at 08/22/23 1526    acetaminophen tablet 650 mg  650 mg Oral Q6H PRN Fransisco Ivan MD   650 mg at 08/20/23 1959    amiodarone 360 mg/200 mL (1.8 mg/mL) infusion  1 mg/min Intravenous Continuous Nadia Barragan MD 33.3 mL/hr at 08/22/23 1701 1 mg/min at 08/22/23 1701    amiodarone in dextrose 150 mg/100 mL (1.5 mg/mL) loading dose 150 mg  150 mg Intravenous Once Paul Chen MD   Paused at 08/22/23 1203    aspirin EC tablet 81 mg  81 mg Oral Daily Fransisco Ivan MD   81 mg at 08/22/23 1330    atorvastatin tablet 80 mg  80 mg Oral Daily Fransisco Ivan MD   80 mg at 08/22/23 1331    dextrose 10% bolus 125 mL 125 mL  12.5 g Intravenous PRN Héctor Vines DNP, TARSHA        dextrose 10% bolus 250 mL 250 mL  25 g Intravenous PRN Héctor Vines DNP, KATHLEENP        DOBUtamine 1000 mg in D5W 250 mL infusion  5 mcg/kg/min Intravenous Continuous Paul Chen MD 4.4 mL/hr at 08/22/23 1701 2.5 mcg/kg/min at 08/22/23 1701    enoxaparin injection 40 mg  40 mg Subcutaneous Q24H (prophylaxis, 1700) Fransisco Ivan MD   40 mg at 08/22/23 1743    furosemide (LASIX) 500 mg in 50 mL infusion (conc: 10 mg/mL)  40 mg/hr Intravenous Continuous Fransisco Ivan MD 4 mL/hr at 08/22/23 1701 40 mg/hr at 08/22/23 1701    glucagon (human recombinant) injection 1 mg  1 mg Intramuscular PRN Héctor Vines DNP, KATHLEENP        glucose chewable tablet 16 g  16 g Oral PRN Eliud Vinesw C., DNP, TARSHA        glucose chewable tablet 24 g  24 g Oral PRN Héctor Vines DNP, FNP        hydrALAZINE tablet 25 mg  25 mg Oral TID Yvonne  Paul Roach MD   25 mg at 08/21/23 1519    insulin aspart U-100 pen 0-10 Units  0-10 Units Subcutaneous PRN Héctor Vines, MICHELLE, FNP   2 Units at 08/22/23 1743    insulin aspart U-100 pen 6-12 Units  6-12 Units Subcutaneous TIDWM Héctor Vines DNP, FNP   6 Units at 08/21/23 1254    insulin regular in 0.9 % NaCl 100 unit/100 mL (1 unit/mL) infusion  2.8 Units/hr Intravenous Continuous Kori Paez NP 2.8 mL/hr at 08/22/23 1753 2.8 Units/hr at 08/22/23 1753    isosorbide dinitrate tablet 20 mg  20 mg Oral TID Paul Chen MD   20 mg at 08/21/23 1520    levETIRAcetam injection 500 mg  500 mg Intravenous Q12H Fransisco Ivan MD   500 mg at 08/22/23 0839    levothyroxine tablet 125 mcg  125 mcg Oral Before breakfast Fransisco Ivan MD   125 mcg at 08/21/23 0621    LIDOcaine (PF) 10 mg/ml (1%) injection 100 mg  10 mL Other Once Fransisco Ivan MD        LIDOcaine 5 % patch 1 patch  1 patch Transdermal Q24H Fransisco Ivan MD   1 patch at 08/11/23 2226    LORazepam injection 2 mg  2 mg Intravenous PRN Fransisco Ivan MD        methocarbamoL tablet 500 mg  500 mg Oral Q6H PRN Fransisco Ivan MD   500 mg at 08/13/23 1646    nitric oxide gas Gas 10 ppm  10 ppm Inhalation Continuous Mariajose Perales MD   10 ppm at 08/21/23 0432    NORepinephrine 4 mg in dextrose 5% 250 mL infusion (premix) (titrating)  0-3 mcg/kg/min Intravenous Continuous Paul Chen MD 9.4 mL/hr at 08/22/23 1701 0.02 mcg/kg/min at 08/22/23 1701    ondansetron injection 4 mg  4 mg Intravenous Q6H PRN Paul Chen MD   4 mg at 08/22/23 1604    oxyCODONE immediate release tablet 5 mg  5 mg Oral Q8H PRN Fransisco MD   5 mg at 08/21/23 2145    pantoprazole EC tablet 40 mg  40 mg Oral Daily Fransisco MD   40 mg at 08/22/23 1330    polyethylene glycol packet 17 g  17 g Oral TID Fransisco Ivan MD   17 g at 08/21/23 1520    senna tablet 8.6 mg  8.6 mg Oral BID Fransisco MD   8.6 mg at  08/21/23 2032    sevelamer carbonate tablet 800 mg  800 mg Oral TID Cal White MD   800 mg at 08/22/23 1557    sodium chloride 0.9% flush 10 mL  10 mL Intravenous PRN Fransisco Ivan MD        sodium chloride 0.9% flush 10 mL  10 mL Intravenous Continuous Aracelis Cuellar DO        sodium chloride 0.9% flush 3 mL  3 mL Intravenous Q6H PRN Star Olmos MD        ticagrelor tablet 90 mg  90 mg Oral BID Fransisco Ivan MD   90 mg at 08/22/23 1330       Review of Systems   Constitutional:  Positive for activity change and fatigue. Negative for appetite change.   HENT: Negative.     Eyes: Negative.  Negative for photophobia.   Respiratory:  Positive for cough and shortness of breath.    Cardiovascular: Negative.  Negative for palpitations.   Gastrointestinal: Negative.    Genitourinary: Negative.    Musculoskeletal: Negative.    Skin: Negative.    Neurological:  Positive for seizures and weakness. Negative for light-headedness.   Psychiatric/Behavioral:  Negative for dysphoric mood and sleep disturbance.      Objective:     Vital Signs (Most Recent):  Temp: 97.7 °F (36.5 °C) (08/22/23 1501)  Pulse: 91 (08/22/23 1701)  Resp: 20 (08/22/23 1701)  BP: 111/75 (08/22/23 1701)  SpO2: 98 % (08/22/23 1701) Vital Signs (24h Range):  Temp:  [97.7 °F (36.5 °C)-97.9 °F (36.6 °C)] 97.7 °F (36.5 °C)  Pulse:  [] 91  Resp:  [13-33] 20  SpO2:  [65 %-100 %] 98 %  BP: ()/(52-77) 111/75  Arterial Line BP: ()/(25-61) 113/61     Weight: 125.2 kg (276 lb)  Body mass index is 35.44 kg/m².     Physical Exam          Significant Labs: All pertinent lab results from the past 24 hours have been reviewed.    Significant Imaging: No neuroimaging in the past 24 hs.     Assessment and Plan:     Tonic-clonic generalized seizure  No acute processes on brain imaging. Currently on pressors. Persistent tonic clonic movements on bilateral upper extremities with loss of consciousness. Primary Team administered keppra. At the  moment without medications related to decrease seizure threshold. Current course possibly related to acute electrolyte imbalance and YVES in the setting of CKD (GFR 15.2). EEG with highly stereotyped episodes (3 episodes) and added to the patient's history of previous right frontal brain infarct is highly concerning of focal seizure.    Plan:    - Started on keppra 500mg BID per primary team, okay to continue but need to monitor renal function as this is max dose based on cr clearance   - If patient needs a different AED, can consider vimpat but will need EKG as can prolong WI (will discuss with team)   - Seizure diagnosis and medications discussed with patient and in agreement , including side effects.   - Correct YVES in the setting of CKD for seizure threshold  - Follow up renal function   - Optimize medications   - Optimize electrolytes (Mg, PO)  - Monitor glucose levels         VTE Risk Mitigation (From admission, onward)           Ordered     enoxaparin injection 40 mg  Every 24 hours         08/16/23 0736     IP VTE HIGH RISK PATIENT  Once         08/11/23 2117     Place sequential compression device  Until discontinued         08/11/23 2117                    Itz Vallejo MD  Neurology  [facility] - Cardiac Intensive Care

## 2023-08-23 NOTE — ASSESSMENT & PLAN NOTE
Mr. Daniel is a 54 y/o M with ischemic cardiomyopathy s/p CABG transferred from outside hospital with NSTEMI s/p revascularization to proximal circumflex. He had associated cardiogenic shock noted from right heart catheterization. CP Impella placed and now removed. Cardiothoracic surgery and interventional cardiology consulted. Nephrology also consulted for YVES on CKD. Palliative medicine consulted for GOC.     Ischemic cardiomyopathy/cardiogenic shock/YVES/CKD/other medical problems  - plan per primary team and other specialty consultants    ACP/GOC:  Spoke to Dr. Roach. Per MD, pt made DNR per his wishes.  Per MD, plan is dialysis today. Per MD, pt made aware of poor prognosis.     Met with pt and pt's family. Pt's father in route terence visit pt. Per pt, he wants to continue medical management at this time but had questions about comfort care if/when he is ready to transition. Comfort care discussed. Pt had questions about medications for comfort care. Questions answered. Pt's family was tearful. Pt and family aware of poor prognosis.      has visited pt.     8/22/23: Dr. Ledesma and this JEFERSON met with pt. MD updated pt on plan of care. Pt's goal is to continue medical management.     Code status discussed with pt. At this time, pt wants to be full code.  Per pt, if he is on life support and not getting better he would NOT want to be kept alive on life support.     MPOA paperwork completed with pt per BEATRIZ Horta today.  Pt's father made MPOA. Dr. Ledesma to update pt's father on pt's status per pt's wishes.     Pal care will continue to follow.     8/20/23  Advance Care Planning   - patient decisional  - patient's son and son's friend at bedside  - introduced palliative medicine team to patient today  - goals: life prolonging  - when engaging patient today about his current condition, patient states that they are unable to get the pressures in his heart down due to kidneys not working. He states that  they are trying to get the kidneys to work so that the pressures come down. He further stated he has not heard from all the teams yet about what options he has available. He wants to hear from all the teams prior to further discussions. He states it is important to him to live so he can be with his three children: ages 21, 19, and 17. He reports enjoying fishing and watching sports (especially baseball and football).   - when discussing decision makers, he verbally stated he would want his father, Ozzie Daniel, to be his surrogate decision maker. ACP booklet brought to bedside so that patient may fill out HCPOA.   - code status: full  - will follow up tomorrow for further GOC discussions.         Above plan of care discussed with patient's primary team including attending of record, Dr. Mackenzie

## 2023-08-23 NOTE — NURSING
Cardiac ICU Care Plan    POC reviewed with Itz Daniel. Questions and concerns addressed. See below and flowsheets for full assessment and VS info.     Pt had multiple runs of non-sustained VT w/ MAPs dropping to 40s and inc levo requirements, rebolused w/ amio. 2nd episode lidocaine bolused and gtt started  2 episodes of seizure activity w/ dequan, shaking, and dec in BP - episodes lasted <30 secs  CVPs 15, 15, and 14  SVO2s 51, 43, 51, 48    Neuro:  [place] Coma Scale  Best Eye Response: 4-->(E4) spontaneous  Best Motor Response: 6-->(M6) obeys commands  Best Verbal Response: 5-->(V5) oriented  [place] Coma Scale Score: 15  Assessment Qualifiers: patient not sedated/intubated  Pupil PERRLA: yes    24 hr Temp:  [97.7 °F (36.5 °C)-98.7 °F (37.1 °C)]      CV:  Rhythm: normal sinus rhythm   DVT prophylaxis: VTE Required Core Measure: Pharmacological prophylaxis initiated/maintained    CVP (mean): (S) 14 mmHg (08/23/23 0301)    [REMOVED] Pulmonary Artery Catheter Assessment  08/15/23 1500 Internal Jugular Left-Current Insertion Depth (cm): 56.5 cm (08/22/23 0701)  [REMOVED] Pulmonary Artery Catheter Assessment  08/11/23 1500 Femoral Vein Right-Current Insertion Depth (cm): 78.5 cm (08/15/23 1101)  PAP: 61/28 (08/22/23 1401)  SVO2 (%): (S) 51 % (08/23/23 0301)  CO (L/min): 5.3 L/min (Joe) (08/13/23 1601)       Type: Impella       Pulses  Right Radial Pulse: 1+ (weak)  Left Radial Pulse: 1+ (weak)  Right Dorsalis Pedis Pulse: Doppler  Left Dorsalis Pedis Pulse: 1+ (weak)  Right Posterior Tibial Pulse: 1+ (weak)  Left Posterior Tibial Pulse: 1+ (weak)    Resp:  Flow (L/min): 4  Oxygen Concentration (%): 36    GI/:  GI prophylaxis: yes  Diet/Nutrition Received: NPO, sips of water, ice chips  Last Bowel Movement: 08/17/23  Voiding Characteristics: urethral catheter (bladder)       Urethral Catheter 08/12/23 0130 16 Fr.-Reason for Continuing Urinary Catheterization: Critically ill in ICU and requiring hourly  monitoring of intake/output   Intake/Output Summary (Last 24 hours) at 8/23/2023 0639  Last data filed at 8/23/2023 0601  Gross per 24 hour   Intake 1697.47 ml   Output 1380 ml   Net 317.47 ml        Nutritional Supplement Intake: Quantity 0, Type:  NPO    Labs/Accuchecks:  Recent Labs   Lab 08/21/23  2232 08/21/23  2239 08/22/23  0320 08/23/23  0351   WBC 8.11  --  9.03 12.20   RBC 2.52*  --  2.47* 2.67*   HGB 7.4*  --  7.2* 7.8*   HCT 22.8* 22* 21.9* 23.8*     --  413 481*      Recent Labs   Lab 08/21/23  0249 08/22/23  0320 08/23/23  0351   INR 1.0 1.1 1.2      Recent Labs     08/23/23  0351   *   K 4.2   CO2 19*   CL 93*   BUN 73*   CREATININE 4.8*   ALKPHOS 117   ALT 2,135*   AST 2,906*   BILITOT 2.0*       Recent Labs   Lab 08/20/23  0232 08/21/23  0826   *  --    TROPONINI  --  14.957*      Recent Labs     08/23/23  0315 08/23/23  0543 08/23/23  0547   PH 7.367 7.329* 7.335*   PCO2 44.5 46.9* 46.3*   PO2 29* 28* 104*   HCO3 25.5 24.7 24.7   POCSATURATED 51* 48* 98   BE 0 -1 -1       Electrolytes: N/A - electrolytes WDL  Accuchecks: Q4H    Gtts/LDAs:   sodium chloride 0.9% 5 mL/hr at 08/23/23 0601    amiodarone in dextrose 5% 1 mg/min (08/23/23 0626)    DOBUTamine IV infusion (non-titrating) Stopped (08/22/23 2333)    furosemide (LASIX) 500 mg in 50 mL infusion (conc: 10 mg/mL) 40 mg/hr (08/23/23 0601)    insulin regular 1 units/mL infusion orderable (TRANSFER) 2.8 Units/hr (08/23/23 0601)    LIDOcaine 1 mg/min (08/23/23 0601)    nitric oxide gas      NORepinephrine bitartrate-D5W 0.14 mcg/kg/min (08/23/23 0601)    sodium chloride 0.9%         Lines/Drains/Airways       Central Venous Catheter Line  Duration             Introducer 08/15/23 1630 Internal Jugular Left 7 days    Percutaneous Central Line Insertion/Assessment - Triple Lumen  08/22/23 1501 Internal Jugular Left <1 day              Drain  Duration                  Urethral Catheter 08/12/23 0130 16 Fr. 11 days               Arterial Line  Duration             Arterial Line 08/21/23 2328 Right Radial 1 day              Peripheral Intravenous Line  Duration                  Peripheral IV - Single Lumen 08/21/23 2243 18 G Anterior;Left Wrist 1 day         Peripheral IV - Single Lumen 08/21/23 2258 20 G Left;Posterior Wrist 1 day                    Skin/Wounds  Bathing/Skin Care: bath, complete;dressed/undressed;foot care (08/21/23 2301)  Wounds: No  Wound care consulted: No

## 2023-08-23 NOTE — ASSESSMENT & PLAN NOTE
Patient with recurrent and frequent  NSVT in the setting of ischemic cardiomyopathy in cardiogenic shock on inotrope. Patient on amiodarone and rebolus today with continuous episodes refractory to medication. Patient has a sub Q ICD    Recommendation  -Antitachycardia therapies turned off per patients wishes  -Continue Amiodarone at 1mg/min  -Continue Lidocaine  -Titrate down Dobutamine as able  -Patient critically ill and currently having discussion with primary team and palliative in regards to goals of care

## 2023-08-23 NOTE — NURSING
Nurses Note -- 4 Eyes      8/23/2023   12:35 PM      Skin assessed during: Daily Assessment      [x] No Altered Skin Integrity Present    [x]Prevention Measures Documented      [] Yes- Altered Skin Integrity Present or Discovered   [] LDA Added if Not in Epic (Describe Wound)   [] New Altered Skin Integrity was Present on Admit and Documented in LDA   [] Wound Image Taken    Wound Care Consulted? No    Attending Nurse:  Rory Vallejo RN/Staff Member:  RASHID Glaser

## 2023-08-23 NOTE — ASSESSMENT & PLAN NOTE
Presented first episode on 08/21 and since has had multiple episodes throughout the day every day  EEG resulted in diffuse cerebral dysfunction and encephalopathy and three patient events captured with no definitive electrographic correlate, but highly stereotyped and taken with patient's known history of frontal infarct are highly concerning for focal seizure  Evaluated by neurology who recommended anti-seizure meds    - Continue on Keppra  - Possibility of additional meds if continue with events  - Continue managing possible secondary causes   - Neurology following

## 2023-08-23 NOTE — ASSESSMENT & PLAN NOTE
Acute kidney injury    Likely secondary to cardiogenic shock  Baseline creatinine of 1.8-2.   Worsening creatinine likely due to ATN, nephrology following  Restarted on diuretics, urine output is still <50/h  Monitoring for need of hemodialysis    Recent Labs   Lab 08/22/23 2011 08/23/23  0027 08/23/23  0351   CREATININE 4.5* 4.9* 4.8*     - Continue furosemide gtt 40mg/h  - Check need for additional diuresis   - Monitor Cr  - Daily weights, strict I/O and chart, renally dose all medications   - Avoid nephrotoxic medications, NSAIDs, ACE/ARB, and IV contrast.  - Monitor for need of hemodialysis  - Appreciate nephrology recs

## 2023-08-23 NOTE — ASSESSMENT & PLAN NOTE
Lab Results   Component Value Date    HGBA1C 10.2 (H) 08/10/2023     - Endocrine following  - Now on insulin drip  - Endocrine adjusting insulin dose  - NPO for now, diabetic/cardiac diet once PO resumed

## 2023-08-23 NOTE — EICU
Intervention Initiated From:  Bedside    Callie intervened regarding:  Time-Out    Comments: Called to bedside for trialysis catheter. Physician privileges verified. Time out performed. Right IJ trialysis catheter placed by MD Russell. Pt tolerated procedure well, PCXR ordered.

## 2023-08-23 NOTE — PROGRESS NOTES
"[facility] - Cardiac Intensive Care  Nephrology  Progress Note    Patient Name: Itz Daniel  MRN: [id number]  Admission Date: 8/11/2023  Hospital Length of Stay: 12 days  Attending Provider: Aracelis Cuellar DO   Primary Care Physician: Sunday Boo MD  Principal Problem:Ischemic cardiomyopathy    Subjective:     HPI:   Itz Daniel is a 53 y.o. male w/ known CKD III/V 2/2 DM and HTN, CABG x3 (2017) with known occluded VG-OM, AICD placement, CAD, HFrEF, hypothyroidism, COVID-19 (2022), Diabetic foot infection s/p amputation (2022), and obesity presented to [facility] on  8/10/2023 with nausea, vomiting, weakness and shortness of breath. Pt's workup revealed pt in cardiogenic shock in setting of NSTEMI. Initial labs at [facility] showed troponin of 24.8, lactic acid of 2.7, , Na 132, glucose 487, Cr 2.51, and WBC 11.57, pt was loaded with heparin. Pt underwent a LHC (LCX had 90-95% proximal stenosis, distal 70% calcified stenosis, diffusely diseased OM1,  of OM2, RHC revealed: RA 20, RV 81/21, PA 81/47 (mean 61), PWCP 45, Ao sat 96%, PA sat 49% (on 6L NC)  CO/CI:   3.69/1.52 (VIKKI)  3.91/1.6 (TD), and impella placement, CABG w/ PCI of the proximal circumflex with a 33 x 24 mm Synergy XD stent (post-dilated up to 4.25 mm)    On arrival to [facility] pt presented with the following VS:   Initial Vitals   BP Pulse Resp Temp SpO2   08/12/23 0600 08/11/23 2109 08/11/23 2115 08/11/23 2100 08/11/23 2115   125/72 (!) 114 20 98.3 °F (36.8 °C) 95 %     Nephrology was consulted for: "Worsening kidney function"  Baseline sCr: appears to be around ~2.  Admission sCr: 2.5 at [facility]  Pt follows with nephrology for CKD  Since arrival to [facility] pt has had significant YVES, highest sCr during hospitalization is 3.7 (today), his urine output has also decreased.   Pt has been aggressively diuresed with ~9L of urine output during hospitalization   Primary team ordered a renal ultrasound w/ " doppler with for evaluation of renal artery stenosis, this was negative  Pt has remained on 4L of O2 since 8/17, he does not use oxygen at home.   Pt does not endorse a history of kidney stones, chronic NSAID use, trauma to the kidneys or bladder.   As for a family history, pt denies family history of kidney diease including family members on dialysis, autoimmune diseases, kidney stones or cancer.     Creatinine   Date Value Ref Range Status   08/19/2023 3.6 (H) 0.5 - 1.4 mg/dL Final   08/19/2023 3.7 (H) 0.5 - 1.4 mg/dL Final   08/19/2023 3.4 (H) 0.5 - 1.4 mg/dL Final     BUN   Date Value Ref Range Status   08/19/2023 51 (H) 6 - 20 mg/dL Final   08/19/2023 50 (H) 6 - 20 mg/dL Final   08/19/2023 49 (H) 6 - 20 mg/dL Final       Interval History: Patient seen and examined this morning. Continue seizure-like activity overnight in addition to non-sustained episodes of VT. He is afebrile with pulse ranging from 120-70s bpm. Systolic blood pressure ranging from 100-80s mmHg via arterial line now on Levophed 0.14, vasopressin 0.04 and dobutamine infusion resumed. CVP 14 mmHg with worsening dyspnea per patient. He is saturating +85% on 4 liters via nasal cannula. Renal function declining now with decreasing UOP despite escalating doses of IV diuretics. Only 1.38 liters of documented UOP in the last 24 hours. Reiterated risks of RTT especially with UF given hypotension with patient to which he verbalized understanding. Palliative Medicine consulted and following and patient reportedly now DNI. Primary team with plans for trialysis catheter placement with [facility] thereafter.     Review of patient's allergies indicates:  No Known Allergies  Current Facility-Administered Medications   Medication Frequency    0.9%  NaCl infusion Continuous    acetaminophen tablet 650 mg Q6H PRN    amiodarone 360 mg/200 mL (1.8 mg/mL) infusion Continuous    amiodarone in dextrose 150 mg/100 mL (1.5 mg/mL) loading dose 150 mg Once    aspirin EC  tablet 81 mg Daily    atorvastatin tablet 80 mg Daily    dextrose 10% bolus 125 mL 125 mL PRN    dextrose 10% bolus 250 mL 250 mL PRN    DOBUtamine 1000 mg in D5W 250 mL infusion Continuous    enoxaparin injection 40 mg Q24H (prophylaxis, 1700)    furosemide (LASIX) 500 mg in 50 mL infusion (conc: 10 mg/mL) Continuous    glucagon (human recombinant) injection 1 mg PRN    glucose chewable tablet 16 g PRN    glucose chewable tablet 24 g PRN    hydrALAZINE tablet 25 mg TID    insulin aspart U-100 pen 0-10 Units PRN    insulin aspart U-100 pen 6-12 Units TIDWM    insulin regular in 0.9 % NaCl 100 unit/100 mL (1 unit/mL) infusion Continuous    isosorbide dinitrate tablet 20 mg TID    levETIRAcetam injection 500 mg Q12H    levothyroxine tablet 125 mcg Before breakfast    LIDOcaine (PF) 10 mg/ml (1%) injection 100 mg Once    LIDOcaine 2000 mg in D5W 250 mL infusion Continuous    LIDOcaine 5 % patch 1 patch Q24H    methocarbamoL tablet 500 mg Q6H PRN    nitric oxide gas Gas 10 ppm Continuous    NORepinephrine 32 mg in dextrose 5 % (D5W) 250 mL infusion Continuous    oxyCODONE immediate release tablet 5 mg Q8H PRN    pantoprazole injection 40 mg Daily    polyethylene glycol packet 17 g TID    prochlorperazine injection Soln 2.5 mg Q6H PRN    senna tablet 8.6 mg BID    sevelamer carbonate tablet 800 mg TID WM    sodium chloride 0.9% flush 10 mL PRN    sodium chloride 0.9% flush 10 mL Continuous    sodium chloride 0.9% flush 3 mL Q6H PRN    ticagrelor tablet 90 mg BID       Objective:     Vital Signs (Most Recent):  Temp: 98.5 °F (36.9 °C) (08/23/23 0705)  Pulse: 98 (08/23/23 0755)  Resp: 15 (08/23/23 0755)  BP: (!) 90/54 (08/23/23 0705)  SpO2: 95 % (08/23/23 0755) Vital Signs (24h Range):  Temp:  [97.7 °F (36.5 °C)-98.7 °F (37.1 °C)] 98.5 °F (36.9 °C)  Pulse:  [] 98  Resp:  [9-33] 15  SpO2:  [75 %-100 %] 95 %  BP: ()/(52-77) 90/54  Arterial Line BP: ()/(43-61) 95/54      Weight: 125.2 kg (276 lb) (08/22/23 0300)  Body mass index is 35.44 kg/m².  Body surface area is 2.56 meters squared.    I/O last 3 completed shifts:  In: 2425.6 [P.O.:240; I.V.:1844.6; IV Piggyback:341]  Out: 2285 [Urine:2285]    Physical Exam  Vitals and nursing note reviewed.   Constitutional:       General: He is awake. He is not in acute distress.     Appearance: He is obese. He is ill-appearing. He is not diaphoretic.      Interventions: Nasal cannula in place.   HENT:      Head: Normocephalic and atraumatic.      Right Ear: External ear normal.      Left Ear: External ear normal.      Nose:      Comments: Nasal cannula in place.     Mouth/Throat:      Mouth: Mucous membranes are moist.      Pharynx: Oropharynx is clear. No oropharyngeal exudate or posterior oropharyngeal erythema.   Eyes:      General: No scleral icterus.        Right eye: No discharge.         Left eye: No discharge.      Extraocular Movements: Extraocular movements intact.      Conjunctiva/sclera: Conjunctivae normal.   Neck:      Comments: CVC & [place] Tariq catheter to left internal jugular vein.  Cardiovascular:      Rate and Rhythm: Normal rate.      Heart sounds: Murmur heard.      Systolic murmur is present with a grade of 1/6.      No friction rub. No gallop.      Comments: Bilateral trace lower extremity edema.  Pulmonary:      Effort: Pulmonary effort is normal. No respiratory distress.      Breath sounds: Rales present. No wheezing or rhonchi.      Comments: Faint bibasilar crackles appreciated.   Chest:      Comments: Well healed sternotomy scar.  Abdominal:      General: Bowel sounds are normal. There is no distension.      Palpations: Abdomen is soft. There is no mass.      Tenderness: There is no abdominal tenderness.   Genitourinary:     Comments: Argueta catheter in place.  Musculoskeletal:      Cervical back: Neck supple.      Right lower leg: Edema present.      Left lower leg: Edema present.   Skin:     General: Skin is  warm and dry.      Coloration: Skin is not jaundiced.   Neurological:      General: No focal deficit present.      Mental Status: He is alert. Mental status is at baseline.      Cranial Nerves: No cranial nerve deficit.      Motor: No weakness.   Psychiatric:         Mood and Affect: Mood normal.         Behavior: Behavior normal. Behavior is cooperative.          Significant Labs:  ABGs:   Recent Labs   Lab 08/23/23  0547   PH 7.335*   PCO2 46.3*   HCO3 24.7   POCSATURATED 98   BE -1       BMP:   Recent Labs   Lab 08/23/23  0351   *   *   K 4.2   CL 93*   CO2 19*   BUN 73*   CREATININE 4.8*   CALCIUM 8.8   MG 2.9*       CBC:   Recent Labs   Lab 08/23/23 0351   WBC 12.20   RBC 2.67*   HGB 7.8*   HCT 23.8*   *   MCV 89   MCH 29.2   MCHC 32.8       CMP:   Recent Labs   Lab 08/23/23  0351   *   CALCIUM 8.8   ALBUMIN 2.7*   PROT 6.4   *   K 4.2   CO2 19*   CL 93*   BUN 73*   CREATININE 4.8*   ALKPHOS 117   ALT 2,135*   AST 2,906*   BILITOT 2.0*       Coagulation:   Recent Labs   Lab 08/23/23 0351   INR 1.2       LFTs:   Recent Labs   Lab 08/23/23 0351   ALT 2,135*   AST 2,906*   ALKPHOS 117   BILITOT 2.0*   PROT 6.4   ALBUMIN 2.7*       Microbiology Results (last 7 days)       Procedure Component Value Units Date/Time    Blood culture [[id number]] Collected: 08/13/23 0920    Order Status: Completed Specimen: Blood from Peripheral, Antecubital, Left Updated: 08/18/23 1012     Blood Culture, Routine No growth after 5 days.    Blood culture [[id number]] Collected: 08/12/23 0103    Order Status: Completed Specimen: Blood from Peripheral, Hand, Left Updated: 08/17/23 0612     Blood Culture, Routine No growth after 5 days.          Specimen (24h ago, onward)      None          Urinary sediment on 08/21/2023:                  Significant Imaging:  I have reviewed all imagining in the last 24 hours.    Assessment/Plan:     Cardiac/Vascular  * Ischemic cardiomyopathy  - admitted for NSTEMI s/p  CABG w/ PCI of the proximal circumflex with a 33 x 24 mm Synergy XD stent (post-dilated up to 4.25 mm) on 8/12/2023   - management per primary team      Cardiogenic shock  Acute on chronic combined systolic and diastolic heart failure  Most recent echocardiogram on 08/17/2023:    Left Ventricle: The left ventricle is severely dilated. Ventricular mass is normal. Normal wall thickness. regional wall motion abnormalities present. See diagram for wall motion findings. There is severely reduced systolic function with a visually estimated ejection fraction of 15 - 20%. Grade III diastolic dysfunction.    Left Atrium: Left atrium is moderately dilated.    Right Ventricle: Right ventricle was not well visualized due to poor acoustic window.    Mitral Valve: There is mild regurgitation.    Tricuspid Valve: There is mild regurgitation.    Pulmonary Artery: The estimated pulmonary artery systolic pressure is 41 mmHg.    IVC/SVC: Normal venous pressure at 3 mmHg.    - management per primary team  - currently on dobutamine infusion  - Palliative Medicine consulted and following as he is not candidate for heart transplant       Renal/  Acute renal failure with acute tubular necrosis superimposed on stage 3b chronic kidney disease  Ischemic ATN 2/2 decrease perfusion (severe hypotension d/t cardiogenic shock), contrast exposure, combined with aggressive diuresis in a patient with known advanced CKD  Baseline sCr: appears to be around ~2.  Admission sCr: 2.5 at [facility]  Lab Results   Component Value Date    CREATININE 5.4 (H) 08/23/2023    CREATININE 5.3 (H) 08/23/2023    CREATININE 4.8 (H) 08/23/2023     Last UPCR:   Prot/Creat Ratio, Urine   Date Value Ref Range Status   08/19/2023 1.35 (H) 0.00 - 0.20 Final       Plan/Recommendations:   - YVES secondary to acute tubular necrosis in the setting of low effective circulatory volume with known heart failure/shock  - renal function declining, plan for initiation of  RRT with [facility] today for metabolic clearance and volume management   - renal diet when not NPO, volume restriction per primary team  - keep MAP > 65 mmHg  - Q8H RFPs and magnesium levels per [facility] protocol  - daily weights and strict I/Os  - renally dose medications to eGFR  - avoid nephrotoxins as much as possible (i.e. supra-therapeutic vancomycin troughs, NSAIDs, intra-arterial contrast, etc.)    Endocrine  DM (diabetes mellitus)  Lab Results   Component Value Date    HGBA1C 10.2 (H) 08/10/2023     - management per primary team    Thank you for your consult. I will follow-up with patient. Please contact us if you have any additional questions.    Cal Gaines MD  Nephrology  [facility] - Cardiac Intensive Care

## 2023-08-23 NOTE — ASSESSMENT & PLAN NOTE
Titrate insulin slowly to avoid hypoglycemia as the risk of hypoglycemia increases with decreased creatinine clearance.    Estimated Creatinine Clearance: 22.2 mL/min (A) (based on SCr of 5.4 mg/dL (H)).

## 2023-08-23 NOTE — PROGRESS NOTES
[facility] - Cardiac Intensive Care  Neurology  Progress Note    Patient Name: Itz Daniel  MRN: [id number]  Admission Date: 8/11/2023  Hospital Length of Stay: 12 days  Code Status: Partial Code   Attending Provider: Aracelis Cuellar DO  Primary Care Physician: Sunday Boo MD   Principal Problem:Ischemic cardiomyopathy    HPI:   54 yo. PMHx CKD III/IV, DM, HTN, CABG (2017), AICD placement, CAD. HFrEF, s/p right foot amputation (diabetic foot infection 2022), obesity. Presented to [facility] on 8/10 with nausea, vomiting, weakness and SOB. Treated for cardiogenic shock in the setting of NSTEMI (trops 24.8, lactic acid 2.7, , glucose 487, cr 2.5. On 8/21 Neurology was consulted for seizure-like activity. Patient was lying in bed in the morning of 8/21 and tried to sit down following and episode of cough and SOB. Suddenly had a course of vasovagal response. Nurse at the bedside witnessed generalized tonic-clonic movements on bilateral upper extremities (first episode for 5 minutes at 8am; second episode for 10 seconds at 9.30am). Associated loss of consciousness with lateral tongue biting. Patient does not recall event and refers this is the first episode in his life. No bowel or bladder movement (on [place]). CT head at the time of episode without acute process.           Overview/Hospital Course:  EEG with highly stereotyped episodes (3 episodes) and added to the patient's history of previous right frontal brain infarct is highly concerning of focal seizure. On YVES in the setting of CKD, which may have decreased the patient's seizure threshold. Current GFR 15.2.  Will continue to monitor neurological status. On close renal function monitoring. Persistent electrolyte imbalance with 2 new witnessed episodes of seizure activity less than 30 seconds. Increased LFTs in the range above 2000s.           Subjective:     Interval History: On close renal function monitoring. Persistent electrolyte  imbalance with 2 new witnessed episodes of seizure activity less than 30 seconds. Increased LFTs in the range above 2000s.     Current Neurological Medications:   Keppra     Current Facility-Administered Medications   Medication Dose Route Frequency Provider Last Rate Last Admin    0.9%  NaCl infusion (CRRT USE ONLY)   Intravenous Continuous Cal Gaines MD        0.9%  NaCl infusion   Intravenous Continuous Aracelis Cuellar DO   Stopped at 08/23/23 1429    acetaminophen tablet 650 mg  650 mg Oral Q6H PRN Fransisco Ivan MD   650 mg at 08/20/23 1959    amiodarone 360 mg/200 mL (1.8 mg/mL) infusion  1 mg/min Intravenous Continuous Nadia Barragan MD 33.3 mL/hr at 08/23/23 1505 1 mg/min at 08/23/23 1505    amiodarone in dextrose 150 mg/100 mL (1.5 mg/mL) loading dose 150 mg  150 mg Intravenous Once Paul Chen MD   Paused at 08/22/23 1203    aspirin EC tablet 81 mg  81 mg Oral Daily Fransisco Ivan MD   81 mg at 08/23/23 0906    atorvastatin tablet 80 mg  80 mg Oral Daily Fransisco Ivan MD   80 mg at 08/23/23 0905    dextrose 10% bolus 125 mL 125 mL  12.5 g Intravenous PRN Héctor Vines DNP, TARSHA        dextrose 10% bolus 250 mL 250 mL  25 g Intravenous PRN Héctor Vines DNP, TARSHA        DOBUtamine 1000 mg in D5W 250 mL infusion  5 mcg/kg/min Intravenous Continuous Paul Chen MD 4.4 mL/hr at 08/23/23 1505 2.5 mcg/kg/min at 08/23/23 1505    enoxaparin injection 30 mg  30 mg Subcutaneous Q24H (prophylaxis, 1700) Eric Moncada MD        furosemide (LASIX) 500 mg in 50 mL infusion (conc: 10 mg/mL)  40 mg/hr Intravenous Continuous Fransisco Ivan MD 4 mL/hr at 08/23/23 1505 40 mg/hr at 08/23/23 1505    glucagon (human recombinant) injection 1 mg  1 mg Intramuscular PRN Héctor Vines DNP, FNP        glucose chewable tablet 16 g  16 g Oral PRHéctor Beckwith DNP, FNP        glucose chewable tablet 24 g  24 g Oral PRN Majeste,  Héctor ALLAN DNP, FNP        insulin aspart U-100 pen 0-10 Units  0-10 Units Subcutaneous PRN Héctor Vines DNP, KATHLEENP   2 Units at 08/23/23 1221    insulin aspart U-100 pen 6-12 Units  6-12 Units Subcutaneous TIDWM Héctor Vines DNP, FNP   6 Units at 08/21/23 1254    insulin regular in 0.9 % NaCl 100 unit/100 mL (1 unit/mL) infusion  2.8 Units/hr Intravenous Continuous Kori Paez NP 2.8 mL/hr at 08/23/23 1505 2.8 Units/hr at 08/23/23 1505    levETIRAcetam injection 500 mg  500 mg Intravenous Q12H Fransisco Ivan MD   500 mg at 08/23/23 0905    levothyroxine tablet 125 mcg  125 mcg Oral Before breakfast Fransisco Ivan MD   125 mcg at 08/21/23 0621    LIDOcaine (PF) 10 mg/ml (1%) injection 100 mg  10 mL Other Once Fransisco Ivan MD        LIDOcaine 2000 mg in D5W 250 mL infusion  1 mg/min Intravenous Continuous Fransisco Ivan MD 7.5 mL/hr at 08/23/23 1505 1 mg/min at 08/23/23 1505    LIDOcaine 5 % patch 1 patch  1 patch Transdermal Q24H Fransisco Ivan MD   1 patch at 08/11/23 2226    magnesium sulfate 2g in water 50mL IVPB (premix)  2 g Intravenous PRN Cal Gaines MD        methocarbamoL tablet 500 mg  500 mg Oral Q6H PRN Fransisco Ivan MD   500 mg at 08/13/23 1646    nitric oxide gas Gas 10 ppm  10 ppm Inhalation Continuous Mariajose Perales MD   10 ppm at 08/21/23 0432    NORepinephrine 32 mg in dextrose 5 % (D5W) 250 mL infusion  0-3 mcg/kg/min Intravenous Continuous Fransisco Ivan MD 9.4 mL/hr at 08/23/23 1505 0.16 mcg/kg/min at 08/23/23 1505    ondansetron injection 4 mg  4 mg Intravenous Q6H PRN Paul Chen MD        oxyCODONE immediate release tablet 5 mg  5 mg Oral Q8H PRN Moncho MD Fransisco   5 mg at 08/21/23 2145    pantoprazole injection 40 mg  40 mg Intravenous Daily Paul Chen MD   40 mg at 08/23/23 0905    polyethylene glycol packet 17 g  17 g Oral TID , MD Fransisco   17 g at 08/21/23 1520    senna tablet 8.6 mg  8.6 mg Oral  BID Fransisco Ivan MD   8.6 mg at 08/21/23 2032    sodium chloride 0.9% flush 10 mL  10 mL Intravenous PRN Fransisco Ivan MD        sodium chloride 0.9% flush 10 mL  10 mL Intravenous Continuous Aracelis Cuellar DO        sodium chloride 0.9% flush 3 mL  3 mL Intravenous Q6H PRN Star Olmos MD        sodium phosphate 20.01 mmol in dextrose 5 % (D5W) 250 mL IVPB  20.01 mmol Intravenous PRN Cal Gaines MD        sodium phosphate 30 mmol in dextrose 5 % (D5W) 250 mL IVPB  30 mmol Intravenous PRN Cal Gaines MD        sodium phosphate 39.99 mmol in dextrose 5 % (D5W) 250 mL IVPB  39.99 mmol Intravenous PRN Cal Gaines MD        ticagrelor tablet 90 mg  90 mg Oral BID Fransisco Ivan MD   90 mg at 08/23/23 0906    vasopressin (PITRESSIN) 0.2 Units/mL in dextrose 5 % (D5W) 100 mL infusion  0.04 Units/min Intravenous Continuous Paul Chen MD 12 mL/hr at 08/23/23 1505 0.04 Units/min at 08/23/23 1505    vasopressin (PITRESSIN) 20 unit/mL injection                Review of Systems   Constitutional:  Positive for activity change and fatigue. Negative for appetite change.   HENT: Negative.     Eyes: Negative.  Negative for photophobia.   Respiratory:  Positive for cough and shortness of breath.    Cardiovascular: Negative.  Negative for palpitations.   Gastrointestinal: Negative.    Genitourinary: Negative.    Musculoskeletal: Negative.    Skin: Negative.    Neurological:  Positive for seizures and weakness. Negative for light-headedness.   Psychiatric/Behavioral:  Negative for dysphoric mood and sleep disturbance.      Objective:     Vital Signs (Most Recent):  Temp: 98.6 °F (37 °C) (08/23/23 1105)  Pulse: 82 (08/23/23 1617)  Resp: (!) 27 (08/23/23 1617)  BP: (!) 80/53 (08/23/23 1205)  SpO2: 98 % (08/23/23 1617) Vital Signs (24h Range):  Temp:  [98.4 °F (36.9 °C)-98.7 °F (37.1 °C)] 98.6 °F (37 °C)  Pulse:  [] 82  Resp:  [9-33] 27  SpO2:  [75 %-100 %] 98 %  BP:  ()/(51-75) 80/53  Arterial Line BP: ()/(41-61) 87/48     Weight: 125.2 kg (276 lb)  Body mass index is 35.44 kg/m².     Physical Exam  Vitals and nursing note reviewed.   HENT:      Head: Normocephalic.   Eyes:      Pupils: Pupils are equal, round, and reactive to light.   Cardiovascular:      Rate and Rhythm: Normal rate and regular rhythm.   Pulmonary:      Effort: Pulmonary effort is normal.   Abdominal:      Palpations: Abdomen is soft.   Musculoskeletal:      Cervical back: Normal range of motion.   Skin:     General: Skin is warm.      Capillary Refill: Capillary refill takes 2 to 3 seconds.   Neurological:      Mental Status: He is alert and oriented to person, place, and time.      Cranial Nerves: Cranial nerves 2-12 are intact.      Coordination: Finger-Nose-Finger Test and Heel to Shin Test normal.      Deep Tendon Reflexes:      Reflex Scores:       Bicep reflexes are 2+ on the right side and 2+ on the left side.       Patellar reflexes are 2+ on the right side and 2+ on the left side.       Achilles reflexes are 2+ on the right side and 2+ on the left side.     Comments: See Neurological Exam.           NEUROLOGICAL EXAMINATION:     MENTAL STATUS   Oriented to person, place, and time.     CRANIAL NERVES   Cranial nerves II through XII intact.     CN III, IV, VI   Pupils are equal, round, and reactive to light.    MOTOR EXAM     Strength   Right deltoid: 2/5  Left deltoid: 2/5  Right biceps: 2/5  Left biceps: 2/5  Right triceps: 2/5  Left triceps: 2/5  Right iliopsoas: 2/5  Left iliopsoas: 2/5  Right quadriceps: 2/5  Left quadriceps: 2/5  Right hamstrin/5  Left hamstrin/5  Right gastroc: 2/5  Left gastroc: 2/5    REFLEXES     Reflexes   Right biceps: 2+  Left biceps: 2+  Right patellar: 2+  Left patellar: 2+  Right achilles: 2+  Left achilles: 2+    SENSORY EXAM   Light touch normal.   Vibration normal.     GAIT AND COORDINATION      Coordination   Finger to nose coordination:  normal  Heel to shin coordination: normal      Significant Labs: All pertinent lab results from the past 24 hours have been reviewed.    Significant Imaging: No new neuroimaging in the past 24 hours.     Assessment and Plan:     Tonic-clonic generalized seizure  No acute processes on brain imaging. Currently on pressors. Persistent tonic clonic movements on bilateral upper extremities with loss of consciousness. Primary Team administered keppra. At the moment without medications related to decrease seizure threshold. Current course possibly related to acute electrolyte imbalance and YVES in the setting of CKD (GFR 15.2). EEG with highly stereotyped episodes (3 episodes) and added to the patient's history of previous right frontal brain infarct highly concerning of focal seizure.    Plan:    - Continue keppra 500mg BID per primary team. Monitor renal function as this is max dose based on cr clearance     - Recommend adding topamax 25mg QD per primary team. Patient with persistent focal seizures and worsening LFTs (transaminitis). Maxed out on his LEV based on renal function. However, patient and family currently considering palliative care process.  - Seizure diagnosis and medications discussed with patient and in agreement, including side effects (sedation).   - Correct YVES in the setting of CKD for seizure threshold  - Follow up renal function   - Optimize medications   - Optimize electrolytes (Mg, PO)  - Monitor glucose levels         VTE Risk Mitigation (From admission, onward)         Ordered     enoxaparin injection 30 mg  Every 24 hours         08/23/23 0909     IP VTE HIGH RISK PATIENT  Once         08/11/23 2117     Place sequential compression device  Until discontinued         08/11/23 2117                Itz Vallejo MD  Neurology  [facility] - Cardiac Intensive Care

## 2023-08-23 NOTE — PROGRESS NOTES
08/23/23 1755   Treatment   Treatment Type SLED   Treatment Status New start   Dialysis Machine Number [id number]   Dialyzer Time (hours) 0   BVP (Liters) 0 L   Solutions Labeled and Current  Yes   Access Temporary Cath;Right;IJ   Catheter Dressing Intact  Yes   Alarms Engaged Yes   CRRT Comments CRRT New start   Prescription   Time (Hours) Other  (24)   Dialysate K + (mEq/L) 4   Dialysate CA + (mEq/L) 2.25   Dialysate HCO3 - (Bicarb) (mEq/L) 30   Dialysate Na + (mEq/L) 140   Cartridge Type Other  (R300)   Dialysate Flow Rate (mL/min) 200   UF Goal Rate 450 mL/hr     CRRT new start. UF rate at 200 ml/hr with goal rate 450 ml/hr. Primary nurse will titrate as tolerated. B/P 100/58, HR 79. Report given to primary nurse at bedside. Pt awake, alert, family at bedside.

## 2023-08-23 NOTE — PROGRESS NOTES
[facility] - Cardiac Intensive Care  Palliative Medicine  Progress Note    Patient Name: Itz Daniel  MRN: [id number]  Admission Date: 8/11/2023  Hospital Length of Stay: 12 days  Code Status: Full Code   Attending Provider: Aracelis Cuellar DO  Consulting Provider: NINA Lopez  Primary Care Physician: Sunday Boo MD  Principal Problem:Ischemic cardiomyopathy    Patient information was obtained from patient, relative(s) and primary team.      Assessment/Plan:     Palliative Care  Encounter for palliative care  Mr. Daniel is a 52 y/o M with ischemic cardiomyopathy s/p CABG transferred from outside hospital with NSTEMI s/p revascularization to proximal circumflex. He had associated cardiogenic shock noted from right heart catheterization. CP Impella placed and now removed. Cardiothoracic surgery and interventional cardiology consulted. Nephrology also consulted for YVES on CKD. Palliative medicine consulted for GOC.     Ischemic cardiomyopathy/cardiogenic shock/YVES/CKD/other medical problems  - plan per primary team and other specialty consultants    ACP/GOC:  Spoke to Dr. Roach. Per MD, pt made DNR per his wishes.  Per MD, plan is dialysis today. Per MD, pt made aware of poor prognosis.     Met with pt and pt's family. Pt's father in route terence visit pt. Per pt, he wants to continue medical management at this time but had questions about comfort care if/when he is ready to transition. Comfort care discussed. Pt had questions about medications for comfort care. Questions answered. Pt's family was tearful. Pt and family aware of poor prognosis.      has visited pt.     8/22/23: Dr. Ledesma and this JEFERSON met with pt. MD updated pt on plan of care. Pt's goal is to continue medical management.     Code status discussed with pt. At this time, pt wants to be full code.  Per pt, if he is on life support and not getting better he would NOT want to be kept alive on life support.     MPOA  paperwork completed with pt per BEATRIZ Horta today.  Pt's father made MPOA. Dr. Ledesma to update pt's father on pt's status per pt's wishes.     Pal care will continue to follow.     8/20/23  Advance Care Planning   - patient decisional  - patient's son and son's friend at bedside  - introduced palliative medicine team to patient today  - goals: life prolonging  - when engaging patient today about his current condition, patient states that they are unable to get the pressures in his heart down due to kidneys not working. He states that they are trying to get the kidneys to work so that the pressures come down. He further stated he has not heard from all the teams yet about what options he has available. He wants to hear from all the teams prior to further discussions. He states it is important to him to live so he can be with his three children: ages 21, 19, and 17. He reports enjoying fishing and watching sports (especially baseball and football).   - when discussing decision makers, he verbally stated he would want his father, Ozzie Daniel, to be his surrogate decision maker. ACP booklet brought to bedside so that patient may fill out HCPOA.   - code status: full  - will follow up tomorrow for further GOC discussions.        Above plan of care discussed with patient's primary team including attending of record, Dr. Mackenzie        I will follow-up with patient. Please contact us if you have any additional questions.    Subjective:     Chief Complaint: No chief complaint on file.      HPI:   Mr. Daniel is a 52 y/o M with ischemic cardiomyopathy s/p CABG transferred from outside hospital with NSTEMI s/p revascularization to proximal circumflex. He had associated cardiogenic shock noted from right heart catheterization. CP Impella placed and now removed. Cardiothoracic surgery and interventional cardiology consulted. Nephrology also consulted for YVES on CKD. Palliative medicine consulted for GOC.       [facility]  Course:  No notes on file    Interval History:   Past Medical History:   Diagnosis Date    CKD stage 4, GFR 15-29 ml/min     HLD (hyperlipidemia)     Hypertension     Ischemic cardiomyopathy/Chronic HFrEF s/p CABG and AICD 2017    T2DM (type 2 diabetes mellitus)        Past Surgical History:   Procedure Laterality Date    CORONARY ARTERY BYPASS GRAFT  2017    3 vessel    CORONARY BYPASS GRAFT ANGIOGRAPHY  8/11/2023    Procedure: Bypass graft study;  Surgeon: Michele Hirsch MD;  Location: [facility] CATH LAB;  Service: Cardiology;;    IMPELLA, REMOVAL Right 8/15/2023    Procedure: Impella, Removal;  Surgeon: Colin Sibley MD;  Location: [facility] CATH LAB;  Service: Cardiology;  Laterality: Right;    INSERTION OF INTRAVASCULAR MICROAXIAL BLOOD PUMP N/A 8/11/2023    Procedure: INSERTION, IMPELLA;  Surgeon: Michele Hirsch MD;  Location: [facility] CATH LAB;  Service: Cardiology;  Laterality: N/A;    IVUS, CORONARY  8/11/2023    Procedure: IVUS, Coronary;  Surgeon: Michele Hirsch MD;  Location: [facility] CATH LAB;  Service: Cardiology;;    LEFT HEART CATHETERIZATION N/A 8/11/2023    Procedure: Left heart cath;  Surgeon: Michele Hirsch MD;  Location: [facility] CATH LAB;  Service: Cardiology;  Laterality: N/A;    PERCUTANEOUS CORONARY INTERVENTION, ARTERY N/A 8/11/2023    Procedure: Percutaneous coronary intervention;  Surgeon: Michele Hirsch MD;  Location: [facility] CATH LAB;  Service: Cardiology;  Laterality: N/A;    PLACEMENT OF SWAN MARLENI CATHETER WITH IMAGING GUIDANCE Left 8/15/2023    Procedure: INSERTION, CATHETER, SWAN-MARLENI, WITH IMAGING GUIDANCE;  Surgeon: Colin Sibley MD;  Location: [facility] CATH LAB;  Service: Cardiology;  Laterality: Left;    REMOVAL OF TUNNELED CENTRAL VENOUS CATHETER (CVC) N/A 8/15/2023    Procedure: REMOVAL, CATHETER, CENTRAL VENOUS, TUNNELED;  Surgeon: Colin Sibley MD;  Location: [facility] CATH LAB;  Service: Cardiology;  Laterality: N/A;    RIGHT HEART CATHETERIZATION Right  8/11/2023    Procedure: INSERTION, CATHETER, RIGHT HEART;  Surgeon: Michele Hirsch MD;  Location: [facility] CATH LAB;  Service: Cardiology;  Laterality: Right;       Review of patient's allergies indicates:  No Known Allergies    Medications:  Continuous Infusions:   sodium chloride 0.9%      sodium chloride 0.9% Stopped (08/23/23 1139)    amiodarone in dextrose 5% 1 mg/min (08/23/23 1219)    DOBUTamine IV infusion (non-titrating) 2.5 mcg/kg/min (08/23/23 1205)    furosemide (LASIX) 500 mg in 50 mL infusion (conc: 10 mg/mL) 40 mg/hr (08/23/23 1205)    insulin regular 1 units/mL infusion orderable (TRANSFER) 2.8 Units/hr (08/23/23 1205)    LIDOcaine 1 mg/min (08/23/23 1205)    nitric oxide gas      NORepinephrine bitartrate-D5W 0.18 mcg/kg/min (08/23/23 1205)    sodium chloride 0.9%      vasopressin 0.04 Units/min (08/23/23 1205)     Scheduled Meds:   amiodarone in dextrose  150 mg Intravenous Once    aspirin  81 mg Oral Daily    atorvastatin  80 mg Oral Daily    enoxparin  30 mg Subcutaneous Q24H (prophylaxis, 1700)    insulin aspart U-100  6-12 Units Subcutaneous TIDWM    levETIRAcetam (Keppra) IV (PEDS and ADULTS)  500 mg Intravenous Q12H    levothyroxine  125 mcg Oral Before breakfast    LIDOcaine (PF) 10 mg/ml (1%)  10 mL Other Once    LIDOcaine  1 patch Transdermal Q24H    pantoprazole  40 mg Intravenous Daily    polyethylene glycol  17 g Oral TID    senna  8.6 mg Oral BID    sevelamer carbonate  800 mg Oral TID WM    ticagrelor  90 mg Oral BID    vasopressin         PRN Meds:acetaminophen, dextrose 10%, dextrose 10%, glucagon (human recombinant), glucose, glucose, insulin aspart U-100, magnesium sulfate IVPB, methocarbamoL, ondansetron, oxyCODONE, sodium chloride 0.9%, sodium chloride 0.9%, sodium phosphate 20.01 mmol in dextrose 5 % (D5W) 250 mL IVPB, sodium phosphate 30 mmol in dextrose 5 % (D5W) 250 mL IVPB, sodium phosphate 39.99 mmol in dextrose 5 % (D5W) 250 mL IVPB,  vasopressin    Family History       Problem Relation (Age of Onset)    Hypertension Mother          Tobacco Use    Smoking status: Former     Current packs/day: 0.00     Types: Cigarettes    Smokeless tobacco: Never   Substance and Sexual Activity    Alcohol use: Yes    Drug use: No    Sexual activity: Not on file       Review of Systems   Constitutional:  Positive for activity change and fatigue. Negative for appetite change.   HENT: Negative.     Eyes: Negative.    Respiratory: Negative.  Negative for shortness of breath.    Cardiovascular: Negative.    Gastrointestinal: Negative.    Genitourinary: Negative.    Musculoskeletal: Negative.    Skin: Negative.    Neurological: Negative.    Psychiatric/Behavioral:  Positive for dysphoric mood and sleep disturbance.    All other systems reviewed and are negative.    Objective:     Vital Signs (Most Recent):  Temp: 98.6 °F (37 °C) (08/23/23 1105)  Pulse: 80 (08/23/23 1220)  Resp: 19 (08/23/23 1220)  BP: (!) 80/53 (08/23/23 1205)  SpO2: 100 % (08/23/23 1220) Vital Signs (24h Range):  Temp:  [97.7 °F (36.5 °C)-98.7 °F (37.1 °C)] 98.6 °F (37 °C)  Pulse:  [] 80  Resp:  [9-33] 19  SpO2:  [75 %-100 %] 100 %  BP: ()/(51-75) 80/53  Arterial Line BP: ()/(47-61) 124/58     Weight: 125.2 kg (276 lb)  Body mass index is 35.44 kg/m².       Physical Exam  Vitals and nursing note reviewed.   Constitutional:       General: He is not in acute distress.     Appearance: He is not toxic-appearing or diaphoretic.   HENT:      Head: Normocephalic and atraumatic.      Right Ear: External ear normal.      Left Ear: External ear normal.      Nose: Nose normal.      Mouth/Throat:      Mouth: Mucous membranes are moist.   Eyes:      General: No scleral icterus.        Right eye: No discharge.         Left eye: No discharge.      Extraocular Movements: Extraocular movements intact.   Neck:      Comments: Line noted in left side neck today  Cardiovascular:      Rate and  Rhythm: Tachycardia present.   Pulmonary:      Effort: Pulmonary effort is normal. No respiratory distress.   Abdominal:      General: There is no distension.      Tenderness: There is no abdominal tenderness.   Musculoskeletal:         General: No deformity or signs of injury.      Cervical back: Normal range of motion.   Skin:     Coloration: Skin is not jaundiced.      Findings: No bruising or rash.   Neurological:      General: No focal deficit present.      Mental Status: He is alert and oriented to person, place, and time.      Cranial Nerves: No cranial nerve deficit.   Psychiatric:         Mood and Affect: Mood is depressed. Affect is flat.         Thought Content: Thought content normal.         Judgment: Judgment normal.            Review of Symptoms      Symptom Assessment (ESAS 0-10 Scale)  Pain:  0  Dyspnea:  0  Anxiety:  0  Nausea:  0  Depression:  0  Anorexia:  0  Fatigue:  0  Insomnia:  0  Restlessness:  0  Agitation:  0     CAM / Delirium:  Negative  Constipation:  Negative  Diarrhea:  Negative      Bowel Management Plan (BMP):  No      Pain Assessment:  OME in 24 hours:  0  Location(s): none      Modified Kandi Scale:  0 (On NC)    Performance Status:  70    Living Arrangements:  Lives with family    Psychosocial/Cultural:   See Palliative Psychosocial Note: No  Social Issues Identified: Coping deficit pt/family, New Diagnosis/Trauma, and Minor Children in home  Bereavement Risk: No  Caregiver Needs Discussed. Caregiver Distress: No:   Cultural: Patient enjoys fishing and watching sports (baseball and football). Patient has three children - 2 boys and 1 girl. The children ages are 21, 19, and 17.   **Primary  to Follow**  Palliative Care  Consult: No        Advance Care Planning  Advance Directives:   Living Will: No    LaPOST: No    Do Not Resuscitate Status: No    Medical Power of : No        Oral Declaration: Yes  Agent's Name:  Ozzie Daniel   Agent's Contact  "Number:  [phone]    Decision Making:  Patient answered questions  Goals of Care: What is most important right now is to focus on remaining as independent as possible, extending life as long as possible, even it it means sacrificing quality. Accordingly, we have decided that the best plan to meet the patient's goals includes continuing with treatment.         Significant Labs: All pertinent labs within the past 24 hours have been reviewed.  CBC:   Recent Labs   Lab 08/23/23  0351 08/23/23  0759 08/23/23  1045   WBC 12.20  --   --    HGB 7.8*  --   --    HCT 23.8*   < > 25*   MCV 89  --   --    *  --   --     < > = values in this interval not displayed.       BMP:  Recent Labs   Lab 08/23/23  0351 08/23/23 0757 08/23/23  1034   *   < > 163*   *   < > 128*   K 4.2   < > 4.4   CL 93*   < > 91*   CO2 19*   < > 20*   BUN 73*   < > 82*   CREATININE 4.8*   < > 5.4*   CALCIUM 8.8   < > 8.7   MG 2.9*  --   --     < > = values in this interval not displayed.       LFT:  Lab Results   Component Value Date    AST 3,853 (H) 08/23/2023    ALKPHOS 133 08/23/2023    BILITOT 2.1 (H) 08/23/2023     Albumin:   Albumin   Date Value Ref Range Status   08/23/2023 2.7 (L) 3.5 - 5.2 g/dL Final     Protein:   Total Protein   Date Value Ref Range Status   08/23/2023 6.4 6.0 - 8.4 g/dL Final     Lactic acid:   Lab Results   Component Value Date    LACTATE 1.9 08/22/2023    LACTATE 1.2 08/22/2023       Significant Imaging: I have reviewed all pertinent imaging results/findings within the past 24 hours.    08/18/2023 CT C/A/P: "1. No evidence for hematoma as clinically questioned, noting noncontrast technique. 2. Small bilateral pleural effusions.  Bilateral pulmonary edema. 3. Intraluminal fluid in the esophagus to the level of the aortic arch.  Consider correlation for aspiration risk. 4. Bilateral pulmonary nodules, largest measuring 9 mm.  For multiple solid nodules with any 6 mm or greater, Fleischner Society " "guidelines recommend follow up with non-contrast chest CT at 3-6 months and 18-24 months after discovery. 5. Gallbladder sludge and mild gallbladder distension, nonspecific. 6. Additional findings as above."      > 50% of 55  min visit spent in chart review, face to face discussion of goals of care, charting, discussion with teams,  symptom assessment, coordination of care and emotional support    Alessia Barker, CNS  Palliative Medicine  [facility] - Cardiac Intensive Care              "

## 2023-08-23 NOTE — NURSING
Notified Paul Chen MD of pt with nausea/vomiting along with hypotension and reported SOB, MAP in the 50s. Zofran, Vasopressin ordered per eMAR. Resp status stable on 4L NC, sats >95%. No increased WOB, slightly tachypneic.

## 2023-08-23 NOTE — PROGRESS NOTES
"[facility] - Cardiac Intensive Care  Endocrinology  Progress Note    Admit Date: 8/11/2023     Reason for Consult: Management of T2DM, Hyperglycemia     Diabetes diagnosis year: >20 years ago    Home Diabetes Medications:  Farxiga 10 mg QD; Lantus 50 units; Novolog 20 units    How often checking glucose at home?  Dexcom    BG readings on regimen: mid to upper 100s  Hypoglycemia on the regimen?  No  Missed doses on regimen?  No    Diabetes Complications include:     Hyperglycemia    Complicating diabetes co morbidities:   Cardiogenic shock; HTN; HLD      HPI: 52-year-old male with a past medical history of ICM, CABG in 2017 (LIMA to LAD, SVG to OM1, SVG to PDA), DM type II, CKD stage IV (baseline 1.8), and ICD who presented [place] ED on 8/10//23 due to n/v and SOB. Work up concerning for NSTEMI with peak trop of 38. Started on ACS protocol with asa, ticagrelor, and heparin gtt. Also noted to have YVES with Cr 2.5, volume overloaded with BNP of 796, LA 2.8>>1.0. TTE with EF drop from 30 to 15%, LVEDD 6.15, moderately reduced RV function. LHC/RHC on 8/12: s/p MIRELLA to prox Cx. RHC showed RA 20, RV 81/21, PA 81/47 (mean 61), PWCP 45, CO/CI: 3.69/1.52 (VIKKI)  3.91/1.6 (TD), therefore, impella CP was placed in in right femoral artery and leave in swan in right femoral vein. [place] [facility] [place]. Endocrine consulted to manage hyperglycemia and type 2 diabetes.     Lab Results   Component Value Date    HGBA1C 10.2 (H) 08/10/2023                 Interval HPI:   Overnight events: Remains in CICU. BG reasonably well controlled on current IV/SQ insulin regimen. Creatinine 5.4. Diet NPO Except for: Medication    Eating:   NPO  Nausea: No  Hypoglycemia and intervention: No  Fever: No  TPN and/or TF: No  If yes, type of TF/TPN and rate: n/a    BP (!) 80/53 (BP Location: Left arm, Patient Position: Lying)   Pulse 80   Temp 98.6 °F (37 °C) (Axillary)   Resp 19   Ht 6' 2" (1.88 m)   Wt 125.2 kg " "(276 lb)   SpO2 100%   BMI 35.44 kg/m²     Labs Reviewed and Include    Recent Labs   Lab 08/23/23  1034   *   CALCIUM 8.7   ALBUMIN 2.7*   PROT 6.4   *   K 4.4   CO2 20*   CL 91*   BUN 82*   CREATININE 5.4*   ALKPHOS 133   ALT 2,614*   AST 3,853*   BILITOT 2.1*     Lab Results   Component Value Date    WBC 12.20 08/23/2023    HGB 7.8 (L) 08/23/2023    HCT 25 (L) 08/23/2023    MCV 89 08/23/2023     (H) 08/23/2023     No results for input(s): "TSH", "FREET4" in the last 168 hours.  Lab Results   Component Value Date    HGBA1C 10.2 (H) 08/10/2023       Nutritional status:   Body mass index is 35.44 kg/m².  Lab Results   Component Value Date    ALBUMIN 2.7 (L) 08/23/2023    ALBUMIN 2.7 (L) 08/23/2023    ALBUMIN 2.7 (L) 08/22/2023     No results found for: "PREALBUMIN"    Estimated Creatinine Clearance: 22.2 mL/min (A) (based on SCr of 5.4 mg/dL (H)).    Accu-Checks  Recent Labs     08/21/23  2218 08/22/23  0252 08/22/23  0834 08/22/23  1158 08/22/23  1742 08/22/23  1944 08/23/23  0027 08/23/23  0358 08/23/23  0757 08/23/23  1034   POCTGLUCOSE 198* 211* 176* 209* 228* 254* 191* 176* 171* 177*       Current Medications and/or Treatments Impacting Glycemic Control  Immunotherapy:    Immunosuppressants       None          Steroids:   Hormones (From admission, onward)      Start     Stop Route Frequency Ordered    08/23/23 1000  vasopressin (PITRESSIN) 0.2 Units/mL in dextrose 5 % (D5W) 100 mL infusion         -- IV Continuous 08/23/23 0945    08/23/23 0939  vasopressin (PITRESSIN) 20 unit/mL injection        Note to Pharmacy: Created by cabinet override    08/23/23 2144   08/23/23 0939          Pressors:    Autonomic Drugs (From admission, onward)      Start     Stop Route Frequency Ordered    08/22/23 1948  NORepinephrine 32 mg in dextrose 5 % (D5W) 250 mL infusion        Question Answer Comment   Begin at (in mcg/kg/min): 0.02    Titrate by: (in mcg/kg/min) 0.02    Titrate interval: (in minutes) 5  "   Titrate to maintain: (MAP or SBP) MAP    Greater than: (in mmHg) 65    Maximum dose: (in mcg/kg/min) 3        -- IV Continuous 08/22/23 1949    08/21/23 2219  succinylcholine (ANECTINE) 20 mg/mL injection        Note to Pharmacy: Created by cabinet override    08/22/23 1029   08/21/23 2219 08/21/23 2219  rocuronium 10 mg/mL injection        Note to Pharmacy: Created by cabinet override    08/22/23 1029   08/21/23 2219          Hyperglycemia/Diabetes Medications:   Antihyperglycemics (From admission, onward)      Start     Stop Route Frequency Ordered    08/22/23 1400  insulin regular in 0.9 % NaCl 100 unit/100 mL (1 unit/mL) infusion        Question:  Enter initial dose (Units/hr):  Answer:  2.8    -- IV Continuous 08/22/23 1345    08/16/23 0815  insulin aspart U-100 pen 6-12 Units         -- SubQ 3 times daily with meals 08/16/23 0813    08/15/23 1003  insulin aspart U-100 pen 0-10 Units         -- SubQ As needed (PRN) 08/15/23 0904            ASSESSMENT and PLAN    Cardiac/Vascular  * Ischemic cardiomyopathy  Managed per primary team  Avoid hypoglycemia        Renal/  CKD (chronic kidney disease), stage IV  Titrate insulin slowly to avoid hypoglycemia as the risk of hypoglycemia increases with decreased creatinine clearance.    Estimated Creatinine Clearance: 22.2 mL/min (A) (based on SCr of 5.4 mg/dL (H)).        Endocrine  Hypothyroidism  Lab Results   Component Value Date    TSH 7.103 (H) 08/10/2023     On Synthroid 125 mcg      DM (diabetes mellitus)  Endocrinology consulted for BG management.   BG goal 140-180     - Continue Transition drip at 2.8 units/hr with step-down parameters.   - Novolog 6-12 units with meals (Administer    6   units if patient eats 25-50% of meal, administer   12    units if patient eats > 50% of meal.) HOLD while NPO  - Novolog (aspart) insulin prn for BG excursions MDC SSI (180/25).  - BG checks q 4 hrs while NPO  - Hypoglycemia protocol in place      ** Please notify  Endocrine for any change and/or advance in diet**  ** Please call Endocrine for any BG related issues **    Discharge Planning:   TBD. Please notify endocrinology prior to discharge.              Kori Paez NP  Endocrinology  [facility] - Cardiac Intensive Care

## 2023-08-23 NOTE — ASSESSMENT & PLAN NOTE
Met with palliative care  GOC discussed and patient continues to be full code with full recovery    - Palliative care following

## 2023-08-23 NOTE — ASSESSMENT & PLAN NOTE
Endocrinology consulted for BG management.   BG goal 140-180     - Continue Transition drip at 2.8 units/hr with step-down parameters.   - Novolog 6-12 units with meals (Administer    6   units if patient eats 25-50% of meal, administer   12    units if patient eats > 50% of meal.) HOLD while NPO  - Novolog (aspart) insulin prn for BG excursions MDC SSI (180/25).  - BG checks q 4 hrs while NPO  - Hypoglycemia protocol in place      ** Please notify Endocrine for any change and/or advance in diet**  ** Please call Endocrine for any BG related issues **    Discharge Planning:   TBD. Please notify endocrinology prior to discharge.

## 2023-08-23 NOTE — CARE UPDATE
HTS Care Update Note    5PM     Hemodynamics    Levo 0.2 vaso 0.04    CVP:  18  SVO2:  40  CO 7.4  CI 2.9  SVR:  511    I/Os    I/O this shift:  In: 1175.2 [P.O.:320; I.V.:806.8; IV Piggyback:48.4]  Out: 164 [Urine:164]      Intake/Output Summary (Last 24 hours) at 8/23/2023 1729  Last data filed at 8/23/2023 1705  Gross per 24 hour   Intake 2039.39 ml   Output 649 ml   Net 1390.39 ml       Net IO Since Admission: -7,292.09 mL [08/23/23 1729]    Continuous Infusions:      sodium chloride 0.9%      sodium chloride 0.9% 5 mL/hr at 08/23/23 1705    amiodarone in dextrose 5% 1 mg/min (08/23/23 1705)    DOBUTamine IV infusion (non-titrating) 2.5 mcg/kg/min (08/23/23 1705)    furosemide (LASIX) 500 mg in 50 mL infusion (conc: 10 mg/mL) 40 mg/hr (08/23/23 1705)    insulin regular 1 units/mL infusion orderable (TRANSFER) 2.8 Units/hr (08/23/23 1712)    LIDOcaine 1 mg/min (08/23/23 1705)    nitric oxide gas      NORepinephrine bitartrate-D5W 0.2 mcg/kg/min (08/23/23 1705)    sodium chloride 0.9%      vasopressin 0.06 Units/min (08/23/23 1714)       Assessment and Plan:  - Right Trialysis line placed and ok to use, contacted HD team  - Pressor requirements are increasing, Vaso increased to 0.06 to decrease levophed (currently 0.2)  - Goals of care updated to DNR and do not intubate  - Patient and family aware of prognosis, will continue medical management  - Plan discussed with attending     Paul Roach MD  Cardiovascular Disease PGY-IV  [facility]

## 2023-08-23 NOTE — PLAN OF CARE
Trialysis line placed for initiation of CRRT. Awaiting start by dialysis nurse. CVP up to 18. Pt with intermittent episodes of nausea today. BP extremely labile during movements such as turning. Primary team aware.

## 2023-08-23 NOTE — ASSESSMENT & PLAN NOTE
Cardiogenic shock    52-year-old male with past medical history of ischemic cardiomyopathy status post CABG in 2017 who presents as a transfer from outside hospital after he presented with an NSTEMI status post revascularization to proximal circumflex.  Associated cardiogenic shock noted from right heart catheterization, CP Impella placed on a 12. 3.4cm from AV to inlet on bedside echo. Since removal of Impella, patient has had elevated PAP and PCWP, and CVP 10. Diuresis improved mildly CVP, but kidney function worsened.  08/22 - Patient had worsening filling pressures with pulmonary edema so diuresis was increased. Kidney dysfunction worsening. Increased Furosemide gtt, given furosemide 80mg bolus ocne, and added chlorothiazide. He also had episode of altered mental status, with sustained VT afterwards for which he was started on amiodarone.   08/23 - Patient continues with low urine output, NSVTs not controlled with amiodarone and lidocaine, and worsening renal and hepatic function consistent with cardiogenic shock. Family was updated on status and will come today to discuss goals of care.      - Furosemide increased to 40mg/h  - Continue amiodarone gtt and Lidocaine gtt  - Restarted  2.5  - Continue Caleb  - HoldHydralazine/Isosorbide and hydralazine 20/25mg TID  - Trend BMP q8 hour   - Palliative following  - TTE below    Results for orders placed during the hospital encounter of 08/11/23    Echo    Interpretation Summary    Left Ventricle: The left ventricle is severely dilated. Ventricular mass is normal. Normal wall thickness. regional wall motion abnormalities present. See diagram for wall motion findings. There is severely reduced systolic function with a visually estimated ejection fraction of 15 - 20%. Grade III diastolic dysfunction.    Left Atrium: Left atrium is moderately dilated.    Right Ventricle: Right ventricle was not well visualized due to poor acoustic window.    Mitral Valve: There is  mild regurgitation.    Tricuspid Valve: There is mild regurgitation.    Pulmonary Artery: The estimated pulmonary artery systolic pressure is 41 mmHg.    IVC/SVC: Normal venous pressure at 3 mmHg.

## 2023-08-23 NOTE — SUBJECTIVE & OBJECTIVE
"Interval HPI:   Overnight events: Remains in CICU. BG reasonably well controlled on current IV/SQ insulin regimen. Creatinine 5.4. Diet NPO Except for: Medication    Eating:   NPO  Nausea: No  Hypoglycemia and intervention: No  Fever: No  TPN and/or TF: No  If yes, type of TF/TPN and rate: n/a    BP (!) 80/53 (BP Location: Left arm, Patient Position: Lying)   Pulse 80   Temp 98.6 °F (37 °C) (Axillary)   Resp 19   Ht 6' 2" (1.88 m)   Wt 125.2 kg (276 lb)   SpO2 100%   BMI 35.44 kg/m²     Labs Reviewed and Include    Recent Labs   Lab 08/23/23  1034   *   CALCIUM 8.7   ALBUMIN 2.7*   PROT 6.4   *   K 4.4   CO2 20*   CL 91*   BUN 82*   CREATININE 5.4*   ALKPHOS 133   ALT 2,614*   AST 3,853*   BILITOT 2.1*     Lab Results   Component Value Date    WBC 12.20 08/23/2023    HGB 7.8 (L) 08/23/2023    HCT 25 (L) 08/23/2023    MCV 89 08/23/2023     (H) 08/23/2023     No results for input(s): "TSH", "FREET4" in the last 168 hours.  Lab Results   Component Value Date    HGBA1C 10.2 (H) 08/10/2023       Nutritional status:   Body mass index is 35.44 kg/m².  Lab Results   Component Value Date    ALBUMIN 2.7 (L) 08/23/2023    ALBUMIN 2.7 (L) 08/23/2023    ALBUMIN 2.7 (L) 08/22/2023     No results found for: "PREALBUMIN"    Estimated Creatinine Clearance: 22.2 mL/min (A) (based on SCr of 5.4 mg/dL (H)).    Accu-Checks  Recent Labs     08/21/23  2218 08/22/23  0252 08/22/23  0834 08/22/23  1158 08/22/23  1742 08/22/23  1944 08/23/23  0027 08/23/23  0358 08/23/23  0757 08/23/23  1034   POCTGLUCOSE 198* 211* 176* 209* 228* 254* 191* 176* 171* 177*       Current Medications and/or Treatments Impacting Glycemic Control  Immunotherapy:    Immunosuppressants       None          Steroids:   Hormones (From admission, onward)      Start     Stop Route Frequency Ordered    08/23/23 1000  vasopressin (PITRESSIN) 0.2 Units/mL in dextrose 5 % (D5W) 100 mL infusion         -- IV Continuous 08/23/23 0945    08/23/23 0939 "  vasopressin (PITRESSIN) 20 unit/mL injection        Note to Pharmacy: Created by [employer] override    08/23/23 2144   08/23/23 0939          Pressors:    Autonomic Drugs (From admission, onward)      Start     Stop Route Frequency Ordered    08/22/23 1948  NORepinephrine 32 mg in dextrose 5 % (D5W) 250 mL infusion        Question Answer Comment   Begin at (in mcg/kg/min): 0.02    Titrate by: (in mcg/kg/min) 0.02    Titrate interval: (in minutes) 5    Titrate to maintain: (MAP or SBP) MAP    Greater than: (in mmHg) 65    Maximum dose: (in mcg/kg/min) 3        -- IV Continuous 08/22/23 1949 08/21/23 2219  succinylcholine (ANECTINE) 20 mg/mL injection        Note to Pharmacy: Created by cabinet override    08/22/23 1029   08/21/23 2219 08/21/23 2219  rocuronium 10 mg/mL injection        Note to Pharmacy: Created by [employer] override    08/22/23 1029   08/21/23 2219          Hyperglycemia/Diabetes Medications:   Antihyperglycemics (From admission, onward)      Start     Stop Route Frequency Ordered    08/22/23 1400  insulin regular in 0.9 % NaCl 100 unit/100 mL (1 unit/mL) infusion        Question:  Enter initial dose (Units/hr):  Answer:  2.8    -- IV Continuous 08/22/23 1345    08/16/23 0815  insulin aspart U-100 pen 6-12 Units         -- SubQ 3 times daily with meals 08/16/23 0813    08/15/23 1003  insulin aspart U-100 pen 0-10 Units         -- SubQ As needed (PRN) 08/15/23 0904

## 2023-08-23 NOTE — SUBJECTIVE & OBJECTIVE
Subjective:     Interval History: On close renal function monitoring. Persistent electrolyte imbalance with 2 new witnessed episodes of seizure activity less than 30 seconds. Increased LFTs in the range above 2000s.     Current Neurological Medications:   Keppra     Current Facility-Administered Medications   Medication Dose Route Frequency Provider Last Rate Last Admin    0.9%  NaCl infusion (CRRT USE ONLY)   Intravenous Continuous Cal Gaines MD        0.9%  NaCl infusion   Intravenous Continuous Aracelis Cuellar DO   Stopped at 08/23/23 1429    acetaminophen tablet 650 mg  650 mg Oral Q6H PRN Fransisco Ivan MD   650 mg at 08/20/23 1959    amiodarone 360 mg/200 mL (1.8 mg/mL) infusion  1 mg/min Intravenous Continuous Nadia Barragan MD 33.3 mL/hr at 08/23/23 1505 1 mg/min at 08/23/23 1505    amiodarone in dextrose 150 mg/100 mL (1.5 mg/mL) loading dose 150 mg  150 mg Intravenous Once Paul Chen MD   Paused at 08/22/23 1203    aspirin EC tablet 81 mg  81 mg Oral Daily Fransisco Ivan MD   81 mg at 08/23/23 0906    atorvastatin tablet 80 mg  80 mg Oral Daily Fransisco Ivan MD   80 mg at 08/23/23 0905    dextrose 10% bolus 125 mL 125 mL  12.5 g Intravenous PRN Héctor Vines DNP, TARSHA        dextrose 10% bolus 250 mL 250 mL  25 g Intravenous PRN Héctor Vines DNP, TARSHA        DOBUtamine 1000 mg in D5W 250 mL infusion  5 mcg/kg/min Intravenous Continuous Paul Chen MD 4.4 mL/hr at 08/23/23 1505 2.5 mcg/kg/min at 08/23/23 1505    enoxaparin injection 30 mg  30 mg Subcutaneous Q24H (prophylaxis, 1700) Eric Moncada MD        furosemide (LASIX) 500 mg in 50 mL infusion (conc: 10 mg/mL)  40 mg/hr Intravenous Continuous Fransisco Ivan MD 4 mL/hr at 08/23/23 1505 40 mg/hr at 08/23/23 1505    glucagon (human recombinant) injection 1 mg  1 mg Intramuscular PRN Héctor Vines, DNP, FNP        glucose chewable tablet 16 g  16 g Oral PRN Mohinder,  Héctor ALLAN DNP, KATHLEENP        glucose chewable tablet 24 g  24 g Oral PRN Héctor Vines DNP, TARSHA        insulin aspart U-100 pen 0-10 Units  0-10 Units Subcutaneous PRN Héctor Vines DNP, FNP   2 Units at 08/23/23 1221    insulin aspart U-100 pen 6-12 Units  6-12 Units Subcutaneous TIDWM Héctor Vines DNP, FNP   6 Units at 08/21/23 1254    insulin regular in 0.9 % NaCl 100 unit/100 mL (1 unit/mL) infusion  2.8 Units/hr Intravenous Continuous Kori Paez NP 2.8 mL/hr at 08/23/23 1505 2.8 Units/hr at 08/23/23 1505    levETIRAcetam injection 500 mg  500 mg Intravenous Q12H Fransisco MD   500 mg at 08/23/23 0905    levothyroxine tablet 125 mcg  125 mcg Oral Before breakfast Fransisco MD   125 mcg at 08/21/23 0621    LIDOcaine (PF) 10 mg/ml (1%) injection 100 mg  10 mL Other Once Fransisco Ivan MD        LIDOcaine 2000 mg in D5W 250 mL infusion  1 mg/min Intravenous Continuous Fransisco Ivan MD 7.5 mL/hr at 08/23/23 1505 1 mg/min at 08/23/23 1505    LIDOcaine 5 % patch 1 patch  1 patch Transdermal Q24H Fransisco Ivan MD   1 patch at 08/11/23 2226    magnesium sulfate 2g in water 50mL IVPB (premix)  2 g Intravenous PRN Cal Gaines MD        methocarbamoL tablet 500 mg  500 mg Oral Q6H PRN Fransisco Ivan MD   500 mg at 08/13/23 1646    nitric oxide gas Gas 10 ppm  10 ppm Inhalation Continuous Mariajose Perales MD   10 ppm at 08/21/23 0432    NORepinephrine 32 mg in dextrose 5 % (D5W) 250 mL infusion  0-3 mcg/kg/min Intravenous Continuous Fransisco Ivan MD 9.4 mL/hr at 08/23/23 1505 0.16 mcg/kg/min at 08/23/23 1505    ondansetron injection 4 mg  4 mg Intravenous Q6H PRN Yvonne Roach Paul, MD        oxyCODONE immediate release tablet 5 mg  5 mg Oral Q8H PRN Fransisco MD   5 mg at 08/21/23 2145    pantoprazole injection 40 mg  40 mg Intravenous Daily Paul Chen MD   40 mg at 08/23/23 0905    polyethylene glycol packet 17 g  17 g Oral TID Fransisco MD    17 g at 08/21/23 1520    senna tablet 8.6 mg  8.6 mg Oral BID Fransisco Ivan MD   8.6 mg at 08/21/23 2032    sodium chloride 0.9% flush 10 mL  10 mL Intravenous PRN Fransisco Ivan MD        sodium chloride 0.9% flush 10 mL  10 mL Intravenous Continuous Aracelis Cuellar DO        sodium chloride 0.9% flush 3 mL  3 mL Intravenous Q6H PRN Star Olmos MD        sodium phosphate 20.01 mmol in dextrose 5 % (D5W) 250 mL IVPB  20.01 mmol Intravenous PRN Cal Gaines MD        sodium phosphate 30 mmol in dextrose 5 % (D5W) 250 mL IVPB  30 mmol Intravenous PRN Cal Gaines MD        sodium phosphate 39.99 mmol in dextrose 5 % (D5W) 250 mL IVPB  39.99 mmol Intravenous PRN Cal Gaines MD        ticagrelor tablet 90 mg  90 mg Oral BID Fransisco Ivan MD   90 mg at 08/23/23 0906    vasopressin (PITRESSIN) 0.2 Units/mL in dextrose 5 % (D5W) 100 mL infusion  0.04 Units/min Intravenous Continuous Paul Chen MD 12 mL/hr at 08/23/23 1505 0.04 Units/min at 08/23/23 1505    vasopressin (PITRESSIN) 20 unit/mL injection                Review of Systems   Constitutional:  Positive for activity change and fatigue. Negative for appetite change.   HENT: Negative.     Eyes: Negative.  Negative for photophobia.   Respiratory:  Positive for cough and shortness of breath.    Cardiovascular: Negative.  Negative for palpitations.   Gastrointestinal: Negative.    Genitourinary: Negative.    Musculoskeletal: Negative.    Skin: Negative.    Neurological:  Positive for seizures and weakness. Negative for light-headedness.   Psychiatric/Behavioral:  Negative for dysphoric mood and sleep disturbance.      Objective:     Vital Signs (Most Recent):  Temp: 98.6 °F (37 °C) (08/23/23 1105)  Pulse: 82 (08/23/23 1617)  Resp: (!) 27 (08/23/23 1617)  BP: (!) 80/53 (08/23/23 1205)  SpO2: 98 % (08/23/23 1617) Vital Signs (24h Range):  Temp:  [98.4 °F (36.9 °C)-98.7 °F (37.1 °C)] 98.6 °F (37 °C)  Pulse:  []  82  Resp:  [9-33] 27  SpO2:  [75 %-100 %] 98 %  BP: ()/(51-75) 80/53  Arterial Line BP: ()/(41-61) 87/48     Weight: 125.2 kg (276 lb)  Body mass index is 35.44 kg/m².     Physical Exam  Vitals and nursing note reviewed.   HENT:      Head: Normocephalic.   Eyes:      Pupils: Pupils are equal, round, and reactive to light.   Cardiovascular:      Rate and Rhythm: Normal rate and regular rhythm.   Pulmonary:      Effort: Pulmonary effort is normal.   Abdominal:      Palpations: Abdomen is soft.   Musculoskeletal:      Cervical back: Normal range of motion.   Skin:     General: Skin is warm.      Capillary Refill: Capillary refill takes 2 to 3 seconds.   Neurological:      Mental Status: He is alert and oriented to person, place, and time.      Cranial Nerves: Cranial nerves 2-12 are intact.      Coordination: Finger-Nose-Finger Test and Heel to Shin Test normal.      Deep Tendon Reflexes:      Reflex Scores:       Bicep reflexes are 2+ on the right side and 2+ on the left side.       Patellar reflexes are 2+ on the right side and 2+ on the left side.       Achilles reflexes are 2+ on the right side and 2+ on the left side.     Comments: See Neurological Exam.           NEUROLOGICAL EXAMINATION:     MENTAL STATUS   Oriented to person, place, and time.     CRANIAL NERVES   Cranial nerves II through XII intact.     CN III, IV, VI   Pupils are equal, round, and reactive to light.    MOTOR EXAM     Strength   Right deltoid: 2/5  Left deltoid: 2/5  Right biceps: 2/5  Left biceps: 2/5  Right triceps: 2/5  Left triceps: 2/5  Right iliopsoas: 2/5  Left iliopsoas: 2/5  Right quadriceps: 2/5  Left quadriceps: 2/5  Right hamstrin/5  Left hamstrin/5  Right gastroc: 2/5  Left gastroc: 2/5    REFLEXES     Reflexes   Right biceps: 2+  Left biceps: 2+  Right patellar: 2+  Left patellar: 2+  Right achilles: 2+  Left achilles: 2+    SENSORY EXAM   Light touch normal.   Vibration normal.     GAIT AND COORDINATION       Coordination   Finger to nose coordination: normal  Heel to shin coordination: normal      Significant Labs: All pertinent lab results from the past 24 hours have been reviewed.    Significant Imaging: No new neuroimaging in the past 24 hours.

## 2023-08-23 NOTE — ASSESSMENT & PLAN NOTE
Most recent echocardiogram on 08/17/2023:    Left Ventricle: The left ventricle is severely dilated. Ventricular mass is normal. Normal wall thickness. regional wall motion abnormalities present. See diagram for wall motion findings. There is severely reduced systolic function with a visually estimated ejection fraction of 15 - 20%. Grade III diastolic dysfunction.    Left Atrium: Left atrium is moderately dilated.    Right Ventricle: Right ventricle was not well visualized due to poor acoustic window.    Mitral Valve: There is mild regurgitation.    Tricuspid Valve: There is mild regurgitation.    Pulmonary Artery: The estimated pulmonary artery systolic pressure is 41 mmHg.    IVC/SVC: Normal venous pressure at 3 mmHg.    - management per primary team  - currently on dobutamine infusion  - Palliative Medicine consulted and following as he is not candidate for heart transplant

## 2023-08-23 NOTE — ASSESSMENT & PLAN NOTE
Patient has been presenting multiple episodes of NSVT, worsening in length in the last couple of days  Patient has subcutaneous AICD  PA catheter removed and dobutamine removed as possible offenders, but  had to be restarted  Started on amiodarone, and rebolused twice  Consulted EP on 08/22, who recommended lidocaine gtt, which was started    - Continue amiodarone  - Continue Lidocaine  - Device interrogation  - EP following

## 2023-08-23 NOTE — PROGRESS NOTES
[facility] - Cardiac Intensive Care  Heart Transplant  Progress Note    Patient Name: Itz Daniel  MRN: [id number]  Admission Date: 8/11/2023  Hospital Length of Stay: 12 days  Attending Physician: Eric Moncada MD  Primary Care Provider: Sunday Boo MD  Principal Problem:Ischemic cardiomyopathy    Subjective:     Interval History: Taruntosei had multiple episodes of tonic-clonic seizures and presented multiple runs of NSVT. Worsening clinical status.    HD    8am  CVP 16  SvO2 41  CO 7  CI 2.9      3am  CVP:  14  SVO2:  51  CO 9.1  CI 3.6  SVR:  527      Continuous Infusions:   sodium chloride 0.9% 5 mL/hr at 08/23/23 0601    amiodarone in dextrose 5% 1 mg/min (08/23/23 0626)    DOBUTamine IV infusion (non-titrating) Stopped (08/22/23 2333)    furosemide (LASIX) 500 mg in 50 mL infusion (conc: 10 mg/mL) 40 mg/hr (08/23/23 0601)    insulin regular 1 units/mL infusion orderable (TRANSFER) 2.8 Units/hr (08/23/23 0601)    LIDOcaine 1 mg/min (08/23/23 0601)    nitric oxide gas      NORepinephrine bitartrate-D5W 0.14 mcg/kg/min (08/23/23 0601)    sodium chloride 0.9%       Scheduled Meds:   amiodarone in dextrose  150 mg Intravenous Once    aspirin  81 mg Oral Daily    atorvastatin  80 mg Oral Daily    enoxparin  40 mg Subcutaneous Q24H (prophylaxis, 1700)    hydrALAZINE  25 mg Oral TID    insulin aspart U-100  6-12 Units Subcutaneous TIDWM    isosorbide dinitrate  20 mg Oral TID    levETIRAcetam (Keppra) IV (PEDS and ADULTS)  500 mg Intravenous Q12H    levothyroxine  125 mcg Oral Before breakfast    LIDOcaine (PF) 10 mg/ml (1%)  10 mL Other Once    LIDOcaine  1 patch Transdermal Q24H    pantoprazole  40 mg Oral Daily    polyethylene glycol  17 g Oral TID    senna  8.6 mg Oral BID    sevelamer carbonate  800 mg Oral TID WM    ticagrelor  90 mg Oral BID     PRN Meds:acetaminophen, dextrose 10%, dextrose 10%, glucagon (human recombinant), glucose, glucose, insulin aspart U-100, lorazepam, methocarbamoL,  oxyCODONE, prochlorperazine, sodium chloride 0.9%, sodium chloride 0.9%    Review of patient's allergies indicates:  No Known Allergies  Objective:     Vital Signs (Most Recent):  Temp: 98.7 °F (37.1 °C) (08/22/23 2301)  Pulse: 83 (08/23/23 0630)  Resp: (!) 21 (08/23/23 0630)  BP: 92/61 (08/23/23 0601)  SpO2: 99 % (08/23/23 0630) Vital Signs (24h Range):  Temp:  [97.7 °F (36.5 °C)-98.7 °F (37.1 °C)] 98.7 °F (37.1 °C)  Pulse:  [] 83  Resp:  [9-33] 21  SpO2:  [75 %-100 %] 99 %  BP: ()/(52-77) 92/61  Arterial Line BP: ()/(43-61) 98/55     Patient Vitals for the past 72 hrs (Last 3 readings):   Weight   08/22/23 0300 125.2 kg (276 lb)   08/21/23 0300 124.7 kg (274 lb 14.4 oz)       Body mass index is 35.44 kg/m².      Intake/Output Summary (Last 24 hours) at 8/23/2023 0648  Last data filed at 8/23/2023 0601  Gross per 24 hour   Intake 1697.47 ml   Output 1380 ml   Net 317.47 ml       I/O this shift:  In: 810.2 [I.V.:673.5; IV Piggyback:136.7]  Out: 425 [Urine:425]    Net IO Since Admission: -8,303.29 mL [08/23/23 0648]    Hemodynamic Parameters:  PAP: (59-66)/(26-33) 61/28  PAP (Mean):  [39 mmHg-45 mmHg] 39 mmHg    Telemetry: NSR with frequent PVCs       Physical Exam  Vitals and nursing note reviewed.   Constitutional:       General: He is not in acute distress.     Appearance: Normal appearance. He is normal weight. He is not ill-appearing.   HENT:      Head: Normocephalic and atraumatic.   Eyes:      Pupils: Pupils are equal, round, and reactive to light.   Neck:      Comments: Left IJ central line triple lumen catheter  Cardiovascular:      Rate and Rhythm: Normal rate and regular rhythm.      Pulses: Normal pulses.      Heart sounds: Normal heart sounds. No murmur heard.     No friction rub. No gallop.   Pulmonary:      Effort: Pulmonary effort is normal. No respiratory distress.      Breath sounds: No wheezing or rhonchi.      Comments: Bibasilar crackles  Abdominal:      General: Abdomen is  "flat. Bowel sounds are normal.      Palpations: Abdomen is soft.   Musculoskeletal:         General: Normal range of motion.      Cervical back: Normal range of motion and neck supple.      Right lower leg: No edema.      Left lower leg: No edema.      Comments: Warm BL LEs   Skin:     General: Skin is warm and dry.      Capillary Refill: Capillary refill takes less than 2 seconds.   Neurological:      General: No focal deficit present.      Mental Status: He is alert, but tired         Significant Labs:  CBC:  Recent Labs   Lab 08/21/23 2232 08/21/23 2239 08/22/23  0320 08/23/23  0351   WBC 8.11  --  9.03 12.20   RBC 2.52*  --  2.47* 2.67*   HGB 7.4*  --  7.2* 7.8*   HCT 22.8* 22* 21.9* 23.8*     --  413 481*   MCV 91  --  89 89   MCH 29.4  --  29.1 29.2   MCHC 32.5  --  32.9 32.8       BNP:  Recent Labs   Lab 08/21/23  0826   BNP 2,674*       CMP:  Recent Labs   Lab 08/21/23  0249 08/21/23  0826 08/21/23 2232 08/22/23  0320 08/22/23  0835 08/22/23 2011 08/23/23 0027 08/23/23  0351   *   < > 227* 198*   < > 220* 177* 164*   CALCIUM 8.2*   < > 8.8 8.8   < > 8.5* 8.6* 8.8   ALBUMIN 2.5*  --  2.7* 2.7*  --   --   --  2.7*   PROT 6.2  --   --  6.5  --   --   --  6.4   *   < > 129* 132*   < > 128* 127* 129*   K 4.3   < > 3.8 4.2   < > 4.3 4.1 4.2   CO2 23   < > 19* 22*   < > 20* 20* 19*   CL 94*   < > 94* 94*   < > 92* 91* 93*   BUN 61*   < > 63* 65*   < > 68* 73* 73*   CREATININE 4.0*   < > 4.2* 4.2*   < > 4.5* 4.9* 4.8*   ALKPHOS 83  --   --  86  --   --   --  117   ALT 15  --   --  126*  --   --   --  2,135*   AST 20  --   --  120*  --   --   --  2,906*   BILITOT 0.6  --   --  0.9  --   --   --  2.0*    < > = values in this interval not displayed.        Coagulation:   Recent Labs   Lab 08/21/23  0249 08/22/23  0320 08/23/23  0351   INR 1.0 1.1 1.2       LDH:  No results for input(s): "LDH" in the last 72 hours.    Microbiology:  Microbiology Results (last 7 days)       Procedure Component " "Value Units Date/Time    Blood culture [[id number]] Collected: 08/13/23 0920    Order Status: Completed Specimen: Blood from Peripheral, Antecubital, Left Updated: 08/18/23 1012     Blood Culture, Routine No growth after 5 days.    Blood culture [[id number]] Collected: 08/12/23 0103    Order Status: Completed Specimen: Blood from Peripheral, Hand, Left Updated: 08/17/23 0612     Blood Culture, Routine No growth after 5 days.            ABGs:   Recent Labs   Lab 08/23/23  0547   PH 7.335*   PCO2 46.3*   HCO3 24.7   POCSATURATED 98   BE -1       BMP:   Recent Labs   Lab 08/23/23  0351   *   *   K 4.2   CL 93*   CO2 19*   BUN 73*   CREATININE 4.8*   CALCIUM 8.8   MG 2.9*       Cardiac Markers: No results for input(s): "CKMB", "TROPONINT", "MYOGLOBIN" in the last 72 hours.  Coagulation:   Recent Labs   Lab 08/23/23  0351   INR 1.2       Prealbumin: No results for input(s): "PREALBUMIN" in the last 72 hours.  Microbiology Results (last 7 days)       Procedure Component Value Units Date/Time    Blood culture [[id number]] Collected: 08/13/23 0920    Order Status: Completed Specimen: Blood from Peripheral, Antecubital, Left Updated: 08/18/23 1012     Blood Culture, Routine No growth after 5 days.    Blood culture [[id number]] Collected: 08/12/23 0103    Order Status: Completed Specimen: Blood from Peripheral, Hand, Left Updated: 08/17/23 0612     Blood Culture, Routine No growth after 5 days.          Specimen (24h ago, onward)      None          I have reviewed all pertinent labs within the past 24 hours.    Estimated Creatinine Clearance: 25 mL/min (A) (based on SCr of 4.8 mg/dL (H)).    Diagnostic Results:    CXR 08/18/23  Postoperative changes and supporting devices as before.  Mild diffuse edema more marked at the lung bases similar to the previous study.  No significant pleural effusion.  Heart size upper limit of normal.    CT chest 08/18/23  1. No evidence for hematoma as clinically questioned, noting " noncontrast technique.  2. Small bilateral pleural effusions.  Bilateral pulmonary edema.  3. Intraluminal fluid in the esophagus to the level of the aortic arch.  Consider correlation for aspiration risk.  4. Bilateral pulmonary nodules, largest measuring 9 mm.  For multiple solid nodules with any 6 mm or greater, Fleischner Society guidelines recommend follow up with non-contrast chest CT at 3-6 months and 18-24 months after discovery.  5. Gallbladder sludge and mild gallbladder distension, nonspecific.  6. Additional findings as above.    US renal 08/17/23  Limited study due to inability to evaluate segmental renal arterial resistive indices.  Additional evaluation, as clinically warranted.     Elevated main renal arterial resistive indices, nonspecific, but can be seen in the setting of medical renal disease.     Renal arteries and veins are patent.     No hydronephrosis     Bilateral pleural effusions.    TTE 08/17/23    Left Ventricle: The left ventricle is severely dilated. Ventricular mass is normal. Normal wall thickness. regional wall motion abnormalities present. See diagram for wall motion findings. There is severely reduced systolic function with a visually estimated ejection fraction of 15 - 20%. Grade III diastolic dysfunction.    Left Atrium: Left atrium is moderately dilated.    Right Ventricle: Right ventricle was not well visualized due to poor acoustic window.    Mitral Valve: There is mild regurgitation.    Tricuspid Valve: There is mild regurgitation.    Pulmonary Artery: The estimated pulmonary artery systolic pressure is 41 mmHg.    IVC/SVC: Normal venous pressure at 3 mmHg.    [facility] 08/12/23  Coronary angiogram:  Left main: Patent  Left anterior descending artery:   just distal to the 1st septal   Left circumflex artery:  90-95% proximal stenosis, distal 70% calcified stenosis.  Diffusely diseased OM1,  of OM2  Right coronary artery:  Ostial      Grafts:  SVG-RCA:  Diffuse  disease with narrowing of up to 40-50% in the proximal portion  The native PLB and PDA both diffusely diseased.  SVG-OM: Occluded  LIMA-LAD:  Widely patent.  Diffusely diseased native LAD with narrowing of up to 90% in the very apical portion.     Assessment:  Cardiogenic shock with extremely low cardiac outputs and elevated left and right filling pressures  NSTEMI - suspect acute graft closure of the SVG to OM2.  Severe native CAD status post PCI of the proximal circumflex with a 33 x 24 mm Synergy XD stent (post-dilated up to 4.25 mm)      RHC 08/11/23  RA 20, RV 81/21, PA 81/47 (mean 61), PWCP 45, Ao sat 96%, PA sat 49% (on 6L NC)  CO/CI:   3.69/1.52 (VIKKI)  3.91/1.6 (TD)         Assessment and Plan:     52-year-old male with a past medical history of ICM, CABG in 2017 (LIMA to LAD, SVG to OM1, SVG to PDA), DM type II, CKD stage IV (baseline 1.8), and ICD who presented [facility] on 8/10//23 due to n/v and SOB. Work up concerning for NSTEMI with peak trop of 38. Started on ACS protocol with asa, ticagrelor, and heparin gtt. Also noted to have YVES with Cr 2.5, volume overloaded with BNP of 796, LA 2.8>>1.0. TTE with EF drop from 30 to 15%, LVEDD 6.15, moderately reduced RV function. LHC/RHC on 8/12: s/p MIRELLA to prox Cx. RHC showed RA 20, RV 81/21, PA 81/47 (mean 61), PWCP 45, CO/CI: 3.69/1.52 (VIKKI)  3.91/1.6 (TD), therefore, impella CP was placed in in right femoral artery and leave in swan in right femoral vein. He was then transferred to [facility] for higher level of care. Of note, never started on inotropes or pressors. Was receiving metoprolol.     On arrival patient was hemodynamically stable.  Not on any inotropes or pressors.  Impella at P7  Initial hemodynamics  CVP: 9  SVO2:  44  Cardiac Output: 4.9  Cardiac Index: 2.0  SVR: 1250     LHC on 8/12  Grafts:  SVG-RCA:  Diffuse disease with narrowing of up to 40-50% in the proximal portion  The native PLB and PDA both diffusely diseased.  SVG-OM:  Occluded  LIMA-LAD:  Widely patent.  Diffusely diseased native LAD with narrowing of up to 90% in the very apical portion.  Severe native CAD status post PCI of the proximal circumflex with a 33 x 24 mm Synergy XD stent (post-dilated up to 4.25 mm)      Previous Cardiac Diagnostics:   TTE 7.1.22  The study quality is average.   The left ventricle is mildly enlarged. Global left ventricular systolic function is severely decreased. The left ventricular ejection fraction is 30%.   Mild (1+) mitral regurgitation.  The pulmonary artery systolic pressure is 10 mmHg. The pulmonary artery appears to be normal.     [facility] 5.19.2017  Severe MVCAD as a cause to severe newly diagnosed ischemic congestive heart failure with EF of 20%  Mild MR  Patent L subclavian, & LIMA suitable for bypass graft conduit     BLE Arterial US 10.18.21  The study quality is average.   The arteries of the left lower extremity appear patent with no significant stenosis noted.   Tri-phasic waveforms are obtained throughout the left lower extremity arteries.      Carotid US 10.18.21  The study quality is average.   1-39% stenosis in the proximal right internal carotid artery based on Bluth Criteria.   1-39% stenosis in the proximal left internal carotid artery based on Bluth Criteria.   Less than 50% stenosis throughout the bilateral common carotid arteries.   Antegrade bilateral vertebral artery flow.         * Ischemic cardiomyopathy  Cardiogenic shock    52-year-old male with past medical history of ischemic cardiomyopathy status post CABG in 2017 who presents as a transfer from outside hospital after he presented with an NSTEMI status post revascularization to proximal circumflex.  Associated cardiogenic shock noted from right heart catheterization, CP Impella placed on a 12. 3.4cm from AV to inlet on bedside echo. Since removal of Impella, patient has had elevated PAP and PCWP, and CVP 10. Diuresis improved mildly CVP, but kidney function  worsened.  08/22 - Patient had worsening filling pressures with pulmonary edema so diuresis was increased. Kidney dysfunction worsening. Increased Furosemide gtt, given furosemide 80mg bolus ocne, and added chlorothiazide. He also had episode of altered mental status, with sustained VT afterwards for which he was started on amiodarone.   08/23 - Patient continues with low urine output, NSVTs not controlled with amiodarone and lidocaine, and worsening renal and hepatic function consistent with cardiogenic shock. Family was updated on status and will come today to discuss goals of care.      - Furosemide increased to 40mg/h  - Continue amiodarone gtt and Lidocaine gtt  - Chlorothiazide 250mg once  - Consider HD for fluid removal  - Restarted  2.5  - Continue Caleb  - HoldHydralazine/Isosorbide and hydralazine 20/25mg TID  - Trend BMP q8 hour   - Palliative following  - TTE below    Results for orders placed during the hospital encounter of 08/11/23    Echo    Interpretation Summary    Left Ventricle: The left ventricle is severely dilated. Ventricular mass is normal. Normal wall thickness. regional wall motion abnormalities present. See diagram for wall motion findings. There is severely reduced systolic function with a visually estimated ejection fraction of 15 - 20%. Grade III diastolic dysfunction.    Left Atrium: Left atrium is moderately dilated.    Right Ventricle: Right ventricle was not well visualized due to poor acoustic window.    Mitral Valve: There is mild regurgitation.    Tricuspid Valve: There is mild regurgitation.    Pulmonary Artery: The estimated pulmonary artery systolic pressure is 41 mmHg.    IVC/SVC: Normal venous pressure at 3 mmHg.          NSVT (nonsustained ventricular tachycardia)  Patient has been presenting multiple episodes of NSVT, worsening in length in the last couple of days  Patient has subcutaneous AICD  PA catheter removed and dobutamine removed as possible offenders, but   had to be restarted  Started on amiodarone, and rebolused twice  Consulted EP on 08/22, who recommended lidocaine gtt, which was started    - Continue amiodarone  - Continue Lidocaine  - Device interrogation  - EP following        Tonic-clonic generalized seizure  Presented first episode on 08/21 and since has had multiple episodes throughout the day every day  EEG resulted in diffuse cerebral dysfunction and encephalopathy and three patient events captured with no definitive electrographic correlate, but highly stereotyped and taken with patient's known history of frontal infarct are highly concerning for focal seizure  Evaluated by neurology who recommended anti-seizure meds    - Continue on Keppra  - Possibility of additional meds if continue with events  - Continue managing possible secondary causes   - Neurology following    Encounter for palliative care  Met with palliative care  GOC discussed this am, and patient does not want intubation or chest compressions, but yes to continue with medications and hemodialysis if needed.     - Palliative care following  - Change in code status: DNR and do not intubate      Hypothyroidism  - Continue home Synthroid    CKD (chronic kidney disease), stage IV  Acute kidney injury    Likely secondary to cardiogenic shock  Baseline creatinine of 1.8-2.   Worsening creatinine likely due to ATN, nephrology following  Restarted on diuretics, urine output is still <50/h  Monitoring for need of hemodialysis    Recent Labs   Lab 08/22/23 2011 08/23/23  0027 08/23/23  0351   CREATININE 4.5* 4.9* 4.8*     - Continue furosemide gtt 40mg/h  - Check need for additional diuresis   - Monitor Cr  - Daily weights, strict I/O and chart, renally dose all medications   - Avoid nephrotoxic medications, NSAIDs, ACE/ARB, and IV contrast.  - Monitor for need of hemodialysis  - Appreciate nephrology recs      NSTEMI (non-ST elevated myocardial infarction)  - Continue Aspirin  - Continue ticagrelor  -  Continue Atorvastatin 80    DM (diabetes mellitus)  Lab Results   Component Value Date    HGBA1C 10.2 (H) 08/10/2023     - Endocrine following  - Now on insulin drip  - Endocrine adjusting insulin dose  - NPO for now, diabetic/cardiac diet once PO resumed          Paul Roach MD  Heart Transplant  [facility] - Cardiac Intensive Care

## 2023-08-23 NOTE — PROGRESS NOTES
RD triggered for LOS. However d/t to worsening clinical status, full assessment not appropriate at this time. If further/alternate nutrition warranted please consult as needed.    Thank you.

## 2023-08-23 NOTE — CARE UPDATE
Hemodynamics     8PM  CVP: 15  SCVO2: 51  Cardiac Output: 9.8  Cardiac Index: 3.8  SVR: 401  Continued to have paroxysmal runs of non sustained VT with drop in MAPs with increased levo requirements. Rebolused amiodarone. With improvement. Giving dose of diuril 250 x1    1130PM  Again have runs of frequent NSVT with drop in MAP. Given bolus of lidocaine and started on gtt at 1.  dcd    1230am  CVP: 15  SCVO2: 43  Cardiac Output: 8.3  Cardiac Index: 3.3  SVR: 471  LA 1.2  Will continue current management     530 AM  CVP: 14  SCVO2: 48  Cardiac Output: 9.2  Cardiac Index: 3.6  SVR: 505  LA 2.0  LFTs now in 2000s  Given worsening end organ function, frequent VT, and not candidate for advanced options- GOC and code status discussed with patient. He wants to continue current management and will talk with family about code status change. Spoke with his father as well. Family planning on being at hospital by early afternoon       Continuous Infusions:   sodium chloride 0.9% 5 mL/hr at 08/22/23 2311    amiodarone in dextrose 5% 1 mg/min (08/22/23 2101)    DOBUTamine IV infusion (non-titrating) 2.5 mcg/kg/min (08/22/23 2101)    furosemide (LASIX) 500 mg in 50 mL infusion (conc: 10 mg/mL) 40 mg/hr (08/22/23 2101)    insulin regular 1 units/mL infusion orderable (TRANSFER) 2.8 Units/hr (08/22/23 2101)    nitric oxide gas      NORepinephrine bitartrate-D5W 0.08 mcg/kg/min (08/22/23 2101)    NORepinephrine bitartrate-D5W 0.02 mcg/kg/min (08/22/23 2001)    sodium chloride 0.9%         Intake/Output Summary (Last 24 hours) at 8/23/2023 0038  Last data filed at 8/22/2023 2101  Gross per 24 hour   Intake 1628.49 ml   Output 1745 ml   Net -116.51 ml       Case discussed with on call attending.    Fransisco Ivan MD  Cardiology Fellow, PGY5

## 2023-08-23 NOTE — ASSESSMENT & PLAN NOTE
Ischemic ATN 2/2 decrease perfusion (severe hypotension d/t cardiogenic shock), contrast exposure, combined with aggressive diuresis in a patient with known advanced CKD  Baseline sCr: appears to be around ~2.  Admission sCr: 2.5 at [facility]  Lab Results   Component Value Date    CREATININE 5.4 (H) 08/23/2023    CREATININE 5.3 (H) 08/23/2023    CREATININE 4.8 (H) 08/23/2023     Last UPCR:   Prot/Creat Ratio, Urine   Date Value Ref Range Status   08/19/2023 1.35 (H) 0.00 - 0.20 Final       Plan/Recommendations:   - YVES secondary to acute tubular necrosis in the setting of low effective circulatory volume with known heart failure/shock  - renal function declining, plan for initiation of RRT with [facility] today for metabolic clearance and volume management   - renal diet when not NPO, volume restriction per primary team  - keep MAP > 65 mmHg  - Q8H RFPs and magnesium levels per [facility] protocol  - daily weights and strict I/Os  - renally dose medications to eGFR  - avoid nephrotoxins as much as possible (i.e. supra-therapeutic vancomycin troughs, NSAIDs, intra-arterial contrast, etc.)

## 2023-08-23 NOTE — SUBJECTIVE & OBJECTIVE
Interval History: Patient overnight continued to have NSVT and was started on lidocaine with improvement on frequency of episodes     Review of Systems   Constitutional: Positive for malaise/fatigue.   Respiratory:  Positive for shortness of breath.    Neurological:  Positive for weakness.     Objective:     Vital Signs (Most Recent):  Temp: 98.6 °F (37 °C) (08/23/23 1105)  Pulse: 80 (08/23/23 1220)  Resp: 19 (08/23/23 1220)  BP: (!) 80/53 (08/23/23 1205)  SpO2: 100 % (08/23/23 1220) Vital Signs (24h Range):  Temp:  [97.7 °F (36.5 °C)-98.7 °F (37.1 °C)] 98.6 °F (37 °C)  Pulse:  [] 80  Resp:  [9-33] 19  SpO2:  [75 %-100 %] 100 %  BP: ()/(51-75) 80/53  Arterial Line BP: ()/(47-61) 124/58     Weight: 125.2 kg (276 lb)  Body mass index is 35.44 kg/m².     SpO2: 100 %        Physical Exam   Vitals and nursing note reviewed.   Constitutional:       General: He is not in acute distress.     Appearance: Normal appearance. He is normal weight. He is not ill-appearing.   HENT:      Head: Normocephalic and atraumatic.   Eyes:      Pupils: Pupils are equal, round, and reactive to light.   Neck:      Comments: Left IJ PA catheter  Cardiovascular:      Rate and Rhythm: Normal rate and regular rhythm.      Pulses: Normal pulses.      Heart sounds: Normal heart sounds. No murmur heard.     No friction rub. No gallop.   Pulmonary:      Effort: Pulmonary effort is normal. No respiratory distress.      Breath sounds: No wheezing or rhonchi.      Comments: Bibasilar crackles  Abdominal:      General: Abdomen is flat. Bowel sounds are normal.      Palpations: Abdomen is soft.   Musculoskeletal:         General: Normal range of motion.      Cervical back: Normal range of motion and neck supple.      Right lower leg: No edema.      Left lower leg: No edema.      Comments: Warm BL LEs   Skin:     General: Skin is warm and dry.      Capillary Refill: Capillary refill takes less than 2 seconds.   Neurological:      General:  No focal deficit present.      Mental Status: He is alert.        Significant Labs: All pertinent lab results from the last 24 hours have been reviewed.

## 2023-08-23 NOTE — SUBJECTIVE & OBJECTIVE
Interval History: Patient seen and examined this morning. Continue seizure-like activity overnight in addition to non-sustained episodes of VT. He is afebrile with pulse ranging from 120-70s bpm. Systolic blood pressure ranging from 100-80s mmHg via arterial line now on Levophed 0.14, vasopressin 0.04 and dobutamine infusion resumed. CVP 14 mmHg with worsening dyspnea per patient. He is saturating +85% on 4 liters via nasal cannula. Renal function declining now with decreasing UOP despite escalating doses of IV diuretics. Only 1.38 liters of documented UOP in the last 24 hours. Reiterated risks of RTT especially with UF given hypotension with patient to which he verbalized understanding. Palliative Medicine consulted and following and patient reportedly now DNI. Primary team with plans for trialysis catheter placement with SLED thereafter.     Review of patient's allergies indicates:  No Known Allergies  Current Facility-Administered Medications   Medication Frequency    0.9%  NaCl infusion Continuous    acetaminophen tablet 650 mg Q6H PRN    amiodarone 360 mg/200 mL (1.8 mg/mL) infusion Continuous    amiodarone in dextrose 150 mg/100 mL (1.5 mg/mL) loading dose 150 mg Once    aspirin EC tablet 81 mg Daily    atorvastatin tablet 80 mg Daily    dextrose 10% bolus 125 mL 125 mL PRN    dextrose 10% bolus 250 mL 250 mL PRN    DOBUtamine 1000 mg in D5W 250 mL infusion Continuous    enoxaparin injection 40 mg Q24H (prophylaxis, 1700)    furosemide (LASIX) 500 mg in 50 mL infusion (conc: 10 mg/mL) Continuous    glucagon (human recombinant) injection 1 mg PRN    glucose chewable tablet 16 g PRN    glucose chewable tablet 24 g PRN    hydrALAZINE tablet 25 mg TID    insulin aspart U-100 pen 0-10 Units PRN    insulin aspart U-100 pen 6-12 Units TIDWM    insulin regular in 0.9 % NaCl 100 unit/100 mL (1 unit/mL) infusion Continuous    isosorbide dinitrate tablet 20 mg TID    levETIRAcetam injection 500 mg Q12H    levothyroxine  tablet 125 mcg Before breakfast    LIDOcaine (PF) 10 mg/ml (1%) injection 100 mg Once    LIDOcaine 2000 mg in D5W 250 mL infusion Continuous    LIDOcaine 5 % patch 1 patch Q24H    methocarbamoL tablet 500 mg Q6H PRN    nitric oxide gas Gas 10 ppm Continuous    NORepinephrine 32 mg in dextrose 5 % (D5W) 250 mL infusion Continuous    oxyCODONE immediate release tablet 5 mg Q8H PRN    pantoprazole injection 40 mg Daily    polyethylene glycol packet 17 g TID    prochlorperazine injection Soln 2.5 mg Q6H PRN    senna tablet 8.6 mg BID    sevelamer carbonate tablet 800 mg TID WM    sodium chloride 0.9% flush 10 mL PRN    sodium chloride 0.9% flush 10 mL Continuous    sodium chloride 0.9% flush 3 mL Q6H PRN    ticagrelor tablet 90 mg BID       Objective:     Vital Signs (Most Recent):  Temp: 98.5 °F (36.9 °C) (08/23/23 0705)  Pulse: 98 (08/23/23 0755)  Resp: 15 (08/23/23 0755)  BP: (!) 90/54 (08/23/23 0705)  SpO2: 95 % (08/23/23 0755) Vital Signs (24h Range):  Temp:  [97.7 °F (36.5 °C)-98.7 °F (37.1 °C)] 98.5 °F (36.9 °C)  Pulse:  [] 98  Resp:  [9-33] 15  SpO2:  [75 %-100 %] 95 %  BP: ()/(52-77) 90/54  Arterial Line BP: ()/(43-61) 95/54     Weight: 125.2 kg (276 lb) (08/22/23 0300)  Body mass index is 35.44 kg/m².  Body surface area is 2.56 meters squared.    I/O last 3 completed shifts:  In: 2425.6 [P.O.:240; I.V.:1844.6; IV Piggyback:341]  Out: 2285 [Urine:2285]     Physical Exam  Vitals and nursing note reviewed.   Constitutional:       General: He is awake. He is not in acute distress.     Appearance: He is obese. He is ill-appearing. He is not diaphoretic.      Interventions: Nasal cannula in place.   HENT:      Head: Normocephalic and atraumatic.      Right Ear: External ear normal.      Left Ear: External ear normal.      Nose:      Comments: Nasal cannula in place.     Mouth/Throat:      Mouth: Mucous membranes are moist.      Pharynx: Oropharynx is clear. No oropharyngeal exudate or posterior  oropharyngeal erythema.   Eyes:      General: No scleral icterus.        Right eye: No discharge.         Left eye: No discharge.      Extraocular Movements: Extraocular movements intact.      Conjunctiva/sclera: Conjunctivae normal.   Neck:      Comments: CVC & Creswell Tariq catheter to left internal jugular vein.  Cardiovascular:      Rate and Rhythm: Normal rate.      Heart sounds: Murmur heard.      Systolic murmur is present with a grade of 1/6.      No friction rub. No gallop.      Comments: Bilateral trace lower extremity edema.  Pulmonary:      Effort: Pulmonary effort is normal. No respiratory distress.      Breath sounds: Rales present. No wheezing or rhonchi.      Comments: Faint bibasilar crackles appreciated.   Chest:      Comments: Well healed sternotomy scar.  Abdominal:      General: Bowel sounds are normal. There is no distension.      Palpations: Abdomen is soft. There is no mass.      Tenderness: There is no abdominal tenderness.   Genitourinary:     Comments: Argueta catheter in place.  Musculoskeletal:      Cervical back: Neck supple.      Right lower leg: Edema present.      Left lower leg: Edema present.   Skin:     General: Skin is warm and dry.      Coloration: Skin is not jaundiced.   Neurological:      General: No focal deficit present.      Mental Status: He is alert. Mental status is at baseline.      Cranial Nerves: No cranial nerve deficit.      Motor: No weakness.   Psychiatric:         Mood and Affect: Mood normal.         Behavior: Behavior normal. Behavior is cooperative.          Significant Labs:  ABGs:   Recent Labs   Lab 08/23/23  0547   PH 7.335*   PCO2 46.3*   HCO3 24.7   POCSATURATED 98   BE -1       BMP:   Recent Labs   Lab 08/23/23  0351   *   *   K 4.2   CL 93*   CO2 19*   BUN 73*   CREATININE 4.8*   CALCIUM 8.8   MG 2.9*       CBC:   Recent Labs   Lab 08/23/23  0351   WBC 12.20   RBC 2.67*   HGB 7.8*   HCT 23.8*   *   MCV 89   MCH 29.2   MCHC 32.8        CMP:   Recent Labs   Lab 08/23/23  0351   *   CALCIUM 8.8   ALBUMIN 2.7*   PROT 6.4   *   K 4.2   CO2 19*   CL 93*   BUN 73*   CREATININE 4.8*   ALKPHOS 117   ALT 2,135*   AST 2,906*   BILITOT 2.0*       Coagulation:   Recent Labs   Lab 08/23/23  0351   INR 1.2       LFTs:   Recent Labs   Lab 08/23/23  0351   ALT 2,135*   AST 2,906*   ALKPHOS 117   BILITOT 2.0*   PROT 6.4   ALBUMIN 2.7*       Microbiology Results (last 7 days)       Procedure Component Value Units Date/Time    Blood culture [[id number]] Collected: 08/13/23 0920    Order Status: Completed Specimen: Blood from Peripheral, Antecubital, Left Updated: 08/18/23 1012     Blood Culture, Routine No growth after 5 days.    Blood culture [[id number]] Collected: 08/12/23 0103    Order Status: Completed Specimen: Blood from Peripheral, Hand, Left Updated: 08/17/23 0612     Blood Culture, Routine No growth after 5 days.          Specimen (24h ago, onward)      None          Urinary sediment on 08/21/2023:                  Significant Imaging:  I have reviewed all imagining in the last 24 hours.

## 2023-08-23 NOTE — SUBJECTIVE & OBJECTIVE
Interval History:   Past Medical History:   Diagnosis Date    CKD stage 4, GFR 15-29 ml/min     HLD (hyperlipidemia)     Hypertension     Ischemic cardiomyopathy/Chronic HFrEF s/p CABG and AICD 2017    T2DM (type 2 diabetes mellitus)        Past Surgical History:   Procedure Laterality Date    CORONARY ARTERY BYPASS GRAFT  2017    3 vessel    CORONARY BYPASS GRAFT ANGIOGRAPHY  8/11/2023    Procedure: Bypass graft study;  Surgeon: Michele Hirsch MD;  Location: [facility] CATH LAB;  Service: Cardiology;;    IMPELLA, REMOVAL Right 8/15/2023    Procedure: Impella, Removal;  Surgeon: Colin Sibley MD;  Location: [facility] CATH LAB;  Service: Cardiology;  Laterality: Right;    INSERTION OF INTRAVASCULAR MICROAXIAL BLOOD PUMP N/A 8/11/2023    Procedure: INSERTION, IMPELLA;  Surgeon: Michele Hirsch MD;  Location: [facility] CATH LAB;  Service: Cardiology;  Laterality: N/A;    IVUS, CORONARY  8/11/2023    Procedure: IVUS, Coronary;  Surgeon: Michele Hirsch MD;  Location: [facility] CATH LAB;  Service: Cardiology;;    LEFT HEART CATHETERIZATION N/A 8/11/2023    Procedure: Left heart cath;  Surgeon: Michele Hirsch MD;  Location: [facility] CATH LAB;  Service: Cardiology;  Laterality: N/A;    PERCUTANEOUS CORONARY INTERVENTION, ARTERY N/A 8/11/2023    Procedure: Percutaneous coronary intervention;  Surgeon: Michele Hirsch MD;  Location: [facility] CATH LAB;  Service: Cardiology;  Laterality: N/A;    PLACEMENT OF SWAN MARLENI CATHETER WITH IMAGING GUIDANCE Left 8/15/2023    Procedure: INSERTION, CATHETER, SWAN-MARLENI, WITH IMAGING GUIDANCE;  Surgeon: Colin Sibley MD;  Location: [facility] CATH LAB;  Service: Cardiology;  Laterality: Left;    REMOVAL OF TUNNELED CENTRAL VENOUS CATHETER (CVC) N/A 8/15/2023    Procedure: REMOVAL, CATHETER, CENTRAL VENOUS, TUNNELED;  Surgeon: Colin Sibley MD;  Location: [facility] CATH LAB;  Service: Cardiology;  Laterality: N/A;    RIGHT HEART CATHETERIZATION Right 8/11/2023    Procedure: INSERTION, CATHETER,  RIGHT HEART;  Surgeon: Michele Hirsch MD;  Location: [facility] CATH LAB;  Service: Cardiology;  Laterality: Right;       Review of patient's allergies indicates:  No Known Allergies    Medications:  Continuous Infusions:   sodium chloride 0.9%      sodium chloride 0.9% Stopped (08/23/23 1139)    amiodarone in dextrose 5% 1 mg/min (08/23/23 1219)    DOBUTamine IV infusion (non-titrating) 2.5 mcg/kg/min (08/23/23 1205)    furosemide (LASIX) 500 mg in 50 mL infusion (conc: 10 mg/mL) 40 mg/hr (08/23/23 1205)    insulin regular 1 units/mL infusion orderable (TRANSFER) 2.8 Units/hr (08/23/23 1205)    LIDOcaine 1 mg/min (08/23/23 1205)    nitric oxide gas      NORepinephrine bitartrate-D5W 0.18 mcg/kg/min (08/23/23 1205)    sodium chloride 0.9%      vasopressin 0.04 Units/min (08/23/23 1205)     Scheduled Meds:   amiodarone in dextrose  150 mg Intravenous Once    aspirin  81 mg Oral Daily    atorvastatin  80 mg Oral Daily    enoxparin  30 mg Subcutaneous Q24H (prophylaxis, 1700)    insulin aspart U-100  6-12 Units Subcutaneous TIDWM    levETIRAcetam (Keppra) IV (PEDS and ADULTS)  500 mg Intravenous Q12H    levothyroxine  125 mcg Oral Before breakfast    LIDOcaine (PF) 10 mg/ml (1%)  10 mL Other Once    LIDOcaine  1 patch Transdermal Q24H    pantoprazole  40 mg Intravenous Daily    polyethylene glycol  17 g Oral TID    senna  8.6 mg Oral BID    sevelamer carbonate  800 mg Oral TID WM    ticagrelor  90 mg Oral BID    vasopressin         PRN Meds:acetaminophen, dextrose 10%, dextrose 10%, glucagon (human recombinant), glucose, glucose, insulin aspart U-100, magnesium sulfate IVPB, methocarbamoL, ondansetron, oxyCODONE, sodium chloride 0.9%, sodium chloride 0.9%, sodium phosphate 20.01 mmol in dextrose 5 % (D5W) 250 mL IVPB, sodium phosphate 30 mmol in dextrose 5 % (D5W) 250 mL IVPB, sodium phosphate 39.99 mmol in dextrose 5 % (D5W) 250 mL IVPB, vasopressin    Family History       Problem Relation (Age of Onset)    Hypertension  Mother          Tobacco Use    Smoking status: Former     Current packs/day: 0.00     Types: Cigarettes    Smokeless tobacco: Never   Substance and Sexual Activity    Alcohol use: Yes    Drug use: No    Sexual activity: Not on file       Review of Systems   Constitutional:  Positive for activity change and fatigue. Negative for appetite change.   HENT: Negative.     Eyes: Negative.    Respiratory: Negative.  Negative for shortness of breath.    Cardiovascular: Negative.    Gastrointestinal: Negative.    Genitourinary: Negative.    Musculoskeletal: Negative.    Skin: Negative.    Neurological: Negative.    Psychiatric/Behavioral:  Positive for dysphoric mood and sleep disturbance.    All other systems reviewed and are negative.    Objective:     Vital Signs (Most Recent):  Temp: 98.6 °F (37 °C) (08/23/23 1105)  Pulse: 80 (08/23/23 1220)  Resp: 19 (08/23/23 1220)  BP: (!) 80/53 (08/23/23 1205)  SpO2: 100 % (08/23/23 1220) Vital Signs (24h Range):  Temp:  [97.7 °F (36.5 °C)-98.7 °F (37.1 °C)] 98.6 °F (37 °C)  Pulse:  [] 80  Resp:  [9-33] 19  SpO2:  [75 %-100 %] 100 %  BP: ()/(51-75) 80/53  Arterial Line BP: ()/(47-61) 124/58     Weight: 125.2 kg (276 lb)  Body mass index is 35.44 kg/m².       Physical Exam  Vitals and nursing note reviewed.   Constitutional:       General: He is not in acute distress.     Appearance: He is not toxic-appearing or diaphoretic.   HENT:      Head: Normocephalic and atraumatic.      Right Ear: External ear normal.      Left Ear: External ear normal.      Nose: Nose normal.      Mouth/Throat:      Mouth: Mucous membranes are moist.   Eyes:      General: No scleral icterus.        Right eye: No discharge.         Left eye: No discharge.      Extraocular Movements: Extraocular movements intact.   Neck:      Comments: Line noted in left side neck today  Cardiovascular:      Rate and Rhythm: Tachycardia present.   Pulmonary:      Effort: Pulmonary effort is normal. No  respiratory distress.   Abdominal:      General: There is no distension.      Tenderness: There is no abdominal tenderness.   Musculoskeletal:         General: No deformity or signs of injury.      Cervical back: Normal range of motion.   Skin:     Coloration: Skin is not jaundiced.      Findings: No bruising or rash.   Neurological:      General: No focal deficit present.      Mental Status: He is alert and oriented to person, place, and time.      Cranial Nerves: No cranial nerve deficit.   Psychiatric:         Mood and Affect: Mood is depressed. Affect is flat.         Thought Content: Thought content normal.         Judgment: Judgment normal.            Review of Symptoms      Symptom Assessment (ESAS 0-10 Scale)  Pain:  0  Dyspnea:  0  Anxiety:  0  Nausea:  0  Depression:  0  Anorexia:  0  Fatigue:  0  Insomnia:  0  Restlessness:  0  Agitation:  0     CAM / Delirium:  Negative  Constipation:  Negative  Diarrhea:  Negative      Bowel Management Plan (BMP):  No      Pain Assessment:  OME in 24 hours:  0  Location(s): none      Modified Kandi Scale:  0 (On NC)    Performance Status:  70    Living Arrangements:  Lives with family    Psychosocial/Cultural:   See Palliative Psychosocial Note: No  Social Issues Identified: Coping deficit pt/family, New Diagnosis/Trauma, and Minor Children in home  Bereavement Risk: No  Caregiver Needs Discussed. Caregiver Distress: No:   Cultural: Patient enjoys fishing and watching sports (baseball and football). Patient has three children - 2 boys and 1 girl. The children ages are 21, 19, and 17.   **Primary  to Follow**  Palliative Care  Consult: No        Advance Care Planning   Advance Directives:   Living Will: No    LaPOST: No    Do Not Resuscitate Status: No    Medical Power of : No        Oral Declaration: Yes  Agent's Name:  Ozzie Daniel   Agent's Contact Number:  [phone]    Decision Making:  Patient answered questions  Goals of Care:  "What is most important right now is to focus on remaining as independent as possible, extending life as long as possible, even it it means sacrificing quality. Accordingly, we have decided that the best plan to meet the patient's goals includes continuing with treatment.         Significant Labs: All pertinent labs within the past 24 hours have been reviewed.  CBC:   Recent Labs   Lab 08/23/23  0351 08/23/23  0759 08/23/23  1045   WBC 12.20  --   --    HGB 7.8*  --   --    HCT 23.8*   < > 25*   MCV 89  --   --    *  --   --     < > = values in this interval not displayed.       BMP:  Recent Labs   Lab 08/23/23  0351 08/23/23  0757 08/23/23  1034   *   < > 163*   *   < > 128*   K 4.2   < > 4.4   CL 93*   < > 91*   CO2 19*   < > 20*   BUN 73*   < > 82*   CREATININE 4.8*   < > 5.4*   CALCIUM 8.8   < > 8.7   MG 2.9*  --   --     < > = values in this interval not displayed.       LFT:  Lab Results   Component Value Date    AST 3,853 (H) 08/23/2023    ALKPHOS 133 08/23/2023    BILITOT 2.1 (H) 08/23/2023     Albumin:   Albumin   Date Value Ref Range Status   08/23/2023 2.7 (L) 3.5 - 5.2 g/dL Final     Protein:   Total Protein   Date Value Ref Range Status   08/23/2023 6.4 6.0 - 8.4 g/dL Final     Lactic acid:   Lab Results   Component Value Date    LACTATE 1.9 08/22/2023    LACTATE 1.2 08/22/2023       Significant Imaging: I have reviewed all pertinent imaging results/findings within the past 24 hours.    08/18/2023 CT C/A/P: "1. No evidence for hematoma as clinically questioned, noting noncontrast technique. 2. Small bilateral pleural effusions.  Bilateral pulmonary edema. 3. Intraluminal fluid in the esophagus to the level of the aortic arch.  Consider correlation for aspiration risk. 4. Bilateral pulmonary nodules, largest measuring 9 mm.  For multiple solid nodules with any 6 mm or greater, Fleischner Society guidelines recommend follow up with non-contrast chest CT at 3-6 months and 18-24 months " "after discovery. 5. Gallbladder sludge and mild gallbladder distension, nonspecific. 6. Additional findings as above."    "

## 2023-08-23 NOTE — SUBJECTIVE & OBJECTIVE
Interval History: Merced had multiple episodes of tonic-clonic seizures and presented multiple runs of NSVT. Worsening clinical status.    HD    3am  CVP:  14  SVO2:  51  CO 9.1  CI 3.6  SVR:  527      Continuous Infusions:   sodium chloride 0.9% 5 mL/hr at 08/23/23 0601    amiodarone in dextrose 5% 1 mg/min (08/23/23 0626)    DOBUTamine IV infusion (non-titrating) Stopped (08/22/23 2333)    furosemide (LASIX) 500 mg in 50 mL infusion (conc: 10 mg/mL) 40 mg/hr (08/23/23 0601)    insulin regular 1 units/mL infusion orderable (TRANSFER) 2.8 Units/hr (08/23/23 0601)    LIDOcaine 1 mg/min (08/23/23 0601)    nitric oxide gas      NORepinephrine bitartrate-D5W 0.14 mcg/kg/min (08/23/23 0601)    sodium chloride 0.9%       Scheduled Meds:   amiodarone in dextrose  150 mg Intravenous Once    aspirin  81 mg Oral Daily    atorvastatin  80 mg Oral Daily    enoxparin  40 mg Subcutaneous Q24H (prophylaxis, 1700)    hydrALAZINE  25 mg Oral TID    insulin aspart U-100  6-12 Units Subcutaneous TIDWM    isosorbide dinitrate  20 mg Oral TID    levETIRAcetam (Keppra) IV (PEDS and ADULTS)  500 mg Intravenous Q12H    levothyroxine  125 mcg Oral Before breakfast    LIDOcaine (PF) 10 mg/ml (1%)  10 mL Other Once    LIDOcaine  1 patch Transdermal Q24H    pantoprazole  40 mg Oral Daily    polyethylene glycol  17 g Oral TID    senna  8.6 mg Oral BID    sevelamer carbonate  800 mg Oral TID WM    ticagrelor  90 mg Oral BID     PRN Meds:acetaminophen, dextrose 10%, dextrose 10%, glucagon (human recombinant), glucose, glucose, insulin aspart U-100, lorazepam, methocarbamoL, oxyCODONE, prochlorperazine, sodium chloride 0.9%, sodium chloride 0.9%    Review of patient's allergies indicates:  No Known Allergies  Objective:     Vital Signs (Most Recent):  Temp: 98.7 °F (37.1 °C) (08/22/23 2301)  Pulse: 83 (08/23/23 0630)  Resp: (!) 21 (08/23/23 0630)  BP: 92/61 (08/23/23 0601)  SpO2: 99 % (08/23/23 0630) Vital Signs (24h Range):  Temp:  [97.7 °F  (36.5 °C)-98.7 °F (37.1 °C)] 98.7 °F (37.1 °C)  Pulse:  [] 83  Resp:  [9-33] 21  SpO2:  [75 %-100 %] 99 %  BP: ()/(52-77) 92/61  Arterial Line BP: ()/(43-61) 98/55     Patient Vitals for the past 72 hrs (Last 3 readings):   Weight   08/22/23 0300 125.2 kg (276 lb)   08/21/23 0300 124.7 kg (274 lb 14.4 oz)       Body mass index is 35.44 kg/m².      Intake/Output Summary (Last 24 hours) at 8/23/2023 0648  Last data filed at 8/23/2023 0601  Gross per 24 hour   Intake 1697.47 ml   Output 1380 ml   Net 317.47 ml       I/O this shift:  In: 810.2 [I.V.:673.5; IV Piggyback:136.7]  Out: 425 [Urine:425]    Net IO Since Admission: -8,303.29 mL [08/23/23 0648]    Hemodynamic Parameters:  PAP: (59-66)/(26-33) 61/28  PAP (Mean):  [39 mmHg-45 mmHg] 39 mmHg    Telemetry: NSR with frequent PVCs       Physical Exam  Vitals and nursing note reviewed.   Constitutional:       General: He is not in acute distress.     Appearance: Normal appearance. He is normal weight. He is not ill-appearing.   HENT:      Head: Normocephalic and atraumatic.   Eyes:      Pupils: Pupils are equal, round, and reactive to light.   Neck:      Comments: Left IJ Cordis and MAC triple lumen  Cardiovascular:      Rate and Rhythm: Normal rate and regular rhythm.      Pulses: Normal pulses.      Heart sounds: Normal heart sounds. No murmur heard.     No friction rub. No gallop.   Pulmonary:      Effort: Pulmonary effort is normal. No respiratory distress.      Breath sounds: No wheezing or rhonchi.      Comments: Bibasilar crackles  Abdominal:      General: Abdomen is flat. Bowel sounds are normal.      Palpations: Abdomen is soft.   Musculoskeletal:         General: Normal range of motion.      Cervical back: Normal range of motion and neck supple.      Right lower leg: No edema.      Left lower leg: No edema.      Comments: Warm BL LEs   Skin:     General: Skin is warm and dry.      Capillary Refill: Capillary refill takes less than 2 seconds.  "  Neurological:      General: No focal deficit present.      Mental Status: He is alert.          Significant Labs:  CBC:  Recent Labs   Lab 08/21/23 2232 08/21/23 2239 08/22/23 0320 08/23/23  0351   WBC 8.11  --  9.03 12.20   RBC 2.52*  --  2.47* 2.67*   HGB 7.4*  --  7.2* 7.8*   HCT 22.8* 22* 21.9* 23.8*     --  413 481*   MCV 91  --  89 89   MCH 29.4  --  29.1 29.2   MCHC 32.5  --  32.9 32.8       BNP:  Recent Labs   Lab 08/21/23 0826   BNP 2,674*       CMP:  Recent Labs   Lab 08/21/23 0249 08/21/23 0826 08/21/23 2232 08/22/23 0320 08/22/23 0835 08/22/23 2011 08/23/23 0027 08/23/23  0351   *   < > 227* 198*   < > 220* 177* 164*   CALCIUM 8.2*   < > 8.8 8.8   < > 8.5* 8.6* 8.8   ALBUMIN 2.5*  --  2.7* 2.7*  --   --   --  2.7*   PROT 6.2  --   --  6.5  --   --   --  6.4   *   < > 129* 132*   < > 128* 127* 129*   K 4.3   < > 3.8 4.2   < > 4.3 4.1 4.2   CO2 23   < > 19* 22*   < > 20* 20* 19*   CL 94*   < > 94* 94*   < > 92* 91* 93*   BUN 61*   < > 63* 65*   < > 68* 73* 73*   CREATININE 4.0*   < > 4.2* 4.2*   < > 4.5* 4.9* 4.8*   ALKPHOS 83  --   --  86  --   --   --  117   ALT 15  --   --  126*  --   --   --  2,135*   AST 20  --   --  120*  --   --   --  2,906*   BILITOT 0.6  --   --  0.9  --   --   --  2.0*    < > = values in this interval not displayed.        Coagulation:   Recent Labs   Lab 08/21/23  0249 08/22/23  0320 08/23/23  0351   INR 1.0 1.1 1.2       LDH:  No results for input(s): "LDH" in the last 72 hours.    Microbiology:  Microbiology Results (last 7 days)       Procedure Component Value Units Date/Time    Blood culture [[id number]] Collected: 08/13/23 0920    Order Status: Completed Specimen: Blood from Peripheral, Antecubital, Left Updated: 08/18/23 1012     Blood Culture, Routine No growth after 5 days.    Blood culture [[id number]] Collected: 08/12/23 0103    Order Status: Completed Specimen: Blood from Peripheral, Hand, Left Updated: 08/17/23 0612     Blood Culture, " "Routine No growth after 5 days.            ABGs:   Recent Labs   Lab 08/23/23  0547   PH 7.335*   PCO2 46.3*   HCO3 24.7   POCSATURATED 98   BE -1       BMP:   Recent Labs   Lab 08/23/23  0351   *   *   K 4.2   CL 93*   CO2 19*   BUN 73*   CREATININE 4.8*   CALCIUM 8.8   MG 2.9*       Cardiac Markers: No results for input(s): "CKMB", "TROPONINT", "MYOGLOBIN" in the last 72 hours.  Coagulation:   Recent Labs   Lab 08/23/23  0351   INR 1.2       Prealbumin: No results for input(s): "PREALBUMIN" in the last 72 hours.  Microbiology Results (last 7 days)       Procedure Component Value Units Date/Time    Blood culture [[id number]] Collected: 08/13/23 0920    Order Status: Completed Specimen: Blood from Peripheral, Antecubital, Left Updated: 08/18/23 1012     Blood Culture, Routine No growth after 5 days.    Blood culture [[id number]] Collected: 08/12/23 0103    Order Status: Completed Specimen: Blood from Peripheral, Hand, Left Updated: 08/17/23 0612     Blood Culture, Routine No growth after 5 days.          Specimen (24h ago, onward)      None          I have reviewed all pertinent labs within the past 24 hours.    Estimated Creatinine Clearance: 25 mL/min (A) (based on SCr of 4.8 mg/dL (H)).    Diagnostic Results:    CXR 08/18/23  Postoperative changes and supporting devices as before.  Mild diffuse edema more marked at the lung bases similar to the previous study.  No significant pleural effusion.  Heart size upper limit of normal.    CT chest 08/18/23  1. No evidence for hematoma as clinically questioned, noting noncontrast technique.  2. Small bilateral pleural effusions.  Bilateral pulmonary edema.  3. Intraluminal fluid in the esophagus to the level of the aortic arch.  Consider correlation for aspiration risk.  4. Bilateral pulmonary nodules, largest measuring 9 mm.  For multiple solid nodules with any 6 mm or greater, Fleischner Society guidelines recommend follow up with non-contrast chest CT at 3-6 " months and 18-24 months after discovery.  5. Gallbladder sludge and mild gallbladder distension, nonspecific.  6. Additional findings as above.    US renal 08/17/23  Limited study due to inability to evaluate segmental renal arterial resistive indices.  Additional evaluation, as clinically warranted.     Elevated main renal arterial resistive indices, nonspecific, but can be seen in the setting of medical renal disease.     Renal arteries and veins are patent.     No hydronephrosis     Bilateral pleural effusions.    TTE 08/17/23    Left Ventricle: The left ventricle is severely dilated. Ventricular mass is normal. Normal wall thickness. regional wall motion abnormalities present. See diagram for wall motion findings. There is severely reduced systolic function with a visually estimated ejection fraction of 15 - 20%. Grade III diastolic dysfunction.    Left Atrium: Left atrium is moderately dilated.    Right Ventricle: Right ventricle was not well visualized due to poor acoustic window.    Mitral Valve: There is mild regurgitation.    Tricuspid Valve: There is mild regurgitation.    Pulmonary Artery: The estimated pulmonary artery systolic pressure is 41 mmHg.    IVC/SVC: Normal venous pressure at 3 mmHg.    [facility] 08/12/23  Coronary angiogram:  Left main: Patent  Left anterior descending artery:   just distal to the 1st septal   Left circumflex artery:  90-95% proximal stenosis, distal 70% calcified stenosis.  Diffusely diseased OM1,  of OM2  Right coronary artery:  Ostial      Grafts:  SVG-RCA:  Diffuse disease with narrowing of up to 40-50% in the proximal portion  The native PLB and PDA both diffusely diseased.  SVG-OM: Occluded  LIMA-LAD:  Widely patent.  Diffusely diseased native LAD with narrowing of up to 90% in the very apical portion.     Assessment:  Cardiogenic shock with extremely low cardiac outputs and elevated left and right filling pressures  NSTEMI - suspect acute graft closure of  the SVG to OM2.  Severe native CAD status post PCI of the proximal circumflex with a 33 x 24 mm Synergy XD stent (post-dilated up to 4.25 mm)      C 08/11/23  RA 20, RV 81/21, PA 81/47 (mean 61), PWCP 45, Ao sat 96%, PA sat 49% (on 6L NC)  CO/CI:   3.69/1.52 (VIKKI)  3.91/1.6 (TD)

## 2023-08-23 NOTE — PROCEDURES
"Itz Daniel is a 53 y.o. male patient.    Temp: 98.6 °F (37 °C) (08/23/23 1105)  Pulse: 81 (08/23/23 1405)  Resp: (!) 26 (08/23/23 1405)  BP: (!) 80/53 (08/23/23 1205)  SpO2: 100 % (08/23/23 1405)  Weight: 125.2 kg (276 lb) (08/22/23 0300)  Height: 6' 2" (188 cm) (08/17/23 1525)    Hemodialysis Catheter    Date/Time: 8/23/2023 3:36 PM    Performed by: Paul Chen MD  Authorized by: Paul Chen MD    Location procedure was performed:  [facility]  Assisting Provider:  Kendall Miller MD  Consent Done ?:  Yes  Time out complete?: Verified correct patient, procedure, equipment, staff, and site/side    Indications:  Hemodialysis  Anesthesia:  Local infiltration  Local anesthetic:  Lidocaine 1% without epinephrine  Preparation:  Skin prepped with ChloraPrep  Skin prep agent dried: Skin prep agent completely dried prior to procedure    Sterile barriers: All five maximal sterile barriers used - gloves, gown, cap, mask and large sterile sheet    Hand hygiene: Hand hygiene performed immediately prior to central venous catheter insertion    Location:  Right internal jugular  Catheter type:  Trialysis  Catheter size:  13 Fr  Inserted Catheter Length (cm):  15  Ultrasound guidance: Yes    Vessel Caliber:  Medium   patent  Comprressibility:  Normal  Needle advanced into vessel with real time ultrasound guidance.    Guidewire confirmed in vessel.    Image recorded and saved.    Steril sheath on probe.    Sterile gel used.  Manometry: Yes    Number of attempts:  1  Securement:  Line sutured, chlorhexidine patch, sterile dressing applied and blood return through all ports  Complications: No    Estimated blood loss (mL):  3  Specimens: No    Implants: No    XRay:  Placement verified by x-ray, no pneumothorax on x-ray and successful placement  Adverse Events:  NoneTermination Site: superior vena cava          8/15/2023     3:01 PM 8/15/2023     2:01 PM 8/15/2023     1:01 PM " 8/15/2023    12:01 PM 8/15/2023    11:01 AM 8/15/2023    10:01 AM 8/15/2023     9:25 AM   TXP LVAD INTERROGATIONS   Type Impella Impella Impella Impella Impella Impella Impella   Pulsatility Pulse Pulse Pulse Pulse Pulse Pulse Pulse         8/23/2023

## 2023-08-23 NOTE — PT/OT/SLP PROGRESS
Occupational Therapy      Patient Name:  Itz Daniel   MRN:  [id number]    Patient not seen today secondary to Other (Comment) (Spoke with RN prior to therapy attempt. Pt with goals of care discussion today with medical team. OT to sign off at this time.). Please re-consult if appropriate.    Zulema Moon OT  8/23/2023

## 2023-08-23 NOTE — ASSESSMENT & PLAN NOTE
No acute processes on brain imaging. Currently on pressors. Persistent tonic clonic movements on bilateral upper extremities with loss of consciousness. Primary Team administered keppra. At the moment without medications related to decrease seizure threshold. Current course possibly related to acute electrolyte imbalance and YVES in the setting of CKD (GFR 15.2). EEG with highly stereotyped episodes (3 episodes) and added to the patient's history of previous right frontal brain infarct highly concerning of focal seizure.    Plan:    - Continue keppra 500mg BID per primary team. Monitor renal function as this is max dose based on cr clearance     - Recommend adding topamax 25mg QD per primary team. Patient with persistent focal seizures and worsening LFTs (transaminitis). Maxed out on his LEV based on renal function. However, patient and family currently considering palliative care process.  - Seizure diagnosis and medications discussed with patient and in agreement, including side effects (sedation).   - Correct YVES in the setting of CKD for seizure threshold  - Follow up renal function   - Optimize medications   - Optimize electrolytes (Mg, PO)  - Monitor glucose levels

## 2023-08-23 NOTE — PT/OT/SLP DISCHARGE
Physical Therapy Discharge Summary    Name: Itz Daniel  MRN: [id number]   Principal Problem: Ischemic cardiomyopathy     Patient Discharged from acute Physical Therapy on 23.  Please refer to prior PT noted date on 23 for functional status.     Assessment:     Patient has not met goals.    Objective:     GOALS:   Multidisciplinary Problems       Physical Therapy Goals          Problem: Physical Therapy    Goal Priority Disciplines Outcome Goal Variances Interventions   Physical Therapy Goal     PT, PT/OT Ongoing, Progressing     Description: Goals to be met by: 23     Patient will increase functional independence with mobility by performin. Supine to sit with Modified [place]  2. Sit to stand transfer with Modified [place]  3. Gait  x 250 feet with Supervision .   4. Upper extremity exercise program x10 reps per handout, with supervision                         Reasons for Discontinuation of Therapy Services  Therapist determines that the patient will no longer benefit from therapy services.      Plan:     Patient Discharged to:  pt is medically unstable and skilled PT is not needed.  .      2023

## 2023-08-23 NOTE — PROGRESS NOTES
[facility] - Cardiac Intensive Care  Cardiac Electrophysiology  Progress Note    Admission Date: 8/11/2023  Code Status: Full Code   Attending Physician: Aracelis Cuellar DO   Expected Discharge Date: 9/1/2023  Principal Problem:Ischemic cardiomyopathy    Subjective:     Interval History: Patient overnight continued to have NSVT and was started on lidocaine with improvement on frequency of episodes     Review of Systems   Constitutional: Positive for malaise/fatigue.   Respiratory:  Positive for shortness of breath.    Neurological:  Positive for weakness.     Objective:     Vital Signs (Most Recent):  Temp: 98.6 °F (37 °C) (08/23/23 1105)  Pulse: 80 (08/23/23 1220)  Resp: 19 (08/23/23 1220)  BP: (!) 80/53 (08/23/23 1205)  SpO2: 100 % (08/23/23 1220) Vital Signs (24h Range):  Temp:  [97.7 °F (36.5 °C)-98.7 °F (37.1 °C)] 98.6 °F (37 °C)  Pulse:  [] 80  Resp:  [9-33] 19  SpO2:  [75 %-100 %] 100 %  BP: ()/(51-75) 80/53  Arterial Line BP: ()/(47-61) 124/58     Weight: 125.2 kg (276 lb)  Body mass index is 35.44 kg/m².     SpO2: 100 %        Physical Exam   Vitals and nursing note reviewed.   Constitutional:       General: He is not in acute distress.     Appearance: Normal appearance. He is normal weight. He is not ill-appearing.   HENT:      Head: Normocephalic and atraumatic.   Eyes:      Pupils: Pupils are equal, round, and reactive to light.   Neck:      Comments: Left IJ PA catheter  Cardiovascular:      Rate and Rhythm: Normal rate and regular rhythm.      Pulses: Normal pulses.      Heart sounds: Normal heart sounds. No murmur heard.     No friction rub. No gallop.   Pulmonary:      Effort: Pulmonary effort is normal. No respiratory distress.      Breath sounds: No wheezing or rhonchi.      Comments: Bibasilar crackles  Abdominal:      General: Abdomen is flat. Bowel sounds are normal.      Palpations: Abdomen is soft.   Musculoskeletal:         General: Normal range of motion.      Cervical  back: Normal range of motion and neck supple.      Right lower leg: No edema.      Left lower leg: No edema.      Comments: Warm BL LEs   Skin:     General: Skin is warm and dry.      Capillary Refill: Capillary refill takes less than 2 seconds.   Neurological:      General: No focal deficit present.      Mental Status: He is alert.        Significant Labs: All pertinent lab results from the last 24 hours have been reviewed.      Assessment and Plan:     NSVT (nonsustained ventricular tachycardia)  Patient with recurrent and frequent  NSVT in the setting of ischemic cardiomyopathy in cardiogenic shock on inotrope. Patient on amiodarone and rebolus today with continuous episodes refractory to medication. Patient has a sub Q ICD    Recommendation  -Antitachycardia therapies turned off per patients wishes  -Continue Amiodarone at 1mg/min  -Continue Lidocaine  -Titrate down Dobutamine as able  -Patient critically ill and currently having discussion with primary team and palliative in regards to goals of care           Nadia Thompson MD  Cardiac Electrophysiology  [facility] - Cardiac Intensive Care

## 2023-08-24 PROBLEM — Z51.5 COMFORT MEASURES ONLY STATUS: Status: ACTIVE | Noted: 2023-01-01

## 2023-08-24 NOTE — CARE UPDATE
Hemodynamics     8PM  CVP: 18  SCVO2: 49  Cardiac Output: 8.1  Cardiac Index: 3.2  SVR: 462  On CRRT. Pressor requirements increasing. Confirmed DNR status. Remains critically ill     Continuous Infusions:   sodium chloride 0.9% 200 mL/hr at 08/24/23 0105    sodium chloride 0.9% 5 mL/hr at 08/24/23 0105    amiodarone in dextrose 5% 1 mg/min (08/24/23 0105)    DOBUTamine IV infusion (non-titrating) 2.5 mcg/kg/min (08/24/23 0105)    furosemide (LASIX) 500 mg in 50 mL infusion (conc: 10 mg/mL) 40 mg/hr (08/24/23 0427)    insulin regular 1 units/mL infusion orderable (TRANSFER) 1.8 Units/hr (08/24/23 0121)    LIDOcaine 1 mg/min (08/24/23 0105)    nitric oxide gas      NORepinephrine bitartrate-D5W 0.42 mcg/kg/min (08/24/23 0105)    sodium chloride 0.9%      vasopressin 0.06 Units/min (08/24/23 0430)       Intake/Output Summary (Last 24 hours) at 8/24/2023 0438  Last data filed at 8/24/2023 0105  Gross per 24 hour   Intake 3426.19 ml   Output 2961 ml   Net 465.19 ml       Case discussed with on call attending.    Fransisco Ivan MD  Cardiology Fellow, PGY5

## 2023-08-24 NOTE — CARE UPDATE
Rediscounted GOC with patient and father. Patient wishes to be full DNR, no compressions or intubations. Code status updated.

## 2023-08-24 NOTE — CARE UPDATE
Called to bedside for worsening dyspnea. Patient remains critically ill, on CRTT, high dose pressors. Patient requesting comfort measures. He is competent. Father at bedside also agrees with comfort. Comfort care order set placed with prn ativan and morphine. Will start deescalating support at this time.     HTS attending updated on plan

## 2023-08-24 NOTE — PROGRESS NOTES
08/24/23 0415   Treatment   Treatment Type SLED   Treatment Status Restart;Daily equipment check   Dialysis Machine Number [id number]   Dialyzer Time (hours) 0   BVP (Liters) 0 L   Solutions Labeled and Current  Yes   Access Temporary Cath;Right;IJ   Catheter Dressing Intact  Yes   Alarms Engaged Yes   CRRT Comments CRRT RST/daily check   $ CRRT Charges   $ CRRT Charges Restart   $ CRRT Daily Assessment Complete   $ CRRT Daily Maintenance Complete     Report received from primary RN. CRRT restarted per MD order. Daily check completed. Orders and machine settings verified.

## 2023-08-24 NOTE — DISCHARGE SUMMARY
[facility] - Cardiac Intensive Care  Heart Transplant  Discharge Summary      Patient Name: Itz Daniel  MRN: [id number]  Admission Date: 8/11/2023  Hospital Length of Stay: 13 days  Discharge Date and Time: 08/24/2023 7:30 AM  Attending Physician: Aracelis Cuellar DO   Discharging Provider: Paul Roach MD  Primary Care Provider: Sunday Boo MD     HPI: 52-year-old male with a past medical history of ICM, CABG in 2017 (LIMA to LAD, SVG to OM1, SVG to PDA), DM type II, CKD stage IV (baseline 1.8), and ICD who presented [place] ED on 8/10//23 due to n/v and SOB. Work up concerning for NSTEMI with peak trop of 38. Started on ACS protocol with asa, ticagrelor, and heparin gtt. Also noted to have YVES with Cr 2.5, volume overloaded with BNP of 796, LA 2.8>>1.0. TTE with EF drop from 30 to 15%, LVEDD 6.15, moderately reduced RV function. LHC/RHC on 8/12: s/p MIRELLA to prox Cx. RHC showed RA 20, RV 81/21, PA 81/47 (mean 61), PWCP 45, CO/CI: 3.69/1.52 (VIKKI)  3.91/1.6 (TD), therefore, impella CP was placed in in right femoral artery and leave in swan in right femoral vein. [place] [facility] [place]. Of note, never started on inotropes or pressors. Was receiving metoprolol.     On arrival patient was hemodynamically stable.  Not on any inotropes or pressors.  Impella at P7  Initial hemodynamics  CVP: 9  SVO2:  44  Cardiac Output: 4.9  Cardiac Index: 2.0  SVR: 1250     LHC on 8/12  Grafts:  SVG-RCA:  Diffuse disease with narrowing of up to 40-50% in the proximal portion  The native PLB and PDA both diffusely diseased.  SVG-OM: Occluded  LIMA-LAD:  Widely patent.  Diffusely diseased native LAD with narrowing of up to 90% in the very apical portion.  Severe native CAD status post PCI of the proximal circumflex with a 33 x 24 mm Synergy XD stent (post-dilated up to 4.25 mm)      Previous Cardiac Diagnostics:   TTE 7.1.22  The study quality is average.   The left ventricle is  mildly enlarged. Global left ventricular systolic function is severely decreased. The left ventricular ejection fraction is 30%.   Mild (1+) mitral regurgitation.  The pulmonary artery systolic pressure is 10 mmHg. The pulmonary artery appears to be normal.     [facility] 5.19.2017  Severe MVCAD as a cause to severe newly diagnosed ischemic congestive heart failure with EF of 20%  Mild MR  Patent L subclavian, & LIMA suitable for bypass graft conduit     BLE Arterial US 10.18.21  The study quality is average.   The arteries of the left lower extremity appear patent with no significant stenosis noted.   Tri-phasic waveforms are obtained throughout the left lower extremity arteries.      Carotid US 10.18.21  The study quality is average.   1-39% stenosis in the proximal right internal carotid artery based on Bluth Criteria.   1-39% stenosis in the proximal left internal carotid artery based on Bluth Criteria.   Less than 50% stenosis throughout the bilateral common carotid arteries.   Antegrade bilateral vertebral artery flow.         Procedure(s) (LRB):  Impella, Removal (Right)  INSERTION, CATHETER, SWAN-MARLENI, WITH IMAGING GUIDANCE (Left)  REMOVAL, CATHETER, CENTRAL VENOUS, TUNNELED (N/A)     Hospital Course: 54yo M patient with past medical history of ischemic cardiomyopathy status post CABG in 2017 who presented as a transfer from outside hospital on 08/11/23 after he presented with an NSTEMI status post revascularization to proximal circumflex.  Associated cardiogenic shock noted from right heart catheterization, CP Impella placed and eventually removed. Since removal of Impella, patient had elevated PAP and PCWP, and CVP 10. Kidney function and diuresis slowly worsened, eventually developing YVES on CKD in the setting of acute heart failure. Patient continued decompensating with transaminitis and respiratory distress. Hemodialysis was started, but patient and family decided on DNR/DNI and comofort measures. Patient   on 23.      Goals of Care Treatment Preferences:  Code Status: DNR    Health care agent: Ozzie Daniel  [facility] care agent number: [phone]          What is most important right now is to focus on remaining as independent as possible, extending life as long as possible, even it it means sacrificing quality.  Accordingly, we have decided that the best plan to meet the patient's goals includes continuing with treatment.      Consults (From admission, onward)        Status Ordering Provider     Inpatient consult to Electrophysiology  Once        Provider:  (Not yet assigned)    Completed ODALIS LOU     Inpatient consult to Midline team  Once        Provider:  (Not yet assigned)    Completed MICHELLE ROTSTSIXTO, EHSAN     Inpatient consult to Neurology  Once        Provider:  (Not yet assigned)    Completed SUSYOMSIMONE ROTSTAIN, EHSAN     Inpatient consult to Cardiothoracic Surgery  Once        Provider:  (Not yet assigned)    Completed LILO MULTANI     Inpatient consult to Palliative Care  Once        Provider:  (Not yet assigned)    Completed SUSYOMSIMONE ROTSTAIN, EHSAN     Inpatient consult to Interventional Cardiology  Once        Provider:  (Not yet assigned)    Completed PORJESSICAOMINSKY ROTSTAIN, EHSAN     Inpatient consult to Nephrology  Once        Provider:  (Not yet assigned)    Completed MICHELLE ROTSTAIN, EHSAN     Inpatient consult to Infectious Diseases  Once        Provider:  (Not yet assigned)    Completed AMRITA HERNÁNDEZ     Inpatient consult to Endocrinology  Once        Provider:  (Not yet assigned)    Completed AMRITA HERNÁNDEZ              Pending Diagnostic Studies:     Procedure Component Value Units Date/Time    Basic metabolic panel [[id number]] Collected: 23    Order Status: Sent Lab Status: In process Updated: 23    Specimen: Blood     Basic metabolic panel [[id number]] Collected: 23 0320    Order Status: Sent Lab Status: In  process Updated: 23    Specimen: Blood     Magnesium [[id number]] Collected: 23    Order Status: Sent Lab Status: In process Updated: 23    Specimen: Blood     Renal function panel [[id number]] Collected: 23    Order Status: Sent Lab Status: In process Updated: 23    Specimen: Blood         Final Active Diagnoses:    Diagnosis Date Noted POA    PRINCIPAL PROBLEM:  Ischemic cardiomyopathy [I25.5] 2017 Yes    Comfort measures only status [Z51.5] 2023 Not Applicable    Advance care planning [Z71.89] 2023 Not Applicable    Palliative care encounter [Z51.5] 2023 Not Applicable    NSVT (nonsustained ventricular tachycardia) [I47.29] 2023 Unknown    Tonic-clonic generalized seizure [G40.409] 2023 No    Encounter for palliative care [Z51.5] 2023 Not Applicable    Acute renal failure with acute tubular necrosis superimposed on stage 3b chronic kidney disease [N17.0, N18.32] 2023 Yes    Acute on chronic combined systolic and diastolic heart failure [I50.43] 2023 Yes    Cardiogenic shock [R57.0] 2023 Yes    Class 1 obesity with body mass index (BMI) of 33.0 to 33.9 in adult [E66.9, Z68.33] 2023 Not Applicable    Toe amputee [Z89.429] 2023 Not Applicable    CKD (chronic kidney disease), stage IV [N18.4] 2023 Yes    Hypothyroidism [E03.9] 2023 Yes    NSTEMI (non-ST elevated myocardial infarction) [I21.4] 08/10/2023 Yes    DM (diabetes mellitus) [E11.9] 2017 Unknown      Problems Resolved During this Admission:    Diagnosis Date Noted Date Resolved POA    Bacteremia [R78.81] 2023 Unknown      Discharged Condition:     Disposition:     Follow Up:    Patient Instructions:   No discharge procedures on file.  Medications:  None    Paul Roach MD  Heart Transplant  [facility] - Cardiac Intensive Care

## 2023-08-24 NOTE — HOSPITAL COURSE
54yo M patient with past medical history of ischemic cardiomyopathy status post CABG in 2017 who presented as a transfer from outside hospital on 23 after he presented with an NSTEMI status post revascularization to proximal circumflex.  Associated cardiogenic shock noted from right heart catheterization, CP Impella placed and eventually removed. Since removal of Impella, patient had elevated PAP and PCWP, and CVP 10. Kidney function and diuresis slowly worsened, eventually developing YVES on CKD in the setting of acute heart failure. Patient continued decompensating with transaminitis and respiratory distress. Hemodialysis was started, but patient and family decided on DNR/DNI and comofort measures. Patient  on 23.

## 2023-08-24 NOTE — CARE UPDATE
Death Note    I was called to bedside on 8/24/2023 at 0600. Nursing at beside, nursing supervisor notified.  has been notified. Family at bedside.     Patient is not responding to verbal or tactile stimuli. No heart or breath sounds on auscultation. No respirations. No palpable pulses. Patient does not have a pupillary or corneal reflex. Patient's pupils are fixed and dilated.      Time of death is 8/24/2023  at 0556        Cause of Death: cardiogenic shock         Signing Physician:    Fransisco Ivan MD   Cardiology Fellow, PGY5  [facility] Contact  Email: [email or web address]  Cell: [phone]

## 2023-08-28 VITALS
DIASTOLIC BLOOD PRESSURE: 72 MMHG | OXYGEN SATURATION: 95 % | BODY MASS INDEX: 35.42 KG/M2 | TEMPERATURE: 99 F | RESPIRATION RATE: 38 BRPM | SYSTOLIC BLOOD PRESSURE: 103 MMHG | WEIGHT: 276 LBS | HEIGHT: 74 IN

## 2023-08-28 LAB
POC ACTIVATED CLOTTING TIME K: 155 SEC (ref 74–137)
SAMPLE: ABNORMAL

## 2023-08-30 NOTE — DISCHARGE SUMMARY
[facility] Internal Medicine Discharge Summary    Admitting Physician: Yordy Victoria MD  Attending Physician: No att. providers found  Date of Admit: 8/10/2023  Date of Discharge: 8/11/23    Discharge to: Discharged to Other Faci*   Condition: Critical    Discharge Diagnoses     Patient Active Problem List   Diagnosis    Chronic systolic CHF (congestive heart failure), NYHA class 3    Ischemic cardiomyopathy    DM (diabetes mellitus)    NSTEMI (non-ST elevated myocardial infarction)    CKD (chronic kidney disease), stage IV    Hypothyroidism    Acute on chronic combined systolic and diastolic heart failure    Cardiogenic shock    Class 1 obesity with body mass index (BMI) of 33.0 to 33.9 in adult    Toe amputee    CAD (coronary artery disease)    COVID-19    Acute renal failure with acute tubular necrosis superimposed on stage 3b chronic kidney disease    Encounter for palliative care    Tonic-clonic generalized seizure    Advance care planning    Palliative care encounter    NSVT (nonsustained ventricular tachycardia)    Comfort measures only status       Consultants and Procedures     Consultants:       Brief History of Present Illness      This is a 52-year-old male with a past medical history of DM type II, CAD with prior CABG in 2017, CKD stage IV, chronic systolic heart failure with last EF of 30% in 2017, and ICD who presented to the ED on 8/102/23 with c/o nausea, vomiting, and shortness of breath. Cardiology was consulted for NSTEMI and started heparin drip and loaded with Brilinta and aspirin. BUN 2.51 and creatinine of 2.51. . Echo revealed an EF of 15%, severely dilated left atrium, mild MR. Blood culture with gram positive cocci, probable staph. BCID panel positive for staphylococcus spp. He was taken to cath lab, ballooned proximal circ and placed MIRELLA, previous bypass to OM was occluded. Echo at that time revealed an EF of 10%, Impella was placed. He was then transferred post-procedure to the ICU.      Hospital Course with Pertinent Findings     Patient was brought to ICU after postprocedure.  Patient underwent left heart catheterization on 08/11/2023.  Cardiology and cardio thoracic surgery was following the patient while he was brought to ICU.  With cardiology recommendation patient was transferred to [place] at [facility] for consideration for LVAD placement.  Patient did not require any pressors while patient was in ICU.  Patient was discharge to neurologist at [facility] for consideration for LVAD placement on 08/11/2023.    Discharge physical exam:  Vitals:    08/11/23 1800   BP: 109/85   Pulse: (!) 111   Resp: (!) 38   Temp:      Physical Exam  Vitals reviewed.   Constitutional:       Appearance: Normal appearance.   HENT:      Head: Normocephalic and atraumatic.   Cardiovascular:      Rate and Rhythm: Normal rate and regular rhythm.      Comments: Impella inserted in right groin  Pulmonary:      Effort: Pulmonary effort is normal.      Breath sounds: Normal breath sounds.   Abdominal:      General: Bowel sounds are normal.      Palpations: Abdomen is soft.   Neurological:      General: No focal deficit present.      Mental Status: He is alert.   Psychiatric:         Mood and Affect: Mood normal.             TIME SPENT ON DISCHARGE: 60 minutes    Discharge Medications        Medication List        ASK your doctor about these medications      aspirin 81 MG EC tablet  Commonly known as: ECOTRIN     FARXIGA 10 mg tablet  Generic drug: dapagliflozin propanediol     LANTUS SOLOSTAR U-100 INSULIN glargine 100 units/mL SubQ pen  Generic drug: insulin     levothyroxine 125 MCG tablet  Commonly known as: SYNTHROID     metoprolol succinate 25 MG 24 hr tablet  Commonly known as: TOPROL-XL     NovoLOG FlexPen U-100 Insulin 100 unit/mL (3 mL) Inpn pen  Generic drug: insulin aspart U-100     rosuvastatin 40 MG Tab  Commonly known as: CRESTOR     torsemide 100 MG Tab  Commonly known as: DEMADEX              Discharge  Information:     Stable to transfer patient to [facility] in [place] for consideration for LVAD placement    Nalini Mcbride,   Internal Medicine - PGY-2

## 2023-08-30 NOTE — PHYSICIAN QUERY
PT Name: Itz Daniel  MR #: [id number]     DOCUMENTATION CLARIFICATION     CDS/: Caitlin Mccollum RN              Contact information: [email or web address]  This form is a permanent document in the medical record.     Query Date: August 30, 2023    By submitting this query, we are merely seeking further clarification of documentation.  Please utilize your independent clinical judgment when addressing the question(s) below.    The Medical Record contains the following   Indicators Supporting Clinical Findings Location in Medical Record   x Heart Failure documented Pt presented to ED on 8/10/23 with  N/V/SOB/CP. ED eval revealed NSTEMI, troponin > 23, and acute on chronic CHF. He was treated with Lasix IV and Heparin gtt. ECHO showed severe global hypokenesis EF 15%, dilated LA, mod decr RV EF. He went for urgent LHC this morning, proximal circ was  ballooned + MIRELLA, previous bypass to OM was occluded, impella was placed and transferred to the ICU.    Acute on chronic systolic heart failure with ejection fraction-15-20%      Patient Active Problem List :  Diagnosis   Chronic systolic CHF (congestive heart failure), NYHA class 3   Ischemic cardiomyopathy   DM (diabetes mellitus)   NSTEMI (non-ST elevated myocardial infarction)   CKD (chronic kidney disease), stage IV   Hypothyroidism   Acute on chronic combined systolic and diastolic heart failure   Consult 8/11       Nephrology            PN 8/11           Discharge Summary 8/11       Pulmonary   x BNP   H&P 8/10       HM     x EF/Echo   Left Ventricle: The left ventricle is mildly dilated. Severe global hypokinesis present. There is severely reduced systolic function with a visually estimated ejection fraction of 15%.    Left Atrium: Left atrium is severely dilated.    Right Ventricle: Systolic function is moderately reduced. TAPSE is 1.13.    Right Atrium: Right atrium is mildly dilated.    Mitral Valve: Mild mitral annular  calcification. There is mild regurgitation.    IVC/SVC: Normal venous pressure at 3 mmHg.    No pul htn    ECHO 8/11   x Radiology findings Impella and Zion-Tariq catheter placement.Mild pulmonary vascular congestion.   CXR 8/11   x Subjective/  Objective Respiratory Conditions  Patient was mildly hypoxemic in the ED, tachycardic and borderline low BP.  PN 8/11       HM   x Recent/Current MI NSTEMI-suspect type 1 PN 8/11       HM    Heart Transplant, LVAD     x Edema, JVD On exam has JVD about 5 cm, H&P 8/10       HM    Ascites     x Diuretics/Meds On torsemide for decompensated heart failure PN 8/11       HM    Other Treatment      Other       Heart failure is a clinical diagnosis which includes symptomatic fluid retention, elevated intracardiac pressures, and/or the inability of the heart to deliver adequate blood flow.    Heart Failure with reduced Ejection Fraction (HFrEF) or Systolic Heart Failure (loses ability to contract normally, EF is <40%)    Heart Failure with preserved Ejection Fraction (HFpEF) or Diastolic Heart Failure (stiff ventricles, does not relax properly, EF is >50%)     Heart Failure with Combined Systolic and Diastolic Failure (stiff ventricles, does not relax properly and EF is <50%)    Mid-range or mildly reduced ejection fraction (HFmrEF) is classified as systolic heart failure.  Congestive heart failure with a recovered EF is classified as Diastolic Heart Failure.  Common clues to acute exacerbation:  Rapidly progressive symptoms (w/in 2 weeks of presentation), using IV diuretics, using supplemental O2, pulmonary edema on Xray, new or worsening pleural effusion, +JVD or other signs of volume overload, MI w/in 4 weeks, and/or BNP >500  The clinical guidelines noted are only system guidelines, and do not replace the providers clinical judgment.    Provider, please clarify the type and acuity of the Heart Failure diagnosis associated with the above clinical findings.    [ x  ]  Acute on  Chronic Systolic Heart Failure (HFrEF or HFmrEF) - worsening of CHF signs/symptoms in preexisting CHF   [   ]  Chronic Systolic Heart Failure (HFrEF or HFmrEF) - preexisting and stable   [   ]  Acute on Chronic Combined Systolic and Diastolic Heart Failure - worsening of CHF signs/symptoms in preexisting CHF   [   ]  Other (please specify): ___________________________________         Please document in your progress notes daily for the duration of treatment until resolved and include in your discharge summary.    References:  American Heart Association editorial staff. (2017, May). Ejection Fraction Heart Failure Measurement. American Heart Association. https://www.heart.org/en/health-topics/heart-failure/diagnosing-heart-failure/ejection-fraction-heart-failure-measurement#:~:text=Ejection%20fraction%20(EF)%20is%20a,pushed%20out%20with%20each%20heartbeat  OLLIE Humphreys (2020, December 15). Heart failure with preserved ejection fraction: Clinical manifestations and diagnosis. [employer]. https://www.[employer].com/contents/heart-failure-with-preserved-ejection-fraction-clinical-manifestations-and-diagnosis.  ICD-10-CM/PCS Coding Clinic Third Quarter ICD-10, Effective with discharges: September 8, 2020 [facility] § Heart failure with mid-range or mildly reduced ejection fraction (2020).  ICD-10-CM/PCS Coding Clinic Third Quarter ICD-10, Effective with discharges: September 8, 2020 [facility] § Heart failure with recovered ejection fraction (2020).  Form No. [id number]
